# Patient Record
Sex: FEMALE | Race: WHITE | HISPANIC OR LATINO | Employment: OTHER | URBAN - METROPOLITAN AREA
[De-identification: names, ages, dates, MRNs, and addresses within clinical notes are randomized per-mention and may not be internally consistent; named-entity substitution may affect disease eponyms.]

---

## 2017-12-13 ENCOUNTER — CONVERSION ENCOUNTER (OUTPATIENT)
Dept: MAMMOGRAPHY | Facility: CLINIC | Age: 52
End: 2017-12-13

## 2018-06-11 LAB
ABSOL LYMPHOCYTES (HISTORICAL): 3.7 K/UL (ref 0.5–4)
ALBUMIN SERPL BCP-MCNC: 4 G/DL (ref 3–5.2)
ALP SERPL-CCNC: 114 U/L (ref 43–122)
ALT SERPL W P-5'-P-CCNC: 28 U/L (ref 9–52)
ANION GAP SERPL CALCULATED.3IONS-SCNC: 11 MMOL/L (ref 5–14)
AST SERPL W P-5'-P-CCNC: 17 U/L (ref 14–36)
BASOPHILS # BLD AUTO: 0.1 K/UL (ref 0–0.1)
BASOPHILS # BLD AUTO: 1 % (ref 0–1)
BILIRUB SERPL-MCNC: 0.4 MG/DL
BILIRUBIN DIRECT (HISTORICAL): 0.2 MG/DL
BUN SERPL-MCNC: 14 MG/DL (ref 5–25)
CALCIUM SERPL-MCNC: 10 MG/DL (ref 8.4–10.2)
CHLORIDE SERPL-SCNC: 97 MEQ/L (ref 97–108)
CHOLEST SERPL-MCNC: 163 MG/DL
CHOLEST/HDLC SERPL: 3.3 {RATIO}
CO2 SERPL-SCNC: 30 MMOL/L (ref 22–30)
CREATINE, SERUM (HISTORICAL): 0.51 MG/DL (ref 0.6–1.2)
DEPRECATED RDW RBC AUTO: 14.1 %
EGFR (HISTORICAL): >60 ML/MIN/1.73 M2
EOSINOPHIL # BLD AUTO: 0.3 K/UL (ref 0–0.4)
EOSINOPHIL NFR BLD AUTO: 2 % (ref 0–6)
GLUCOSE FASTING (HISTORICAL): 217 MG/DL (ref 70–99)
HCT VFR BLD AUTO: 43.7 % (ref 36–46)
HDLC SERPL-MCNC: 49 MG/DL
HGB BLD-MCNC: 14.3 G/DL (ref 12–16)
LDL/HDL RATIO (HISTORICAL): 1.5
LDLC SERPL CALC-MCNC: 73 MG/DL
LYMPHOCYTES NFR BLD AUTO: 34 % (ref 25–45)
MCH RBC QN AUTO: 26.9 PG (ref 26–34)
MCHC RBC AUTO-ENTMCNC: 32.7 % (ref 31–36)
MCV RBC AUTO: 82 FL (ref 80–100)
MONOCYTES # BLD AUTO: 0.5 K/UL (ref 0.2–0.9)
MONOCYTES NFR BLD AUTO: 4 % (ref 1–10)
NEUTROPHILS ABS COUNT (HISTORICAL): 6.5 K/UL (ref 1.8–7.8)
NEUTS SEG NFR BLD AUTO: 59 % (ref 45–65)
PLATELET # BLD AUTO: 251 K/MCL (ref 150–450)
POTASSIUM SERPL-SCNC: 4.6 MEQ/L (ref 3.6–5)
RBC # BLD AUTO: 5.32 M/MCL (ref 4–5.2)
SODIUM SERPL-SCNC: 139 MEQ/L (ref 137–147)
TOTAL PROTEIN (HISTORICAL): 7.4 G/DL (ref 5.9–8.4)
TRIGL SERPL-MCNC: 203 MG/DL
VLDLC SERPL CALC-MCNC: 41 MG/DL (ref 0–40)
WBC # BLD AUTO: 11.1 K/MCL (ref 4.5–11)

## 2018-06-22 ENCOUNTER — TRANSCRIBE ORDERS (OUTPATIENT)
Dept: ADMINISTRATIVE | Facility: HOSPITAL | Age: 53
End: 2018-06-22

## 2018-06-22 DIAGNOSIS — R51.9 FACIAL PAIN: ICD-10-CM

## 2018-06-22 DIAGNOSIS — R10.30 INGUINAL PAIN, UNSPECIFIED LATERALITY: Primary | ICD-10-CM

## 2018-06-26 ENCOUNTER — HOSPITAL ENCOUNTER (OUTPATIENT)
Dept: ULTRASOUND IMAGING | Facility: HOSPITAL | Age: 53
Discharge: HOME | End: 2018-06-26
Payer: MEDICARE

## 2018-06-26 ENCOUNTER — HOSPITAL ENCOUNTER (OUTPATIENT)
Dept: CT IMAGING | Facility: HOSPITAL | Age: 53
Discharge: HOME | End: 2018-06-26
Payer: MEDICARE

## 2018-06-26 DIAGNOSIS — R10.9 ABDOMINAL PAIN: ICD-10-CM

## 2018-06-26 DIAGNOSIS — R51.9 FACIAL PAIN: ICD-10-CM

## 2018-06-26 DIAGNOSIS — R10.30 INGUINAL PAIN, UNSPECIFIED LATERALITY: ICD-10-CM

## 2018-06-26 PROCEDURE — 70450 CT HEAD/BRAIN W/O DYE: CPT

## 2018-06-26 PROCEDURE — 76700 US EXAM ABDOM COMPLETE: CPT

## 2018-06-26 PROCEDURE — 76856 US EXAM PELVIC COMPLETE: CPT

## 2018-06-26 PROCEDURE — 76830 TRANSVAGINAL US NON-OB: CPT

## 2018-07-11 DIAGNOSIS — F32.A DEPRESSION, UNSPECIFIED DEPRESSION TYPE: Primary | ICD-10-CM

## 2018-07-11 DIAGNOSIS — J45.909 UNCOMPLICATED ASTHMA, UNSPECIFIED ASTHMA SEVERITY, UNSPECIFIED WHETHER PERSISTENT: ICD-10-CM

## 2018-07-11 RX ORDER — CALCIUM CARBONATE/VITAMIN D3 600 MG-20
TABLET,CHEWABLE ORAL
Qty: 60 TABLET | Refills: 5 | Status: SHIPPED | OUTPATIENT
Start: 2018-07-11 | End: 2018-09-07 | Stop reason: SDUPTHER

## 2018-07-11 RX ORDER — ESCITALOPRAM OXALATE 10 MG/1
TABLET ORAL
Qty: 30 TABLET | Refills: 3 | Status: SHIPPED | OUTPATIENT
Start: 2018-07-11 | End: 2018-09-07 | Stop reason: SDUPTHER

## 2018-07-24 ENCOUNTER — TRANSCRIBE ORDERS (OUTPATIENT)
Dept: ADMINISTRATIVE | Facility: HOSPITAL | Age: 53
End: 2018-07-24

## 2018-07-24 ENCOUNTER — LAB (OUTPATIENT)
Dept: LAB | Facility: HOSPITAL | Age: 53
End: 2018-07-24
Payer: MEDICARE

## 2018-07-24 DIAGNOSIS — M54.2 CERVICALGIA: ICD-10-CM

## 2018-07-24 DIAGNOSIS — E11.9 TYPE 2 DIABETES MELLITUS WITHOUT COMPLICATION, UNSPECIFIED WHETHER LONG TERM INSULIN USE (HCC): ICD-10-CM

## 2018-07-24 DIAGNOSIS — R11.0 NAUSEA: ICD-10-CM

## 2018-07-24 DIAGNOSIS — D51.9 ANEMIA DUE TO VITAMIN B12 DEFICIENCY, UNSPECIFIED B12 DEFICIENCY TYPE: ICD-10-CM

## 2018-07-24 DIAGNOSIS — E11.9 TYPE 2 DIABETES MELLITUS WITHOUT COMPLICATION, UNSPECIFIED WHETHER LONG TERM INSULIN USE (HCC): Primary | ICD-10-CM

## 2018-07-24 LAB
AMYLASE SERPL-CCNC: 56 IU/L (ref 30–110)
ANION GAP SERPL CALCULATED.3IONS-SCNC: 10 MMOL/L (ref 5–14)
BASOPHILS # BLD AUTO: 0.1 THOUSANDS/ΜL (ref 0–0.1)
BASOPHILS NFR BLD AUTO: 1 % (ref 0–1)
BUN SERPL-MCNC: 15 MG/DL (ref 5–25)
CALCIUM SERPL-MCNC: 9.8 MG/DL (ref 8.4–10.2)
CHLORIDE SERPL-SCNC: 100 MMOL/L (ref 97–108)
CO2 SERPL-SCNC: 31 MMOL/L (ref 22–30)
CREAT SERPL-MCNC: 0.52 MG/DL (ref 0.6–1.2)
EOSINOPHIL # BLD AUTO: 0.3 THOUSAND/ΜL (ref 0–0.4)
EOSINOPHIL NFR BLD AUTO: 3 % (ref 0–6)
ERYTHROCYTE [DISTWIDTH] IN BLOOD BY AUTOMATED COUNT: 14.2 %
ERYTHROCYTE [SEDIMENTATION RATE] IN BLOOD: 7 MM/HOUR (ref 1–20)
EST. AVERAGE GLUCOSE BLD GHB EST-MCNC: 240 MG/DL
FERRITIN SERPL-MCNC: 52 NG/ML (ref 8–388)
FOLATE SERPL-MCNC: 8 NG/ML (ref 3.1–17.5)
GFR SERPL CREATININE-BSD FRML MDRD: 109 ML/MIN/1.73SQ M
GLUCOSE P FAST SERPL-MCNC: 313 MG/DL (ref 70–99)
HBA1C MFR BLD: 10 % (ref 4.2–6.3)
HCT VFR BLD AUTO: 41.9 % (ref 36–46)
HGB BLD-MCNC: 13.8 G/DL (ref 12–16)
LIPASE SERPL-CCNC: 103 U/L (ref 23–300)
LYMPHOCYTES # BLD AUTO: 4 THOUSANDS/ΜL (ref 0.5–4)
LYMPHOCYTES NFR BLD AUTO: 41 % (ref 20–50)
MCH RBC QN AUTO: 27.4 PG (ref 26–34)
MCHC RBC AUTO-ENTMCNC: 32.9 G/DL (ref 31–36)
MCV RBC AUTO: 83 FL (ref 80–100)
MONOCYTES # BLD AUTO: 0.4 THOUSAND/ΜL (ref 0.2–0.9)
MONOCYTES NFR BLD AUTO: 4 % (ref 1–10)
NEUTROPHILS # BLD AUTO: 5 THOUSANDS/ΜL (ref 1.8–7.8)
NEUTS SEG NFR BLD AUTO: 51 % (ref 45–65)
PLATELET # BLD AUTO: 260 THOUSANDS/UL (ref 150–450)
PMV BLD AUTO: 9 FL (ref 8.9–12.7)
POTASSIUM SERPL-SCNC: 4.8 MMOL/L (ref 3.6–5)
RBC # BLD AUTO: 5.04 MILLION/UL (ref 4–5.2)
SODIUM SERPL-SCNC: 141 MMOL/L (ref 137–147)
WBC # BLD AUTO: 9.7 THOUSAND/UL (ref 4.5–11)

## 2018-07-24 PROCEDURE — 82150 ASSAY OF AMYLASE: CPT

## 2018-07-24 PROCEDURE — 80048 BASIC METABOLIC PNL TOTAL CA: CPT

## 2018-07-24 PROCEDURE — 83036 HEMOGLOBIN GLYCOSYLATED A1C: CPT | Performed by: INTERNAL MEDICINE

## 2018-07-24 PROCEDURE — 82746 ASSAY OF FOLIC ACID SERUM: CPT

## 2018-07-24 PROCEDURE — 83690 ASSAY OF LIPASE: CPT

## 2018-07-24 PROCEDURE — 85652 RBC SED RATE AUTOMATED: CPT

## 2018-07-24 PROCEDURE — 85025 COMPLETE CBC W/AUTO DIFF WBC: CPT

## 2018-07-24 PROCEDURE — 36415 COLL VENOUS BLD VENIPUNCTURE: CPT | Performed by: INTERNAL MEDICINE

## 2018-07-24 PROCEDURE — 82728 ASSAY OF FERRITIN: CPT

## 2018-07-31 ENCOUNTER — OFFICE VISIT (OUTPATIENT)
Dept: FAMILY MEDICINE CLINIC | Facility: CLINIC | Age: 53
End: 2018-07-31
Payer: MEDICARE

## 2018-07-31 VITALS
DIASTOLIC BLOOD PRESSURE: 72 MMHG | HEIGHT: 60 IN | WEIGHT: 160 LBS | SYSTOLIC BLOOD PRESSURE: 128 MMHG | HEART RATE: 72 BPM | TEMPERATURE: 97.5 F | BODY MASS INDEX: 31.41 KG/M2 | RESPIRATION RATE: 14 BRPM | OXYGEN SATURATION: 99 %

## 2018-07-31 DIAGNOSIS — E11.9 DIABETES MELLITUS WITHOUT COMPLICATION (HCC): Primary | ICD-10-CM

## 2018-07-31 DIAGNOSIS — E78.49 OTHER HYPERLIPIDEMIA: ICD-10-CM

## 2018-07-31 DIAGNOSIS — Z12.39 SCREENING BREAST EXAMINATION: ICD-10-CM

## 2018-07-31 DIAGNOSIS — E83.42 HYPOMAGNESEMIA: ICD-10-CM

## 2018-07-31 DIAGNOSIS — K80.20 GALL BLADDER STONES: ICD-10-CM

## 2018-07-31 PROCEDURE — 99214 OFFICE O/P EST MOD 30 MIN: CPT | Performed by: INTERNAL MEDICINE

## 2018-07-31 RX ORDER — MONTELUKAST SODIUM 10 MG/1
TABLET ORAL EVERY 24 HOURS
COMMUNITY
Start: 2018-06-13 | End: 2018-10-11 | Stop reason: SDUPTHER

## 2018-07-31 RX ORDER — MELOXICAM 7.5 MG/1
TABLET ORAL EVERY 12 HOURS
COMMUNITY
Start: 2018-06-20 | End: 2018-09-19 | Stop reason: SURG

## 2018-07-31 RX ORDER — ATORVASTATIN CALCIUM 10 MG/1
TABLET, FILM COATED ORAL
COMMUNITY
Start: 2018-06-13 | End: 2018-10-11 | Stop reason: SDUPTHER

## 2018-07-31 RX ORDER — INSULIN GLARGINE 100 [IU]/ML
INJECTION, SOLUTION SUBCUTANEOUS
Refills: 3 | COMMUNITY
Start: 2018-07-11 | End: 2018-07-31 | Stop reason: SDUPTHER

## 2018-07-31 RX ORDER — INSULIN GLARGINE 100 [IU]/ML
75 INJECTION, SOLUTION SUBCUTANEOUS DAILY
Qty: 5 PEN | Refills: 5 | Status: SHIPPED | OUTPATIENT
Start: 2018-07-31 | End: 2018-09-19 | Stop reason: SURG

## 2018-07-31 RX ORDER — METOPROLOL SUCCINATE 25 MG/1
TABLET, EXTENDED RELEASE ORAL
COMMUNITY
Start: 2018-06-13 | End: 2018-10-11 | Stop reason: SDUPTHER

## 2018-07-31 RX ORDER — PANTOPRAZOLE SODIUM 40 MG/1
TABLET, DELAYED RELEASE ORAL EVERY 12 HOURS
COMMUNITY
Start: 2018-06-13 | End: 2018-10-11 | Stop reason: SDUPTHER

## 2018-07-31 RX ORDER — ASPIRIN 81 MG/1
81 TABLET ORAL DAILY
Refills: 3 | COMMUNITY
Start: 2018-07-11 | End: 2018-11-30 | Stop reason: SDUPTHER

## 2018-07-31 RX ORDER — LANCETS 33 GAUGE
EACH MISCELLANEOUS
Refills: 5 | COMMUNITY
Start: 2018-06-20 | End: 2019-02-15 | Stop reason: SDUPTHER

## 2018-07-31 NOTE — PROGRESS NOTES
Assessment/Plan:         Diagnoses and all orders for this visit:    Diabetes mellitus without complication [facility]): not well controlled  Increase lantus to 75 Units Daily  Life Style Mod  RTC in 3mos w Bloodw ork  -     LANTUS SOLOSTAR 100 units/mL injection pen; Inject 75 Units under the skin daily  -     Ambulatory referral to Ophthalmology; Future  -     Basic metabolic panel; Future  -     CBC; Future  -     Hemoglobin A1C; Future  -     Lipid panel; Future  -     Hepatic function panel; Future  -     Urinalysis with reflex to microscopic; Future  -     Amylase; Future  -     Magnesium; Future    Gall bladder stones: Hidascan to evaluate the ejection fraction  Low fat Diet  -     NM hepatobiliary w rx; Future  -     Amylase; Future  -     Lipase; Future    Hypomagnesemia: Blood work  Consider Liquid form of magnesium  -     Basic metabolic panel; Future    Other hyperlipidemia  -     Lipid panel; Future    Screening breast examination  -     Mammo diagnostic bilateral w cad; Future    Other orders  -     indacaterol-glycopyrrolate (UTIBRON NEOHALER) 27 5-15 6 MCG inhaler; Every 12 hours  -     metoprolol succinate (TOPROL XL) 25 mg 24 hr tablet; take 1 Tablet by Oral route once daily in the evening  -     montelukast (SINGULAIR) 10 mg tablet; every 24 hours  -     pantoprazole (PROTONIX) 40 mg tablet; Every 12 hours  -     meloxicam (MOBIC) 7 5 mg tablet; Every 12 hours  -     metFORMIN (GLUCOPHAGE) 1000 MG tablet; Every 12 hours  -     glucose blood test strip; insert 1 by Injection route once to test sugar  -     aspirin (ECOTRIN LOW STRENGTH) 81 mg EC tablet;  Take 81 mg by mouth daily  -     atorvastatin (LIPITOR) 10 mg tablet; TAKE ONE TABLET BY MOUTH DAILY  -     Discontinue: LANTUS SOLOSTAR 100 units/mL injection pen; INJECT 50 UNITS TWICE A DAY SUBCUTANEOUS INJECTION EVERY DAY  -     COMFORT EZ PEN NEEDLES 32G X 4 MM MISC; INJECT SUB-Q INSULIN TWICE A DAY AS NEEDED [id number]        Subjective:      Patient ID: Mayi Salgado is a 46 y o  female  Nice 46 Y O lady with multiple Medical problems, is here today for Regular check Up, and Rt Upper Q  Tenderness    Recent blood work and Med list reviewed w Pt in Detail    She Could Not swallow the magnesium tabs           The following portions of the patient's history were reviewed and updated as appropriate: allergies, current medications, past family history, past medical history, past social history, past surgical history and problem list     Review of Systems   Constitutional: Negative for chills, fatigue and fever  HENT: Negative for congestion, facial swelling, sore throat, trouble swallowing and voice change  Eyes: Negative for pain, discharge and visual disturbance  Respiratory: Negative for cough, shortness of breath and wheezing  Cardiovascular: Negative for chest pain, palpitations and leg swelling  Gastrointestinal: Positive for abdominal pain  Negative for blood in stool, constipation, diarrhea and nausea  Endocrine: Negative for polydipsia, polyphagia and polyuria  Genitourinary: Negative for difficulty urinating, hematuria and urgency  Musculoskeletal: Negative for arthralgias and myalgias  Skin: Negative for rash  Neurological: Positive for dizziness  Negative for tremors, weakness and headaches  Hematological: Negative for adenopathy  Does not bruise/bleed easily  Psychiatric/Behavioral: Negative for dysphoric mood, sleep disturbance and suicidal ideas  Objective:      /72 (BP Location: Left arm, Patient Position: Sitting, Cuff Size: Standard)   Pulse 72   Temp 97 5 °F (36 4 °C) (Oral)   Resp 14   Ht 5' (1 524 m)   Wt 72 6 kg (160 lb)   SpO2 99%   BMI 31 25 kg/m²          Physical Exam   Constitutional: She is oriented to person, place, and time  She appears well-nourished  No distress  HENT:   Head: Normocephalic  Mouth/Throat: Oropharynx is clear and moist  No oropharyngeal exudate     Eyes: Conjunctivae are normal  Pupils are equal, round, and reactive to light  No scleral icterus  Neck: Neck supple  No thyromegaly present  Cardiovascular: Normal rate, regular rhythm and normal heart sounds  No murmur heard  Pulmonary/Chest: Effort normal and breath sounds normal  No respiratory distress  She has no wheezes  She has no rales  Abdominal: Soft  Bowel sounds are normal  There is tenderness  There is no rebound and no guarding  Epigastric and R U Q Tenderness   Mild      Musculoskeletal: She exhibits no edema or tenderness  Lymphadenopathy:     She has no cervical adenopathy  Neurological: She is alert and oriented to person, place, and time  No cranial nerve deficit  Coordination normal    Skin: No rash noted  No erythema  Psychiatric: She has a normal mood and affect

## 2018-09-07 DIAGNOSIS — F32.A DEPRESSION, UNSPECIFIED DEPRESSION TYPE: Primary | ICD-10-CM

## 2018-09-10 DIAGNOSIS — E78.49 OTHER HYPERLIPIDEMIA: Primary | ICD-10-CM

## 2018-09-10 RX ORDER — MONTELUKAST SODIUM 10 MG/1
TABLET ORAL
Qty: 30 TABLET | Refills: 5 | OUTPATIENT
Start: 2018-09-10

## 2018-09-10 RX ORDER — CALCIUM CARBONATE/VITAMIN D3 600 MG-20
TABLET,CHEWABLE ORAL
Qty: 60 TABLET | Refills: 5 | Status: SHIPPED | OUTPATIENT
Start: 2018-09-10 | End: 2019-02-12 | Stop reason: SDUPTHER

## 2018-09-10 RX ORDER — OXYBUTYNIN CHLORIDE 15 MG/1
TABLET, EXTENDED RELEASE ORAL
Qty: 30 TABLET | Refills: 1 | Status: SHIPPED | OUTPATIENT
Start: 2018-09-10 | End: 2018-11-19 | Stop reason: SDUPTHER

## 2018-09-10 RX ORDER — ATORVASTATIN CALCIUM 10 MG/1
TABLET, FILM COATED ORAL
Qty: 30 TABLET | Refills: 5 | OUTPATIENT
Start: 2018-09-10

## 2018-09-10 RX ORDER — ESCITALOPRAM OXALATE 10 MG/1
TABLET ORAL
Qty: 30 TABLET | Refills: 1 | Status: SHIPPED | OUTPATIENT
Start: 2018-09-10 | End: 2018-11-19 | Stop reason: SDUPTHER

## 2018-09-10 RX ORDER — METOPROLOL SUCCINATE 25 MG/1
TABLET, EXTENDED RELEASE ORAL
Qty: 30 TABLET | Refills: 5 | OUTPATIENT
Start: 2018-09-10

## 2018-09-10 RX ORDER — ASPIRIN 81 MG/1
TABLET ORAL
Qty: 30 TABLET | Refills: 5 | OUTPATIENT
Start: 2018-09-10

## 2018-09-13 ENCOUNTER — TRANSCRIBE ORDERS (OUTPATIENT)
Dept: ADMINISTRATIVE | Facility: HOSPITAL | Age: 53
End: 2018-09-13

## 2018-09-13 ENCOUNTER — APPOINTMENT (OUTPATIENT)
Dept: LAB | Facility: HOSPITAL | Age: 53
End: 2018-09-13
Payer: MEDICARE

## 2018-09-13 DIAGNOSIS — K80.20 GALL BLADDER STONES: ICD-10-CM

## 2018-09-13 DIAGNOSIS — E11.9 DIABETES MELLITUS WITHOUT COMPLICATION (HCC): ICD-10-CM

## 2018-09-13 LAB
AMYLASE SERPL-CCNC: 63 IU/L (ref 30–110)
ERYTHROCYTE [DISTWIDTH] IN BLOOD BY AUTOMATED COUNT: 14.1 %
EST. AVERAGE GLUCOSE BLD GHB EST-MCNC: 252 MG/DL
HBA1C MFR BLD: 10.4 % (ref 4.2–6.3)
HCT VFR BLD AUTO: 41.3 % (ref 36–46)
HGB BLD-MCNC: 13.7 G/DL (ref 12–16)
LIPASE SERPL-CCNC: 118 U/L (ref 23–300)
MAGNESIUM SERPL-MCNC: 1.8 MG/DL (ref 1.6–2.3)
MCH RBC QN AUTO: 27.5 PG (ref 26–34)
MCHC RBC AUTO-ENTMCNC: 33.1 G/DL (ref 31–36)
MCV RBC AUTO: 83 FL (ref 80–100)
PLATELET # BLD AUTO: 264 THOUSANDS/UL (ref 150–450)
PMV BLD AUTO: 9 FL (ref 8.9–12.7)
RBC # BLD AUTO: 4.97 MILLION/UL (ref 4–5.2)
WBC # BLD AUTO: 9.8 THOUSAND/UL (ref 4.5–11)

## 2018-09-13 PROCEDURE — 82150 ASSAY OF AMYLASE: CPT

## 2018-09-13 PROCEDURE — 85027 COMPLETE CBC AUTOMATED: CPT

## 2018-09-13 PROCEDURE — 83036 HEMOGLOBIN GLYCOSYLATED A1C: CPT

## 2018-09-13 PROCEDURE — 36415 COLL VENOUS BLD VENIPUNCTURE: CPT

## 2018-09-13 PROCEDURE — 83690 ASSAY OF LIPASE: CPT

## 2018-09-13 PROCEDURE — 83735 ASSAY OF MAGNESIUM: CPT

## 2018-09-19 ENCOUNTER — OFFICE VISIT (OUTPATIENT)
Dept: FAMILY MEDICINE CLINIC | Facility: CLINIC | Age: 53
End: 2018-09-19
Payer: MEDICARE

## 2018-09-19 VITALS
BODY MASS INDEX: 33.22 KG/M2 | DIASTOLIC BLOOD PRESSURE: 74 MMHG | RESPIRATION RATE: 14 BRPM | HEART RATE: 74 BPM | SYSTOLIC BLOOD PRESSURE: 126 MMHG | OXYGEN SATURATION: 97 % | TEMPERATURE: 98.3 F | WEIGHT: 169.2 LBS | HEIGHT: 60 IN

## 2018-09-19 DIAGNOSIS — R22.1 LOCALIZED SWELLING, MASS AND LUMP, NECK: Primary | ICD-10-CM

## 2018-09-19 DIAGNOSIS — R07.9 CHEST PAIN, UNSPECIFIED TYPE: ICD-10-CM

## 2018-09-19 DIAGNOSIS — E11.9 DIABETES MELLITUS WITHOUT COMPLICATION (HCC): ICD-10-CM

## 2018-09-19 DIAGNOSIS — M54.6 CHRONIC RIGHT-SIDED THORACIC BACK PAIN: ICD-10-CM

## 2018-09-19 DIAGNOSIS — F17.201 TOBACCO ABUSE, IN REMISSION: ICD-10-CM

## 2018-09-19 DIAGNOSIS — Z23 NEED FOR VACCINATION: ICD-10-CM

## 2018-09-19 DIAGNOSIS — G89.29 CHRONIC RIGHT-SIDED THORACIC BACK PAIN: ICD-10-CM

## 2018-09-19 PROCEDURE — 90471 IMMUNIZATION ADMIN: CPT

## 2018-09-19 PROCEDURE — 90682 RIV4 VACC RECOMBINANT DNA IM: CPT

## 2018-09-19 PROCEDURE — 99214 OFFICE O/P EST MOD 30 MIN: CPT | Performed by: INTERNAL MEDICINE

## 2018-09-19 PROCEDURE — 96372 THER/PROPH/DIAG INJ SC/IM: CPT | Performed by: INTERNAL MEDICINE

## 2018-09-19 RX ORDER — LANOLIN ALCOHOL/MO/W.PET/CERES
CREAM (GRAM) TOPICAL
Refills: 5 | COMMUNITY
Start: 2018-09-10 | End: 2018-10-11 | Stop reason: SDUPTHER

## 2018-09-19 NOTE — PROGRESS NOTES
Assessment/Plan:         Diagnoses and all orders for this visit:    Localized swelling, mass and lump, neck: long H/O Smoking  She quit 9 years ago  Should do :  -     US head neck soft tissue; Future    Diabetes mellitus without complication [facility]): Not well Controlled  Increase lantus to 90 U BID  Continue Metformin 1000 mg BID  RTC in 3mos w Blood work  -     influenza vaccine, 1517-2016, quadrivalent, recombinant, PF, 0 5 mL, for patients 18 yr+ (FLUBLOK)  -     insulin glargine (LANTUS SOLOSTAR) 100 units/mL injection pen; Inject 90 Units under the skin every 12 (twelve) hours  -     Ambulatory referral to Ophthalmology; Future  -     Basic metabolic panel; Future  -     CBC and differential; Future  -     TSH, 3rd generation; Future  -     Hemoglobin A1C; Future  -     Urinalysis with reflex to microscopic; Future  -     Lipid panel; Future  -     Hepatic function panel; Future    Chronic right-sided thoracic back pain: moist Heat  Tylenol PRN  X rays    Tobacco abuse, in remission: quit few Years ago  Low Dose Ct Lungs    Chest pain, unspecified type  -     XR ribs right w pa chest min 3 views; Future    Need for vaccination   Done    Other orders  -     magnesium Oxide (MAG-OX) 400 mg TABS; TAKE 1 TABLET BY ORAL ROUTE ONCE        Subjective:      Patient ID: Roslyn Carmen is a 46 y o  female  46 Y O lady with H/O DM,Obesity,HTN,  Is here for Regular Check up  Recent blood work showed elevated HgA1c still    Pt is NOT Compliant with Life Style Mod and Txs    Med list reviewed w pt in Detail    She complains of tenderness Rt chest wall and left wrist  The following portions of the patient's history were reviewed and updated as appropriate: allergies, current medications, past family history, past medical history, past social history, past surgical history and problem list     Review of Systems   Constitutional: Positive for fatigue  Negative for chills and fever     HENT: Negative for congestion, facial swelling, sore throat, trouble swallowing and voice change  Eyes: Negative for pain, discharge and visual disturbance  Respiratory: Negative for cough, shortness of breath and wheezing  Cardiovascular: Positive for chest pain  Negative for palpitations and leg swelling  Gastrointestinal: Negative for abdominal pain, blood in stool, constipation, diarrhea and nausea  Endocrine: Negative for polydipsia, polyphagia and polyuria  Genitourinary: Negative for difficulty urinating, hematuria and urgency  Musculoskeletal: Positive for back pain  Negative for arthralgias and myalgias  Skin: Negative for rash  Neurological: Negative for dizziness, tremors, weakness and headaches  Hematological: Negative for adenopathy  Does not bruise/bleed easily  Psychiatric/Behavioral: Negative for dysphoric mood, sleep disturbance and suicidal ideas  Objective:      /74 (BP Location: Left arm, Patient Position: Sitting, Cuff Size: Standard)   Pulse 74   Temp 98 3 °F (36 8 °C) (Oral)   Resp 14   Ht 5' (1 524 m)   Wt 76 7 kg (169 lb 3 2 oz)   SpO2 97%   BMI 33 04 kg/m²          Physical Exam   Constitutional: She is oriented to person, place, and time  She appears well-nourished  No distress  HENT:   Head: Normocephalic  Mouth/Throat: Oropharynx is clear and moist  No oropharyngeal exudate  Eyes: Conjunctivae are normal  Pupils are equal, round, and reactive to light  No scleral icterus  Neck: Neck supple  No thyromegaly present  Cardiovascular: Normal rate, regular rhythm and normal heart sounds  No murmur heard  Pulmonary/Chest: Effort normal and breath sounds normal  No respiratory distress  She has no wheezes  She has no rales  Abdominal: Soft  Bowel sounds are normal  She exhibits no distension  There is no tenderness  There is no rebound and no guarding  Obese   Musculoskeletal: She exhibits tenderness  She exhibits no edema     Rt Side Chest wall and Mid back tenderness? ? Lymphadenopathy:     She has no cervical adenopathy  Neurological: She is alert and oriented to person, place, and time  No cranial nerve deficit  Coordination normal    Skin: No rash noted  No erythema  No pallor  Psychiatric: She has a normal mood and affect

## 2018-10-01 DIAGNOSIS — E11.9 DIABETES MELLITUS WITHOUT COMPLICATION (HCC): ICD-10-CM

## 2018-10-01 DIAGNOSIS — E78.49 OTHER HYPERLIPIDEMIA: Primary | ICD-10-CM

## 2018-10-01 DIAGNOSIS — F32.A DEPRESSION, UNSPECIFIED DEPRESSION TYPE: ICD-10-CM

## 2018-10-01 DIAGNOSIS — I10 ESSENTIAL HYPERTENSION: ICD-10-CM

## 2018-10-02 RX ORDER — MONTELUKAST SODIUM 10 MG/1
TABLET ORAL
Qty: 30 TABLET | Refills: 5 | OUTPATIENT
Start: 2018-10-02

## 2018-10-02 RX ORDER — ESCITALOPRAM OXALATE 10 MG/1
TABLET ORAL
Qty: 30 TABLET | Refills: 3 | OUTPATIENT
Start: 2018-10-02

## 2018-10-02 RX ORDER — OXYBUTYNIN CHLORIDE 15 MG/1
TABLET, EXTENDED RELEASE ORAL
Qty: 30 TABLET | Refills: 5 | OUTPATIENT
Start: 2018-10-02

## 2018-10-02 RX ORDER — ASPIRIN 81 MG/1
TABLET ORAL
Qty: 30 TABLET | Refills: 5 | OUTPATIENT
Start: 2018-10-02

## 2018-10-02 RX ORDER — ATORVASTATIN CALCIUM 10 MG/1
TABLET, FILM COATED ORAL
Qty: 30 TABLET | Refills: 5 | OUTPATIENT
Start: 2018-10-02

## 2018-10-02 RX ORDER — METOPROLOL SUCCINATE 25 MG/1
TABLET, EXTENDED RELEASE ORAL
Qty: 30 TABLET | Refills: 5 | OUTPATIENT
Start: 2018-10-02

## 2018-10-09 DIAGNOSIS — E78.49 OTHER HYPERLIPIDEMIA: ICD-10-CM

## 2018-10-09 DIAGNOSIS — J30.1 ALLERGIC RHINITIS DUE TO POLLEN, UNSPECIFIED SEASONALITY: ICD-10-CM

## 2018-10-09 DIAGNOSIS — E11.9 DIABETES MELLITUS WITHOUT COMPLICATION (HCC): ICD-10-CM

## 2018-10-09 DIAGNOSIS — I10 ESSENTIAL HYPERTENSION: Primary | ICD-10-CM

## 2018-10-10 RX ORDER — METOPROLOL SUCCINATE 25 MG/1
TABLET, EXTENDED RELEASE ORAL
Qty: 30 TABLET | Refills: 5 | Status: CANCELLED | OUTPATIENT
Start: 2018-10-10

## 2018-10-10 RX ORDER — ATORVASTATIN CALCIUM 10 MG/1
TABLET, FILM COATED ORAL
Qty: 30 TABLET | Refills: 5 | Status: CANCELLED | OUTPATIENT
Start: 2018-10-10

## 2018-10-10 RX ORDER — MONTELUKAST SODIUM 10 MG/1
TABLET ORAL
Qty: 30 TABLET | Refills: 5 | Status: CANCELLED | OUTPATIENT
Start: 2018-10-10

## 2018-10-11 DIAGNOSIS — J45.909 UNCOMPLICATED ASTHMA, UNSPECIFIED ASTHMA SEVERITY, UNSPECIFIED WHETHER PERSISTENT: ICD-10-CM

## 2018-10-11 DIAGNOSIS — I10 ESSENTIAL HYPERTENSION: ICD-10-CM

## 2018-10-11 DIAGNOSIS — E11.9 DIABETES MELLITUS WITHOUT COMPLICATION (HCC): ICD-10-CM

## 2018-10-11 DIAGNOSIS — E78.49 OTHER HYPERLIPIDEMIA: Primary | ICD-10-CM

## 2018-10-11 RX ORDER — ASPIRIN 81 MG/1
TABLET ORAL
Qty: 30 TABLET | Refills: 5 | OUTPATIENT
Start: 2018-10-11

## 2018-10-11 RX ORDER — PANTOPRAZOLE SODIUM 40 MG/1
40 TABLET, DELAYED RELEASE ORAL DAILY
Qty: 90 TABLET | Refills: 3 | Status: SHIPPED | OUTPATIENT
Start: 2018-10-11 | End: 2019-06-24 | Stop reason: SDUPTHER

## 2018-10-11 RX ORDER — LANOLIN ALCOHOL/MO/W.PET/CERES
400 CREAM (GRAM) TOPICAL 2 TIMES DAILY
Qty: 180 TABLET | Refills: 3 | Status: SHIPPED | OUTPATIENT
Start: 2018-10-11 | End: 2019-04-15 | Stop reason: SDUPTHER

## 2018-10-11 RX ORDER — METOPROLOL SUCCINATE 25 MG/1
25 TABLET, EXTENDED RELEASE ORAL DAILY
Qty: 90 TABLET | Refills: 3 | Status: SHIPPED | OUTPATIENT
Start: 2018-10-11 | End: 2019-06-24 | Stop reason: SDUPTHER

## 2018-10-11 RX ORDER — ALBUTEROL SULFATE 90 UG/1
2 AEROSOL, METERED RESPIRATORY (INHALATION) EVERY 6 HOURS PRN
Qty: 1 INHALER | Refills: 5 | Status: SHIPPED | OUTPATIENT
Start: 2018-10-11 | End: 2018-11-09 | Stop reason: ALTCHOICE

## 2018-10-11 RX ORDER — METFORMIN HYDROCHLORIDE EXTENDED-RELEASE TABLETS 1000 MG/1
TABLET, FILM COATED, EXTENDED RELEASE ORAL
Qty: 180 TABLET | OUTPATIENT
Start: 2018-10-11

## 2018-10-11 RX ORDER — ATORVASTATIN CALCIUM 10 MG/1
10 TABLET, FILM COATED ORAL DAILY
Qty: 90 TABLET | Refills: 3 | Status: SHIPPED | OUTPATIENT
Start: 2018-10-11 | End: 2019-06-24 | Stop reason: SDUPTHER

## 2018-10-11 RX ORDER — MONTELUKAST SODIUM 10 MG/1
10 TABLET ORAL EVERY 24 HOURS
Qty: 90 TABLET | Refills: 3 | Status: SHIPPED | OUTPATIENT
Start: 2018-10-11 | End: 2019-06-24 | Stop reason: SDUPTHER

## 2018-11-02 ENCOUNTER — ANNUAL EXAM (OUTPATIENT)
Dept: OBGYN CLINIC | Facility: CLINIC | Age: 53
End: 2018-11-02
Payer: COMMERCIAL

## 2018-11-02 VITALS
SYSTOLIC BLOOD PRESSURE: 120 MMHG | DIASTOLIC BLOOD PRESSURE: 70 MMHG | HEIGHT: 60 IN | BODY MASS INDEX: 33.18 KG/M2 | WEIGHT: 169 LBS

## 2018-11-02 DIAGNOSIS — Z01.419 ENCOUNTER FOR GYNECOLOGICAL EXAMINATION (GENERAL) (ROUTINE) WITHOUT ABNORMAL FINDINGS: Primary | ICD-10-CM

## 2018-11-02 DIAGNOSIS — Z12.4 ENCOUNTER FOR PAPANICOLAOU SMEAR FOR CERVICAL CANCER SCREENING: ICD-10-CM

## 2018-11-02 DIAGNOSIS — Z12.39 SCREENING BREAST EXAMINATION: ICD-10-CM

## 2018-11-02 PROCEDURE — G0101 CA SCREEN;PELVIC/BREAST EXAM: HCPCS | Performed by: OBSTETRICS & GYNECOLOGY

## 2018-11-02 PROCEDURE — 88175 CYTOPATH C/V AUTO FLUID REDO: CPT | Performed by: OBSTETRICS & GYNECOLOGY

## 2018-11-02 PROCEDURE — 87624 HPV HI-RISK TYP POOLED RSLT: CPT | Performed by: OBSTETRICS & GYNECOLOGY

## 2018-11-02 NOTE — PROGRESS NOTES
Assessment/Plan:           Diagnoses and all orders for this visit:    Encounter for gynecological examination (general) (routine) without abnormal findings    Encounter for Papanicolaou smear for cervical cancer screening  -     Liquid-based pap, diagnostic    Screening breast examination  -     Mammo screening bilateral w cad; Future              Subjective:      Patient ID: Murray Granda is a 46 y o  female presents for annual exam   She thinks her LMP was in 2010 and she denies PMB  Last pap was May 2016 and was negative  HPI    The following portions of the patient's history were reviewed and updated as appropriate: allergies, current medications, past family history, past medical history, past social history, past surgical history and problem list     Review of Systems      Objective:      /70   Ht 5' (1 524 m)   Wt 76 7 kg (169 lb)   BMI 33 01 kg/m²          Physical Exam   Constitutional: She is oriented to person, place, and time  She appears well-developed and well-nourished  No distress  HENT:   Head: Normocephalic  Neck: No thyromegaly present  Cardiovascular: Normal rate, regular rhythm and normal heart sounds  No murmur heard  Pulmonary/Chest: Effort normal and breath sounds normal  No respiratory distress  She has no wheezes  She has no rales  She exhibits no tenderness  Right breast exhibits no inverted nipple, no mass, no nipple discharge, no skin change and no tenderness  Left breast exhibits tenderness  Left breast exhibits no inverted nipple, no mass, no nipple discharge and no skin change  Abdominal: Soft  Bowel sounds are normal  She exhibits no distension and no mass  There is no tenderness  There is no rebound and no guarding  Genitourinary: Vagina normal and uterus normal  Rectal exam shows no external hemorrhoid  No breast swelling, tenderness, discharge or bleeding  No labial fusion  There is no rash, tenderness, lesion or injury on the right labia   There is no rash, tenderness, lesion or injury on the left labia  Uterus is not deviated, not enlarged, not fixed and not tender  Cervix exhibits no motion tenderness, no discharge and no friability  Right adnexum displays no mass, no tenderness and no fullness  Left adnexum displays no mass, no tenderness and no fullness  No erythema, tenderness or bleeding in the vagina  No foreign body in the vagina  No signs of injury around the vagina  No vaginal discharge found  Musculoskeletal: She exhibits no edema  Lymphadenopathy:        Right: No inguinal adenopathy present  Left: No inguinal adenopathy present  Neurological: She is alert and oriented to person, place, and time  Skin: Skin is warm and dry  Psychiatric: She has a normal mood and affect  Her behavior is normal  Judgment and thought content normal    Vitals reviewed

## 2018-11-06 LAB
HPV HR 12 DNA CVX QL NAA+PROBE: NEGATIVE
HPV16 DNA CVX QL NAA+PROBE: NEGATIVE
HPV18 DNA CVX QL NAA+PROBE: NEGATIVE

## 2018-11-07 LAB
LAB AP GYN PRIMARY INTERPRETATION: NORMAL
Lab: NORMAL

## 2018-11-09 DIAGNOSIS — J45.909 UNCOMPLICATED ASTHMA, UNSPECIFIED ASTHMA SEVERITY, UNSPECIFIED WHETHER PERSISTENT: Primary | ICD-10-CM

## 2018-11-09 RX ORDER — ALBUTEROL SULFATE 90 UG/1
2 AEROSOL, METERED RESPIRATORY (INHALATION) EVERY 6 HOURS PRN
Qty: 1 INHALER | Refills: 5 | Status: SHIPPED | OUTPATIENT
Start: 2018-11-09 | End: 2019-10-25 | Stop reason: SDUPTHER

## 2018-11-19 DIAGNOSIS — F32.A DEPRESSION, UNSPECIFIED DEPRESSION TYPE: ICD-10-CM

## 2018-11-19 RX ORDER — ESCITALOPRAM OXALATE 10 MG/1
TABLET ORAL
Qty: 30 TABLET | Refills: 5 | Status: SHIPPED | OUTPATIENT
Start: 2018-11-19 | End: 2019-04-08 | Stop reason: SDUPTHER

## 2018-11-19 RX ORDER — OXYBUTYNIN CHLORIDE 15 MG/1
TABLET, EXTENDED RELEASE ORAL
Qty: 30 TABLET | Refills: 5 | Status: SHIPPED | OUTPATIENT
Start: 2018-11-19 | End: 2018-11-20 | Stop reason: SDUPTHER

## 2018-11-20 DIAGNOSIS — F32.A DEPRESSION, UNSPECIFIED DEPRESSION TYPE: ICD-10-CM

## 2018-11-20 RX ORDER — OXYBUTYNIN CHLORIDE 15 MG/1
15 TABLET, EXTENDED RELEASE ORAL DAILY
Qty: 30 TABLET | Refills: 3 | Status: SHIPPED | OUTPATIENT
Start: 2018-11-20 | End: 2019-08-14

## 2018-11-30 DIAGNOSIS — I10 ESSENTIAL HYPERTENSION: Primary | ICD-10-CM

## 2018-11-30 RX ORDER — ASPIRIN 81 MG/1
TABLET ORAL
Qty: 30 TABLET | Refills: 5 | Status: SHIPPED | OUTPATIENT
Start: 2018-11-30 | End: 2018-12-19 | Stop reason: SDUPTHER

## 2018-12-14 ENCOUNTER — HOSPITAL ENCOUNTER (OUTPATIENT)
Dept: MAMMOGRAPHY | Facility: CLINIC | Age: 53
Discharge: HOME | End: 2018-12-14
Payer: COMMERCIAL

## 2018-12-14 VITALS — HEIGHT: 60 IN | BODY MASS INDEX: 33.18 KG/M2 | WEIGHT: 169 LBS

## 2018-12-14 DIAGNOSIS — Z12.39 SCREENING BREAST EXAMINATION: ICD-10-CM

## 2018-12-14 PROCEDURE — 77067 SCR MAMMO BI INCL CAD: CPT

## 2018-12-19 ENCOUNTER — OFFICE VISIT (OUTPATIENT)
Dept: FAMILY MEDICINE CLINIC | Facility: CLINIC | Age: 53
End: 2018-12-19
Payer: COMMERCIAL

## 2018-12-19 VITALS
SYSTOLIC BLOOD PRESSURE: 100 MMHG | WEIGHT: 167 LBS | RESPIRATION RATE: 14 BRPM | TEMPERATURE: 97.5 F | DIASTOLIC BLOOD PRESSURE: 60 MMHG | HEART RATE: 70 BPM | BODY MASS INDEX: 29.59 KG/M2 | HEIGHT: 63 IN | OXYGEN SATURATION: 97 %

## 2018-12-19 DIAGNOSIS — Z01.00 DIABETIC EYE EXAM (HCC): ICD-10-CM

## 2018-12-19 DIAGNOSIS — I10 ESSENTIAL HYPERTENSION: ICD-10-CM

## 2018-12-19 DIAGNOSIS — M19.90 ARTHRITIS: ICD-10-CM

## 2018-12-19 DIAGNOSIS — IMO0002 TYPE II DIABETES MELLITUS WITH MANIFESTATIONS, UNCONTROLLED: Primary | ICD-10-CM

## 2018-12-19 DIAGNOSIS — Z11.59 NEED FOR HEPATITIS C SCREENING TEST: ICD-10-CM

## 2018-12-19 DIAGNOSIS — E11.69 HYPERLIPIDEMIA ASSOCIATED WITH TYPE 2 DIABETES MELLITUS (HCC): ICD-10-CM

## 2018-12-19 DIAGNOSIS — R10.11 RIGHT UPPER QUADRANT ABDOMINAL PAIN: ICD-10-CM

## 2018-12-19 DIAGNOSIS — E11.9 ENCOUNTER FOR DIABETIC FOOT EXAM (HCC): ICD-10-CM

## 2018-12-19 DIAGNOSIS — Z23 NEED FOR VACCINATION: ICD-10-CM

## 2018-12-19 DIAGNOSIS — E66.9 OBESITY (BMI 30-39.9): ICD-10-CM

## 2018-12-19 DIAGNOSIS — E11.9 DIABETIC EYE EXAM (HCC): ICD-10-CM

## 2018-12-19 DIAGNOSIS — E78.5 HYPERLIPIDEMIA ASSOCIATED WITH TYPE 2 DIABETES MELLITUS (HCC): ICD-10-CM

## 2018-12-19 DIAGNOSIS — E66.3 OVERWEIGHT (BMI 25.0-29.9): ICD-10-CM

## 2018-12-19 PROCEDURE — 96372 THER/PROPH/DIAG INJ SC/IM: CPT | Performed by: INTERNAL MEDICINE

## 2018-12-19 PROCEDURE — 99214 OFFICE O/P EST MOD 30 MIN: CPT | Performed by: INTERNAL MEDICINE

## 2018-12-19 PROCEDURE — 3008F BODY MASS INDEX DOCD: CPT | Performed by: INTERNAL MEDICINE

## 2018-12-19 PROCEDURE — 90670 PCV13 VACCINE IM: CPT

## 2018-12-19 PROCEDURE — 3074F SYST BP LT 130 MM HG: CPT | Performed by: INTERNAL MEDICINE

## 2018-12-19 PROCEDURE — 1036F TOBACCO NON-USER: CPT | Performed by: INTERNAL MEDICINE

## 2018-12-19 PROCEDURE — G0009 ADMIN PNEUMOCOCCAL VACCINE: HCPCS

## 2018-12-19 PROCEDURE — 3078F DIAST BP <80 MM HG: CPT | Performed by: INTERNAL MEDICINE

## 2018-12-19 RX ORDER — ASPIRIN 81 MG/1
81 TABLET ORAL DAILY
Qty: 90 TABLET | Refills: 3 | Status: SHIPPED | OUTPATIENT
Start: 2018-12-19 | End: 2019-12-13 | Stop reason: SDUPTHER

## 2018-12-19 NOTE — PROGRESS NOTES
Assessment/Plan:         Diagnoses and all orders for this visit:    Type II diabetes mellitus with manifestations, uncontrolled ([place] [address] ): Life Style mOd  Continue same meds  RTC in 1-2 mos w Blood work  -     Microalbumin / creatinine urine ratio  -     PNEUMOCOCCAL CONJUGATE VACCINE 13-VALENT GREATER THAN 6 MONTHS  -     Basic metabolic panel; Future  -     CBC and differential; Future  -     Hemoglobin A1C; Future  -     Lipid panel; Future  -     Hepatic function panel; Future  -     Magnesium; Future  -     TSH, 3rd generation; Future  -     Urinalysis with reflex to microscopic  -     aspirin (ECOTRIN LOW STRENGTH) 81 mg EC tablet; Take 1 tablet (81 mg total) by mouth daily    Need for hepatitis C screening test  -     Hepatitis C antibody; Future    Diabetic eye exam ([place] [address] )  -     Visual acuity screening    Encounter for diabetic foot exam ([place] [address] )  -     Ambulatory referral to Podiatry; Future    Hyperlipidemia associated with type 2 diabetes mellitus ([place] [address] ): life style Mod  Continue same meds  RTC in 2 mos w Blood work    Obesity (BMI 30-39 9); Life style Mod  -     TSH, 3rd generation; Future    Essential hypertension  -     aspirin (ECOTRIN LOW STRENGTH) 81 mg EC tablet; Take 1 tablet (81 mg total) by mouth daily    Arthritis  -     XR hip/pelv 2-3 vws left if performed; Future  -     XR knee 3 vw left non injury; Future    Right upper quadrant abdominal pain  -     US abdomen complete; Future  Patient's shoes and socks removed  Right Foot/Ankle   Right Foot Inspection  Skin Exam: skin normal, skin intact, callus and callus no dry skin, no warmth, no erythema, no maceration, no abnormal color, no pre-ulcer and no ulcer                          Toe Exam: ROM and strength within normal limitsno swelling, no tenderness and erythema  Sensory   Vibration: intact  Proprioception: intact   Monofilament testing: intact  Vascular    The right DP pulse is 2+  The right PT pulse is 2+       Left Foot/Ankle  Left Foot Inspection  Skin Exam: skin normal, skin intact and callusno dry skin, no warmth, no erythema, no maceration, normal color, no pre-ulcer and no ulcer                         Toe Exam: ROM and strength within normal limitsno swelling and no tenderness                   Sensory   Vibration: intact  Proprioception: intact  Monofilament: intact  Vascular    The left DP pulse is 2+  The left PT pulse is 2+  Assign Risk Category:  No deformity present; No loss of protective sensation; No weak pulses       Risk: 0    Need for vaccination    Other orders  -     Calcium Carbonate-Vitamin D 600-400 MG-UNIT per tablet; TAKE 2 TABLETS DAILY WITH LUNCH        Subjective:      Patient ID: Roland Thompson is a 46 y o  female  [address] with H/O DM,Obesity,  Is here for Regular check up and On/Off Tenderness RUQ, and On/Off pain Knees Hips     No recent blood work, Med list reviewed w pt in detail        The following portions of the patient's history were reviewed and updated as appropriate: allergies, current medications, past family history, past medical history, past social history, past surgical history and problem list     Review of Systems   Constitutional: Positive for fatigue  Negative for chills and fever  HENT: Negative for congestion, facial swelling, sore throat, trouble swallowing and voice change  Eyes: Negative for pain, discharge and visual disturbance  Respiratory: Negative for cough, shortness of breath and wheezing  Cardiovascular: Negative for chest pain, palpitations and leg swelling  Gastrointestinal: Positive for abdominal pain  Negative for blood in stool, constipation, diarrhea and nausea  Endocrine: Negative for polydipsia, polyphagia and polyuria  Genitourinary: Negative for difficulty urinating, hematuria and urgency  Musculoskeletal: Positive for arthralgias  Negative for myalgias  Skin: Negative for rash     Neurological: Negative for dizziness, tremors, weakness and headaches  Hematological: Negative for adenopathy  Does not bruise/bleed easily  Psychiatric/Behavioral: Negative for dysphoric mood, sleep disturbance and suicidal ideas  Objective:      /60 (BP Location: Left arm, Patient Position: Sitting, Cuff Size: Standard)   Pulse 70   Temp 97 5 °F (36 4 °C) (Tympanic)   Resp 14   Ht 5' 3" (1 6 m)   Wt 75 8 kg (167 lb)   SpO2 97%   BMI 29 58 kg/m²          Physical Exam   Constitutional: She is oriented to person, place, and time  She appears well-nourished  No distress  HENT:   Head: Normocephalic  Mouth/Throat: Oropharynx is clear and moist  No oropharyngeal exudate  Eyes: Pupils are equal, round, and reactive to light  Conjunctivae are normal  No scleral icterus  Neck: Neck supple  No thyromegaly present  Cardiovascular: Normal rate, regular rhythm and normal heart sounds  Pulses are no weak pulses  No murmur heard  Pulses:       Dorsalis pedis pulses are 2+ on the right side, and 2+ on the left side  Posterior tibial pulses are 2+ on the right side, and 2+ on the left side  Pulmonary/Chest: Effort normal and breath sounds normal  No respiratory distress  She has no wheezes  She has no rales  Abdominal: Soft  Bowel sounds are normal  She exhibits no distension  There is tenderness  There is no rebound and no guarding  Obese   Musculoskeletal: She exhibits tenderness  She exhibits no edema  Feet:   Right Foot:   Skin Integrity: Positive for callus  Negative for ulcer, skin breakdown, erythema, warmth or dry skin  Left Foot:   Skin Integrity: Positive for callus  Negative for ulcer, skin breakdown, erythema, warmth or dry skin  Lymphadenopathy:     She has no cervical adenopathy  Neurological: She is alert and oriented to person, place, and time  No cranial nerve deficit  Coordination normal    Skin: No rash noted  No erythema  No pallor  Psychiatric: She has a normal mood and affect  BMI Counseling:  Body mass index is 29 58 kg/m²  Discussed the patient's BMI with her  The BMI is above average  BMI counseling and education was provided to the patient  Nutrition recommendations include reducing portion sizes

## 2018-12-19 NOTE — PATIENT INSTRUCTIONS
Low Fat Diet   AMBULATORY CARE:   A low-fat diet  is an eating plan that is low in total fat, unhealthy fat, and cholesterol  You may need to follow a low-fat diet if you have trouble digesting or absorbing fat  You may also need to follow this diet if you have high cholesterol  You can also lower your cholesterol by increasing the amount of fiber in your diet  Soluble fiber is a type of fiber that helps to decrease cholesterol levels  Different types of fat in food:   · Limit unhealthy fats  A diet that is high in cholesterol, saturated fat, and trans fat may cause unhealthy cholesterol levels  Unhealthy cholesterol levels increase your risk of heart disease  ¨ Cholesterol:  Limit intake of cholesterol to less than 200 mg per day  Cholesterol is found in meat, eggs, and dairy  ¨ Saturated fat:  Limit saturated fat to less than 7% of your total daily calories  Ask your dietitian how many calories you need each day  Saturated fat is found in butter, cheese, ice cream, whole milk, and palm oil  Saturated fat is also found in meat, such as beef, pork, chicken skin, and processed meats  Processed meats include sausage, hot dogs, and bologna  ¨ Trans fat:  Avoid trans fat as much as possible  Trans fat is used in fried and baked foods  Foods that say trans fat free on the label may still have up to 0 5 grams of trans fat per serving  · Include healthy fats  Replace foods that are high in saturated and trans fat with foods high in healthy fats  This may help to decrease high cholesterol levels  ¨ Monounsaturated fats: These are found in avocados, nuts, and vegetable oils, such as olive, canola, and sunflower oil  ¨ Polyunsaturated fats: These can be found in vegetable oils, such as soybean or corn oil  Omega-3 fats can help to decrease the risk of heart disease  Omega-3 fats are found in fish, such as salmon, herring, trout, and tuna   Omega-3 fats can also be found in plant foods, such as walnuts, flaxseed, soybeans, and canola oil    Foods to limit or avoid:   · Grains:      ¨ Snacks that are made with partially hydrogenated oils, such as chips, regular crackers, and butter-flavored popcorn    ¨ High-fat baked goods, such as biscuits, croissants, doughnuts, pies, cookies, and pastries    · Dairy:      ¨ Whole milk, 2% milk, and yogurt and ice cream made with whole milk    ¨ Half and half creamer, heavy cream, and whipping cream    ¨ Cheese, cream cheese, and sour cream    · Meats and proteins:      ¨ High-fat cuts of meat (T-bone steak, regular hamburger, and ribs)    ¨ Fried meat, poultry (turkey and chicken), and fish    ¨ Poultry (chicken and turkey) with skin    ¨ Cold cuts (salami or bologna), hot dogs, guido, and sausage    ¨ Whole eggs and egg yolks    · Vegetables and fruits with added fat:      ¨ Fried vegetables or vegetables in butter or high-fat sauces, such as cream or cheese sauces    ¨ Fried fruit or fruit served with butter or cream    · Fats:      ¨ Butter, stick margarine, and shortening    ¨ Coconut, palm oil, and palm kernel oil  Foods to include:   · Grains:      ¨ Whole-grain breads, cereals, pasta, and brown rice    ¨ Low-fat crackers and pretzels    · Vegetables and fruits:      ¨ Fresh, frozen, or canned vegetables (no salt or low-sodium)    ¨ Fresh, frozen, dried, or canned fruit (canned in light syrup or fruit juice)    ¨ Avocado    · Low-fat dairy products:      ¨ Nonfat (skim) or 1% milk    ¨ Nonfat or low-fat cheese, yogurt, and cottage cheese    · Meats and proteins:      ¨ Chicken or turkey with no skin    ¨ Baked or broiled fish    ¨ Lean beef and pork (loin, round, extra lean hamburger)    ¨ Beans and peas, unsalted nuts, soy products    ¨ Egg whites and substitutes    ¨ Seeds and nuts    · Fats:      ¨ Unsaturated oil, such as canola, olive, peanut, soybean, or sunflower oil    ¨ Soft or liquid margarine and vegetable oil spread    ¨ Low-fat salad dressing  Other ways to decrease fat:   · Read food labels before you buy foods  Choose foods that have less than 30% of calories from fat  Choose low-fat or fat-free dairy products  Remember that fat free does not mean calorie free  These foods still contain calories, and too many calories can lead to weight gain  · Trim fat from meat and avoid fried food  Trim all visible fat from meat before you cook it  Remove the skin from poultry  Do not braun meat, fish, or poultry  Bake, roast, boil, or broil these foods instead  Avoid fried foods  Eat a baked potato instead of [place] fries  Steam vegetables instead of sautéing them in butter  · Add less fat to foods  Use imitation guido bits on salads and baked potatoes instead of regular guido bits  Use fat-free or low-fat salad dressings instead of regular dressings  Use low-fat or nonfat butter-flavored topping instead of regular butter or margarine on popcorn and other foods  Ways to decrease fat in recipes:  Replace high-fat ingredients with low-fat or nonfat ones  This may cause baked goods to be drier than usual  You may need to use nonfat cooking spray on pans to prevent food from sticking  You also may need to change the amount of other ingredients, such as water, in the recipe  Try the following:  · Use low-fat or light margarine instead of regular margarine or shortening  · Use lean ground turkey breast or chicken, or lean ground beef (less than 5% fat) instead of hamburger  · Add 1 teaspoon of canola oil to 8 ounces of skim milk instead of using cream or half and half  · Use grated zucchini, carrots, or apples in breads instead of coconut  · Use blenderized, low-fat cottage cheese, plain tofu, or low-fat ricotta cheese instead of cream cheese  · Use 1 egg white and 1 teaspoon of canola oil, or use ¼ cup (2 ounces) of fat-free egg substitute instead of a whole egg       · Replace half of the oil that is called for in a recipe with applesauce when you bake  Use 3 tablespoons of cocoa powder and 1 tablespoon of canola oil instead of a square of baking chocolate  How to increase fiber:  Eat enough high-fiber foods to get 20 to 30 grams of fiber every day  Slowly increase your fiber intake to avoid stomach cramps, gas, and other problems  · Eat 3 ounces of whole-grain foods each day  An ounce is about 1 slice of bread  Eat whole-grain breads, such as whole-wheat bread  Whole wheat, whole-wheat flour, or other whole grains should be listed as the first ingredient on the food label  Replace white flour with whole-grain flour or use half of each in recipes  Whole-grain flour is heavier than white flour, so you may have to add more yeast or baking powder  · Eat a high-fiber cereal for breakfast   Oatmeal is a good source of soluble fiber  Look for cereals that have bran or fiber in the name  Choose whole-grain products, such as brown rice, barley, and whole-wheat pasta  · Eat more beans, peas, and lentils  For example, add beans to soups or salads  Eat at least 5 cups of fruits and vegetables each day  Eat fruits and vegetables with the peel because the peel is high in fiber  © 2017 [address] [employer] Information is for End User's use only and may not be sold, redistributed or otherwise used for commercial purposes  All illustrations and images included in CareNotes® are the copyrighted property of [employer]  or [employer]  The above information is an  only  It is not intended as medical advice for individual conditions or treatments  Talk to your doctor, nurse or pharmacist before following any medical regimen to see if it is safe and effective for you  Heart Healthy Diet   AMBULATORY CARE:   A heart healthy diet  is an eating plan low in total fat, unhealthy fats, and sodium (salt)  A heart healthy diet helps decrease your risk for heart disease and stroke   Limit the amount of fat you eat to 25% to 35% of your total daily calories  Limit sodium to less than 2,300 mg each day  Healthy fats:  Healthy fats can help improve cholesterol levels  The risk for heart disease is decreased when cholesterol levels are normal  Choose healthy fats, such as the following:  · Unsaturated fat  is found in foods such as soybean, canola, olive, corn, and safflower oils  It is also found in soft tub margarine that is made with liquid vegetable oil  · Omega-3 fat  is found in certain fish, such as salmon, tuna, and trout, and in walnuts and flaxseed  Unhealthy fats:  Unhealthy fats can cause unhealthy cholesterol levels in your blood and increase your risk of heart disease  Limit unhealthy fats, such as the following:  · Cholesterol  is found in animal foods, such as eggs and lobster, and in dairy products made from whole milk  Limit cholesterol to less than 300 milligrams (mg) each day  You may need to limit cholesterol to 200 mg each day if you have heart disease  · Saturated fat  is found in meats, such as guido and hamburger  It is also found in chicken or turkey skin, whole milk, and butter  Limit saturated fat to less than 7% of your total daily calories  Limit saturated fat to less than 6% if you have heart disease or are at increased risk for it  · Trans fat  is found in packaged foods, such as potato chips and cookies  It is also in hard margarine, some fried foods, and shortening  Avoid trans fats as much as possible    Heart healthy foods and drinks to include:  Ask your dietitian or healthcare provider how many servings to have from each of the following food groups:  · Grains:      ¨ Whole-wheat breads, cereals, and pastas, and brown rice    ¨ Low-fat, low-sodium crackers and chips    · Vegetables:      ¨ Broccoli, green beans, green peas, and spinach    ¨ Collards, kale, and lima beans    ¨ Carrots, sweet potatoes, tomatoes, and peppers    ¨ Canned vegetables with no salt added    · Fruits:      ¨ Bananas, peaches, pears, and pineapple    ¨ Grapes, raisins, and dates    ¨ Oranges, tangerines, grapefruit, orange juice, and grapefruit juice    ¨ Apricots, mangoes, melons, and papaya    ¨ Raspberries and strawberries    ¨ Canned fruit with no added sugar    · Low-fat dairy products:      ¨ Nonfat (skim) milk, 1% milk, and low-fat almond, cashew, or soy milks fortified with calcium    ¨ Low-fat cheese, regular or frozen yogurt, and cottage cheese    · Meats and proteins , such as lean cuts of beef and pork (loin, leg, round), skinless chicken and turkey, legumes, soy products, egg whites, and nuts  Foods and drinks to limit or avoid:  Ask your dietitian or healthcare provider about these and other foods that are high in unhealthy fat, sodium, and sugar:  · Snack or packaged foods , such as frozen dinners, cookies, macaroni and cheese, and cereals with more than 300 mg of sodium per serving    · Canned or dry mixes  for cakes, soups, sauces, or gravies    · Vegetables with added sodium , such as instant potatoes, vegetables with added sauces, or regular canned vegetables    · Other foods high in sodium , such as ketchup, barbecue sauce, salad dressing, pickles, olives, soy sauce, and miso    · High-fat dairy foods  such as whole or 2% milk, cream cheese, or sour cream, and cheeses     · High-fat protein foods  such as high-fat cuts of beef (T-bone steaks, ribs), chicken or turkey with skin, and organ meats, such as liver    · Cured or smoked meats , such as hot dogs, guido, and sausage    · Unhealthy fats and oils , such as butter, stick margarine, shortening, and cooking oils such as coconut or palm oil    · Food and drinks high in sugar , such as soft drinks (soda), sports drinks, sweetened tea, candy, cake, cookies, pies, and doughnuts  Other diet guidelines to follow:   · Eat more foods containing omega-3 fats  Eat fish high in omega-3 fats at least 2 times a week  · Limit alcohol    Too much alcohol can damage your heart and raise your blood pressure  Women should limit alcohol to 1 drink a day  Men should limit alcohol to 2 drinks a day  A drink of alcohol is 12 ounces of beer, 5 ounces of wine, or 1½ ounces of liquor  · Choose low-sodium foods  High-sodium foods can lead to high blood pressure  Add little or no salt to food you prepare  Use herbs and spices in place of salt  · Eat more fiber  to help lower cholesterol levels  Eat at least 5 servings of fruits and vegetables each day  Eat 3 ounces of whole-grain foods each day  Legumes (beans) are also a good source of fiber  Lifestyle guidelines:   · Do not smoke  Nicotine and other chemicals in cigarettes and cigars can cause lung and heart damage  Ask your healthcare provider for information if you currently smoke and need help to quit  E-cigarettes or smokeless tobacco still contain nicotine  Talk to your healthcare provider before you use these products  · Exercise regularly  to help you maintain a healthy weight and improve your blood pressure and cholesterol levels  Ask your healthcare provider about the best exercise plan for you  Do not start an exercise program without asking your healthcare provider  Follow up with your healthcare provider as directed:  Write down your questions so you remember to ask them during your visits  © 2017 [facility] Information is for End User's use only and may not be sold, redistributed or otherwise used for commercial purposes  All illustrations and images included in CareNotes® are the copyrighted property of [employer] , [employer]  or [employer]  The above information is an  only  It is not intended as medical advice for individual conditions or treatments  Talk to your doctor, nurse or pharmacist before following any medical regimen to see if it is safe and effective for you  Calorie Counting Diet   WHAT YOU NEED TO KNOW:   What is a calorie counting diet?   It is a meal plan based on counting calories each day to reach a healthy body weight  You will need to eat fewer calories if you are trying to lose weight  Weight loss may decrease your risk for certain health problems or improve your health if you have health problems  Some of these health problems include heart disease, high blood pressure, and diabetes  What foods should I avoid? Your dietitian will tell you if you need to avoid certain foods based on your body weight and health condition  You may need to avoid high-fat foods if you are at risk for or have heart disease  You may need to eat fewer foods from the breads and starches food group if you have diabetes  How many calories are in foods? The following is a list of foods and drinks with the approximate number of calories in each  Check the food label to find the exact number of calories  A dietitian can tell you how many calories you should have from each food group each day    · Carbohydrate:      ¨ ½ of a 3-inch bagel, 1 slice of bread, or ½ of a hamburger bun or hot dog bun (80)    ¨ 1 (8-inch) flour tortilla or ½ cup of cooked rice (100)    ¨ 1 (6-inch) corn tortilla (80)    ¨ 1 (6-inch) pancake or 1 cup of bran flakes cereal (110)    ¨ ½ cup of cooked cereal (80)    ¨ ½ cup of cooked pasta (85)    ¨ 1 ounce of pretzels (100)    ¨ 3 cups of air-popped popcorn without butter or oil (80)    · Dairy:      ¨ 1 cup of skim or 1% milk (90)    ¨ 1 cup of 2% milk (120)    ¨ 1 cup of whole milk (160)    ¨ 1 cup of 2% chocolate milk (220)    ¨ 1 ounce of low-fat cheese with 3 grams of fat per ounce (70)    ¨ 1 ounce of cheddar cheese (114)    ¨ ½ cup of 1% fat cottage cheese (80)    ¨ 1 cup of plain or sugar-free, fat-free yogurt (90)    · Protein foods:      ¨ 3 ounces of fish (not breaded or fried) (95)    ¨ 3 ounces of breaded, fried fish (195)    ¨ ¾ cup of tuna canned in water (105)    ¨ 3 ounces of chicken breast without skin (105)    ¨ 1 fried chicken breast with skin (350)    ¨ ¼ cup of fat free egg substitute (40)    ¨ 1 large egg (75)    ¨ 3 ounces of lean beef or pork (165)    ¨ 3 ounces of fried pork chop or ham (185)    ¨ ½ cup of cooked dried beans, such as kidney, oliveira, lentils, or navy (115)    ¨ 3 ounces of bologna or lunch meat (225)    ¨ 2 links of breakfast sausage (140)    · Vegetables:      ¨ ½ cup of sliced mushrooms (10)    ¨ 1 cup of salad greens, such as lettuce, spinach, or moira (15)    ¨ ½ cup of steamed asparagus (20)    ¨ ½ cup of cooked summer squash, zucchini squash, or green or wax beans (25)    ¨ 1 cup of broccoli or cauliflower florets, or 1 medium tomato (25)    ¨ 1 large raw carrot or ½ cup of cooked carrots (40)    ¨ ? of a medium cucumber or 1 stalk of celery (5)    ¨ 1 small baked potato (160)    ¨ 1 cup of breaded, fried vegetables (230)    · Fruit:      ¨ 1 (6-inch) banana (55)     ¨ ½ of a 4-inch grapefruit (55)    ¨ 15 grapes (60)    ¨ 1 medium orange or apple (70)    ¨ 1 large peach (65)    ¨ 1 cup of fresh pineapple chunks (75)    ¨ 1 cup of melon cubes (50)    ¨ 1¼ cups of whole strawberries (45)    ¨ ½ cup of fruit canned in juice (55)    ¨ ½ cup of fruit canned in heavy syrup (110)    ¨ ?  cup of raisins (130)    ¨ ½ cup of unsweetened fruit juice (60)    ¨ ½ cup of grape, cranberry, or prune juice (90)    · Fat:      ¨ 10 peanuts or 2 teaspoons of peanut butter (55)    ¨ 2 tablespoons of avocado or 1 tablespoon of regular salad dressing (45)    ¨ 2 slices of guido (90)    ¨ 1 teaspoon of oil, such as safflower, canola, corn, or olive oil (45)    ¨ 2 teaspoons of low-fat margarine, or 1 tablespoon of low-fat mayonnaise (50)    ¨ 1 teaspoon of regular margarine (40)    ¨ 1 tablespoon of regular mayonnaise (135)    ¨ 1 tablespoon of cream cheese or 2 tablespoons of low-fat cream cheese (45)    ¨ 2 tablespoons of vegetable shortening (215)    · Dessert and sweets:      ¨ 8 animal crackers or 5 vanilla wafers (80)    ¨ 1 frozen fruit juice bar (80)    ¨ ½ cup of ice milk or low-fat frozen yogurt (90)    ¨ ½ cup of sherbet or sorbet (125)    ¨ ½ cup of sugar-free pudding or custard (60)    ¨ ½ cup of ice cream (140)    ¨ ½ cup of pudding or custard (175)    ¨ 1 (2-inch) square chocolate brownie (185)    · Combination foods:      ¨ Bean burrito made with an 8-inch tortilla, without cheese (275)    ¨ Chicken breast sandwich with lettuce and tomato (325)    ¨ 1 cup of chicken noodle soup (60)    ¨ 1 beef taco (175)    ¨ Regular hamburger with lettuce and tomato (310)    ¨ Regular cheeseburger with lettuce and tomato (410)     ¨ ¼ of a 12-inch cheese pizza (280)    ¨ Fried fish sandwich with lettuce and tomato (425)    ¨ Hot dog and bun (275)    ¨ 1½ cups of macaroni and cheese (310)    ¨ Taco salad with a fried tortilla shell (870)    · Low-calorie foods:      ¨ 1 tablespoon of ketchup or 1 tablespoon of fat free sour cream (15)    ¨ 1 teaspoon of mustard (5)    ¨ ¼ cup of salsa (20)    ¨ 1 large dill pickle (15)    ¨ 1 tablespoon of fat free salad dressing (10)    ¨ 2 teaspoons of low-sugar, light jam or jelly, or 1 tablespoon of sugar-free syrup (15)    ¨ 1 sugar-free popsicle (15)    ¨ 1 cup of club soda, seltzer water, or diet soda (0)  CARE AGREEMENT:   You have the right to help plan your care  Discuss treatment options with your caregivers to decide what care you want to receive  You always have the right to refuse treatment  The above information is an  only  It is not intended as medical advice for individual conditions or treatments  Talk to your doctor, nurse or pharmacist before following any medical regimen to see if it is safe and effective for you  © 2017 [address] [employer] Information is for End User's use only and may not be sold, redistributed or otherwise used for commercial purposes   All illustrations and images included in CareNotes® are the copyrighted property of [employer]  or [facility] [employer]

## 2019-02-12 DIAGNOSIS — F32.A DEPRESSION, UNSPECIFIED DEPRESSION TYPE: ICD-10-CM

## 2019-02-13 RX ORDER — CALCIUM CARBONATE/VITAMIN D3 600 MG-20
TABLET,CHEWABLE ORAL
Qty: 60 TABLET | Refills: 5 | Status: SHIPPED | OUTPATIENT
Start: 2019-02-13 | End: 2019-08-05 | Stop reason: SDUPTHER

## 2019-02-15 DIAGNOSIS — E11.9 TYPE 2 DIABETES MELLITUS WITHOUT COMPLICATION, UNSPECIFIED WHETHER LONG TERM INSULIN USE (HCC): Primary | ICD-10-CM

## 2019-02-15 RX ORDER — LANCETS 33 GAUGE
EACH MISCELLANEOUS
Qty: 400 EACH | Refills: 5 | Status: SHIPPED | OUTPATIENT
Start: 2019-02-15 | End: 2020-12-28

## 2019-03-25 ENCOUNTER — HOSPITAL ENCOUNTER (OUTPATIENT)
Dept: RADIOLOGY | Facility: HOSPITAL | Age: 54
Discharge: HOME | End: 2019-03-25
Payer: COMMERCIAL

## 2019-03-25 ENCOUNTER — APPOINTMENT (OUTPATIENT)
Dept: LAB | Facility: HOSPITAL | Age: 54
End: 2019-03-25
Payer: COMMERCIAL

## 2019-03-25 DIAGNOSIS — M19.90 ARTHRITIS: ICD-10-CM

## 2019-03-25 DIAGNOSIS — Z11.59 NEED FOR HEPATITIS C SCREENING TEST: ICD-10-CM

## 2019-03-25 DIAGNOSIS — IMO0002 TYPE II DIABETES MELLITUS WITH MANIFESTATIONS, UNCONTROLLED: ICD-10-CM

## 2019-03-25 DIAGNOSIS — E11.9 DIABETES MELLITUS WITHOUT COMPLICATION (HCC): ICD-10-CM

## 2019-03-25 DIAGNOSIS — E66.9 OBESITY (BMI 30-39.9): ICD-10-CM

## 2019-03-25 LAB
ALBUMIN SERPL BCP-MCNC: 4.3 G/DL (ref 3–5.2)
ALP SERPL-CCNC: 132 U/L (ref 43–122)
ALT SERPL W P-5'-P-CCNC: 29 U/L (ref 9–52)
ANION GAP SERPL CALCULATED.3IONS-SCNC: 11 MMOL/L (ref 5–14)
AST SERPL W P-5'-P-CCNC: 18 U/L (ref 14–36)
BACTERIA UR QL AUTO: ABNORMAL /HPF
BASOPHILS # BLD AUTO: 0.1 THOUSANDS/ΜL (ref 0–0.1)
BASOPHILS NFR BLD AUTO: 1 % (ref 0–1)
BILIRUB DIRECT SERPL-MCNC: 0 MG/DL
BILIRUB SERPL-MCNC: 0.3 MG/DL
BILIRUB UR QL STRIP: NEGATIVE
BUN SERPL-MCNC: 15 MG/DL (ref 5–25)
CALCIUM SERPL-MCNC: 10.1 MG/DL (ref 8.4–10.2)
CHLORIDE SERPL-SCNC: 101 MMOL/L (ref 97–108)
CHOLEST SERPL-MCNC: 149 MG/DL
CLARITY UR: ABNORMAL
CO2 SERPL-SCNC: 29 MMOL/L (ref 22–30)
COLOR UR: YELLOW
CREAT SERPL-MCNC: 0.55 MG/DL (ref 0.6–1.2)
CREAT UR-MCNC: 104 MG/DL
EOSINOPHIL # BLD AUTO: 0.3 THOUSAND/ΜL (ref 0–0.4)
EOSINOPHIL NFR BLD AUTO: 3 % (ref 0–6)
ERYTHROCYTE [DISTWIDTH] IN BLOOD BY AUTOMATED COUNT: 14.6 %
EST. AVERAGE GLUCOSE BLD GHB EST-MCNC: 206 MG/DL
GFR SERPL CREATININE-BSD FRML MDRD: 107 ML/MIN/1.73SQ M
GLUCOSE P FAST SERPL-MCNC: 290 MG/DL (ref 70–99)
GLUCOSE UR STRIP-MCNC: ABNORMAL MG/DL
HBA1C MFR BLD: 8.8 % (ref 4.2–6.3)
HCT VFR BLD AUTO: 41.3 % (ref 36–46)
HDLC SERPL-MCNC: 41 MG/DL (ref 40–59)
HGB BLD-MCNC: 13.1 G/DL (ref 12–16)
HGB UR QL STRIP.AUTO: NEGATIVE
KETONES UR STRIP-MCNC: NEGATIVE MG/DL
LDLC SERPL CALC-MCNC: 79 MG/DL
LEUKOCYTE ESTERASE UR QL STRIP: 500
LYMPHOCYTES # BLD AUTO: 3.4 THOUSANDS/ΜL (ref 0.5–4)
LYMPHOCYTES NFR BLD AUTO: 33 % (ref 25–45)
MAGNESIUM SERPL-MCNC: 1.7 MG/DL (ref 1.6–2.3)
MCH RBC QN AUTO: 26 PG (ref 26–34)
MCHC RBC AUTO-ENTMCNC: 31.7 G/DL (ref 31–36)
MCV RBC AUTO: 82 FL (ref 80–100)
MICROALBUMIN UR-MCNC: 129 MG/L (ref 0–20)
MICROALBUMIN/CREAT 24H UR: 124 MG/G CREATININE (ref 0–30)
MONOCYTES # BLD AUTO: 0.4 THOUSAND/ΜL (ref 0.2–0.9)
MONOCYTES NFR BLD AUTO: 4 % (ref 1–10)
NEUTROPHILS # BLD AUTO: 6 THOUSANDS/ΜL (ref 1.8–7.8)
NEUTS SEG NFR BLD AUTO: 58 % (ref 45–65)
NITRITE UR QL STRIP: NEGATIVE
NON-SQ EPI CELLS URNS QL MICRO: ABNORMAL /HPF
NONHDLC SERPL-MCNC: 108 MG/DL
PH UR STRIP.AUTO: 5 [PH]
PLATELET # BLD AUTO: 272 THOUSANDS/UL (ref 150–450)
PMV BLD AUTO: 8.6 FL (ref 8.9–12.7)
POTASSIUM SERPL-SCNC: 4.3 MMOL/L (ref 3.6–5)
PROT SERPL-MCNC: 7.6 G/DL (ref 5.9–8.4)
PROT UR STRIP-MCNC: ABNORMAL MG/DL
RBC # BLD AUTO: 5.04 MILLION/UL (ref 4–5.2)
RBC #/AREA URNS AUTO: ABNORMAL /HPF
SODIUM SERPL-SCNC: 141 MMOL/L (ref 137–147)
SP GR UR STRIP.AUTO: 1.02 (ref 1–1.04)
TRIGL SERPL-MCNC: 143 MG/DL
TSH SERPL DL<=0.05 MIU/L-ACNC: 2.82 UIU/ML (ref 0.47–4.68)
UROBILINOGEN UA: NEGATIVE MG/DL
WBC # BLD AUTO: 10.2 THOUSAND/UL (ref 4.5–11)
WBC #/AREA URNS AUTO: ABNORMAL /HPF

## 2019-03-25 PROCEDURE — 36415 COLL VENOUS BLD VENIPUNCTURE: CPT

## 2019-03-25 PROCEDURE — 83036 HEMOGLOBIN GLYCOSYLATED A1C: CPT

## 2019-03-25 PROCEDURE — 73502 X-RAY EXAM HIP UNI 2-3 VIEWS: CPT

## 2019-03-25 PROCEDURE — 80061 LIPID PANEL: CPT

## 2019-03-25 PROCEDURE — 3060F POS MICROALBUMINURIA REV: CPT | Performed by: INTERNAL MEDICINE

## 2019-03-25 PROCEDURE — 81001 URINALYSIS AUTO W/SCOPE: CPT | Performed by: INTERNAL MEDICINE

## 2019-03-25 PROCEDURE — 80076 HEPATIC FUNCTION PANEL: CPT

## 2019-03-25 PROCEDURE — 80048 BASIC METABOLIC PNL TOTAL CA: CPT

## 2019-03-25 PROCEDURE — 82570 ASSAY OF URINE CREATININE: CPT | Performed by: INTERNAL MEDICINE

## 2019-03-25 PROCEDURE — 86803 HEPATITIS C AB TEST: CPT

## 2019-03-25 PROCEDURE — 85025 COMPLETE CBC W/AUTO DIFF WBC: CPT

## 2019-03-25 PROCEDURE — 82043 UR ALBUMIN QUANTITATIVE: CPT | Performed by: INTERNAL MEDICINE

## 2019-03-25 PROCEDURE — 73562 X-RAY EXAM OF KNEE 3: CPT

## 2019-03-25 PROCEDURE — 84443 ASSAY THYROID STIM HORMONE: CPT

## 2019-03-25 PROCEDURE — 83735 ASSAY OF MAGNESIUM: CPT

## 2019-03-26 LAB — HCV AB SER QL: NORMAL

## 2019-04-08 DIAGNOSIS — F32.A DEPRESSION, UNSPECIFIED DEPRESSION TYPE: ICD-10-CM

## 2019-04-08 RX ORDER — ESCITALOPRAM OXALATE 10 MG/1
TABLET ORAL
Qty: 30 TABLET | Refills: 5 | Status: SHIPPED | OUTPATIENT
Start: 2019-04-08 | End: 2019-07-09 | Stop reason: SDUPTHER

## 2019-04-09 ENCOUNTER — OFFICE VISIT (OUTPATIENT)
Dept: FAMILY MEDICINE CLINIC | Facility: CLINIC | Age: 54
End: 2019-04-09
Payer: COMMERCIAL

## 2019-04-09 VITALS
SYSTOLIC BLOOD PRESSURE: 116 MMHG | OXYGEN SATURATION: 98 % | BODY MASS INDEX: 29.77 KG/M2 | WEIGHT: 168 LBS | TEMPERATURE: 98.1 F | DIASTOLIC BLOOD PRESSURE: 66 MMHG | HEART RATE: 68 BPM | HEIGHT: 63 IN | RESPIRATION RATE: 14 BRPM

## 2019-04-09 DIAGNOSIS — R10.84 GENERALIZED ABDOMINAL PAIN: ICD-10-CM

## 2019-04-09 DIAGNOSIS — E66.9 OBESITY (BMI 30-39.9): ICD-10-CM

## 2019-04-09 DIAGNOSIS — IMO0002 TYPE II DIABETES MELLITUS WITH MANIFESTATIONS, UNCONTROLLED: ICD-10-CM

## 2019-04-09 DIAGNOSIS — Z12.11 SCREENING FOR COLON CANCER: ICD-10-CM

## 2019-04-09 DIAGNOSIS — Z00.01 ENCOUNTER FOR GENERAL ADULT MEDICAL EXAMINATION WITH ABNORMAL FINDINGS: Primary | ICD-10-CM

## 2019-04-09 DIAGNOSIS — R01.1 HEART MURMUR: ICD-10-CM

## 2019-04-09 DIAGNOSIS — J30.9 ALLERGIC RHINITIS, UNSPECIFIED SEASONALITY, UNSPECIFIED TRIGGER: ICD-10-CM

## 2019-04-09 DIAGNOSIS — M81.0 OSTEOPOROSIS, UNSPECIFIED OSTEOPOROSIS TYPE, UNSPECIFIED PATHOLOGICAL FRACTURE PRESENCE: ICD-10-CM

## 2019-04-09 DIAGNOSIS — E11.9 DIABETES MELLITUS WITHOUT COMPLICATION (HCC): Primary | ICD-10-CM

## 2019-04-09 PROCEDURE — G0439 PPPS, SUBSEQ VISIT: HCPCS | Performed by: INTERNAL MEDICINE

## 2019-04-09 PROCEDURE — 3008F BODY MASS INDEX DOCD: CPT | Performed by: INTERNAL MEDICINE

## 2019-04-09 PROCEDURE — 1036F TOBACCO NON-USER: CPT | Performed by: INTERNAL MEDICINE

## 2019-04-09 PROCEDURE — 99214 OFFICE O/P EST MOD 30 MIN: CPT | Performed by: INTERNAL MEDICINE

## 2019-04-09 RX ORDER — FEXOFENADINE HCL 180 MG/1
180 TABLET ORAL DAILY
Qty: 30 TABLET | Refills: 5 | Status: SHIPPED | OUTPATIENT
Start: 2019-04-09 | End: 2020-09-03

## 2019-04-09 RX ORDER — DIPHENHYDRAMINE HCL 25 MG
25 TABLET ORAL
Qty: 30 TABLET | Refills: 5 | Status: SHIPPED | OUTPATIENT
Start: 2019-04-09 | End: 2019-08-14

## 2019-04-09 RX ORDER — MELOXICAM 7.5 MG/1
7.5 TABLET ORAL DAILY
Qty: 30 TABLET | Refills: 1 | Status: SHIPPED | OUTPATIENT
Start: 2019-04-09 | End: 2019-05-07 | Stop reason: SDUPTHER

## 2019-04-10 RX ORDER — LANOLIN ALCOHOL/MO/W.PET/CERES
400 CREAM (GRAM) TOPICAL 2 TIMES DAILY
Qty: 180 TABLET | Refills: 3 | OUTPATIENT
Start: 2019-04-10

## 2019-04-10 RX ORDER — LEVOCETIRIZINE DIHYDROCHLORIDE 5 MG/1
TABLET, FILM COATED ORAL
Qty: 90 TABLET | Refills: 1 | OUTPATIENT
Start: 2019-04-10

## 2019-04-15 DIAGNOSIS — E11.9 DIABETES MELLITUS WITHOUT COMPLICATION (HCC): ICD-10-CM

## 2019-04-15 RX ORDER — LANOLIN ALCOHOL/MO/W.PET/CERES
400 CREAM (GRAM) TOPICAL DAILY
Qty: 90 TABLET | Refills: 0 | Status: SHIPPED | OUTPATIENT
Start: 2019-04-15 | End: 2019-08-13 | Stop reason: SDUPTHER

## 2019-04-17 ENCOUNTER — HOSPITAL ENCOUNTER (OUTPATIENT)
Dept: ULTRASOUND IMAGING | Facility: HOSPITAL | Age: 54
Discharge: HOME | End: 2019-04-17
Payer: COMMERCIAL

## 2019-04-17 ENCOUNTER — HOSPITAL ENCOUNTER (OUTPATIENT)
Dept: BONE DENSITY | Facility: CLINIC | Age: 54
Discharge: HOME | End: 2019-04-17
Payer: COMMERCIAL

## 2019-04-17 ENCOUNTER — APPOINTMENT (OUTPATIENT)
Dept: LAB | Facility: HOSPITAL | Age: 54
End: 2019-04-17
Payer: COMMERCIAL

## 2019-04-17 DIAGNOSIS — R10.84 GENERALIZED ABDOMINAL PAIN: ICD-10-CM

## 2019-04-17 DIAGNOSIS — J30.9 ALLERGIC RHINITIS, UNSPECIFIED SEASONALITY, UNSPECIFIED TRIGGER: ICD-10-CM

## 2019-04-17 DIAGNOSIS — IMO0002 TYPE II DIABETES MELLITUS WITH MANIFESTATIONS, UNCONTROLLED: ICD-10-CM

## 2019-04-17 DIAGNOSIS — M81.0 OSTEOPOROSIS, UNSPECIFIED OSTEOPOROSIS TYPE, UNSPECIFIED PATHOLOGICAL FRACTURE PRESENCE: ICD-10-CM

## 2019-04-17 DIAGNOSIS — R52 GENERALIZED PAIN: ICD-10-CM

## 2019-04-17 LAB
ANION GAP SERPL CALCULATED.3IONS-SCNC: 11 MMOL/L (ref 5–14)
BUN SERPL-MCNC: 13 MG/DL (ref 5–25)
CALCIUM SERPL-MCNC: 9.7 MG/DL (ref 8.4–10.2)
CHLORIDE SERPL-SCNC: 98 MMOL/L (ref 97–108)
CO2 SERPL-SCNC: 28 MMOL/L (ref 22–30)
CREAT SERPL-MCNC: 0.55 MG/DL (ref 0.6–1.2)
EST. AVERAGE GLUCOSE BLD GHB EST-MCNC: 229 MG/DL
GFR SERPL CREATININE-BSD FRML MDRD: 107 ML/MIN/1.73SQ M
GLUCOSE P FAST SERPL-MCNC: 187 MG/DL (ref 70–99)
HBA1C MFR BLD: 9.6 % (ref 4.2–6.3)
POTASSIUM SERPL-SCNC: 4.1 MMOL/L (ref 3.6–5)
SODIUM SERPL-SCNC: 137 MMOL/L (ref 137–147)

## 2019-04-17 PROCEDURE — 76856 US EXAM PELVIC COMPLETE: CPT

## 2019-04-17 PROCEDURE — 83036 HEMOGLOBIN GLYCOSYLATED A1C: CPT

## 2019-04-17 PROCEDURE — 77080 DXA BONE DENSITY AXIAL: CPT

## 2019-04-17 PROCEDURE — 76830 TRANSVAGINAL US NON-OB: CPT

## 2019-04-17 PROCEDURE — 36415 COLL VENOUS BLD VENIPUNCTURE: CPT

## 2019-04-17 PROCEDURE — 86003 ALLG SPEC IGE CRUDE XTRC EA: CPT

## 2019-04-17 PROCEDURE — 80048 BASIC METABOLIC PNL TOTAL CA: CPT

## 2019-04-17 PROCEDURE — 76700 US EXAM ABDOM COMPLETE: CPT

## 2019-04-17 PROCEDURE — 82785 ASSAY OF IGE: CPT

## 2019-04-19 LAB
A ALTERNATA IGE QN: <0.1 KUA/I
A FUMIGATUS IGE QN: <0.1 KUA/I
ALLERGEN COMMENT: NORMAL
ALLERGEN COMMENT: NORMAL
ALMOND IGE QN: <0.1 KUA/I
BERMUDA GRASS IGE QN: <0.1 KUA/I
BOXELDER IGE QN: <0.1 KUA/I
C HERBARUM IGE QN: <0.1 KUA/I
CASHEW NUT IGE QN: <0.1 KUA/I
CAT DANDER IGE QN: <0.1 KUA/I
CMN PIGWEED IGE QN: <0.1 KUA/I
CODFISH IGE QN: <0.1 KUA/I
COMMON RAGWEED IGE QN: <0.1 KUA/I
COTTONWOOD IGE QN: <0.1 KUA/I
D FARINAE IGE QN: <0.1 KUA/I
D PTERONYSS IGE QN: <0.1 KUA/I
DOG DANDER IGE QN: <0.1 KUA/I
EGG WHITE IGE QN: <0.1 KUA/I
GLUTEN IGE QN: <0.1 KUA/I
HAZELNUT IGE QN: <0.1 KUA/L
LONDON PLANE IGE QN: <0.1 KUA/I
MILK IGE QN: <0.1 KUA/I
MOUSE URINE PROT IGE QN: <0.1 KUA/I
MT JUNIPER IGE QN: <0.1 KUA/I
MUGWORT IGE QN: <0.1 KUA/I
P NOTATUM IGE QN: <0.1 KUA/I
PEANUT IGE QN: <0.1 KUA/I
ROACH IGE QN: <0.1 KUA/I
SALMON IGE QN: <0.1 KUA/I
SCALLOP IGE QN: <0.1 KUA/L
SESAME SEED IGE QN: <0.1 KUA/I
SHEEP SORREL IGE QN: <0.1 KUA/I
SHRIMP IGE QN: <0.1 KUA/L
SILVER BIRCH IGE QN: <0.1 KUA/I
SOYBEAN IGE QN: <0.1 KUA/I
TIMOTHY IGE QN: <0.1 KUA/I
TOTAL IGE SMQN RAST: <2 KU/L (ref 0–113)
TOTAL IGE SMQN RAST: <2 KU/L (ref 0–113)
TUNA IGE QN: <0.1 KUA/I
WALNUT IGE QN: <0.1 KUA/I
WALNUT IGE QN: <0.1 KUA/I
WHEAT IGE QN: <0.1 KUA/I
WHITE ASH IGE QN: <0.1 KUA/I
WHITE ELM IGE QN: <0.1 KUA/I
WHITE MULBERRY IGE QN: <0.1 KUA/I
WHITE OAK IGE QN: <0.1 KUA/I

## 2019-04-22 ENCOUNTER — TELEPHONE (OUTPATIENT)
Dept: FAMILY MEDICINE CLINIC | Facility: CLINIC | Age: 54
End: 2019-04-22

## 2019-05-01 ENCOUNTER — OFFICE VISIT (OUTPATIENT)
Dept: OBGYN CLINIC | Facility: CLINIC | Age: 54
End: 2019-05-01

## 2019-05-01 VITALS
HEIGHT: 63 IN | WEIGHT: 167 LBS | SYSTOLIC BLOOD PRESSURE: 110 MMHG | DIASTOLIC BLOOD PRESSURE: 60 MMHG | BODY MASS INDEX: 29.59 KG/M2

## 2019-05-01 DIAGNOSIS — R10.2 PELVIC PAIN: Primary | ICD-10-CM

## 2019-05-01 PROCEDURE — 99213 OFFICE O/P EST LOW 20 MIN: CPT | Performed by: FAMILY MEDICINE

## 2019-05-07 DIAGNOSIS — J30.9 ALLERGIC RHINITIS, UNSPECIFIED SEASONALITY, UNSPECIFIED TRIGGER: ICD-10-CM

## 2019-05-07 DIAGNOSIS — F32.A DEPRESSION, UNSPECIFIED DEPRESSION TYPE: ICD-10-CM

## 2019-05-07 RX ORDER — MELOXICAM 7.5 MG/1
7.5 TABLET ORAL DAILY
Qty: 30 TABLET | Refills: 1 | Status: SHIPPED | OUTPATIENT
Start: 2019-05-07 | End: 2019-07-08 | Stop reason: SDUPTHER

## 2019-05-07 RX ORDER — ESCITALOPRAM OXALATE 10 MG/1
TABLET ORAL
Qty: 30 TABLET | Refills: 5 | Status: SHIPPED | OUTPATIENT
Start: 2019-05-07 | End: 2020-09-03 | Stop reason: SDUPTHER

## 2019-05-07 RX ORDER — OXYBUTYNIN CHLORIDE 15 MG/1
TABLET, EXTENDED RELEASE ORAL
Qty: 30 TABLET | Refills: 5 | Status: SHIPPED | OUTPATIENT
Start: 2019-05-07 | End: 2019-11-13 | Stop reason: SDUPTHER

## 2019-05-13 ENCOUNTER — HOSPITAL ENCOUNTER (OUTPATIENT)
Dept: NON INVASIVE DIAGNOSTICS | Facility: HOSPITAL | Age: 54
Discharge: HOME | End: 2019-05-13
Payer: COMMERCIAL

## 2019-05-13 DIAGNOSIS — R01.1 HEART MURMUR: ICD-10-CM

## 2019-05-13 PROCEDURE — 93306 TTE W/DOPPLER COMPLETE: CPT

## 2019-05-13 PROCEDURE — 93306 TTE W/DOPPLER COMPLETE: CPT | Performed by: INTERNAL MEDICINE

## 2019-06-04 DIAGNOSIS — J43.9 PULMONARY EMPHYSEMA, UNSPECIFIED EMPHYSEMA TYPE (HCC): Primary | ICD-10-CM

## 2019-06-24 DIAGNOSIS — I10 ESSENTIAL HYPERTENSION: ICD-10-CM

## 2019-06-24 DIAGNOSIS — J45.909 UNCOMPLICATED ASTHMA, UNSPECIFIED ASTHMA SEVERITY, UNSPECIFIED WHETHER PERSISTENT: ICD-10-CM

## 2019-06-24 DIAGNOSIS — E78.49 OTHER HYPERLIPIDEMIA: ICD-10-CM

## 2019-06-24 RX ORDER — MONTELUKAST SODIUM 10 MG/1
10 TABLET ORAL EVERY 24 HOURS
Qty: 90 TABLET | Refills: 3 | Status: SHIPPED | OUTPATIENT
Start: 2019-06-24 | End: 2020-07-02

## 2019-06-24 RX ORDER — METOPROLOL SUCCINATE 25 MG/1
25 TABLET, EXTENDED RELEASE ORAL DAILY
Qty: 90 TABLET | Refills: 3 | Status: SHIPPED | OUTPATIENT
Start: 2019-06-24 | End: 2019-08-14

## 2019-06-24 RX ORDER — ATORVASTATIN CALCIUM 10 MG/1
10 TABLET, FILM COATED ORAL DAILY
Qty: 90 TABLET | Refills: 3 | Status: SHIPPED | OUTPATIENT
Start: 2019-06-24 | End: 2020-04-23 | Stop reason: SDUPTHER

## 2019-06-24 RX ORDER — PANTOPRAZOLE SODIUM 40 MG/1
40 TABLET, DELAYED RELEASE ORAL DAILY
Qty: 90 TABLET | Refills: 3 | Status: SHIPPED | OUTPATIENT
Start: 2019-06-24 | End: 2020-02-24 | Stop reason: SDUPTHER

## 2019-07-08 DIAGNOSIS — J30.9 ALLERGIC RHINITIS, UNSPECIFIED SEASONALITY, UNSPECIFIED TRIGGER: ICD-10-CM

## 2019-07-09 ENCOUNTER — OFFICE VISIT (OUTPATIENT)
Dept: FAMILY MEDICINE CLINIC | Facility: CLINIC | Age: 54
End: 2019-07-09
Payer: COMMERCIAL

## 2019-07-09 VITALS
HEIGHT: 63 IN | HEART RATE: 74 BPM | BODY MASS INDEX: 28.35 KG/M2 | OXYGEN SATURATION: 96 % | RESPIRATION RATE: 14 BRPM | SYSTOLIC BLOOD PRESSURE: 112 MMHG | DIASTOLIC BLOOD PRESSURE: 72 MMHG | TEMPERATURE: 97.5 F | WEIGHT: 160 LBS

## 2019-07-09 DIAGNOSIS — L03.032 CELLULITIS OF TOE OF LEFT FOOT: ICD-10-CM

## 2019-07-09 DIAGNOSIS — H61.20 WAX IN EAR: ICD-10-CM

## 2019-07-09 DIAGNOSIS — R22.1 LOCALIZED SWELLING, MASS AND LUMP, NECK: ICD-10-CM

## 2019-07-09 DIAGNOSIS — R07.9 CHEST PAIN, UNSPECIFIED TYPE: ICD-10-CM

## 2019-07-09 DIAGNOSIS — F17.210 CIGARETTE SMOKER: ICD-10-CM

## 2019-07-09 DIAGNOSIS — E11.9 TYPE 2 DIABETES MELLITUS WITHOUT COMPLICATION, WITH LONG-TERM CURRENT USE OF INSULIN (HCC): Primary | ICD-10-CM

## 2019-07-09 DIAGNOSIS — J44.1 ACUTE EXACERBATION OF CHRONIC OBSTRUCTIVE PULMONARY DISEASE (COPD) (HCC): ICD-10-CM

## 2019-07-09 DIAGNOSIS — Z79.4 TYPE 2 DIABETES MELLITUS WITHOUT COMPLICATION, WITH LONG-TERM CURRENT USE OF INSULIN (HCC): Primary | ICD-10-CM

## 2019-07-09 DIAGNOSIS — H61.22 CERUMEN DEBRIS ON TYMPANIC MEMBRANE OF LEFT EAR: ICD-10-CM

## 2019-07-09 LAB — SL AMB POCT HEMOGLOBIN AIC: 9.8 (ref ?–6.5)

## 2019-07-09 PROCEDURE — 99214 OFFICE O/P EST MOD 30 MIN: CPT | Performed by: INTERNAL MEDICINE

## 2019-07-09 PROCEDURE — 93000 ELECTROCARDIOGRAM COMPLETE: CPT | Performed by: INTERNAL MEDICINE

## 2019-07-09 PROCEDURE — 69209 REMOVE IMPACTED EAR WAX UNI: CPT | Performed by: INTERNAL MEDICINE

## 2019-07-09 PROCEDURE — 83036 HEMOGLOBIN GLYCOSYLATED A1C: CPT | Performed by: INTERNAL MEDICINE

## 2019-07-09 PROCEDURE — 3046F HEMOGLOBIN A1C LEVEL >9.0%: CPT | Performed by: INTERNAL MEDICINE

## 2019-07-09 PROCEDURE — 96372 THER/PROPH/DIAG INJ SC/IM: CPT | Performed by: INTERNAL MEDICINE

## 2019-07-09 PROCEDURE — 36415 COLL VENOUS BLD VENIPUNCTURE: CPT | Performed by: INTERNAL MEDICINE

## 2019-07-09 RX ORDER — METHYLPREDNISOLONE SODIUM SUCCINATE 125 MG/2ML
125 INJECTION, POWDER, LYOPHILIZED, FOR SOLUTION INTRAMUSCULAR; INTRAVENOUS ONCE
Status: COMPLETED | OUTPATIENT
Start: 2019-07-09 | End: 2019-07-09

## 2019-07-09 RX ORDER — AMOXICILLIN AND CLAVULANATE POTASSIUM 875; 125 MG/1; MG/1
1 TABLET, FILM COATED ORAL EVERY 12 HOURS SCHEDULED
Qty: 2014 TABLET | Refills: 0 | Status: SHIPPED | OUTPATIENT
Start: 2019-07-09 | End: 2019-07-16

## 2019-07-09 RX ORDER — GUAIFENESIN/DEXTROMETHORPHAN 100-10MG/5
5 SYRUP ORAL 3 TIMES DAILY PRN
Qty: 118 ML | Refills: 1 | Status: SHIPPED | OUTPATIENT
Start: 2019-07-09 | End: 2019-08-14 | Stop reason: ALTCHOICE

## 2019-07-09 RX ORDER — MELOXICAM 7.5 MG/1
7.5 TABLET ORAL DAILY
Qty: 30 TABLET | Refills: 1 | Status: SHIPPED | OUTPATIENT
Start: 2019-07-09 | End: 2019-08-05 | Stop reason: SDUPTHER

## 2019-07-09 RX ADMIN — METHYLPREDNISOLONE SODIUM SUCCINATE 125 MG: 125 INJECTION, POWDER, LYOPHILIZED, FOR SOLUTION INTRAMUSCULAR; INTRAVENOUS at 09:45

## 2019-07-09 NOTE — PROGRESS NOTES
Assessment/Plan:         Diagnoses and all orders for this visit:    Type 2 diabetes mellitus without complication, with long-term current use of insulin ([facility] ); Life Style mod  Continue same  RTc in 2-3 mos w :  -     POCT hemoglobin A1c  -     Ambulatory referral to Podiatry; Future  -     Basic metabolic panel; Future  -     CBC and differential; Future  -     Hemoglobin A1C; Future  -     Magnesium; Future  -     Lipid panel; Future  -     Hepatic function panel; Future  -     TSH, 3rd generation; Future  -     T4, free; Future  -     Urinalysis with reflex to microscopic      Acute exacerbation of chronic obstructive pulmonary disease (COPD) ([facility] ) : Bed Rest, Try ;  -     Peak flow  -     methylPREDNISolone sodium succinate (Solu-MEDROL) injection 125 mg  -     amoxicillin-clavulanate (AUGMENTIN) 875-125 mg per tablet; Take 1 tablet by mouth every 12 (twelve) hours for 7 days With food  -     dextromethorphan-guaiFENesin (ROBITUSSIN DM)  mg/5 mL syrup; Take 5 mL by mouth 3 (three) times a day as needed for congestion  -     Peak flow; Future    Cerumen debris on tympanic membrane of left ear; Ear Irrigation done in office WITHOUt using any Instrumenets    Cellulitis of toe of left foot  -     Ambulatory referral to Podiatry; Future    Chest pain, unspecified type; chest wall ; Costochondritis :  -     POCT ECG  -     XR chest pa & lateral; Future    Cigarette smoker; H/O quit only few years ago ;  -     CT lung screening program; Future  -     Occult Blood, Fecal Immunochemical; Future  -     CT soft tissue neck w wo contrast; Future    Localized swelling, mass and lump, neck; left side of neck :  -     CT soft tissue neck w wo contrast; Future      BMI Counseling: Body mass index is 28 34 kg/m²  Discussed the patient's BMI with her  The BMI is above average  BMI counseling and education was provided to the patient   Nutrition recommendations include reducing portion sizes and decreasing overall calorie intake  Patient's shoes and socks removed  Right Foot/Ankle   Right Foot Inspection  Skin Exam: skin normal and skin intact no dry skin, no warmth, no callus, no erythema, no maceration, no abnormal color, no pre-ulcer, no ulcer and no callus                          Toe Exam: no swelling and no tenderness  Sensory   Vibration: intact  Proprioception: intact   Monofilament testing: intact  Vascular    The right DP pulse is 2+  The right PT pulse is 2+  Left Foot/Ankle  Left Foot Inspection  Skin Exam: skin normal and skin intactno dry skin, no warmth, no erythema, no maceration, normal color, no pre-ulcer, no ulcer and no callus                         Toe Exam: no swelling and no tenderness                   Sensory   Vibration: intact  Proprioception: intact  Monofilament: intact  Vascular    The left DP pulse is 2+  The left PT pulse is 2+  Assign Risk Category:  No deformity present; No loss of protective sensation; No weak pulses       Risk: 0      Subjective:      Patient ID: Denisha Calle is a 53 y o  female  53 Y O lady is here for Regular check up and Few Issues as per R O S , Recent blood work and med list reviewed  wpt in detail    The following portions of the patient's history were reviewed and updated as appropriate: allergies, current medications, past family history, past medical history, past social history, past surgical history and problem list     Review of Systems   Constitutional: Negative for chills, fatigue and fever  HENT: Positive for ear pain, hearing loss, postnasal drip and sore throat  Negative for congestion, facial swelling, trouble swallowing and voice change  Eyes: Negative for pain, discharge and visual disturbance  Respiratory: Positive for cough and wheezing  Negative for shortness of breath  Cardiovascular: Negative for chest pain, palpitations and leg swelling     Gastrointestinal: Negative for abdominal pain, blood in stool, constipation, diarrhea and nausea  Endocrine: Negative for polydipsia, polyphagia and polyuria  Genitourinary: Negative for difficulty urinating, hematuria and urgency  Musculoskeletal: Negative for arthralgias and myalgias  Skin: Negative for rash  Neurological: Negative for dizziness, tremors, weakness and headaches  Hematological: Negative for adenopathy  Does not bruise/bleed easily  Psychiatric/Behavioral: Negative for dysphoric mood, sleep disturbance and suicidal ideas  Objective:      /72 (BP Location: Left arm, Patient Position: Sitting, Cuff Size: Standard)   Pulse 74   Temp 97 5 °F (36 4 °C) (Tympanic)   Resp 14   Ht 5' 3" (1 6 m)   Wt 72 6 kg (160 lb)   SpO2 96%   BMI 28 34 kg/m²          Physical Exam   Constitutional: She is oriented to person, place, and time  She appears well-nourished  No distress  HENT:   Head: Normocephalic  Mouth/Throat: Oropharynx is clear and moist  No oropharyngeal exudate  Left Ear Cerumen   Eyes: Pupils are equal, round, and reactive to light  Conjunctivae are normal  No scleral icterus  Neck: Neck supple  Thyromegaly present  Cardiovascular: Normal rate, regular rhythm and normal heart sounds  Pulses are no weak pulses  No murmur heard  Pulses:       Dorsalis pedis pulses are 2+ on the right side, and 2+ on the left side  Posterior tibial pulses are 2+ on the right side, and 2+ on the left side  Pulmonary/Chest: Effort normal  No respiratory distress  She has wheezes  She has no rales  Abdominal: Soft  Bowel sounds are normal  She exhibits no distension  There is no tenderness  There is no rebound and no guarding  obese   Musculoskeletal: She exhibits no edema or tenderness  Feet:   Right Foot:   Skin Integrity: Negative for ulcer, skin breakdown, erythema, warmth, callus or dry skin  Left Foot:   Skin Integrity: Negative for ulcer, skin breakdown, erythema, warmth, callus or dry skin     Lymphadenopathy:     She has no cervical adenopathy  Neurological: She is alert and oriented to person, place, and time  No cranial nerve deficit  Coordination normal    Skin: No erythema  Psychiatric: She has a normal mood and affect

## 2019-07-12 ENCOUNTER — HOSPITAL ENCOUNTER (OUTPATIENT)
Dept: CT IMAGING | Facility: HOSPITAL | Age: 54
Discharge: HOME | End: 2019-07-12
Payer: COMMERCIAL

## 2019-07-12 ENCOUNTER — HOSPITAL ENCOUNTER (OUTPATIENT)
Dept: RADIOLOGY | Facility: HOSPITAL | Age: 54
Discharge: HOME | End: 2019-07-12

## 2019-07-12 ENCOUNTER — APPOINTMENT (OUTPATIENT)
Dept: LAB | Facility: HOSPITAL | Age: 54
End: 2019-07-12
Payer: COMMERCIAL

## 2019-07-12 DIAGNOSIS — R22.1 LOCALIZED SWELLING, MASS AND LUMP, NECK: Primary | ICD-10-CM

## 2019-07-12 DIAGNOSIS — F17.210 CIGARETTE SMOKER: ICD-10-CM

## 2019-07-12 DIAGNOSIS — Z79.4 TYPE 2 DIABETES MELLITUS WITHOUT COMPLICATION, WITH LONG-TERM CURRENT USE OF INSULIN (HCC): ICD-10-CM

## 2019-07-12 DIAGNOSIS — R07.9 CHEST PAIN, UNSPECIFIED TYPE: ICD-10-CM

## 2019-07-12 DIAGNOSIS — E11.9 TYPE 2 DIABETES MELLITUS WITHOUT COMPLICATION, WITH LONG-TERM CURRENT USE OF INSULIN (HCC): ICD-10-CM

## 2019-07-12 DIAGNOSIS — R22.1 LOCALIZED SWELLING, MASS AND LUMP, NECK: ICD-10-CM

## 2019-07-12 LAB
ALBUMIN SERPL BCP-MCNC: 4.3 G/DL (ref 3–5.2)
ALP SERPL-CCNC: 114 U/L (ref 43–122)
ALT SERPL W P-5'-P-CCNC: 27 U/L (ref 9–52)
ANION GAP SERPL CALCULATED.3IONS-SCNC: 11 MMOL/L (ref 5–14)
AST SERPL W P-5'-P-CCNC: 25 U/L (ref 14–36)
BACTERIA UR QL AUTO: ABNORMAL /HPF
BASOPHILS # BLD AUTO: 0.21 THOUSAND/UL (ref 0–0.1)
BASOPHILS NFR MAR MANUAL: 2 % (ref 0–1)
BILIRUB DIRECT SERPL-MCNC: 0.1 MG/DL
BILIRUB SERPL-MCNC: 0.4 MG/DL
BILIRUB UR QL STRIP: NEGATIVE
BUN SERPL-MCNC: 18 MG/DL (ref 5–25)
CALCIUM SERPL-MCNC: 10 MG/DL (ref 8.4–10.2)
CHLORIDE SERPL-SCNC: 101 MMOL/L (ref 97–108)
CHOLEST SERPL-MCNC: 164 MG/DL
CLARITY UR: CLEAR
CO2 SERPL-SCNC: 32 MMOL/L (ref 22–30)
COLOR UR: ABNORMAL
CREAT SERPL-MCNC: 0.63 MG/DL (ref 0.6–1.2)
EOSINOPHIL # BLD AUTO: 0.32 THOUSAND/UL (ref 0–0.4)
EOSINOPHIL NFR BLD MANUAL: 3 % (ref 0–6)
ERYTHROCYTE [DISTWIDTH] IN BLOOD BY AUTOMATED COUNT: 14.8 %
GFR SERPL CREATININE-BSD FRML MDRD: 101 ML/MIN/1.73SQ M
GLUCOSE P FAST SERPL-MCNC: 202 MG/DL (ref 70–99)
GLUCOSE UR STRIP-MCNC: ABNORMAL MG/DL
HCT VFR BLD AUTO: 43.9 % (ref 36–46)
HDLC SERPL-MCNC: 40 MG/DL (ref 40–59)
HGB BLD-MCNC: 14.4 G/DL (ref 12–16)
HGB UR QL STRIP.AUTO: NEGATIVE
KETONES UR STRIP-MCNC: NEGATIVE MG/DL
LDLC SERPL CALC-MCNC: 65 MG/DL
LEUKOCYTE ESTERASE UR QL STRIP: 100
LYMPHOCYTES # BLD AUTO: 4.41 THOUSAND/UL (ref 0.5–4)
LYMPHOCYTES # BLD AUTO: 42 % (ref 25–45)
MAGNESIUM SERPL-MCNC: 2 MG/DL (ref 1.6–2.3)
MCH RBC QN AUTO: 26.2 PG (ref 26–34)
MCHC RBC AUTO-ENTMCNC: 32.8 G/DL (ref 31–36)
MCV RBC AUTO: 80 FL (ref 80–100)
MONOCYTES # BLD AUTO: 0.95 THOUSAND/UL (ref 0.2–0.9)
MONOCYTES NFR BLD AUTO: 9 % (ref 1–10)
NEUTS SEG # BLD: 4.31 THOUSAND/UL (ref 1.8–7.8)
NEUTS SEG NFR BLD AUTO: 41 %
NITRITE UR QL STRIP: NEGATIVE
NON-SQ EPI CELLS URNS QL MICRO: ABNORMAL /HPF
NONHDLC SERPL-MCNC: 124 MG/DL
PH UR STRIP.AUTO: 5 [PH]
PLATELET # BLD AUTO: 276 THOUSANDS/UL (ref 150–450)
PLATELET BLD QL SMEAR: ADEQUATE
PMV BLD AUTO: 9.1 FL (ref 8.9–12.7)
POTASSIUM SERPL-SCNC: 4.5 MMOL/L (ref 3.6–5)
PROT SERPL-MCNC: 7.8 G/DL (ref 5.9–8.4)
PROT UR STRIP-MCNC: NEGATIVE MG/DL
RBC # BLD AUTO: 5.49 MILLION/UL (ref 4–5.2)
RBC #/AREA URNS AUTO: ABNORMAL /HPF
RBC MORPH BLD: NORMAL
SODIUM SERPL-SCNC: 144 MMOL/L (ref 137–147)
SP GR UR STRIP.AUTO: 1.01 (ref 1–1.04)
T4 FREE SERPL-MCNC: 1.18 NG/DL (ref 0.76–1.46)
TOTAL CELLS COUNTED SPEC: 100
TRIGL SERPL-MCNC: 293 MG/DL
TSH SERPL DL<=0.05 MIU/L-ACNC: 3.5 UIU/ML (ref 0.47–4.68)
UROBILINOGEN UA: NEGATIVE MG/DL
VARIANT LYMPHS # BLD AUTO: 3 % (ref 0–0)
WBC # BLD AUTO: 10.5 THOUSAND/UL (ref 4.5–11)
WBC #/AREA URNS AUTO: ABNORMAL /HPF

## 2019-07-12 PROCEDURE — 70491 CT SOFT TISSUE NECK W/DYE: CPT

## 2019-07-12 PROCEDURE — 85027 COMPLETE CBC AUTOMATED: CPT

## 2019-07-12 PROCEDURE — 36415 COLL VENOUS BLD VENIPUNCTURE: CPT

## 2019-07-12 PROCEDURE — 84443 ASSAY THYROID STIM HORMONE: CPT

## 2019-07-12 PROCEDURE — 80061 LIPID PANEL: CPT

## 2019-07-12 PROCEDURE — 84439 ASSAY OF FREE THYROXINE: CPT

## 2019-07-12 PROCEDURE — 83735 ASSAY OF MAGNESIUM: CPT

## 2019-07-12 PROCEDURE — 80048 BASIC METABOLIC PNL TOTAL CA: CPT

## 2019-07-12 PROCEDURE — 85007 BL SMEAR W/DIFF WBC COUNT: CPT

## 2019-07-12 PROCEDURE — 81001 URINALYSIS AUTO W/SCOPE: CPT | Performed by: INTERNAL MEDICINE

## 2019-07-12 PROCEDURE — 80076 HEPATIC FUNCTION PANEL: CPT

## 2019-07-12 RX ADMIN — IOHEXOL 85 ML: 350 INJECTION, SOLUTION INTRAVENOUS at 09:42

## 2019-08-05 DIAGNOSIS — F32.A DEPRESSION, UNSPECIFIED DEPRESSION TYPE: ICD-10-CM

## 2019-08-05 DIAGNOSIS — J30.9 ALLERGIC RHINITIS, UNSPECIFIED SEASONALITY, UNSPECIFIED TRIGGER: ICD-10-CM

## 2019-08-05 RX ORDER — MELOXICAM 7.5 MG/1
7.5 TABLET ORAL DAILY
Qty: 30 TABLET | Refills: 1 | Status: SHIPPED | OUTPATIENT
Start: 2019-08-05 | End: 2019-08-14

## 2019-08-07 DIAGNOSIS — E11.9 DIABETES MELLITUS WITHOUT COMPLICATION (HCC): ICD-10-CM

## 2019-08-07 RX ORDER — INSULIN GLARGINE 100 [IU]/ML
INJECTION, SOLUTION SUBCUTANEOUS
Qty: 60 ML | Refills: 5 | Status: SHIPPED | OUTPATIENT
Start: 2019-08-07 | End: 2020-07-02

## 2019-08-13 DIAGNOSIS — E11.9 DIABETES MELLITUS WITHOUT COMPLICATION (HCC): ICD-10-CM

## 2019-08-14 ENCOUNTER — OFFICE VISIT (OUTPATIENT)
Dept: FAMILY MEDICINE CLINIC | Facility: CLINIC | Age: 54
End: 2019-08-14
Payer: COMMERCIAL

## 2019-08-14 VITALS
DIASTOLIC BLOOD PRESSURE: 60 MMHG | HEIGHT: 63 IN | HEART RATE: 74 BPM | SYSTOLIC BLOOD PRESSURE: 110 MMHG | TEMPERATURE: 97.8 F | BODY MASS INDEX: 28.35 KG/M2 | RESPIRATION RATE: 14 BRPM | WEIGHT: 160 LBS | OXYGEN SATURATION: 96 %

## 2019-08-14 DIAGNOSIS — I10 ESSENTIAL HYPERTENSION: ICD-10-CM

## 2019-08-14 DIAGNOSIS — Z12.11 SCREENING FOR COLON CANCER: ICD-10-CM

## 2019-08-14 DIAGNOSIS — K52.9 ACUTE GASTROENTERITIS: Primary | ICD-10-CM

## 2019-08-14 DIAGNOSIS — E78.5 HYPERLIPIDEMIA ASSOCIATED WITH TYPE 2 DIABETES MELLITUS (HCC): ICD-10-CM

## 2019-08-14 DIAGNOSIS — IMO0002 TYPE II DIABETES MELLITUS WITH MANIFESTATIONS, UNCONTROLLED: ICD-10-CM

## 2019-08-14 DIAGNOSIS — E11.69 HYPERLIPIDEMIA ASSOCIATED WITH TYPE 2 DIABETES MELLITUS (HCC): ICD-10-CM

## 2019-08-14 DIAGNOSIS — R22.1 NECK MASS: ICD-10-CM

## 2019-08-14 PROCEDURE — 1036F TOBACCO NON-USER: CPT | Performed by: INTERNAL MEDICINE

## 2019-08-14 PROCEDURE — 3008F BODY MASS INDEX DOCD: CPT | Performed by: INTERNAL MEDICINE

## 2019-08-14 PROCEDURE — 3074F SYST BP LT 130 MM HG: CPT | Performed by: INTERNAL MEDICINE

## 2019-08-14 PROCEDURE — 99214 OFFICE O/P EST MOD 30 MIN: CPT | Performed by: INTERNAL MEDICINE

## 2019-08-14 RX ORDER — FENOFIBRATE 145 MG/1
145 TABLET, COATED ORAL DAILY
Qty: 90 TABLET | Refills: 3 | Status: SHIPPED | OUTPATIENT
Start: 2019-08-14 | End: 2020-04-23 | Stop reason: SDUPTHER

## 2019-08-14 RX ORDER — GLIPIZIDE 5 MG/1
5 TABLET ORAL 2 TIMES DAILY WITH MEALS
Qty: 180 TABLET | Refills: 3 | Status: SHIPPED | OUTPATIENT
Start: 2019-08-14 | End: 2020-04-23 | Stop reason: SDUPTHER

## 2019-08-14 RX ORDER — CIPROFLOXACIN 500 MG/1
500 TABLET, FILM COATED ORAL EVERY 12 HOURS SCHEDULED
Qty: 10 TABLET | Refills: 0 | Status: SHIPPED | OUTPATIENT
Start: 2019-08-14 | End: 2019-08-19

## 2019-08-14 NOTE — PROGRESS NOTES
Assessment/Plan:         Diagnoses and all orders for this visit:    Acute gastroenteritis; Bed Rest, Increase Po fluids, TRY ;  -     ciprofloxacin (CIPRO) 500 mg tablet; Take 1 tablet (500 mg total) by mouth every 12 (twelve) hours for 5 days With food    Type II diabetes mellitus with manifestations, uncontrolled ([facility] 75 ); Not well Controlled  Continue metformin  Add :  -     glipiZIDE (GLUCOTROL) 5 mg tablet; Take 1 tablet (5 mg total) by mouth 2 (two) times a day with meals  RTC in 3mos w :  -     Comprehensive metabolic panel; Future  -     CBC and differential; Future  -     Hemoglobin A1C; Future  -     Magnesium; Future  -     Urinalysis with reflex to microscopic    Hyperlipidemia associated with type 2 diabetes mellitus ([facility] 75 ); Not well controlled  Add :  -     fenofibrate (TRICOR) 145 mg tablet; Take 1 tablet (145 mg total) by mouth daily  RTC in 3mos w :  -     Lipid panel; Future    Essential hypertension; life style mod  Change To :  -     metoprolol tartrate (LOPRESSOR) 25 mg tablet; Take 1 tablet (25 mg total) by mouth every 12 (twelve) hours    Neck mass; left side of neck : ENT Consult ASAP    Screening for colon cancer  -     Occult Blood, Fecal Immunochemical; Future        Subjective:      Patient ID: Roslyn Carmen is a 53 y o  female  [address] is here for Regular check up and increasing Diarrhea and abd Pain for few days, Recent blood work and med List reviewed  w pt in Detail    The following portions of the patient's history were reviewed and updated as appropriate: allergies, current medications, past family history, past medical history, past social history, past surgical history and problem list     Review of Systems   Constitutional: Negative for chills, fatigue and fever  HENT: Negative for congestion, facial swelling, sore throat, trouble swallowing and voice change  Eyes: Negative for pain, discharge and visual disturbance     Respiratory: Negative for cough, shortness of breath and wheezing  Cardiovascular: Negative for chest pain, palpitations and leg swelling  Gastrointestinal: Positive for abdominal pain and diarrhea  Negative for blood in stool, constipation and nausea  Endocrine: Negative for polydipsia, polyphagia and polyuria  Genitourinary: Negative for difficulty urinating, hematuria and urgency  Musculoskeletal: Negative for arthralgias and myalgias  Skin: Negative for rash  Neurological: Negative for dizziness, tremors, weakness and headaches  Hematological: Negative for adenopathy  Does not bruise/bleed easily  Psychiatric/Behavioral: Negative for dysphoric mood, sleep disturbance and suicidal ideas  Objective:      /60 (BP Location: Right arm, Patient Position: Standing, Cuff Size: Standard)   Pulse 74   Temp 97 8 °F (36 6 °C) (Tympanic)   Resp 14   Ht 5' 3" (1 6 m)   Wt 72 6 kg (160 lb)   SpO2 96%   BMI 28 34 kg/m²          Physical Exam   Constitutional: She is oriented to person, place, and time  She appears well-nourished  No distress  HENT:   Head: Normocephalic  Mouth/Throat: Oropharynx is clear and moist  No oropharyngeal exudate  Eyes: Pupils are equal, round, and reactive to light  Conjunctivae are normal  No scleral icterus  Neck: Neck supple  Thyromegaly present  Cardiovascular: Normal rate and regular rhythm  Murmur heard  Pulmonary/Chest: Effort normal and breath sounds normal  No respiratory distress  She has no wheezes  She has no rales  Abdominal: Soft  Bowel sounds are normal  She exhibits no distension  There is tenderness  There is no rebound and no guarding  Musculoskeletal: She exhibits no edema or tenderness  Lymphadenopathy:     She has no cervical adenopathy  Neurological: She is alert and oriented to person, place, and time  No cranial nerve deficit  Skin: No erythema  Psychiatric: She has a normal mood and affect

## 2019-09-04 DIAGNOSIS — J30.9 ALLERGIC RHINITIS, UNSPECIFIED SEASONALITY, UNSPECIFIED TRIGGER: ICD-10-CM

## 2019-09-04 DIAGNOSIS — F32.A DEPRESSION, UNSPECIFIED DEPRESSION TYPE: ICD-10-CM

## 2019-09-04 DIAGNOSIS — IMO0002 TYPE II DIABETES MELLITUS WITH MANIFESTATIONS, UNCONTROLLED: ICD-10-CM

## 2019-09-04 RX ORDER — EMPAGLIFLOZIN 25 MG/1
TABLET, FILM COATED ORAL
Qty: 30 TABLET | Refills: 5 | Status: SHIPPED | OUTPATIENT
Start: 2019-09-04 | End: 2020-03-23

## 2019-09-04 RX ORDER — DIPHENHYDRAMINE HYDROCHLORIDE 25 MG/1
CAPSULE ORAL
Qty: 30 CAPSULE | Refills: 3 | Status: SHIPPED | OUTPATIENT
Start: 2019-09-04 | End: 2020-01-15

## 2019-10-03 DIAGNOSIS — F32.A DEPRESSION, UNSPECIFIED DEPRESSION TYPE: ICD-10-CM

## 2019-10-03 DIAGNOSIS — J30.9 ALLERGIC RHINITIS, UNSPECIFIED SEASONALITY, UNSPECIFIED TRIGGER: ICD-10-CM

## 2019-10-03 RX ORDER — MELOXICAM 7.5 MG/1
7.5 TABLET ORAL DAILY
Qty: 30 TABLET | Refills: 1 | OUTPATIENT
Start: 2019-10-03

## 2019-10-03 RX ORDER — OXYBUTYNIN CHLORIDE 15 MG/1
TABLET, EXTENDED RELEASE ORAL
Qty: 30 TABLET | Refills: 5 | OUTPATIENT
Start: 2019-10-03

## 2019-10-25 DIAGNOSIS — J45.909 UNCOMPLICATED ASTHMA, UNSPECIFIED ASTHMA SEVERITY, UNSPECIFIED WHETHER PERSISTENT: ICD-10-CM

## 2019-10-27 RX ORDER — ALBUTEROL SULFATE 90 UG/1
2 AEROSOL, METERED RESPIRATORY (INHALATION) EVERY 6 HOURS PRN
Qty: 18 G | Refills: 3 | Status: SHIPPED | OUTPATIENT
Start: 2019-10-27 | End: 2020-07-02

## 2019-11-01 ENCOUNTER — TRANSCRIBE ORDERS (OUTPATIENT)
Dept: ADMINISTRATIVE | Facility: HOSPITAL | Age: 54
End: 2019-11-01

## 2019-11-01 ENCOUNTER — APPOINTMENT (OUTPATIENT)
Dept: LAB | Facility: HOSPITAL | Age: 54
End: 2019-11-01
Payer: COMMERCIAL

## 2019-11-01 DIAGNOSIS — E11.69 HYPERLIPIDEMIA ASSOCIATED WITH TYPE 2 DIABETES MELLITUS (HCC): ICD-10-CM

## 2019-11-01 DIAGNOSIS — IMO0002 TYPE II DIABETES MELLITUS WITH MANIFESTATIONS, UNCONTROLLED: ICD-10-CM

## 2019-11-01 DIAGNOSIS — E78.5 HYPERLIPIDEMIA ASSOCIATED WITH TYPE 2 DIABETES MELLITUS (HCC): ICD-10-CM

## 2019-11-01 LAB
ALBUMIN SERPL BCP-MCNC: 4 G/DL (ref 3–5.2)
ALP SERPL-CCNC: 98 U/L (ref 43–122)
ALT SERPL W P-5'-P-CCNC: 22 U/L (ref 9–52)
ANION GAP SERPL CALCULATED.3IONS-SCNC: 7 MMOL/L (ref 5–14)
AST SERPL W P-5'-P-CCNC: 20 U/L (ref 14–36)
BACTERIA UR QL AUTO: ABNORMAL /HPF
BASOPHILS # BLD AUTO: 0.1 THOUSANDS/ΜL (ref 0–0.1)
BASOPHILS NFR BLD AUTO: 1 % (ref 0–1)
BILIRUB SERPL-MCNC: 0.3 MG/DL
BILIRUB UR QL STRIP: NEGATIVE
BUN SERPL-MCNC: 12 MG/DL (ref 5–25)
CALCIUM SERPL-MCNC: 9.8 MG/DL (ref 8.4–10.2)
CHLORIDE SERPL-SCNC: 103 MMOL/L (ref 97–108)
CHOLEST SERPL-MCNC: 150 MG/DL
CLARITY UR: ABNORMAL
CO2 SERPL-SCNC: 30 MMOL/L (ref 22–30)
COLOR UR: ABNORMAL
CREAT SERPL-MCNC: 0.63 MG/DL (ref 0.6–1.2)
EOSINOPHIL # BLD AUTO: 0.3 THOUSAND/ΜL (ref 0–0.4)
EOSINOPHIL NFR BLD AUTO: 3 % (ref 0–6)
ERYTHROCYTE [DISTWIDTH] IN BLOOD BY AUTOMATED COUNT: 14.8 %
EST. AVERAGE GLUCOSE BLD GHB EST-MCNC: 186 MG/DL
GFR SERPL CREATININE-BSD FRML MDRD: 101 ML/MIN/1.73SQ M
GLUCOSE P FAST SERPL-MCNC: 157 MG/DL (ref 70–99)
GLUCOSE UR STRIP-MCNC: ABNORMAL MG/DL
HBA1C MFR BLD: 8.1 % (ref 4.2–6.3)
HCT VFR BLD AUTO: 43.6 % (ref 36–46)
HDLC SERPL-MCNC: 37 MG/DL
HGB BLD-MCNC: 14.2 G/DL (ref 12–16)
HGB UR QL STRIP.AUTO: 10
KETONES UR STRIP-MCNC: NEGATIVE MG/DL
LDLC SERPL CALC-MCNC: 73 MG/DL
LEUKOCYTE ESTERASE UR QL STRIP: 500
LYMPHOCYTES # BLD AUTO: 2.9 THOUSANDS/ΜL (ref 0.5–4)
LYMPHOCYTES NFR BLD AUTO: 30 % (ref 25–45)
MAGNESIUM SERPL-MCNC: 2 MG/DL (ref 1.6–2.3)
MCH RBC QN AUTO: 26.7 PG (ref 26–34)
MCHC RBC AUTO-ENTMCNC: 32.5 G/DL (ref 31–36)
MCV RBC AUTO: 82 FL (ref 80–100)
MONOCYTES # BLD AUTO: 0.5 THOUSAND/ΜL (ref 0.2–0.9)
MONOCYTES NFR BLD AUTO: 5 % (ref 1–10)
NEUTROPHILS # BLD AUTO: 6.2 THOUSANDS/ΜL (ref 1.8–7.8)
NEUTS SEG NFR BLD AUTO: 62 % (ref 45–65)
NITRITE UR QL STRIP: NEGATIVE
NON-SQ EPI CELLS URNS QL MICRO: ABNORMAL /HPF
NONHDLC SERPL-MCNC: 113 MG/DL
OTHER STN SPEC: ABNORMAL
PH UR STRIP.AUTO: 5 [PH]
PLATELET # BLD AUTO: 270 THOUSANDS/UL (ref 150–450)
PMV BLD AUTO: 8.3 FL (ref 8.9–12.7)
POTASSIUM SERPL-SCNC: 4.3 MMOL/L (ref 3.6–5)
PROT SERPL-MCNC: 7.4 G/DL (ref 5.9–8.4)
PROT UR STRIP-MCNC: NEGATIVE MG/DL
RBC # BLD AUTO: 5.31 MILLION/UL (ref 4–5.2)
RBC #/AREA URNS AUTO: ABNORMAL /HPF
SODIUM SERPL-SCNC: 140 MMOL/L (ref 137–147)
SP GR UR STRIP.AUTO: 1.01 (ref 1–1.04)
TRIGL SERPL-MCNC: 198 MG/DL
UROBILINOGEN UA: NEGATIVE MG/DL
WBC # BLD AUTO: 9.9 THOUSAND/UL (ref 4.5–11)
WBC #/AREA URNS AUTO: ABNORMAL /HPF

## 2019-11-01 PROCEDURE — 36415 COLL VENOUS BLD VENIPUNCTURE: CPT

## 2019-11-01 PROCEDURE — 83036 HEMOGLOBIN GLYCOSYLATED A1C: CPT

## 2019-11-01 PROCEDURE — 80053 COMPREHEN METABOLIC PANEL: CPT

## 2019-11-01 PROCEDURE — 83735 ASSAY OF MAGNESIUM: CPT

## 2019-11-01 PROCEDURE — 80061 LIPID PANEL: CPT

## 2019-11-01 PROCEDURE — 85025 COMPLETE CBC W/AUTO DIFF WBC: CPT

## 2019-11-01 PROCEDURE — 81001 URINALYSIS AUTO W/SCOPE: CPT | Performed by: INTERNAL MEDICINE

## 2019-11-05 ENCOUNTER — TELEPHONE (OUTPATIENT)
Dept: FAMILY MEDICINE CLINIC | Facility: CLINIC | Age: 54
End: 2019-11-05

## 2019-11-05 DIAGNOSIS — R31.1 BENIGN ESSENTIAL MICROSCOPIC HEMATURIA: Primary | ICD-10-CM

## 2019-11-13 ENCOUNTER — OFFICE VISIT (OUTPATIENT)
Dept: FAMILY MEDICINE CLINIC | Facility: CLINIC | Age: 54
End: 2019-11-13
Payer: COMMERCIAL

## 2019-11-13 VITALS
TEMPERATURE: 97.7 F | HEART RATE: 92 BPM | RESPIRATION RATE: 14 BRPM | DIASTOLIC BLOOD PRESSURE: 78 MMHG | OXYGEN SATURATION: 96 % | SYSTOLIC BLOOD PRESSURE: 130 MMHG | WEIGHT: 162 LBS | HEIGHT: 63 IN | BODY MASS INDEX: 28.7 KG/M2

## 2019-11-13 DIAGNOSIS — Z11.4 SCREENING FOR HUMAN IMMUNODEFICIENCY VIRUS: ICD-10-CM

## 2019-11-13 DIAGNOSIS — Z01.00 DIABETIC EYE EXAM (HCC): ICD-10-CM

## 2019-11-13 DIAGNOSIS — R80.9 PROTEINURIA, UNSPECIFIED TYPE: ICD-10-CM

## 2019-11-13 DIAGNOSIS — E03.9 HYPOTHYROIDISM, UNSPECIFIED TYPE: ICD-10-CM

## 2019-11-13 DIAGNOSIS — E78.5 HYPERLIPIDEMIA ASSOCIATED WITH TYPE 2 DIABETES MELLITUS (HCC): ICD-10-CM

## 2019-11-13 DIAGNOSIS — K58.0 IRRITABLE BOWEL SYNDROME WITH DIARRHEA: ICD-10-CM

## 2019-11-13 DIAGNOSIS — F32.A DEPRESSION, UNSPECIFIED DEPRESSION TYPE: ICD-10-CM

## 2019-11-13 DIAGNOSIS — Z12.11 SPECIAL SCREENING FOR MALIGNANT NEOPLASMS, COLON: ICD-10-CM

## 2019-11-13 DIAGNOSIS — IMO0002 TYPE II DIABETES MELLITUS WITH MANIFESTATIONS, UNCONTROLLED: Primary | ICD-10-CM

## 2019-11-13 DIAGNOSIS — J30.9 ALLERGIC RHINITIS, UNSPECIFIED SEASONALITY, UNSPECIFIED TRIGGER: ICD-10-CM

## 2019-11-13 DIAGNOSIS — E11.69 HYPERLIPIDEMIA ASSOCIATED WITH TYPE 2 DIABETES MELLITUS (HCC): ICD-10-CM

## 2019-11-13 DIAGNOSIS — E11.9 DIABETIC EYE EXAM (HCC): ICD-10-CM

## 2019-11-13 DIAGNOSIS — R10.84 GENERALIZED ABDOMINAL PAIN: ICD-10-CM

## 2019-11-13 DIAGNOSIS — Z23 NEED FOR INFLUENZA VACCINATION: ICD-10-CM

## 2019-11-13 LAB
SL AMB  POCT GLUCOSE, UA: ABNORMAL
SL AMB LEUKOCYTE ESTERASE,UA: ABNORMAL
SL AMB POCT BILIRUBIN,UA: ABNORMAL
SL AMB POCT BLOOD,UA: ABNORMAL
SL AMB POCT CLARITY,UA: CLEAR
SL AMB POCT COLOR,UA: YELLOW
SL AMB POCT KETONES,UA: ABNORMAL
SL AMB POCT NITRITE,UA: ABNORMAL
SL AMB POCT PH,UA: 5
SL AMB POCT SPECIFIC GRAVITY,UA: 1.01
SL AMB POCT URINE PROTEIN: ABNORMAL
SL AMB POCT UROBILINOGEN: ABNORMAL

## 2019-11-13 PROCEDURE — 3066F NEPHROPATHY DOC TX: CPT | Performed by: INTERNAL MEDICINE

## 2019-11-13 PROCEDURE — 1036F TOBACCO NON-USER: CPT | Performed by: INTERNAL MEDICINE

## 2019-11-13 PROCEDURE — 81002 URINALYSIS NONAUTO W/O SCOPE: CPT | Performed by: INTERNAL MEDICINE

## 2019-11-13 PROCEDURE — 99214 OFFICE O/P EST MOD 30 MIN: CPT | Performed by: INTERNAL MEDICINE

## 2019-11-13 PROCEDURE — 90686 IIV4 VACC NO PRSV 0.5 ML IM: CPT

## 2019-11-13 PROCEDURE — G0008 ADMIN INFLUENZA VIRUS VAC: HCPCS

## 2019-11-13 RX ORDER — OXYBUTYNIN CHLORIDE 15 MG/1
15 TABLET, EXTENDED RELEASE ORAL DAILY
Qty: 30 TABLET | Refills: 5 | Status: SHIPPED | OUTPATIENT
Start: 2019-11-13 | End: 2020-02-24 | Stop reason: SDUPTHER

## 2019-11-13 RX ORDER — BENZONATATE 100 MG/1
100 CAPSULE ORAL 3 TIMES DAILY PRN
Qty: 30 CAPSULE | Refills: 0 | Status: SHIPPED | OUTPATIENT
Start: 2019-11-13 | End: 2020-03-10

## 2019-11-13 RX ORDER — LEVOCETIRIZINE DIHYDROCHLORIDE 5 MG/1
5 TABLET, FILM COATED ORAL EVERY EVENING
Qty: 30 TABLET | Refills: 3 | Status: SHIPPED | OUTPATIENT
Start: 2019-11-13 | End: 2020-02-25

## 2019-11-13 NOTE — PROGRESS NOTES
Assessment/Plan:         Diagnoses and all orders for this visit:    Type II diabetes mellitus with manifestations, uncontrolled ([place] [address] ); Improving slowly, continue same  RTc in 3mos w :  -     Ambulatory referral to Ophthalmology; Future  -     Comprehensive metabolic panel; Future  -     CBC and differential; Future  -     Hemoglobin A1C; Future  -     Magnesium; Future  -     Vitamin D 25 hydroxy; Future  -     Vitamin B12; Future    Special screening for malignant neoplasms, colon  -     Cologuard; Future    Screening for human immunodeficiency virus  -     Human Immunodeficiency Virus 1/2 Antigen / Antibody ( Fourth Generation) with Reflex Testing; Future    Need for influenza vaccination  -     influenza vaccine, 6155-2176, quadrivalent, 0 5 mL, preservative-free, for adult and pediatric patients 6 mos+ (AFLURIA, FLUARIX, FLULAVAL, FLUZONE)    Diabetic eye exam ([place] [address] );  -     Ambulatory referral to Ophthalmology; Future    Irritable bowel syndrome with diarrhea; TRY :  -     rifaximin (XIFAXAN) 550 mg tablet; Take 1 tablet (550 mg total) by mouth every 8 (eight) hours for 14 days  RTC in [place] w :  -     Vitamin D 25 hydroxy; Future  -     Vitamin B12; Future  -     IgE; Future  -     Food Allergy Profile; Future  -     IgA; Future  -     Celiac Disease Panel; Future  -     Gluten IgE; Future  -     [place] Allergy Panel, Adult; Future  -     Ambulatory referral to Gastroenterology; Future    Hypothyroidism, unspecified type; RTC in 3mos w :  -     Thyroid Antibodies Panel; Future  -     T4, free; Future  -     TSH, 3rd generation; Future    Hyperlipidemia associated with type 2 diabetes mellitus ([place] [address] )  -     Lipid panel; Future    Proteinuria, unspecified type  -     UA (URINE) with reflex to Scope  -     Protein, Total W/Creat, 24 Hour Urine  -     POCT urine dip    Allergic rhinitis, unspecified seasonality, unspecified trigger  -     benzonatate (TESSALON PERLES) 100 mg capsule;  Take 1 capsule (100 mg total) by mouth 3 (three) times a day as needed for cough With food/Meals  -     levocetirizine (XYZAL) 5 MG tablet; Take 1 tablet (5 mg total) by mouth every evening    Renew :  -     oxybutynin (DITROPAN XL) 15 MG 24 hr tablet; Take 1 tablet (15 mg total) by mouth daily    Generalized abdominal pain; with increase Gas : cause ? ? :  -     Ambulatory referral to Gastroenterology; Future        Subjective:      Patient ID: Analia Santamaria is a 53 y o  female  [address] is here for Regular check up, she has for last few months increasing gas,abd pain and Diarrhea, Recent blood work and med List reviewed w pt in detail    The following portions of the patient's history were reviewed and updated as appropriate: allergies, current medications, past family history, past medical history, past social history, past surgical history and problem list     Review of Systems   Constitutional: Negative for chills, fatigue and fever  HENT: Positive for postnasal drip and sore throat  Negative for congestion, facial swelling, trouble swallowing and voice change  Eyes: Negative for pain, discharge and visual disturbance  Respiratory: Positive for cough  Negative for shortness of breath and wheezing  Cardiovascular: Negative for chest pain, palpitations and leg swelling  Gastrointestinal: Positive for abdominal pain and diarrhea  Negative for blood in stool, constipation and nausea  Endocrine: Negative for polydipsia, polyphagia and polyuria  Genitourinary: Negative for difficulty urinating, hematuria and urgency  Musculoskeletal: Negative for arthralgias and myalgias  Skin: Negative for rash  Neurological: Negative for dizziness, tremors, weakness and headaches  Hematological: Negative for adenopathy  Does not bruise/bleed easily  Psychiatric/Behavioral: Negative for dysphoric mood, sleep disturbance and suicidal ideas           Objective:      /78 (BP Location: Left arm, Patient Position: Sitting, Cuff Size: Standard)   Pulse 92   Temp 97 7 °F (36 5 °C) (Tympanic)   Resp 14   Ht 5' 3" (1 6 m)   Wt 73 5 kg (162 lb)   SpO2 96%   BMI 28 70 kg/m²          Physical Exam   Constitutional: She is oriented to person, place, and time  She appears well-nourished  No distress  HENT:   Head: Normocephalic  Mouth/Throat: No oropharyngeal exudate  Viral URI ? ? Eyes: Pupils are equal, round, and reactive to light  Conjunctivae are normal  No scleral icterus  Neck: Neck supple  Thyromegaly present  Cardiovascular: Normal rate and regular rhythm  Murmur heard  Pulmonary/Chest: Effort normal and breath sounds normal  No respiratory distress  She has no wheezes  She has no rales  Abdominal: Soft  Bowel sounds are normal  She exhibits no distension  There is tenderness  There is no rebound and no guarding  Musculoskeletal: She exhibits no edema or tenderness  Lymphadenopathy:     She has no cervical adenopathy  Neurological: She is alert and oriented to person, place, and time  No cranial nerve deficit  Coordination normal    Skin: No rash noted  No erythema  Psychiatric: She has a normal mood and affect

## 2019-12-13 DIAGNOSIS — I10 ESSENTIAL HYPERTENSION: ICD-10-CM

## 2019-12-13 DIAGNOSIS — IMO0002 TYPE II DIABETES MELLITUS WITH MANIFESTATIONS, UNCONTROLLED: ICD-10-CM

## 2019-12-13 RX ORDER — ASPIRIN 81 MG/1
81 TABLET ORAL DAILY
Qty: 90 TABLET | Refills: 0 | Status: SHIPPED | OUTPATIENT
Start: 2019-12-13 | End: 2020-02-24 | Stop reason: SDUPTHER

## 2020-01-15 DIAGNOSIS — J30.9 ALLERGIC RHINITIS, UNSPECIFIED SEASONALITY, UNSPECIFIED TRIGGER: ICD-10-CM

## 2020-01-15 RX ORDER — DIPHENHYDRAMINE HYDROCHLORIDE 25 MG/1
CAPSULE ORAL
Qty: 30 CAPSULE | Refills: 0 | Status: SHIPPED | OUTPATIENT
Start: 2020-01-15 | End: 2020-02-24 | Stop reason: SDUPTHER

## 2020-02-24 DIAGNOSIS — I10 ESSENTIAL HYPERTENSION: ICD-10-CM

## 2020-02-24 DIAGNOSIS — F32.A DEPRESSION, UNSPECIFIED DEPRESSION TYPE: ICD-10-CM

## 2020-02-24 DIAGNOSIS — J30.9 ALLERGIC RHINITIS, UNSPECIFIED SEASONALITY, UNSPECIFIED TRIGGER: ICD-10-CM

## 2020-02-24 DIAGNOSIS — J45.909 UNCOMPLICATED ASTHMA, UNSPECIFIED ASTHMA SEVERITY, UNSPECIFIED WHETHER PERSISTENT: ICD-10-CM

## 2020-02-24 DIAGNOSIS — IMO0002 TYPE II DIABETES MELLITUS WITH MANIFESTATIONS, UNCONTROLLED: ICD-10-CM

## 2020-02-24 RX ORDER — DIPHENHYDRAMINE HCL 25 MG
25 CAPSULE ORAL
Qty: 90 CAPSULE | Refills: 3 | Status: SHIPPED | OUTPATIENT
Start: 2020-02-24 | End: 2020-04-23 | Stop reason: SDUPTHER

## 2020-02-24 RX ORDER — PANTOPRAZOLE SODIUM 40 MG/1
40 TABLET, DELAYED RELEASE ORAL DAILY
Qty: 90 TABLET | Refills: 3 | Status: SHIPPED | OUTPATIENT
Start: 2020-02-24 | End: 2020-09-03 | Stop reason: SDUPTHER

## 2020-02-24 RX ORDER — OXYBUTYNIN CHLORIDE 15 MG/1
15 TABLET, EXTENDED RELEASE ORAL DAILY
Qty: 90 TABLET | Refills: 3 | Status: SHIPPED | OUTPATIENT
Start: 2020-02-24 | End: 2020-09-03 | Stop reason: SDUPTHER

## 2020-02-24 RX ORDER — ASPIRIN 81 MG/1
81 TABLET ORAL DAILY
Qty: 90 TABLET | Refills: 3 | Status: SHIPPED | OUTPATIENT
Start: 2020-02-24 | End: 2021-03-26

## 2020-02-25 DIAGNOSIS — J30.9 ALLERGIC RHINITIS, UNSPECIFIED SEASONALITY, UNSPECIFIED TRIGGER: ICD-10-CM

## 2020-02-25 RX ORDER — LEVOCETIRIZINE DIHYDROCHLORIDE 5 MG/1
5 TABLET, FILM COATED ORAL EVERY EVENING
Qty: 30 TABLET | Refills: 3 | Status: SHIPPED | OUTPATIENT
Start: 2020-02-25 | End: 2020-07-24

## 2020-02-27 ENCOUNTER — OFFICE VISIT (OUTPATIENT)
Dept: FAMILY MEDICINE CLINIC | Facility: CLINIC | Age: 55
End: 2020-02-27
Payer: COMMERCIAL

## 2020-02-27 ENCOUNTER — HOSPITAL ENCOUNTER (OUTPATIENT)
Dept: CT IMAGING | Facility: HOSPITAL | Age: 55
Discharge: HOME | End: 2020-02-27
Payer: COMMERCIAL

## 2020-02-27 ENCOUNTER — APPOINTMENT (OUTPATIENT)
Dept: LAB | Facility: HOSPITAL | Age: 55
End: 2020-02-27
Payer: COMMERCIAL

## 2020-02-27 ENCOUNTER — TELEPHONE (OUTPATIENT)
Dept: FAMILY MEDICINE CLINIC | Facility: CLINIC | Age: 55
End: 2020-02-27

## 2020-02-27 VITALS
TEMPERATURE: 98.4 F | DIASTOLIC BLOOD PRESSURE: 62 MMHG | HEART RATE: 64 BPM | RESPIRATION RATE: 14 BRPM | BODY MASS INDEX: 28.35 KG/M2 | OXYGEN SATURATION: 98 % | WEIGHT: 160 LBS | SYSTOLIC BLOOD PRESSURE: 118 MMHG | HEIGHT: 63 IN

## 2020-02-27 DIAGNOSIS — J44.1 ACUTE EXACERBATION OF CHRONIC OBSTRUCTIVE PULMONARY DISEASE (COPD) (HCC): ICD-10-CM

## 2020-02-27 DIAGNOSIS — R11.0 NAUSEA: ICD-10-CM

## 2020-02-27 DIAGNOSIS — E11.69 HYPERLIPIDEMIA ASSOCIATED WITH TYPE 2 DIABETES MELLITUS (HCC): ICD-10-CM

## 2020-02-27 DIAGNOSIS — E03.9 HYPOTHYROIDISM, UNSPECIFIED TYPE: ICD-10-CM

## 2020-02-27 DIAGNOSIS — Z11.4 SCREENING FOR HUMAN IMMUNODEFICIENCY VIRUS: ICD-10-CM

## 2020-02-27 DIAGNOSIS — R10.31 RIGHT LOWER QUADRANT ABDOMINAL PAIN: ICD-10-CM

## 2020-02-27 DIAGNOSIS — K58.0 IRRITABLE BOWEL SYNDROME WITH DIARRHEA: ICD-10-CM

## 2020-02-27 DIAGNOSIS — Z12.31 SCREENING MAMMOGRAM FOR HIGH-RISK PATIENT: ICD-10-CM

## 2020-02-27 DIAGNOSIS — IMO0002 TYPE II DIABETES MELLITUS WITH MANIFESTATIONS, UNCONTROLLED: ICD-10-CM

## 2020-02-27 DIAGNOSIS — E78.5 HYPERLIPIDEMIA ASSOCIATED WITH TYPE 2 DIABETES MELLITUS (HCC): ICD-10-CM

## 2020-02-27 DIAGNOSIS — R10.31 RIGHT LOWER QUADRANT ABDOMINAL PAIN: Primary | ICD-10-CM

## 2020-02-27 LAB
25(OH)D3 SERPL-MCNC: 39.2 NG/ML (ref 30–100)
ALBUMIN SERPL BCP-MCNC: 4.4 G/DL (ref 3–5.2)
ALP SERPL-CCNC: 105 U/L (ref 43–122)
ALT SERPL W P-5'-P-CCNC: 35 U/L (ref 9–52)
ANION GAP SERPL CALCULATED.3IONS-SCNC: 9 MMOL/L (ref 5–14)
AST SERPL W P-5'-P-CCNC: 22 U/L (ref 14–36)
BACTERIA UR QL AUTO: ABNORMAL /HPF
BILIRUB SERPL-MCNC: 0.3 MG/DL
BILIRUB UR QL STRIP: NEGATIVE
BUN SERPL-MCNC: 13 MG/DL (ref 5–25)
CALCIUM ALBUM COR SERPL-MCNC: 10.3 MG/DL (ref 8.3–10.1)
CALCIUM SERPL-MCNC: 10.6 MG/DL (ref 8.4–10.2)
CHLORIDE SERPL-SCNC: 101 MMOL/L (ref 97–108)
CHOLEST SERPL-MCNC: 174 MG/DL
CLARITY UR: ABNORMAL
CO2 SERPL-SCNC: 33 MMOL/L (ref 22–30)
COLOR UR: ABNORMAL
CREAT SERPL-MCNC: 0.54 MG/DL (ref 0.6–1.2)
EOSINOPHIL # BLD AUTO: 0.48 THOUSAND/UL (ref 0–0.4)
EOSINOPHIL NFR BLD MANUAL: 4 % (ref 0–6)
ERYTHROCYTE [DISTWIDTH] IN BLOOD BY AUTOMATED COUNT: 15.8 %
EST. AVERAGE GLUCOSE BLD GHB EST-MCNC: 169 MG/DL
GFR SERPL CREATININE-BSD FRML MDRD: 107 ML/MIN/1.73SQ M
GLUCOSE P FAST SERPL-MCNC: 87 MG/DL (ref 70–99)
GLUCOSE UR STRIP-MCNC: ABNORMAL MG/DL
HBA1C MFR BLD: 7.5 %
HCT VFR BLD AUTO: 45.4 % (ref 36–46)
HDLC SERPL-MCNC: 48 MG/DL
HGB BLD-MCNC: 14.9 G/DL (ref 12–16)
HGB UR QL STRIP.AUTO: 10
IGA SERPL-MCNC: 368 MG/DL (ref 70–400)
KETONES UR STRIP-MCNC: NEGATIVE MG/DL
LDLC SERPL CALC-MCNC: 74 MG/DL
LEUKOCYTE ESTERASE UR QL STRIP: 500
LIPASE SERPL-CCNC: 106 U/L (ref 23–300)
LYMPHOCYTES # BLD AUTO: 44 % (ref 25–45)
LYMPHOCYTES # BLD AUTO: 5.28 THOUSAND/UL (ref 0.5–4)
MAGNESIUM SERPL-MCNC: 2.2 MG/DL (ref 1.6–2.3)
MCH RBC QN AUTO: 27 PG (ref 26–34)
MCHC RBC AUTO-ENTMCNC: 32.8 G/DL (ref 31–36)
MCV RBC AUTO: 83 FL (ref 80–100)
MONOCYTES # BLD AUTO: 0.6 THOUSAND/UL (ref 0.2–0.9)
MONOCYTES NFR BLD AUTO: 5 % (ref 1–10)
MUCOUS THREADS UR QL AUTO: ABNORMAL
NEUTS BAND NFR BLD MANUAL: 1 % (ref 0–8)
NEUTS SEG # BLD: 5.4 THOUSAND/UL (ref 1.8–7.8)
NEUTS SEG NFR BLD AUTO: 44 %
NITRITE UR QL STRIP: NEGATIVE
NON-SQ EPI CELLS URNS QL MICRO: ABNORMAL /HPF
NONHDLC SERPL-MCNC: 126 MG/DL
PH UR STRIP.AUTO: 5 [PH]
PLATELET # BLD AUTO: 280 THOUSANDS/UL (ref 150–450)
PLATELET BLD QL SMEAR: ADEQUATE
PMV BLD AUTO: 8.6 FL (ref 8.9–12.7)
POTASSIUM SERPL-SCNC: 4.2 MMOL/L (ref 3.6–5)
PROT SERPL-MCNC: 8.4 G/DL (ref 5.9–8.4)
PROT UR STRIP-MCNC: ABNORMAL MG/DL
RBC # BLD AUTO: 5.51 MILLION/UL (ref 4–5.2)
RBC #/AREA URNS AUTO: ABNORMAL /HPF
RBC MORPH BLD: NORMAL
SODIUM SERPL-SCNC: 143 MMOL/L (ref 137–147)
SP GR UR STRIP.AUTO: 1.01 (ref 1–1.04)
T4 FREE SERPL-MCNC: 1.09 NG/DL (ref 0.76–1.46)
TOTAL CELLS COUNTED SPEC: 100
TRIGL SERPL-MCNC: 259 MG/DL
TSH SERPL DL<=0.05 MIU/L-ACNC: 2.32 UIU/ML (ref 0.47–4.68)
UROBILINOGEN UA: NEGATIVE MG/DL
VARIANT LYMPHS # BLD AUTO: 2 % (ref 0–0)
VIT B12 SERPL-MCNC: 472 PG/ML (ref 100–900)
WBC # BLD AUTO: 12 THOUSAND/UL (ref 4.5–11)
WBC #/AREA URNS AUTO: ABNORMAL /HPF

## 2020-02-27 PROCEDURE — 80061 LIPID PANEL: CPT

## 2020-02-27 PROCEDURE — 36415 COLL VENOUS BLD VENIPUNCTURE: CPT

## 2020-02-27 PROCEDURE — 82784 ASSAY IGA/IGD/IGG/IGM EACH: CPT

## 2020-02-27 PROCEDURE — 86255 FLUORESCENT ANTIBODY SCREEN: CPT

## 2020-02-27 PROCEDURE — 83036 HEMOGLOBIN GLYCOSYLATED A1C: CPT

## 2020-02-27 PROCEDURE — 81001 URINALYSIS AUTO W/SCOPE: CPT

## 2020-02-27 PROCEDURE — 74177 CT ABD & PELVIS W/CONTRAST: CPT

## 2020-02-27 PROCEDURE — 80053 COMPREHEN METABOLIC PANEL: CPT

## 2020-02-27 PROCEDURE — 86800 THYROGLOBULIN ANTIBODY: CPT

## 2020-02-27 PROCEDURE — 83690 ASSAY OF LIPASE: CPT

## 2020-02-27 PROCEDURE — 86003 ALLG SPEC IGE CRUDE XTRC EA: CPT

## 2020-02-27 PROCEDURE — 87389 HIV-1 AG W/HIV-1&-2 AB AG IA: CPT

## 2020-02-27 PROCEDURE — 3078F DIAST BP <80 MM HG: CPT | Performed by: INTERNAL MEDICINE

## 2020-02-27 PROCEDURE — 83735 ASSAY OF MAGNESIUM: CPT

## 2020-02-27 PROCEDURE — 3051F HG A1C>EQUAL 7.0%<8.0%: CPT | Performed by: INTERNAL MEDICINE

## 2020-02-27 PROCEDURE — 86376 MICROSOMAL ANTIBODY EACH: CPT

## 2020-02-27 PROCEDURE — 3008F BODY MASS INDEX DOCD: CPT | Performed by: INTERNAL MEDICINE

## 2020-02-27 PROCEDURE — 82785 ASSAY OF IGE: CPT

## 2020-02-27 PROCEDURE — 1036F TOBACCO NON-USER: CPT | Performed by: INTERNAL MEDICINE

## 2020-02-27 PROCEDURE — 85007 BL SMEAR W/DIFF WBC COUNT: CPT

## 2020-02-27 PROCEDURE — 82306 VITAMIN D 25 HYDROXY: CPT

## 2020-02-27 PROCEDURE — 3074F SYST BP LT 130 MM HG: CPT | Performed by: INTERNAL MEDICINE

## 2020-02-27 PROCEDURE — 84443 ASSAY THYROID STIM HORMONE: CPT

## 2020-02-27 PROCEDURE — 99214 OFFICE O/P EST MOD 30 MIN: CPT | Performed by: INTERNAL MEDICINE

## 2020-02-27 PROCEDURE — 84439 ASSAY OF FREE THYROXINE: CPT

## 2020-02-27 PROCEDURE — 82607 VITAMIN B-12: CPT

## 2020-02-27 PROCEDURE — 83516 IMMUNOASSAY NONANTIBODY: CPT

## 2020-02-27 PROCEDURE — 85027 COMPLETE CBC AUTOMATED: CPT

## 2020-02-27 RX ORDER — CIPROFLOXACIN 500 MG/1
500 TABLET, FILM COATED ORAL EVERY 12 HOURS SCHEDULED
Qty: 14 TABLET | Refills: 0 | Status: SHIPPED | OUTPATIENT
Start: 2020-02-27 | End: 2020-03-10

## 2020-02-27 RX ORDER — METRONIDAZOLE 250 MG/1
250 TABLET ORAL EVERY 8 HOURS SCHEDULED
Qty: 21 TABLET | Refills: 0 | Status: SHIPPED | OUTPATIENT
Start: 2020-02-27 | End: 2020-03-10

## 2020-02-27 RX ADMIN — IOHEXOL 100 ML: 350 INJECTION, SOLUTION INTRAVENOUS at 12:57

## 2020-02-27 NOTE — TELEPHONE ENCOUNTER
Ellen from [facility] radiology calling in regards to patients CT, states results are ready in epic

## 2020-02-27 NOTE — PROGRESS NOTES
Assessment/Plan:         Diagnoses and all orders for this visit:    Right lower quadrant abdominal pain; Cause ? R/O Acute Diverticulitis  Off work  Bed rest  Increase Po Fluids  Try ;  -     CT abdomen pelvis w contrast; STAT     -     Comprehensive metabolic panel; STAT  -     Lipase; Future  -     CBC and differential; Future  -     Cancel: UA (URINE) with reflex to Scope  -     ciprofloxacin (CIPRO) 500 mg tablet; Take 1 tablet (500 mg total) by mouth every 12 (twelve) hours for 7 days  -     metroNIDAZOLE (FLAGYL) 250 mg tablet; Take 1 tablet (250 mg total) by mouth every 8 (eight) hours for 7 days  RTC in 10 days    Screening for human immunodeficiency virus  -     HIV 1/2 AG-AB combo; Future      Nausea; as above    -     CT abdomen pelvis w contrast; Future  -     Comprehensive metabolic panel; Future  -     Lipase; Future  -     CBC and differential; Future  -     Cancel: UA (URINE) with reflex to Scope  -     ciprofloxacin (CIPRO) 500 mg tablet; Take 1 tablet (500 mg total) by mouth every 12 (twelve) hours for 7 days  -     metroNIDAZOLE (FLAGYL) 250 mg tablet; Take 1 tablet (250 mg total) by mouth every 8 (eight) hours for 7 days  Patient's shoes and socks removed  Right Foot/Ankle   Right Foot Inspection  Skin Exam: skin normal and skin intact no dry skin, no warmth, no callus, no erythema, no maceration, no abnormal color, no pre-ulcer, no ulcer and no callus                          Toe Exam: no swelling and no tenderness  Sensory   Vibration: diminished  Proprioception: diminished   Monofilament testing: diminished  Vascular    The right DP pulse is 1+  The right PT pulse is 1+       Left Foot/Ankle  Left Foot Inspection  Skin Exam: skin normal and skin intactno dry skin, no warmth, no erythema, no maceration, normal color, no pre-ulcer, no ulcer and no callus                         Toe Exam: no swelling and no tenderness                   Sensory   Vibration: diminished  Proprioception: diminished  Monofilament: diminished  Vascular    The left DP pulse is 1+  The left PT pulse is 1+  Assign Risk Category:  No deformity present; No loss of protective sensation; Weak pulses       Risk: 1      Other orders  -     Cancel: Mammo screening bilateral w cad; Future        Subjective:      Patient ID: Cleone Leyden is a 53 y o  female  53 Y O lady is here for Regular check up, and for last 2-3 weeks she ahs been suffering of Nausea,abd pain,Diarrhea, low Grade fever, Recent blood work and med list reviewed w pt in Detail    The following portions of the patient's history were reviewed and updated as appropriate: allergies, current medications, past family history, past medical history, past social history, past surgical history and problem list     Review of Systems   Constitutional: Negative for chills, fatigue and fever  HENT: Negative for congestion, facial swelling, sore throat, trouble swallowing and voice change  Eyes: Negative for pain, discharge and visual disturbance  Respiratory: Negative for cough, shortness of breath and wheezing  Cardiovascular: Negative for chest pain, palpitations and leg swelling  Gastrointestinal: Positive for abdominal pain, diarrhea and nausea  Negative for blood in stool and constipation  Endocrine: Negative for polydipsia, polyphagia and polyuria  Genitourinary: Negative for difficulty urinating, hematuria and urgency  Musculoskeletal: Negative for arthralgias and myalgias  Skin: Negative for rash  Neurological: Negative for dizziness, tremors, weakness and headaches  Hematological: Negative for adenopathy  Does not bruise/bleed easily  Psychiatric/Behavioral: Negative for dysphoric mood, sleep disturbance and suicidal ideas           Objective:      /62 (BP Location: Right arm, Patient Position: Standing, Cuff Size: Standard)   Pulse 64   Temp 98 4 °F (36 9 °C) (Tympanic)   Resp 14   Ht 5' 3" (1 6 m)   Wt 72 6 kg (160 lb) SpO2 98%   BMI 28 34 kg/m²          Physical Exam   Constitutional: She is oriented to person, place, and time  She appears well-nourished  No distress  HENT:   Head: Normocephalic  Mouth/Throat: Oropharynx is clear and moist  No oropharyngeal exudate  Eyes: Pupils are equal, round, and reactive to light  Conjunctivae are normal  No scleral icterus  Neck: Neck supple  Thyromegaly present  Cardiovascular: Normal rate and regular rhythm  Pulses are weak pulses  Murmur heard  Pulses:       Dorsalis pedis pulses are 1+ on the right side, and 1+ on the left side  Posterior tibial pulses are 1+ on the right side, and 1+ on the left side  Pulmonary/Chest: Effort normal and breath sounds normal  No respiratory distress  She has no wheezes  She has no rales  Abdominal: Soft  Bowel sounds are normal  She exhibits no distension  There is tenderness  There is no rebound and no guarding  Musculoskeletal: She exhibits no edema or tenderness  Feet:   Right Foot:   Skin Integrity: Negative for ulcer, skin breakdown, erythema, warmth, callus or dry skin  Left Foot:   Skin Integrity: Negative for ulcer, skin breakdown, erythema, warmth, callus or dry skin  Lymphadenopathy:     She has no cervical adenopathy  Neurological: She is alert and oriented to person, place, and time  No cranial nerve deficit  Coordination normal    Skin: No rash noted  No erythema  Psychiatric: She has a normal mood and affect

## 2020-02-28 LAB
A ALTERNATA IGE QN: <0.1 KUA/I
A FUMIGATUS IGE QN: <0.1 KUA/I
ALLERGEN COMMENT: NORMAL
ALLERGEN COMMENT: NORMAL
ALMOND IGE QN: <0.1 KUA/I
BERMUDA GRASS IGE QN: <0.1 KUA/I
BOXELDER IGE QN: <0.1 KUA/I
C HERBARUM IGE QN: <0.1 KUA/I
CASHEW NUT IGE QN: <0.1 KUA/I
CAT DANDER IGE QN: <0.1 KUA/I
CMN PIGWEED IGE QN: <0.1 KUA/I
CODFISH IGE QN: <0.1 KUA/I
COMMON RAGWEED IGE QN: <0.1 KUA/I
COTTONWOOD IGE QN: <0.1 KUA/I
D FARINAE IGE QN: <0.1 KUA/I
D PTERONYSS IGE QN: <0.1 KUA/I
DOG DANDER IGE QN: <0.1 KUA/I
EGG WHITE IGE QN: <0.1 KUA/I
GLUTEN IGE QN: <0.1 KUA/I
HAZELNUT IGE QN: <0.1 KUA/L
HIV 1+2 AB+HIV1 P24 AG SERPL QL IA: NORMAL
LONDON PLANE IGE QN: <0.1 KUA/I
MILK IGE QN: <0.1 KUA/I
MOUSE URINE PROT IGE QN: <0.1 KUA/I
MT JUNIPER IGE QN: <0.1 KUA/I
MUGWORT IGE QN: <0.1 KUA/I
P NOTATUM IGE QN: <0.1 KUA/I
PEANUT IGE QN: <0.1 KUA/I
ROACH IGE QN: <0.1 KUA/I
SALMON IGE QN: <0.1 KUA/I
SCALLOP IGE QN: <0.1 KUA/L
SESAME SEED IGE QN: <0.1 KUA/I
SHEEP SORREL IGE QN: <0.1 KUA/I
SHRIMP IGE QN: <0.1 KUA/L
SILVER BIRCH IGE QN: <0.1 KUA/I
SOYBEAN IGE QN: <0.1 KUA/I
THYROGLOB AB SERPL-ACNC: <1 IU/ML (ref 0–0.9)
THYROPEROXIDASE AB SERPL-ACNC: 8 IU/ML (ref 0–34)
TIMOTHY IGE QN: <0.1 KUA/I
TOTAL IGE SMQN RAST: <2 KU/L (ref 0–113)
TOTAL IGE SMQN RAST: <2 KU/L (ref 0–113)
TUNA IGE QN: <0.1 KUA/I
WALNUT IGE QN: <0.1 KUA/I
WALNUT IGE QN: <0.1 KUA/I
WHEAT IGE QN: <0.1 KUA/I
WHITE ASH IGE QN: <0.1 KUA/I
WHITE ELM IGE QN: <0.1 KUA/I
WHITE MULBERRY IGE QN: <0.1 KUA/I
WHITE OAK IGE QN: <0.1 KUA/I

## 2020-02-29 LAB
ENDOMYSIUM IGA SER QL: NEGATIVE
GLIADIN PEPTIDE IGA SER-ACNC: 4 UNITS (ref 0–19)
GLIADIN PEPTIDE IGG SER-ACNC: 10 UNITS (ref 0–19)
IGA SERPL-MCNC: 396 MG/DL (ref 87–352)
TTG IGA SER-ACNC: <2 U/ML (ref 0–3)
TTG IGG SER-ACNC: 8 U/ML (ref 0–5)

## 2020-03-10 ENCOUNTER — OFFICE VISIT (OUTPATIENT)
Dept: FAMILY MEDICINE CLINIC | Facility: CLINIC | Age: 55
End: 2020-03-10
Payer: COMMERCIAL

## 2020-03-10 VITALS
DIASTOLIC BLOOD PRESSURE: 62 MMHG | OXYGEN SATURATION: 98 % | TEMPERATURE: 98 F | WEIGHT: 161.8 LBS | RESPIRATION RATE: 14 BRPM | HEART RATE: 72 BPM | SYSTOLIC BLOOD PRESSURE: 118 MMHG | BODY MASS INDEX: 28.67 KG/M2 | HEIGHT: 63 IN

## 2020-03-10 DIAGNOSIS — K90.0 CELIAC DISEASE: ICD-10-CM

## 2020-03-10 DIAGNOSIS — IMO0002 TYPE II DIABETES MELLITUS WITH MANIFESTATIONS, UNCONTROLLED: ICD-10-CM

## 2020-03-10 DIAGNOSIS — Z12.31 SCREENING MAMMOGRAM FOR HIGH-RISK PATIENT: ICD-10-CM

## 2020-03-10 DIAGNOSIS — K52.9 COLITIS: Primary | ICD-10-CM

## 2020-03-10 DIAGNOSIS — Z12.11 COLON CANCER SCREENING: ICD-10-CM

## 2020-03-10 DIAGNOSIS — R35.0 URINARY FREQUENCY: ICD-10-CM

## 2020-03-10 DIAGNOSIS — R06.02 SOB (SHORTNESS OF BREATH): ICD-10-CM

## 2020-03-10 LAB — NON VENT ROOM AIR: 400 %

## 2020-03-10 PROCEDURE — 3074F SYST BP LT 130 MM HG: CPT | Performed by: INTERNAL MEDICINE

## 2020-03-10 PROCEDURE — 1036F TOBACCO NON-USER: CPT | Performed by: INTERNAL MEDICINE

## 2020-03-10 PROCEDURE — 99214 OFFICE O/P EST MOD 30 MIN: CPT | Performed by: INTERNAL MEDICINE

## 2020-03-10 PROCEDURE — 3051F HG A1C>EQUAL 7.0%<8.0%: CPT | Performed by: INTERNAL MEDICINE

## 2020-03-10 PROCEDURE — 3008F BODY MASS INDEX DOCD: CPT | Performed by: INTERNAL MEDICINE

## 2020-03-10 PROCEDURE — 3078F DIAST BP <80 MM HG: CPT | Performed by: INTERNAL MEDICINE

## 2020-03-10 RX ORDER — METRONIDAZOLE 500 MG/1
TABLET ORAL
COMMUNITY
Start: 2020-02-27 | End: 2020-03-10

## 2020-03-10 RX ORDER — CALCIUM CARBONATE/VITAMIN D3 600 MG-10
2 TABLET ORAL DAILY
COMMUNITY
Start: 2020-02-24 | End: 2020-04-23 | Stop reason: SDUPTHER

## 2020-03-10 NOTE — PROGRESS NOTES
Assessment/Plan:         Diagnoses and all orders for this visit:    Colitis; Improving slowly : TRY ;  -     rifaximin (XIFAXAN) 200 mg tablet; Take 1 tablet (200 mg total) by mouth 3 (three) times a day for 3 days  RTc in 1mo w :  -     CBC and differential; Future    Screening mammogram for high-risk patient  -     Mammo screening bilateral w 3d & cad; Future    Colon cancer screening  -     Cologuard; Future    Celiac disease; ??   -     Ambulatory referral to Gastroenterology; Future    Urinary frequency; cause ? ?   -     US kidney and bladder with pvr; Future  -     Protein, Total W/Creat, 24 Hour Urine    Type II diabetes mellitus with manifestations, uncontrolled ([facility] [address] ); Life style Mod  Continue same Meds  RTc in 1-2 mos w :  -     Basic metabolic panel; Future    SOB (shortness of breath)  -     POCT peak flow  -     CBC and differential; Future    Other orders  -     Cancel: Mammo screening bilateral w cad; Future  -     Discontinue: metroNIDAZOLE (FLAGYL) 500 mg tablet; TAKE HALF TABLET (250 MG TOTAL) BY MOUTH EVERY 8 (EIGHT) HOURS FOR 7 DAYS  -     CALCIUM 600-D 600-400 MG-UNIT TABS; Take 2 tablets by mouth daily  -     Cancel: rifaximin (XIFAXAN) 550 mg tablet; Take 1 tablet (550 mg total) by mouth every 8 (eight) hours for 14 days        Subjective:      Patient ID: Margie Banegas is a 53 y o  female  [address] is here for Regular check up, and Re check from last visit, she feels better, No New issues, recent Blood work and med List reviewed w pt in detail    The following portions of the patient's history were reviewed and updated as appropriate: allergies, current medications, past family history, past medical history, past social history, past surgical history and problem list     Review of Systems   Constitutional: Negative for chills, fatigue and fever  HENT: Negative for congestion, facial swelling, sore throat, trouble swallowing and voice change      Eyes: Negative for pain, discharge and visual disturbance  Respiratory: Positive for shortness of breath  Negative for cough and wheezing  Cardiovascular: Negative for chest pain, palpitations and leg swelling  Gastrointestinal: Positive for abdominal pain and diarrhea  Negative for blood in stool, constipation and nausea  Endocrine: Negative for polydipsia, polyphagia and polyuria  Genitourinary: Positive for frequency  Negative for difficulty urinating, hematuria and urgency  Musculoskeletal: Negative for arthralgias and myalgias  Skin: Negative for rash  Neurological: Negative for dizziness, tremors, weakness and headaches  Hematological: Negative for adenopathy  Does not bruise/bleed easily  Psychiatric/Behavioral: Negative for dysphoric mood, sleep disturbance and suicidal ideas  Objective:      /62 (BP Location: Left arm, Patient Position: Sitting, Cuff Size: Standard)   Pulse 72   Temp 98 °F (36 7 °C) (Tympanic)   Resp 14   Ht 5' 3" (1 6 m)   Wt 73 4 kg (161 lb 12 8 oz)   SpO2 98%   BMI 28 66 kg/m²          Physical Exam   Constitutional: She is oriented to person, place, and time  She appears well-nourished  No distress  HENT:   Head: Normocephalic  Mouth/Throat: Oropharynx is clear and moist  No oropharyngeal exudate  Eyes: Pupils are equal, round, and reactive to light  Conjunctivae are normal  No scleral icterus  Neck: Neck supple  Thyromegaly present  Cardiovascular: Normal rate and regular rhythm  Murmur heard  Pulmonary/Chest: Effort normal and breath sounds normal  No respiratory distress  She has no wheezes  She has no rales  Abdominal: Soft  Bowel sounds are normal  She exhibits no distension  There is tenderness  There is no rebound and no guarding  Increase Gas   Musculoskeletal: She exhibits no edema or tenderness  Lymphadenopathy:     She has no cervical adenopathy  Neurological: She is alert and oriented to person, place, and time  No cranial nerve deficit  Coordination normal    Skin: No rash noted  No erythema  Psychiatric: She has a normal mood and affect

## 2020-03-20 ENCOUNTER — APPOINTMENT (OUTPATIENT)
Dept: LAB | Facility: HOSPITAL | Age: 55
End: 2020-03-20
Payer: COMMERCIAL

## 2020-03-20 ENCOUNTER — HOSPITAL ENCOUNTER (OUTPATIENT)
Dept: ULTRASOUND IMAGING | Facility: HOSPITAL | Age: 55
Discharge: HOME | End: 2020-03-20
Payer: COMMERCIAL

## 2020-03-20 DIAGNOSIS — R35.0 URINARY FREQUENCY: ICD-10-CM

## 2020-03-20 DIAGNOSIS — IMO0002 TYPE II DIABETES MELLITUS WITH MANIFESTATIONS, UNCONTROLLED: ICD-10-CM

## 2020-03-20 DIAGNOSIS — K52.9 COLITIS: ICD-10-CM

## 2020-03-20 DIAGNOSIS — R06.02 SOB (SHORTNESS OF BREATH): ICD-10-CM

## 2020-03-20 LAB
ANION GAP SERPL CALCULATED.3IONS-SCNC: 9 MMOL/L (ref 5–14)
BASOPHILS # BLD AUTO: 0.1 THOUSANDS/ΜL (ref 0–0.1)
BASOPHILS NFR BLD AUTO: 1 % (ref 0–1)
BUN SERPL-MCNC: 14 MG/DL (ref 5–25)
CALCIUM SERPL-MCNC: 9.5 MG/DL (ref 8.4–10.2)
CHLORIDE SERPL-SCNC: 99 MMOL/L (ref 97–108)
CO2 SERPL-SCNC: 30 MMOL/L (ref 22–30)
CREAT SERPL-MCNC: 0.5 MG/DL (ref 0.6–1.2)
EOSINOPHIL # BLD AUTO: 0.3 THOUSAND/ΜL (ref 0–0.4)
EOSINOPHIL NFR BLD AUTO: 3 % (ref 0–6)
ERYTHROCYTE [DISTWIDTH] IN BLOOD BY AUTOMATED COUNT: 15 %
GFR SERPL CREATININE-BSD FRML MDRD: 109 ML/MIN/1.73SQ M
GLUCOSE P FAST SERPL-MCNC: 146 MG/DL (ref 70–99)
HCT VFR BLD AUTO: 42.2 % (ref 36–46)
HGB BLD-MCNC: 13.8 G/DL (ref 12–16)
LYMPHOCYTES # BLD AUTO: 2.9 THOUSANDS/ΜL (ref 0.5–4)
LYMPHOCYTES NFR BLD AUTO: 32 % (ref 25–45)
MCH RBC QN AUTO: 26.9 PG (ref 26–34)
MCHC RBC AUTO-ENTMCNC: 32.6 G/DL (ref 31–36)
MCV RBC AUTO: 82 FL (ref 80–100)
MONOCYTES # BLD AUTO: 0.4 THOUSAND/ΜL (ref 0.2–0.9)
MONOCYTES NFR BLD AUTO: 4 % (ref 1–10)
NEUTROPHILS # BLD AUTO: 5.6 THOUSANDS/ΜL (ref 1.8–7.8)
NEUTS SEG NFR BLD AUTO: 60 % (ref 45–65)
PLATELET # BLD AUTO: 263 THOUSANDS/UL (ref 150–450)
PMV BLD AUTO: 8.7 FL (ref 8.9–12.7)
POTASSIUM SERPL-SCNC: 4 MMOL/L (ref 3.6–5)
RBC # BLD AUTO: 5.12 MILLION/UL (ref 4–5.2)
SODIUM SERPL-SCNC: 138 MMOL/L (ref 137–147)
WBC # BLD AUTO: 9.3 THOUSAND/UL (ref 4.5–11)

## 2020-03-20 PROCEDURE — 80048 BASIC METABOLIC PNL TOTAL CA: CPT

## 2020-03-20 PROCEDURE — 51798 US URINE CAPACITY MEASURE: CPT

## 2020-03-20 PROCEDURE — 85025 COMPLETE CBC W/AUTO DIFF WBC: CPT

## 2020-03-20 PROCEDURE — 36415 COLL VENOUS BLD VENIPUNCTURE: CPT

## 2020-03-23 DIAGNOSIS — IMO0002 TYPE II DIABETES MELLITUS WITH MANIFESTATIONS, UNCONTROLLED: ICD-10-CM

## 2020-03-23 RX ORDER — EMPAGLIFLOZIN 25 MG/1
TABLET, FILM COATED ORAL
Qty: 30 TABLET | Refills: 5 | Status: SHIPPED | OUTPATIENT
Start: 2020-03-23 | End: 2020-04-22

## 2020-03-25 ENCOUNTER — APPOINTMENT (OUTPATIENT)
Dept: LAB | Facility: HOSPITAL | Age: 55
End: 2020-03-25
Payer: COMMERCIAL

## 2020-03-25 DIAGNOSIS — R80.9 PROTEINURIA, UNSPECIFIED TYPE: Primary | ICD-10-CM

## 2020-03-25 LAB
CREAT 24H UR-MRATE: 0.7 G/24HR (ref 0.6–1.8)
PROT 24H UR-MCNC: <120 MG/24 HRS (ref 40–150)
SPECIMEN VOL UR: 2000 ML
SPECIMEN VOL UR: 2000 ML

## 2020-03-25 PROCEDURE — 84156 ASSAY OF PROTEIN URINE: CPT

## 2020-03-25 PROCEDURE — 3066F NEPHROPATHY DOC TX: CPT | Performed by: INTERNAL MEDICINE

## 2020-03-25 PROCEDURE — 82570 ASSAY OF URINE CREATININE: CPT

## 2020-04-07 ENCOUNTER — TELEMEDICINE (OUTPATIENT)
Dept: GASTROENTEROLOGY | Facility: MEDICAL CENTER | Age: 55
End: 2020-04-07

## 2020-04-07 DIAGNOSIS — R19.4 CHANGE IN BOWEL HABITS: Primary | ICD-10-CM

## 2020-04-07 DIAGNOSIS — R21 RASH: Primary | ICD-10-CM

## 2020-04-07 DIAGNOSIS — K62.5 RECTAL BLEEDING: ICD-10-CM

## 2020-04-07 DIAGNOSIS — R10.30 LOWER ABDOMINAL PAIN: ICD-10-CM

## 2020-04-07 DIAGNOSIS — R15.1 FECAL SMEARING: ICD-10-CM

## 2020-04-07 DIAGNOSIS — E11.9 TYPE 2 DIABETES MELLITUS WITHOUT COMPLICATION, WITH LONG-TERM CURRENT USE OF INSULIN (HCC): ICD-10-CM

## 2020-04-07 DIAGNOSIS — K57.30 DIVERTICULOSIS OF COLON: ICD-10-CM

## 2020-04-07 DIAGNOSIS — Z79.4 TYPE 2 DIABETES MELLITUS WITHOUT COMPLICATION, WITH LONG-TERM CURRENT USE OF INSULIN (HCC): ICD-10-CM

## 2020-04-07 PROBLEM — D72.829 LEUKOCYTOSIS: Status: ACTIVE | Noted: 2020-04-07

## 2020-04-07 PROBLEM — E66.01 MORBID OBESITY (HCC): Status: ACTIVE | Noted: 2020-04-07

## 2020-04-07 PROBLEM — E24.9 CUSHING'S SYNDROME (HCC): Status: ACTIVE | Noted: 2020-04-07

## 2020-04-07 PROBLEM — M19.90 OSTEOARTHRITIS: Status: ACTIVE | Noted: 2020-04-07

## 2020-04-07 PROCEDURE — 99214 OFFICE O/P EST MOD 30 MIN: CPT | Performed by: INTERNAL MEDICINE

## 2020-04-07 RX ORDER — POLYETHYLENE GLYCOL 3350 17 G/17G
17 POWDER, FOR SOLUTION ORAL DAILY
Qty: 510 G | Refills: 3 | Status: SHIPPED | OUTPATIENT
Start: 2020-04-07 | End: 2020-04-23

## 2020-04-07 RX ORDER — CALCIPOTRIENE 50 UG/G
CREAM TOPICAL
Qty: 120 G | Refills: 0 | Status: SHIPPED | OUTPATIENT
Start: 2020-04-07 | End: 2020-07-02

## 2020-04-11 PROBLEM — E66.01 MORBID OBESITY (HCC): Status: RESOLVED | Noted: 2020-04-07 | Resolved: 2020-04-11

## 2020-04-14 ENCOUNTER — TELEPHONE (OUTPATIENT)
Dept: GASTROENTEROLOGY | Facility: MEDICAL CENTER | Age: 55
End: 2020-04-14

## 2020-04-16 ENCOUNTER — TELEPHONE (OUTPATIENT)
Dept: GASTROENTEROLOGY | Facility: MEDICAL CENTER | Age: 55
End: 2020-04-16

## 2020-04-22 DIAGNOSIS — IMO0002 TYPE II DIABETES MELLITUS WITH MANIFESTATIONS, UNCONTROLLED: ICD-10-CM

## 2020-04-22 RX ORDER — EMPAGLIFLOZIN 25 MG/1
TABLET, FILM COATED ORAL
Qty: 30 TABLET | Refills: 5 | Status: SHIPPED | OUTPATIENT
Start: 2020-04-22 | End: 2020-04-23 | Stop reason: SDUPTHER

## 2020-04-23 ENCOUNTER — OFFICE VISIT (OUTPATIENT)
Dept: FAMILY MEDICINE CLINIC | Facility: CLINIC | Age: 55
End: 2020-04-23
Payer: COMMERCIAL

## 2020-04-23 VITALS
TEMPERATURE: 98.1 F | BODY MASS INDEX: 28.76 KG/M2 | OXYGEN SATURATION: 98 % | RESPIRATION RATE: 14 BRPM | SYSTOLIC BLOOD PRESSURE: 118 MMHG | HEIGHT: 63 IN | DIASTOLIC BLOOD PRESSURE: 74 MMHG | WEIGHT: 162.3 LBS | HEART RATE: 64 BPM

## 2020-04-23 DIAGNOSIS — IMO0002 TYPE II DIABETES MELLITUS WITH MANIFESTATIONS, UNCONTROLLED: ICD-10-CM

## 2020-04-23 DIAGNOSIS — E78.49 OTHER HYPERLIPIDEMIA: ICD-10-CM

## 2020-04-23 DIAGNOSIS — J30.9 ALLERGIC RHINITIS, UNSPECIFIED SEASONALITY, UNSPECIFIED TRIGGER: ICD-10-CM

## 2020-04-23 DIAGNOSIS — E11.9 DIABETES MELLITUS WITHOUT COMPLICATION (HCC): ICD-10-CM

## 2020-04-23 DIAGNOSIS — E11.69 HYPERLIPIDEMIA ASSOCIATED WITH TYPE 2 DIABETES MELLITUS (HCC): ICD-10-CM

## 2020-04-23 DIAGNOSIS — E78.5 HYPERLIPIDEMIA ASSOCIATED WITH TYPE 2 DIABETES MELLITUS (HCC): ICD-10-CM

## 2020-04-23 DIAGNOSIS — I10 ESSENTIAL HYPERTENSION: ICD-10-CM

## 2020-04-23 DIAGNOSIS — Z00.01 ENCOUNTER FOR GENERAL ADULT MEDICAL EXAMINATION WITH ABNORMAL FINDINGS: Primary | ICD-10-CM

## 2020-04-23 DIAGNOSIS — Z12.11 SCREEN FOR COLON CANCER: ICD-10-CM

## 2020-04-23 DIAGNOSIS — Z79.4 TYPE 2 DIABETES MELLITUS WITHOUT COMPLICATION, WITH LONG-TERM CURRENT USE OF INSULIN (HCC): ICD-10-CM

## 2020-04-23 DIAGNOSIS — E11.9 TYPE 2 DIABETES MELLITUS WITHOUT COMPLICATION, WITH LONG-TERM CURRENT USE OF INSULIN (HCC): ICD-10-CM

## 2020-04-23 DIAGNOSIS — E24.9 CUSHING'S SYNDROME (HCC): ICD-10-CM

## 2020-04-23 PROCEDURE — 3074F SYST BP LT 130 MM HG: CPT | Performed by: INTERNAL MEDICINE

## 2020-04-23 PROCEDURE — 3066F NEPHROPATHY DOC TX: CPT | Performed by: INTERNAL MEDICINE

## 2020-04-23 PROCEDURE — 3008F BODY MASS INDEX DOCD: CPT | Performed by: INTERNAL MEDICINE

## 2020-04-23 PROCEDURE — 3051F HG A1C>EQUAL 7.0%<8.0%: CPT | Performed by: INTERNAL MEDICINE

## 2020-04-23 PROCEDURE — 99214 OFFICE O/P EST MOD 30 MIN: CPT | Performed by: INTERNAL MEDICINE

## 2020-04-23 PROCEDURE — 3078F DIAST BP <80 MM HG: CPT | Performed by: INTERNAL MEDICINE

## 2020-04-23 PROCEDURE — G0439 PPPS, SUBSEQ VISIT: HCPCS | Performed by: INTERNAL MEDICINE

## 2020-04-23 PROCEDURE — 1036F TOBACCO NON-USER: CPT | Performed by: INTERNAL MEDICINE

## 2020-04-23 RX ORDER — GLIPIZIDE 5 MG/1
5 TABLET ORAL 2 TIMES DAILY WITH MEALS
Qty: 180 TABLET | Refills: 3 | Status: SHIPPED | OUTPATIENT
Start: 2020-04-23 | End: 2020-09-03 | Stop reason: SDUPTHER

## 2020-04-23 RX ORDER — ATORVASTATIN CALCIUM 10 MG/1
10 TABLET, FILM COATED ORAL DAILY
Qty: 90 TABLET | Refills: 3 | Status: SHIPPED | OUTPATIENT
Start: 2020-04-23 | End: 2020-09-03 | Stop reason: SDUPTHER

## 2020-04-23 RX ORDER — DIPHENHYDRAMINE HCL 25 MG
25 CAPSULE ORAL
Qty: 90 CAPSULE | Refills: 3 | Status: SHIPPED | OUTPATIENT
Start: 2020-04-23 | End: 2020-09-03

## 2020-04-23 RX ORDER — CALCIUM CARBONATE/VITAMIN D3 600 MG-10
2 TABLET ORAL DAILY
Qty: 60 TABLET | Refills: 6 | Status: SHIPPED | OUTPATIENT
Start: 2020-04-23 | End: 2020-12-01

## 2020-04-23 RX ORDER — FENOFIBRATE 145 MG/1
145 TABLET, COATED ORAL DAILY
Qty: 90 TABLET | Refills: 3 | Status: SHIPPED | OUTPATIENT
Start: 2020-04-23 | End: 2020-09-03 | Stop reason: SDUPTHER

## 2020-07-02 DIAGNOSIS — R21 RASH: ICD-10-CM

## 2020-07-02 DIAGNOSIS — E11.9 DIABETES MELLITUS WITHOUT COMPLICATION (HCC): ICD-10-CM

## 2020-07-02 DIAGNOSIS — J45.909 UNCOMPLICATED ASTHMA, UNSPECIFIED ASTHMA SEVERITY, UNSPECIFIED WHETHER PERSISTENT: ICD-10-CM

## 2020-07-02 RX ORDER — ALBUTEROL SULFATE 90 UG/1
2 AEROSOL, METERED RESPIRATORY (INHALATION) EVERY 6 HOURS PRN
Qty: 8.5 G | Refills: 3 | Status: SHIPPED | OUTPATIENT
Start: 2020-07-02 | End: 2020-08-25

## 2020-07-02 RX ORDER — MONTELUKAST SODIUM 10 MG/1
10 TABLET ORAL EVERY 24 HOURS
Qty: 90 TABLET | Refills: 3 | Status: SHIPPED | OUTPATIENT
Start: 2020-07-02 | End: 2020-09-03 | Stop reason: SDUPTHER

## 2020-07-02 RX ORDER — INSULIN GLARGINE 100 [IU]/ML
INJECTION, SOLUTION SUBCUTANEOUS
Qty: 60 ML | Refills: 5 | Status: SHIPPED | OUTPATIENT
Start: 2020-07-02 | End: 2020-09-03 | Stop reason: SDUPTHER

## 2020-07-02 RX ORDER — CALCIPOTRIENE 50 UG/G
CREAM TOPICAL
Qty: 120 G | Refills: 0 | Status: SHIPPED | OUTPATIENT
Start: 2020-07-02 | End: 2020-08-26

## 2020-07-24 DIAGNOSIS — J30.9 ALLERGIC RHINITIS, UNSPECIFIED SEASONALITY, UNSPECIFIED TRIGGER: ICD-10-CM

## 2020-07-24 DIAGNOSIS — E11.9 DIABETES MELLITUS WITHOUT COMPLICATION (HCC): ICD-10-CM

## 2020-07-24 DIAGNOSIS — R19.4 CHANGE IN BOWEL HABITS: ICD-10-CM

## 2020-07-24 DIAGNOSIS — R10.30 LOWER ABDOMINAL PAIN: ICD-10-CM

## 2020-07-24 RX ORDER — LEVOCETIRIZINE DIHYDROCHLORIDE 5 MG/1
5 TABLET, FILM COATED ORAL EVERY EVENING
Qty: 30 TABLET | Refills: 3 | Status: SHIPPED | OUTPATIENT
Start: 2020-07-24 | End: 2020-08-25 | Stop reason: SDUPTHER

## 2020-07-24 RX ORDER — MAGNESIUM OXIDE 400 MG/1
TABLET ORAL
Qty: 90 TABLET | Refills: 1 | Status: SHIPPED | OUTPATIENT
Start: 2020-07-24 | End: 2020-09-03

## 2020-07-24 RX ORDER — POLYETHYLENE GLYCOL 3350 17 G/17G
POWDER, FOR SOLUTION ORAL
Qty: 510 G | Refills: 3 | Status: SHIPPED | OUTPATIENT
Start: 2020-07-24 | End: 2020-08-25 | Stop reason: SDUPTHER

## 2020-08-25 DIAGNOSIS — R10.30 LOWER ABDOMINAL PAIN: ICD-10-CM

## 2020-08-25 DIAGNOSIS — R19.4 CHANGE IN BOWEL HABITS: ICD-10-CM

## 2020-08-25 DIAGNOSIS — J30.9 ALLERGIC RHINITIS, UNSPECIFIED SEASONALITY, UNSPECIFIED TRIGGER: ICD-10-CM

## 2020-08-25 DIAGNOSIS — E11.9 DIABETES MELLITUS WITHOUT COMPLICATION (HCC): ICD-10-CM

## 2020-08-25 DIAGNOSIS — J45.909 UNCOMPLICATED ASTHMA, UNSPECIFIED ASTHMA SEVERITY, UNSPECIFIED WHETHER PERSISTENT: ICD-10-CM

## 2020-08-25 RX ORDER — LEVOCETIRIZINE DIHYDROCHLORIDE 5 MG/1
5 TABLET, FILM COATED ORAL EVERY EVENING
Qty: 30 TABLET | Refills: 3 | OUTPATIENT
Start: 2020-08-25

## 2020-08-25 RX ORDER — MAGNESIUM OXIDE 400 MG/1
TABLET ORAL
Qty: 90 TABLET | Refills: 1 | OUTPATIENT
Start: 2020-08-25

## 2020-08-25 RX ORDER — POLYETHYLENE GLYCOL 3350 17 G/17G
POWDER, FOR SOLUTION ORAL
Qty: 510 G | Refills: 3 | Status: SHIPPED | OUTPATIENT
Start: 2020-08-25 | End: 2022-04-05

## 2020-08-25 RX ORDER — ALBUTEROL SULFATE 90 UG/1
2 AEROSOL, METERED RESPIRATORY (INHALATION) EVERY 6 HOURS PRN
Qty: 17 G | OUTPATIENT
Start: 2020-08-25

## 2020-08-26 DIAGNOSIS — R21 RASH: ICD-10-CM

## 2020-08-26 RX ORDER — CALCIPOTRIENE 50 UG/G
CREAM TOPICAL
Qty: 120 G | Refills: 0 | Status: SHIPPED | OUTPATIENT
Start: 2020-08-26 | End: 2020-09-29

## 2020-08-26 RX ORDER — ALBUTEROL SULFATE 90 UG/1
2 AEROSOL, METERED RESPIRATORY (INHALATION) EVERY 6 HOURS PRN
Qty: 8.5 G | Refills: 3 | Status: SHIPPED | OUTPATIENT
Start: 2020-08-26 | End: 2020-12-23

## 2020-08-26 RX ORDER — LEVOCETIRIZINE DIHYDROCHLORIDE 5 MG/1
5 TABLET, FILM COATED ORAL EVERY EVENING
Qty: 30 TABLET | Refills: 3 | Status: SHIPPED | OUTPATIENT
Start: 2020-08-26 | End: 2021-03-26

## 2020-09-03 ENCOUNTER — OFFICE VISIT (OUTPATIENT)
Dept: FAMILY MEDICINE CLINIC | Facility: CLINIC | Age: 55
End: 2020-09-03
Payer: COMMERCIAL

## 2020-09-03 VITALS
BODY MASS INDEX: 29.82 KG/M2 | OXYGEN SATURATION: 98 % | DIASTOLIC BLOOD PRESSURE: 76 MMHG | TEMPERATURE: 97.9 F | HEART RATE: 72 BPM | RESPIRATION RATE: 14 BRPM | SYSTOLIC BLOOD PRESSURE: 120 MMHG | WEIGHT: 168.3 LBS | HEIGHT: 63 IN

## 2020-09-03 DIAGNOSIS — Z79.4 TYPE 2 DIABETES MELLITUS WITHOUT COMPLICATION, WITH LONG-TERM CURRENT USE OF INSULIN (HCC): ICD-10-CM

## 2020-09-03 DIAGNOSIS — IMO0002 TYPE II DIABETES MELLITUS WITH MANIFESTATIONS, UNCONTROLLED: Primary | ICD-10-CM

## 2020-09-03 DIAGNOSIS — R32 URINARY INCONTINENCE, UNSPECIFIED TYPE: ICD-10-CM

## 2020-09-03 DIAGNOSIS — F17.210 CIGARETTE SMOKER: ICD-10-CM

## 2020-09-03 DIAGNOSIS — E78.5 HYPERLIPIDEMIA ASSOCIATED WITH TYPE 2 DIABETES MELLITUS (HCC): ICD-10-CM

## 2020-09-03 DIAGNOSIS — E11.9 DIABETES MELLITUS WITHOUT COMPLICATION (HCC): ICD-10-CM

## 2020-09-03 DIAGNOSIS — E78.49 OTHER HYPERLIPIDEMIA: ICD-10-CM

## 2020-09-03 DIAGNOSIS — I10 ESSENTIAL HYPERTENSION: ICD-10-CM

## 2020-09-03 DIAGNOSIS — E11.9 TYPE 2 DIABETES MELLITUS WITHOUT COMPLICATION, WITH LONG-TERM CURRENT USE OF INSULIN (HCC): ICD-10-CM

## 2020-09-03 DIAGNOSIS — J45.909 UNCOMPLICATED ASTHMA, UNSPECIFIED ASTHMA SEVERITY, UNSPECIFIED WHETHER PERSISTENT: ICD-10-CM

## 2020-09-03 DIAGNOSIS — F32.A DEPRESSION, UNSPECIFIED DEPRESSION TYPE: ICD-10-CM

## 2020-09-03 DIAGNOSIS — Z12.39 BREAST CANCER SCREENING: ICD-10-CM

## 2020-09-03 DIAGNOSIS — E11.69 HYPERLIPIDEMIA ASSOCIATED WITH TYPE 2 DIABETES MELLITUS (HCC): ICD-10-CM

## 2020-09-03 LAB
SL AMB  POCT GLUCOSE, UA: ABNORMAL
SL AMB LEUKOCYTE ESTERASE,UA: ABNORMAL
SL AMB POCT BILIRUBIN,UA: ABNORMAL
SL AMB POCT BLOOD,UA: ABNORMAL
SL AMB POCT CLARITY,UA: CLEAR
SL AMB POCT COLOR,UA: ABNORMAL
SL AMB POCT GLUCOSE BLD: 286
SL AMB POCT HEMOGLOBIN AIC: 9.6 (ref ?–6.5)
SL AMB POCT KETONES,UA: ABNORMAL
SL AMB POCT NITRITE,UA: ABNORMAL
SL AMB POCT PH,UA: 5
SL AMB POCT SPECIFIC GRAVITY,UA: 1.01
SL AMB POCT URINE PROTEIN: ABNORMAL
SL AMB POCT UROBILINOGEN: ABNORMAL

## 2020-09-03 PROCEDURE — 36415 COLL VENOUS BLD VENIPUNCTURE: CPT | Performed by: INTERNAL MEDICINE

## 2020-09-03 PROCEDURE — 82948 REAGENT STRIP/BLOOD GLUCOSE: CPT | Performed by: INTERNAL MEDICINE

## 2020-09-03 PROCEDURE — 81002 URINALYSIS NONAUTO W/O SCOPE: CPT | Performed by: INTERNAL MEDICINE

## 2020-09-03 PROCEDURE — 3061F NEG MICROALBUMINURIA REV: CPT | Performed by: INTERNAL MEDICINE

## 2020-09-03 PROCEDURE — 83036 HEMOGLOBIN GLYCOSYLATED A1C: CPT | Performed by: INTERNAL MEDICINE

## 2020-09-03 PROCEDURE — 3078F DIAST BP <80 MM HG: CPT | Performed by: INTERNAL MEDICINE

## 2020-09-03 PROCEDURE — 99214 OFFICE O/P EST MOD 30 MIN: CPT | Performed by: INTERNAL MEDICINE

## 2020-09-03 PROCEDURE — 1036F TOBACCO NON-USER: CPT | Performed by: INTERNAL MEDICINE

## 2020-09-03 PROCEDURE — 3046F HEMOGLOBIN A1C LEVEL >9.0%: CPT | Performed by: INTERNAL MEDICINE

## 2020-09-03 RX ORDER — ATORVASTATIN CALCIUM 10 MG/1
10 TABLET, FILM COATED ORAL DAILY
Qty: 90 TABLET | Refills: 3 | Status: SHIPPED | OUTPATIENT
Start: 2020-09-03 | End: 2021-04-15 | Stop reason: SDUPTHER

## 2020-09-03 RX ORDER — MONTELUKAST SODIUM 10 MG/1
10 TABLET ORAL EVERY 24 HOURS
Qty: 90 TABLET | Refills: 3 | Status: SHIPPED | OUTPATIENT
Start: 2020-09-03 | End: 2021-04-15 | Stop reason: SDUPTHER

## 2020-09-03 RX ORDER — GLIPIZIDE 5 MG/1
5 TABLET ORAL 2 TIMES DAILY WITH MEALS
Qty: 180 TABLET | Refills: 3 | Status: SHIPPED | OUTPATIENT
Start: 2020-09-03 | End: 2021-04-15 | Stop reason: SDUPTHER

## 2020-09-03 RX ORDER — ESCITALOPRAM OXALATE 10 MG/1
10 TABLET ORAL
Qty: 30 TABLET | Refills: 5 | Status: SHIPPED | OUTPATIENT
Start: 2020-09-03 | End: 2021-04-15 | Stop reason: SDUPTHER

## 2020-09-03 RX ORDER — FENOFIBRATE 145 MG/1
145 TABLET, COATED ORAL DAILY
Qty: 90 TABLET | Refills: 3 | Status: SHIPPED | OUTPATIENT
Start: 2020-09-03 | End: 2021-04-15 | Stop reason: SDUPTHER

## 2020-09-03 RX ORDER — EMPAGLIFLOZIN 25 MG/1
25 TABLET, FILM COATED ORAL DAILY
Qty: 90 TABLET | Refills: 3 | Status: SHIPPED | OUTPATIENT
Start: 2020-09-03 | End: 2021-08-13

## 2020-09-03 RX ORDER — OXYBUTYNIN CHLORIDE 15 MG/1
15 TABLET, EXTENDED RELEASE ORAL DAILY
Qty: 90 TABLET | Refills: 3 | Status: SHIPPED | OUTPATIENT
Start: 2020-09-03 | End: 2021-07-08

## 2020-09-03 RX ORDER — INSULIN GLARGINE 100 [IU]/ML
INJECTION, SOLUTION SUBCUTANEOUS
Qty: 45 ML | Refills: 5 | Status: SHIPPED | OUTPATIENT
Start: 2020-09-03 | End: 2021-04-15 | Stop reason: SDUPTHER

## 2020-09-03 RX ORDER — INSULIN GLARGINE 100 [IU]/ML
70 INJECTION, SOLUTION SUBCUTANEOUS DAILY
Qty: 5 PEN | Refills: 5 | Status: SHIPPED | OUTPATIENT
Start: 2020-09-03 | End: 2020-09-03

## 2020-09-03 RX ORDER — PANTOPRAZOLE SODIUM 40 MG/1
40 TABLET, DELAYED RELEASE ORAL DAILY
Qty: 90 TABLET | Refills: 3 | Status: SHIPPED | OUTPATIENT
Start: 2020-09-03 | End: 2021-04-15 | Stop reason: SDUPTHER

## 2020-09-04 NOTE — PATIENT INSTRUCTIONS
Low Fat Diet   AMBULATORY CARE:   A low-fat diet  is an eating plan that is low in total fat, unhealthy fat, and cholesterol  You may need to follow a low-fat diet if you have trouble digesting or absorbing fat  You may also need to follow this diet if you have high cholesterol  You can also lower your cholesterol by increasing the amount of fiber in your diet  Soluble fiber is a type of fiber that helps to decrease cholesterol levels  Different types of fat in food:   · Limit unhealthy fats  A diet that is high in cholesterol, saturated fat, and trans fat may cause unhealthy cholesterol levels  Unhealthy cholesterol levels increase your risk of heart disease  ¨ Cholesterol:  Limit intake of cholesterol to less than 200 mg per day  Cholesterol is found in meat, eggs, and dairy  ¨ Saturated fat:  Limit saturated fat to less than 7% of your total daily calories  Ask your dietitian how many calories you need each day  Saturated fat is found in butter, cheese, ice cream, whole milk, and palm oil  Saturated fat is also found in meat, such as beef, pork, chicken skin, and processed meats  Processed meats include sausage, hot dogs, and bologna  ¨ Trans fat:  Avoid trans fat as much as possible  Trans fat is used in fried and baked foods  Foods that say trans fat free on the label may still have up to 0 5 grams of trans fat per serving  · Include healthy fats  Replace foods that are high in saturated and trans fat with foods high in healthy fats  This may help to decrease high cholesterol levels  ¨ Monounsaturated fats: These are found in avocados, nuts, and vegetable oils, such as olive, canola, and sunflower oil  ¨ Polyunsaturated fats: These can be found in vegetable oils, such as soybean or corn oil  Omega-3 fats can help to decrease the risk of heart disease  Omega-3 fats are found in fish, such as salmon, herring, trout, and tuna   Omega-3 fats can also be found in plant foods, such as walnuts, flaxseed, soybeans, and canola oil    Foods to limit or avoid:   · Grains:      ¨ Snacks that are made with partially hydrogenated oils, such as chips, regular crackers, and butter-flavored popcorn    ¨ High-fat baked goods, such as biscuits, croissants, doughnuts, pies, cookies, and pastries    · Dairy:      ¨ Whole milk, 2% milk, and yogurt and ice cream made with whole milk    ¨ Half and half creamer, heavy cream, and whipping cream    ¨ Cheese, cream cheese, and sour cream    · Meats and proteins:      ¨ High-fat cuts of meat (T-bone steak, regular hamburger, and ribs)    ¨ Fried meat, poultry (turkey and chicken), and fish    ¨ Poultry (chicken and turkey) with skin    ¨ Cold cuts (salami or bologna), hot dogs, guido, and sausage    ¨ Whole eggs and egg yolks    · Vegetables and fruits with added fat:      ¨ Fried vegetables or vegetables in butter or high-fat sauces, such as cream or cheese sauces    ¨ Fried fruit or fruit served with butter or cream    · Fats:      ¨ Butter, stick margarine, and shortening    ¨ Coconut, palm oil, and palm kernel oil  Foods to include:   · Grains:      ¨ Whole-grain breads, cereals, pasta, and brown rice    ¨ Low-fat crackers and pretzels    · Vegetables and fruits:      ¨ Fresh, frozen, or canned vegetables (no salt or low-sodium)    ¨ Fresh, frozen, dried, or canned fruit (canned in light syrup or fruit juice)    ¨ Avocado    · Low-fat dairy products:      ¨ Nonfat (skim) or 1% milk    ¨ Nonfat or low-fat cheese, yogurt, and cottage cheese    · Meats and proteins:      ¨ Chicken or turkey with no skin    ¨ Baked or broiled fish    ¨ Lean beef and pork (loin, round, extra lean hamburger)    ¨ Beans and peas, unsalted nuts, soy products    ¨ Egg whites and substitutes    ¨ Seeds and nuts    · Fats:      ¨ Unsaturated oil, such as canola, olive, peanut, soybean, or sunflower oil    ¨ Soft or liquid margarine and vegetable oil spread    ¨ Low-fat salad dressing  Other ways to decrease fat:   · Read food labels before you buy foods  Choose foods that have less than 30% of calories from fat  Choose low-fat or fat-free dairy products  Remember that fat free does not mean calorie free  These foods still contain calories, and too many calories can lead to weight gain  · Trim fat from meat and avoid fried food  Trim all visible fat from meat before you cook it  Remove the skin from poultry  Do not braun meat, fish, or poultry  Bake, roast, boil, or broil these foods instead  Avoid fried foods  Eat a baked potato instead of [place] fries  Steam vegetables instead of sautéing them in butter  · Add less fat to foods  Use imitation guido bits on salads and baked potatoes instead of regular guido bits  Use fat-free or low-fat salad dressings instead of regular dressings  Use low-fat or nonfat butter-flavored topping instead of regular butter or margarine on popcorn and other foods  Ways to decrease fat in recipes:  Replace high-fat ingredients with low-fat or nonfat ones  This may cause baked goods to be drier than usual  You may need to use nonfat cooking spray on pans to prevent food from sticking  You also may need to change the amount of other ingredients, such as water, in the recipe  Try the following:  · Use low-fat or light margarine instead of regular margarine or shortening  · Use lean ground turkey breast or chicken, or lean ground beef (less than 5% fat) instead of hamburger  · Add 1 teaspoon of canola oil to 8 ounces of skim milk instead of using cream or half and half  · Use grated zucchini, carrots, or apples in breads instead of coconut  · Use blenderized, low-fat cottage cheese, plain tofu, or low-fat ricotta cheese instead of cream cheese  · Use 1 egg white and 1 teaspoon of canola oil, or use ¼ cup (2 ounces) of fat-free egg substitute instead of a whole egg       · Replace half of the oil that is called for in a recipe with applesauce when you bake  Use 3 tablespoons of cocoa powder and 1 tablespoon of canola oil instead of a square of baking chocolate  How to increase fiber:  Eat enough high-fiber foods to get 20 to 30 grams of fiber every day  Slowly increase your fiber intake to avoid stomach cramps, gas, and other problems  · Eat 3 ounces of whole-grain foods each day  An ounce is about 1 slice of bread  Eat whole-grain breads, such as whole-wheat bread  Whole wheat, whole-wheat flour, or other whole grains should be listed as the first ingredient on the food label  Replace white flour with whole-grain flour or use half of each in recipes  Whole-grain flour is heavier than white flour, so you may have to add more yeast or baking powder  · Eat a high-fiber cereal for breakfast   Oatmeal is a good source of soluble fiber  Look for cereals that have bran or fiber in the name  Choose whole-grain products, such as brown rice, barley, and whole-wheat pasta  · Eat more beans, peas, and lentils  For example, add beans to soups or salads  Eat at least 5 cups of fruits and vegetables each day  Eat fruits and vegetables with the peel because the peel is high in fiber  © 2017 [address] Information is for End User's use only and may not be sold, redistributed or otherwise used for commercial purposes  All illustrations and images included in CareNotes® are the copyrighted property of [employer]  or [employer]  The above information is an  only  It is not intended as medical advice for individual conditions or treatments  Talk to your doctor, nurse or pharmacist before following any medical regimen to see if it is safe and effective for you  Heart Healthy Diet   AMBULATORY CARE:   A heart healthy diet  is an eating plan low in total fat, unhealthy fats, and sodium (salt)  A heart healthy diet helps decrease your risk for heart disease and stroke   Limit the amount of fat you eat to 25% to 35% of your total daily calories  Limit sodium to less than 2,300 mg each day  Healthy fats:  Healthy fats can help improve cholesterol levels  The risk for heart disease is decreased when cholesterol levels are normal  Choose healthy fats, such as the following:  · Unsaturated fat  is found in foods such as soybean, canola, olive, corn, and safflower oils  It is also found in soft tub margarine that is made with liquid vegetable oil  · Omega-3 fat  is found in certain fish, such as salmon, tuna, and trout, and in walnuts and flaxseed  Unhealthy fats:  Unhealthy fats can cause unhealthy cholesterol levels in your blood and increase your risk of heart disease  Limit unhealthy fats, such as the following:  · Cholesterol  is found in animal foods, such as eggs and lobster, and in dairy products made from whole milk  Limit cholesterol to less than 300 milligrams (mg) each day  You may need to limit cholesterol to 200 mg each day if you have heart disease  · Saturated fat  is found in meats, such as guido and hamburger  It is also found in chicken or turkey skin, whole milk, and butter  Limit saturated fat to less than 7% of your total daily calories  Limit saturated fat to less than 6% if you have heart disease or are at increased risk for it  · Trans fat  is found in packaged foods, such as potato chips and cookies  It is also in hard margarine, some fried foods, and shortening  Avoid trans fats as much as possible    Heart healthy foods and drinks to include:  Ask your dietitian or healthcare provider how many servings to have from each of the following food groups:  · Grains:      ¨ Whole-wheat breads, cereals, and pastas, and brown rice    ¨ Low-fat, low-sodium crackers and chips    · Vegetables:      ¨ Broccoli, green beans, green peas, and spinach    ¨ Collards, kale, and lima beans    ¨ Carrots, sweet potatoes, tomatoes, and peppers    ¨ Canned vegetables with no salt added    · Fruits:      ¨ Bananas, peaches, pears, and pineapple    ¨ Grapes, raisins, and dates    ¨ Oranges, tangerines, grapefruit, orange juice, and grapefruit juice    ¨ Apricots, mangoes, melons, and papaya    ¨ Raspberries and strawberries    ¨ Canned fruit with no added sugar    · Low-fat dairy products:      ¨ Nonfat (skim) milk, 1% milk, and low-fat almond, cashew, or soy milks fortified with calcium    ¨ Low-fat cheese, regular or frozen yogurt, and cottage cheese    · Meats and proteins , such as lean cuts of beef and pork (loin, leg, round), skinless chicken and turkey, legumes, soy products, egg whites, and nuts  Foods and drinks to limit or avoid:  Ask your dietitian or healthcare provider about these and other foods that are high in unhealthy fat, sodium, and sugar:  · Snack or packaged foods , such as frozen dinners, cookies, macaroni and cheese, and cereals with more than 300 mg of sodium per serving    · Canned or dry mixes  for cakes, soups, sauces, or gravies    · Vegetables with added sodium , such as instant potatoes, vegetables with added sauces, or regular canned vegetables    · Other foods high in sodium , such as ketchup, barbecue sauce, salad dressing, pickles, olives, soy sauce, and miso    · High-fat dairy foods  such as whole or 2% milk, cream cheese, or sour cream, and cheeses     · High-fat protein foods  such as high-fat cuts of beef (T-bone steaks, ribs), chicken or turkey with skin, and organ meats, such as liver    · Cured or smoked meats , such as hot dogs, guido, and sausage    · Unhealthy fats and oils , such as butter, stick margarine, shortening, and cooking oils such as coconut or palm oil    · Food and drinks high in sugar , such as soft drinks (soda), sports drinks, sweetened tea, candy, cake, cookies, pies, and doughnuts  Other diet guidelines to follow:   · Eat more foods containing omega-3 fats  Eat fish high in omega-3 fats at least 2 times a week  · Limit alcohol    Too much alcohol can damage your heart and raise your blood pressure  Women should limit alcohol to 1 drink a day  Men should limit alcohol to 2 drinks a day  A drink of alcohol is 12 ounces of beer, 5 ounces of wine, or 1½ ounces of liquor  · Choose low-sodium foods  High-sodium foods can lead to high blood pressure  Add little or no salt to food you prepare  Use herbs and spices in place of salt  · Eat more fiber  to help lower cholesterol levels  Eat at least 5 servings of fruits and vegetables each day  Eat 3 ounces of whole-grain foods each day  Legumes (beans) are also a good source of fiber  Lifestyle guidelines:   · Do not smoke  Nicotine and other chemicals in cigarettes and cigars can cause lung and heart damage  Ask your healthcare provider for information if you currently smoke and need help to quit  E-cigarettes or smokeless tobacco still contain nicotine  Talk to your healthcare provider before you use these products  · Exercise regularly  to help you maintain a healthy weight and improve your blood pressure and cholesterol levels  Ask your healthcare provider about the best exercise plan for you  Do not start an exercise program without asking your healthcare provider  Follow up with your healthcare provider as directed:  Write down your questions so you remember to ask them during your visits  © 2017 [facility] Information is for End User's use only and may not be sold, redistributed or otherwise used for commercial purposes  All illustrations and images included in CareNotes® are the copyrighted property of [employer]  or [employer]  The above information is an  only  It is not intended as medical advice for individual conditions or treatments  Talk to your doctor, nurse or pharmacist before following any medical regimen to see if it is safe and effective for you  Calorie Counting Diet   WHAT YOU NEED TO KNOW:   What is a calorie counting diet?   It is a meal plan based on counting calories each day to reach a healthy body weight  You will need to eat fewer calories if you are trying to lose weight  Weight loss may decrease your risk for certain health problems or improve your health if you have health problems  Some of these health problems include heart disease, high blood pressure, and diabetes  What foods should I avoid? Your dietitian will tell you if you need to avoid certain foods based on your body weight and health condition  You may need to avoid high-fat foods if you are at risk for or have heart disease  You may need to eat fewer foods from the breads and starches food group if you have diabetes  How many calories are in foods? The following is a list of foods and drinks with the approximate number of calories in each  Check the food label to find the exact number of calories  A dietitian can tell you how many calories you should have from each food group each day    · Carbohydrate:      ¨ ½ of a 3-inch bagel, 1 slice of bread, or ½ of a hamburger bun or hot dog bun (80)    ¨ 1 (8-inch) flour tortilla or ½ cup of cooked rice (100)    ¨ 1 (6-inch) corn tortilla (80)    ¨ 1 (6-inch) pancake or 1 cup of bran flakes cereal (110)    ¨ ½ cup of cooked cereal (80)    ¨ ½ cup of cooked pasta (85)    ¨ 1 ounce of pretzels (100)    ¨ 3 cups of air-popped popcorn without butter or oil (80)    · Dairy:      ¨ 1 cup of skim or 1% milk (90)    ¨ 1 cup of 2% milk (120)    ¨ 1 cup of whole milk (160)    ¨ 1 cup of 2% chocolate milk (220)    ¨ 1 ounce of low-fat cheese with 3 grams of fat per ounce (70)    ¨ 1 ounce of cheddar cheese (114)    ¨ ½ cup of 1% fat cottage cheese (80)    ¨ 1 cup of plain or sugar-free, fat-free yogurt (90)    · Protein foods:      ¨ 3 ounces of fish (not breaded or fried) (95)    ¨ 3 ounces of breaded, fried fish (195)    ¨ ¾ cup of tuna canned in water (105)    ¨ 3 ounces of chicken breast without skin (105)    ¨ 1 fried chicken breast with skin (350)    ¨ ¼ cup of fat free egg substitute (40)    ¨ 1 large egg (75)    ¨ 3 ounces of lean beef or pork (165)    ¨ 3 ounces of fried pork chop or ham (185)    ¨ ½ cup of cooked dried beans, such as kidney, oliveira, lentils, or navy (115)    ¨ 3 ounces of bologna or lunch meat (225)    ¨ 2 links of breakfast sausage (140)    · Vegetables:      ¨ ½ cup of sliced mushrooms (10)    ¨ 1 cup of salad greens, such as lettuce, spinach, or moira (15)    ¨ ½ cup of steamed asparagus (20)    ¨ ½ cup of cooked summer squash, zucchini squash, or green or wax beans (25)    ¨ 1 cup of broccoli or cauliflower florets, or 1 medium tomato (25)    ¨ 1 large raw carrot or ½ cup of cooked carrots (40)    ¨ ? of a medium cucumber or 1 stalk of celery (5)    ¨ 1 small baked potato (160)    ¨ 1 cup of breaded, fried vegetables (230)    · Fruit:      ¨ 1 (6-inch) banana (55)     ¨ ½ of a 4-inch grapefruit (55)    ¨ 15 grapes (60)    ¨ 1 medium orange or apple (70)    ¨ 1 large peach (65)    ¨ 1 cup of fresh pineapple chunks (75)    ¨ 1 cup of melon cubes (50)    ¨ 1¼ cups of whole strawberries (45)    ¨ ½ cup of fruit canned in juice (55)    ¨ ½ cup of fruit canned in heavy syrup (110)    ¨ ?  cup of raisins (130)    ¨ ½ cup of unsweetened fruit juice (60)    ¨ ½ cup of grape, cranberry, or prune juice (90)    · Fat:      ¨ 10 peanuts or 2 teaspoons of peanut butter (55)    ¨ 2 tablespoons of avocado or 1 tablespoon of regular salad dressing (45)    ¨ 2 slices of guido (90)    ¨ 1 teaspoon of oil, such as safflower, canola, corn, or olive oil (45)    ¨ 2 teaspoons of low-fat margarine, or 1 tablespoon of low-fat mayonnaise (50)    ¨ 1 teaspoon of regular margarine (40)    ¨ 1 tablespoon of regular mayonnaise (135)    ¨ 1 tablespoon of cream cheese or 2 tablespoons of low-fat cream cheese (45)    ¨ 2 tablespoons of vegetable shortening (215)    · Dessert and sweets:      ¨ 8 animal crackers or 5 vanilla wafers (80)    ¨ 1 frozen fruit juice bar (80)    ¨ ½ cup of ice milk or low-fat frozen yogurt (90)    ¨ ½ cup of sherbet or sorbet (125)    ¨ ½ cup of sugar-free pudding or custard (60)    ¨ ½ cup of ice cream (140)    ¨ ½ cup of pudding or custard (175)    ¨ 1 (2-inch) square chocolate brownie (185)    · Combination foods:      ¨ Bean burrito made with an 8-inch tortilla, without cheese (275)    ¨ Chicken breast sandwich with lettuce and tomato (325)    ¨ 1 cup of chicken noodle soup (60)    ¨ 1 beef taco (175)    ¨ Regular hamburger with lettuce and tomato (310)    ¨ Regular cheeseburger with lettuce and tomato (410)     ¨ ¼ of a 12-inch cheese pizza (280)    ¨ Fried fish sandwich with lettuce and tomato (425)    ¨ Hot dog and bun (275)    ¨ 1½ cups of macaroni and cheese (310)    ¨ Taco salad with a fried tortilla shell (870)    · Low-calorie foods:      ¨ 1 tablespoon of ketchup or 1 tablespoon of fat free sour cream (15)    ¨ 1 teaspoon of mustard (5)    ¨ ¼ cup of salsa (20)    ¨ 1 large dill pickle (15)    ¨ 1 tablespoon of fat free salad dressing (10)    ¨ 2 teaspoons of low-sugar, light jam or jelly, or 1 tablespoon of sugar-free syrup (15)    ¨ 1 sugar-free popsicle (15)    ¨ 1 cup of club soda, seltzer water, or diet soda (0)  CARE AGREEMENT:   You have the right to help plan your care  Discuss treatment options with your caregivers to decide what care you want to receive  You always have the right to refuse treatment  The above information is an  only  It is not intended as medical advice for individual conditions or treatments  Talk to your doctor, nurse or pharmacist before following any medical regimen to see if it is safe and effective for you  © 2017 [address] Information is for End User's use only and may not be sold, redistributed or otherwise used for commercial purposes   All illustrations and images included in CareNotes® are the copyrighted property of [employer]  or [employer]

## 2020-09-04 NOTE — PROGRESS NOTES
Assessment/Plan:         Diagnoses and all orders for this visit:    Type II diabetes mellitus with manifestations, uncontrolled ([facility] [address] ); life style mod  Continue Lantus on daily basis and :  -     glipiZIDE (GLUCOTROL) 5 mg tablet; Take 1 tablet (5 mg total) by mouth 2 (two) times a day with meals  -     Empagliflozin (Jardiance) 25 MG TABS; Take 1 tablet (25 mg total) by mouth daily  -     POCT urine dip  RTC in 2-3 mos w :  -     Comprehensive metabolic panel; Future  -     CBC and differential; Future  -     Magnesium; Future  -     TSH, 3rd generation; Future  -     T4, free; Future  -     Thyroid Antibodies Panel; Future  -     Vitamin B12; Future  -     Vitamin D 25 hydroxy; Future  -     UA (URINE) with reflex to Scope; Future  -     Ambulatory Referral to Ophthalmology; Future    Breast cancer screening  - rtc in 2-3 mos w:  -     Mammo screening bilateral w 3d & cad; Future  -     Ambulatory referral to Ophthalmology; Future  -     POCT hemoglobin A1c  -     POCT blood glucose    Uncomplicated asthma, unspecified asthma severity, unspecified whether persistent  -     pantoprazole (PROTONIX) 40 mg tablet; Take 1 tablet (40 mg total) by mouth daily  -     montelukast (SINGULAIR) 10 mg tablet; Take 1 tablet (10 mg total) by mouth every 24 hours    Depression, unspecified depression type  -     Renew :  -     escitalopram (LEXAPRO) 10 mg tablet; Take 1 tablet (10 mg total) by mouth daily with dinner    Essential hypertension  -     metoprolol tartrate (LOPRESSOR) 25 mg tablet; Take 1 tablet (25 mg total) by mouth every 12 (twelve) hours  RTC in 2-3 mos  w:  -     Comprehensive metabolic panel; Future  -     CBC and differential; Future  -     Magnesium; Future  -     Lipid panel; Future  -     TSH, 3rd generation; Future  -     T4, free; Future  -     Thyroid Antibodies Panel; Future  -     Vitamin B12; Future  -     Vitamin D 25 hydroxy; Future    Renew :  -     metFORMIN (GLUCOPHAGE) 1000 MG tablet;  Take 1 tablet (1,000 mg total) by mouth 2 (two) times a day with meals  -     magnesium oxide (MAG-OX) 400 mg; Take 1 tablet (400 mg total) by mouth daily  -     Discontinue: insulin glargine (Lantus SoloStar) 100 units/mL injection pen; Inject 70 Units under the skin daily    Hyperlipidemia associated with type 2 diabetes mellitus ([address] ); Renew :  -     fenofibrate (TRICOR) 145 mg tablet; Take 1 tablet (145 mg total) by mouth daily  -     Lipid panel; Future  -     TSH, 3rd generation; Future  -     T4, free; Future  -     Thyroid Antibodies Panel; Future  -     Vitamin B12; Future  -     Vitamin D 25 hydroxy; Future  -     UA (URINE) with reflex to Scope; Future    -     atorvastatin (LIPITOR) 10 mg tablet; Take 1 tablet (10 mg total) by mouth daily    Urinary incontinence, unspecified type  -     Ambulatory referral to Urogynecology; Future    Cigarette smoker  -     CT lung screening program; Future      Patient's shoes and socks removed  Right Foot/Ankle   Right Foot Inspection  Skin Exam: skin normal and skin intact no dry skin, no warmth, no callus, no erythema, no maceration, no abnormal color, no pre-ulcer, no ulcer and no callus                          Toe Exam: swellingno tenderness  Sensory     Proprioception: diminished   Monofilament testing: diminished  Vascular    The right DP pulse is 1+  The right PT pulse is 1+  Left Foot/Ankle  Left Foot Inspection  Skin Exam: skin normal and skin intactno dry skin, no warmth, no erythema, no maceration, normal color, no pre-ulcer, no ulcer and no callus                         Toe Exam: swellingno tenderness                   Sensory   Vibration: diminished  Proprioception: diminished  Monofilament: diminished  Vascular    The left DP pulse is 1+  The left PT pulse is 1+  Assign Risk Category:  No deformity present; No loss of protective sensation; Weak pulses       Risk: 1          Subjective:      Patient ID: Mayda Owens is a 63 y o  female      63 y o lady is here for Regular check up, she feels fatigue, recent Blood work and med List reviewed  w pt in Detail,  The following portions of the patient's history were reviewed and updated as appropriate: allergies, current medications, past family history, past social history, past surgical history and problem list     Review of Systems   Constitutional: Positive for fatigue  Negative for chills and fever  HENT: Negative for congestion, facial swelling, sore throat, trouble swallowing and voice change  Eyes: Negative for pain, discharge and visual disturbance  Respiratory: Negative for cough, shortness of breath and wheezing  Cardiovascular: Negative for chest pain, palpitations and leg swelling  Gastrointestinal: Negative for abdominal pain, blood in stool, constipation, diarrhea and nausea  Endocrine: Negative for polydipsia, polyphagia and polyuria  Genitourinary: Positive for frequency and urgency  Negative for difficulty urinating and hematuria  Musculoskeletal: Negative for arthralgias and myalgias  Skin: Negative for rash  Neurological: Negative for dizziness, tremors, weakness and headaches  Hematological: Negative for adenopathy  Does not bruise/bleed easily  Psychiatric/Behavioral: Negative for dysphoric mood, sleep disturbance and suicidal ideas  Objective:      /76 (BP Location: Left arm, Patient Position: Sitting, Cuff Size: Standard)   Pulse 72   Temp 97 9 °F (36 6 °C) (Probe)   Resp 14   Ht 5' 3" (1 6 m)   Wt 76 3 kg (168 lb 4 8 oz)   SpO2 98%   BMI 29 81 kg/m²          Physical Exam  Constitutional:       General: She is not in acute distress  HENT:      Head: Normocephalic  Mouth/Throat:      Pharynx: No oropharyngeal exudate  Eyes:      General: No scleral icterus  Conjunctiva/sclera: Conjunctivae normal       Pupils: Pupils are equal, round, and reactive to light  Neck:      Musculoskeletal: Neck supple  Thyroid: No thyromegaly  Cardiovascular:      Rate and Rhythm: Normal rate and regular rhythm  Pulses: Pulses are weak  Dorsalis pedis pulses are 1+ on the right side and 1+ on the left side  Posterior tibial pulses are 1+ on the right side and 1+ on the left side  Heart sounds: Normal heart sounds  No murmur  Pulmonary:      Effort: Pulmonary effort is normal  No respiratory distress  Breath sounds: Normal breath sounds  No wheezing or rales  Abdominal:      General: Bowel sounds are normal  There is no distension  Palpations: Abdomen is soft  Tenderness: There is no abdominal tenderness  There is no guarding or rebound  Musculoskeletal:         General: No tenderness  Feet:      Right foot:      Skin integrity: No ulcer, skin breakdown, erythema, warmth, callus or dry skin  Left foot:      Skin integrity: No ulcer, skin breakdown, erythema, warmth, callus or dry skin  Lymphadenopathy:      Cervical: No cervical adenopathy  Skin:     Coloration: Skin is not pale  Findings: No rash  Neurological:      Mental Status: She is alert and oriented to person, place, and time  Sensory: No sensory deficit  Motor: No weakness  BMI Counseling: Body mass index is 29 81 kg/m²  The BMI is above normal  Nutrition recommendations include reducing portion sizes and decreasing overall calorie intake

## 2020-09-29 DIAGNOSIS — R21 RASH: ICD-10-CM

## 2020-09-29 RX ORDER — CALCIPOTRIENE 50 UG/G
CREAM TOPICAL
Qty: 120 G | Refills: 0 | Status: SHIPPED | OUTPATIENT
Start: 2020-09-29 | End: 2020-10-20

## 2020-09-30 ENCOUNTER — TELEPHONE (OUTPATIENT)
Dept: GASTROENTEROLOGY | Facility: MEDICAL CENTER | Age: 55
End: 2020-09-30

## 2020-09-30 NOTE — TELEPHONE ENCOUNTER
CALLED PT TO CONFIRM AND SHE STATES SHE HAS NO  DURING THE WEEK   SHE WILL CALL BACK WHEN SHE CAN RESCHEDULE

## 2020-10-20 DIAGNOSIS — R21 RASH: ICD-10-CM

## 2020-10-20 RX ORDER — CALCIPOTRIENE 50 UG/G
CREAM TOPICAL
Qty: 120 G | Refills: 0 | Status: SHIPPED | OUTPATIENT
Start: 2020-10-20 | End: 2021-03-26

## 2020-11-14 ENCOUNTER — HOSPITAL ENCOUNTER (OUTPATIENT)
Dept: MAMMOGRAPHY | Facility: CLINIC | Age: 55
Discharge: HOME | End: 2020-11-14
Payer: COMMERCIAL

## 2020-11-14 ENCOUNTER — HOSPITAL ENCOUNTER (OUTPATIENT)
Dept: CT IMAGING | Facility: HOSPITAL | Age: 55
Discharge: HOME | End: 2020-11-14
Payer: COMMERCIAL

## 2020-11-14 VITALS — HEIGHT: 63 IN | BODY MASS INDEX: 29.77 KG/M2 | WEIGHT: 168 LBS

## 2020-11-14 DIAGNOSIS — Z12.31 ENCOUNTER FOR SCREENING MAMMOGRAM FOR MALIGNANT NEOPLASM OF BREAST: ICD-10-CM

## 2020-11-14 DIAGNOSIS — F17.210 CIGARETTE SMOKER: ICD-10-CM

## 2020-11-14 DIAGNOSIS — Z12.39 BREAST CANCER SCREENING: ICD-10-CM

## 2020-11-14 PROCEDURE — G1004 CDSM NDSC: HCPCS

## 2020-11-14 PROCEDURE — 77067 SCR MAMMO BI INCL CAD: CPT

## 2020-11-14 PROCEDURE — G0297 LDCT FOR LUNG CA SCREEN: HCPCS

## 2020-11-14 PROCEDURE — 77063 BREAST TOMOSYNTHESIS BI: CPT

## 2020-11-20 ENCOUNTER — LAB (OUTPATIENT)
Dept: LAB | Facility: HOSPITAL | Age: 55
End: 2020-11-20
Payer: COMMERCIAL

## 2020-11-20 DIAGNOSIS — Z00.01 ENCOUNTER FOR GENERAL ADULT MEDICAL EXAMINATION WITH ABNORMAL FINDINGS: ICD-10-CM

## 2020-11-20 DIAGNOSIS — E11.69 HYPERLIPIDEMIA ASSOCIATED WITH TYPE 2 DIABETES MELLITUS (HCC): ICD-10-CM

## 2020-11-20 DIAGNOSIS — E78.5 HYPERLIPIDEMIA ASSOCIATED WITH TYPE 2 DIABETES MELLITUS (HCC): ICD-10-CM

## 2020-11-20 DIAGNOSIS — IMO0002 TYPE II DIABETES MELLITUS WITH MANIFESTATIONS, UNCONTROLLED: ICD-10-CM

## 2020-11-20 DIAGNOSIS — I10 ESSENTIAL HYPERTENSION: ICD-10-CM

## 2020-11-20 LAB
25(OH)D3 SERPL-MCNC: 33.4 NG/ML (ref 30–100)
ALBUMIN SERPL BCP-MCNC: 4.1 G/DL (ref 3–5.2)
ALP SERPL-CCNC: 101 U/L (ref 43–122)
ALT SERPL W P-5'-P-CCNC: 47 U/L (ref 9–52)
ANION GAP SERPL CALCULATED.3IONS-SCNC: 7 MMOL/L (ref 5–14)
AST SERPL W P-5'-P-CCNC: 42 U/L (ref 14–36)
BACTERIA UR QL AUTO: ABNORMAL /HPF
BASOPHILS # BLD AUTO: 0.1 THOUSANDS/ΜL (ref 0–0.1)
BASOPHILS NFR BLD AUTO: 1 % (ref 0–1)
BILIRUB SERPL-MCNC: 0.6 MG/DL
BILIRUB UR QL STRIP: NEGATIVE
BUN SERPL-MCNC: 13 MG/DL (ref 5–25)
CALCIUM SERPL-MCNC: 9.8 MG/DL (ref 8.4–10.2)
CHLORIDE SERPL-SCNC: 100 MMOL/L (ref 97–108)
CHOLEST SERPL-MCNC: 153 MG/DL
CLARITY UR: CLEAR
CO2 SERPL-SCNC: 31 MMOL/L (ref 22–30)
COLOR UR: YELLOW
CREAT SERPL-MCNC: 0.61 MG/DL (ref 0.6–1.2)
CREAT UR-MCNC: 90.3 MG/DL
EOSINOPHIL # BLD AUTO: 0.2 THOUSAND/ΜL (ref 0–0.4)
EOSINOPHIL NFR BLD AUTO: 2 % (ref 0–6)
ERYTHROCYTE [DISTWIDTH] IN BLOOD BY AUTOMATED COUNT: 15.3 %
EST. AVERAGE GLUCOSE BLD GHB EST-MCNC: 163 MG/DL
GFR SERPL CREATININE-BSD FRML MDRD: 102 ML/MIN/1.73SQ M
GLUCOSE P FAST SERPL-MCNC: 83 MG/DL (ref 70–99)
GLUCOSE UR STRIP-MCNC: ABNORMAL MG/DL
HBA1C MFR BLD: 7.3 %
HCT VFR BLD AUTO: 41.5 % (ref 36–46)
HDLC SERPL-MCNC: 39 MG/DL
HGB BLD-MCNC: 13.6 G/DL (ref 12–16)
HGB UR QL STRIP.AUTO: NEGATIVE
KETONES UR STRIP-MCNC: NEGATIVE MG/DL
LDLC SERPL CALC-MCNC: 86 MG/DL
LEUKOCYTE ESTERASE UR QL STRIP: 100
LYMPHOCYTES # BLD AUTO: 3.6 THOUSANDS/ΜL (ref 0.5–4)
LYMPHOCYTES NFR BLD AUTO: 31 % (ref 25–45)
MAGNESIUM SERPL-MCNC: 1.8 MG/DL (ref 1.6–2.3)
MCH RBC QN AUTO: 26.1 PG (ref 26–34)
MCHC RBC AUTO-ENTMCNC: 32.8 G/DL (ref 31–36)
MCV RBC AUTO: 80 FL (ref 80–100)
MICROALBUMIN UR-MCNC: 34 MG/L (ref 0–20)
MICROALBUMIN/CREAT 24H UR: 38 MG/G CREATININE (ref 0–30)
MONOCYTES # BLD AUTO: 0.5 THOUSAND/ΜL (ref 0.2–0.9)
MONOCYTES NFR BLD AUTO: 4 % (ref 1–10)
NEUTROPHILS # BLD AUTO: 7.3 THOUSANDS/ΜL (ref 1.8–7.8)
NEUTS SEG NFR BLD AUTO: 62 % (ref 45–65)
NITRITE UR QL STRIP: NEGATIVE
NON-SQ EPI CELLS URNS QL MICRO: ABNORMAL /HPF
NONHDLC SERPL-MCNC: 114 MG/DL
PH UR STRIP.AUTO: 5 [PH]
PLATELET # BLD AUTO: 306 THOUSANDS/UL (ref 150–450)
PLATELET BLD QL SMEAR: ADEQUATE
PMV BLD AUTO: 8.7 FL (ref 8.9–12.7)
POTASSIUM SERPL-SCNC: 4.2 MMOL/L (ref 3.6–5)
PROT SERPL-MCNC: 7.7 G/DL (ref 5.9–8.4)
PROT UR STRIP-MCNC: NEGATIVE MG/DL
RBC # BLD AUTO: 5.22 MILLION/UL (ref 4–5.2)
RBC #/AREA URNS AUTO: ABNORMAL /HPF
RBC MORPH BLD: NORMAL
SODIUM SERPL-SCNC: 138 MMOL/L (ref 137–147)
SP GR UR STRIP.AUTO: 1.01 (ref 1–1.04)
T4 FREE SERPL-MCNC: 1.19 NG/DL (ref 0.76–1.46)
TRIGL SERPL-MCNC: 140 MG/DL
TSH SERPL DL<=0.05 MIU/L-ACNC: 3.16 UIU/ML (ref 0.47–4.68)
UROBILINOGEN UA: NEGATIVE MG/DL
VIT B12 SERPL-MCNC: 339 PG/ML (ref 100–900)
WBC # BLD AUTO: 11.7 THOUSAND/UL (ref 4.5–11)
WBC #/AREA URNS AUTO: ABNORMAL /HPF

## 2020-11-20 PROCEDURE — 86376 MICROSOMAL ANTIBODY EACH: CPT

## 2020-11-20 PROCEDURE — 86800 THYROGLOBULIN ANTIBODY: CPT

## 2020-11-20 PROCEDURE — 82043 UR ALBUMIN QUANTITATIVE: CPT | Performed by: INTERNAL MEDICINE

## 2020-11-20 PROCEDURE — 85025 COMPLETE CBC W/AUTO DIFF WBC: CPT

## 2020-11-20 PROCEDURE — 80053 COMPREHEN METABOLIC PANEL: CPT

## 2020-11-20 PROCEDURE — 84443 ASSAY THYROID STIM HORMONE: CPT

## 2020-11-20 PROCEDURE — 83036 HEMOGLOBIN GLYCOSYLATED A1C: CPT

## 2020-11-20 PROCEDURE — 82570 ASSAY OF URINE CREATININE: CPT | Performed by: INTERNAL MEDICINE

## 2020-11-20 PROCEDURE — 80061 LIPID PANEL: CPT

## 2020-11-20 PROCEDURE — 36415 COLL VENOUS BLD VENIPUNCTURE: CPT

## 2020-11-20 PROCEDURE — 82607 VITAMIN B-12: CPT

## 2020-11-20 PROCEDURE — 84439 ASSAY OF FREE THYROXINE: CPT

## 2020-11-20 PROCEDURE — 83735 ASSAY OF MAGNESIUM: CPT

## 2020-11-20 PROCEDURE — 82306 VITAMIN D 25 HYDROXY: CPT

## 2020-11-20 PROCEDURE — 81001 URINALYSIS AUTO W/SCOPE: CPT | Performed by: INTERNAL MEDICINE

## 2020-11-21 LAB
THYROGLOB AB SERPL-ACNC: <1 IU/ML (ref 0–0.9)
THYROPEROXIDASE AB SERPL-ACNC: <9 IU/ML (ref 0–34)

## 2020-12-01 DIAGNOSIS — IMO0002 TYPE II DIABETES MELLITUS WITH MANIFESTATIONS, UNCONTROLLED: ICD-10-CM

## 2020-12-03 ENCOUNTER — OFFICE VISIT (OUTPATIENT)
Dept: FAMILY MEDICINE CLINIC | Facility: CLINIC | Age: 55
End: 2020-12-03
Payer: COMMERCIAL

## 2020-12-03 VITALS
RESPIRATION RATE: 14 BRPM | OXYGEN SATURATION: 97 % | WEIGHT: 168.8 LBS | TEMPERATURE: 97.7 F | HEIGHT: 63 IN | BODY MASS INDEX: 29.91 KG/M2 | HEART RATE: 97 BPM | SYSTOLIC BLOOD PRESSURE: 132 MMHG | DIASTOLIC BLOOD PRESSURE: 78 MMHG

## 2020-12-03 DIAGNOSIS — M19.90 ARTHRITIS: ICD-10-CM

## 2020-12-03 DIAGNOSIS — E11.69 HYPERLIPIDEMIA ASSOCIATED WITH TYPE 2 DIABETES MELLITUS (HCC): ICD-10-CM

## 2020-12-03 DIAGNOSIS — R19.7 DIARRHEA, UNSPECIFIED TYPE: ICD-10-CM

## 2020-12-03 DIAGNOSIS — K52.9 ACUTE COLITIS: Primary | ICD-10-CM

## 2020-12-03 DIAGNOSIS — Z23 NEED FOR INFLUENZA VACCINATION: ICD-10-CM

## 2020-12-03 DIAGNOSIS — E78.5 HYPERLIPIDEMIA ASSOCIATED WITH TYPE 2 DIABETES MELLITUS (HCC): ICD-10-CM

## 2020-12-03 DIAGNOSIS — IMO0002 TYPE II DIABETES MELLITUS WITH MANIFESTATIONS, UNCONTROLLED: ICD-10-CM

## 2020-12-03 PROCEDURE — G0008 ADMIN INFLUENZA VIRUS VAC: HCPCS

## 2020-12-03 PROCEDURE — 3008F BODY MASS INDEX DOCD: CPT | Performed by: INTERNAL MEDICINE

## 2020-12-03 PROCEDURE — 99214 OFFICE O/P EST MOD 30 MIN: CPT | Performed by: INTERNAL MEDICINE

## 2020-12-03 PROCEDURE — 3078F DIAST BP <80 MM HG: CPT | Performed by: INTERNAL MEDICINE

## 2020-12-03 PROCEDURE — 1036F TOBACCO NON-USER: CPT | Performed by: INTERNAL MEDICINE

## 2020-12-03 PROCEDURE — 90682 RIV4 VACC RECOMBINANT DNA IM: CPT

## 2020-12-03 PROCEDURE — 3075F SYST BP GE 130 - 139MM HG: CPT | Performed by: INTERNAL MEDICINE

## 2020-12-03 RX ORDER — METRONIDAZOLE 250 MG/1
250 TABLET ORAL EVERY 8 HOURS SCHEDULED
Qty: 21 TABLET | Refills: 0 | Status: SHIPPED | OUTPATIENT
Start: 2020-12-03 | End: 2020-12-10

## 2020-12-03 RX ORDER — CELECOXIB 200 MG/1
200 CAPSULE ORAL DAILY
Qty: 30 CAPSULE | Refills: 1 | Status: SHIPPED | OUTPATIENT
Start: 2020-12-03 | End: 2021-03-26

## 2020-12-23 DIAGNOSIS — J45.909 UNCOMPLICATED ASTHMA, UNSPECIFIED ASTHMA SEVERITY, UNSPECIFIED WHETHER PERSISTENT: ICD-10-CM

## 2020-12-23 RX ORDER — ALBUTEROL SULFATE 90 UG/1
2 AEROSOL, METERED RESPIRATORY (INHALATION) EVERY 6 HOURS PRN
Qty: 8.5 G | Refills: 3 | Status: SHIPPED | OUTPATIENT
Start: 2020-12-23 | End: 2021-05-19

## 2020-12-28 DIAGNOSIS — E11.9 TYPE 2 DIABETES MELLITUS WITHOUT COMPLICATION, UNSPECIFIED WHETHER LONG TERM INSULIN USE (HCC): ICD-10-CM

## 2020-12-28 RX ORDER — BLOOD-GLUCOSE METER
EACH MISCELLANEOUS
Qty: 100 EACH | Refills: 3 | Status: SHIPPED | OUTPATIENT
Start: 2020-12-28

## 2021-03-26 DIAGNOSIS — I10 ESSENTIAL HYPERTENSION: ICD-10-CM

## 2021-03-26 DIAGNOSIS — IMO0002 TYPE II DIABETES MELLITUS WITH MANIFESTATIONS, UNCONTROLLED: ICD-10-CM

## 2021-03-26 DIAGNOSIS — R21 RASH: ICD-10-CM

## 2021-03-26 DIAGNOSIS — M19.90 ARTHRITIS: ICD-10-CM

## 2021-03-26 DIAGNOSIS — J30.9 ALLERGIC RHINITIS, UNSPECIFIED SEASONALITY, UNSPECIFIED TRIGGER: ICD-10-CM

## 2021-03-26 RX ORDER — LEVOCETIRIZINE DIHYDROCHLORIDE 5 MG/1
5 TABLET, FILM COATED ORAL EVERY EVENING
Qty: 30 TABLET | Refills: 3 | Status: SHIPPED | OUTPATIENT
Start: 2021-03-26 | End: 2021-07-14

## 2021-03-26 RX ORDER — CELECOXIB 200 MG/1
200 CAPSULE ORAL DAILY
Qty: 30 CAPSULE | Refills: 1 | Status: SHIPPED | OUTPATIENT
Start: 2021-03-26 | End: 2021-04-15

## 2021-03-26 RX ORDER — CALCIPOTRIENE 50 UG/G
CREAM TOPICAL
Qty: 120 G | Refills: 0 | Status: SHIPPED | OUTPATIENT
Start: 2021-03-26 | End: 2021-07-24

## 2021-03-26 RX ORDER — ASPIRIN 81 MG/1
81 TABLET ORAL DAILY
Qty: 90 TABLET | Refills: 3 | Status: SHIPPED | OUTPATIENT
Start: 2021-03-26 | End: 2021-04-15 | Stop reason: SDUPTHER

## 2021-04-13 ENCOUNTER — APPOINTMENT (OUTPATIENT)
Dept: LAB | Facility: HOSPITAL | Age: 56
End: 2021-04-13
Payer: COMMERCIAL

## 2021-04-13 DIAGNOSIS — E78.5 HYPERLIPIDEMIA ASSOCIATED WITH TYPE 2 DIABETES MELLITUS (HCC): ICD-10-CM

## 2021-04-13 DIAGNOSIS — E11.69 HYPERLIPIDEMIA ASSOCIATED WITH TYPE 2 DIABETES MELLITUS (HCC): ICD-10-CM

## 2021-04-13 DIAGNOSIS — IMO0002 TYPE II DIABETES MELLITUS WITH MANIFESTATIONS, UNCONTROLLED: ICD-10-CM

## 2021-04-13 LAB
ALBUMIN SERPL BCP-MCNC: 3.8 G/DL (ref 3–5.2)
ALP SERPL-CCNC: 108 U/L (ref 43–122)
ALT SERPL W P-5'-P-CCNC: 30 U/L
ANION GAP SERPL CALCULATED.3IONS-SCNC: 8 MMOL/L (ref 5–14)
AST SERPL W P-5'-P-CCNC: 30 U/L (ref 14–36)
BILIRUB SERPL-MCNC: 0.24 MG/DL
BUN SERPL-MCNC: 16 MG/DL (ref 5–25)
CALCIUM SERPL-MCNC: 10 MG/DL (ref 8.4–10.2)
CHLORIDE SERPL-SCNC: 102 MMOL/L (ref 97–108)
CHOLEST SERPL-MCNC: 145 MG/DL
CO2 SERPL-SCNC: 31 MMOL/L (ref 22–30)
CREAT SERPL-MCNC: 0.72 MG/DL (ref 0.6–1.2)
GFR SERPL CREATININE-BSD FRML MDRD: 94 ML/MIN/1.73SQ M
GLUCOSE P FAST SERPL-MCNC: 83 MG/DL (ref 70–99)
HDLC SERPL-MCNC: 41 MG/DL
LDLC SERPL CALC-MCNC: 63 MG/DL
NONHDLC SERPL-MCNC: 104 MG/DL
POTASSIUM SERPL-SCNC: 4 MMOL/L (ref 3.6–5)
PROT SERPL-MCNC: 7.1 G/DL (ref 5.9–8.4)
SODIUM SERPL-SCNC: 141 MMOL/L (ref 137–147)
TRIGL SERPL-MCNC: 207 MG/DL

## 2021-04-13 PROCEDURE — 80061 LIPID PANEL: CPT

## 2021-04-13 PROCEDURE — 36415 COLL VENOUS BLD VENIPUNCTURE: CPT

## 2021-04-13 PROCEDURE — 83036 HEMOGLOBIN GLYCOSYLATED A1C: CPT

## 2021-04-13 PROCEDURE — 80053 COMPREHEN METABOLIC PANEL: CPT

## 2021-04-14 LAB
EST. AVERAGE GLUCOSE BLD GHB EST-MCNC: 163 MG/DL
HBA1C MFR BLD: 7.3 %

## 2021-04-14 PROCEDURE — 3051F HG A1C>EQUAL 7.0%<8.0%: CPT | Performed by: INTERNAL MEDICINE

## 2021-04-15 ENCOUNTER — OFFICE VISIT (OUTPATIENT)
Dept: FAMILY MEDICINE CLINIC | Facility: CLINIC | Age: 56
End: 2021-04-15
Payer: COMMERCIAL

## 2021-04-15 VITALS
TEMPERATURE: 97.7 F | HEIGHT: 63 IN | OXYGEN SATURATION: 98 % | RESPIRATION RATE: 16 BRPM | HEART RATE: 71 BPM | BODY MASS INDEX: 30.3 KG/M2 | WEIGHT: 171 LBS | SYSTOLIC BLOOD PRESSURE: 112 MMHG | DIASTOLIC BLOOD PRESSURE: 72 MMHG

## 2021-04-15 DIAGNOSIS — I10 ESSENTIAL HYPERTENSION: ICD-10-CM

## 2021-04-15 DIAGNOSIS — M25.462 PAIN AND SWELLING OF LEFT KNEE: ICD-10-CM

## 2021-04-15 DIAGNOSIS — L03.119 CELLULITIS OF FOOT: Primary | ICD-10-CM

## 2021-04-15 DIAGNOSIS — E78.5 HYPERLIPIDEMIA ASSOCIATED WITH TYPE 2 DIABETES MELLITUS (HCC): ICD-10-CM

## 2021-04-15 DIAGNOSIS — IMO0002 TYPE II DIABETES MELLITUS WITH MANIFESTATIONS, UNCONTROLLED: ICD-10-CM

## 2021-04-15 DIAGNOSIS — E11.9 DIABETES MELLITUS WITHOUT COMPLICATION (HCC): ICD-10-CM

## 2021-04-15 DIAGNOSIS — J45.909 UNCOMPLICATED ASTHMA, UNSPECIFIED ASTHMA SEVERITY, UNSPECIFIED WHETHER PERSISTENT: ICD-10-CM

## 2021-04-15 DIAGNOSIS — M25.562 PAIN AND SWELLING OF LEFT KNEE: ICD-10-CM

## 2021-04-15 DIAGNOSIS — J44.1 ACUTE EXACERBATION OF CHRONIC OBSTRUCTIVE PULMONARY DISEASE (COPD) (HCC): ICD-10-CM

## 2021-04-15 DIAGNOSIS — F32.A DEPRESSION, UNSPECIFIED DEPRESSION TYPE: ICD-10-CM

## 2021-04-15 DIAGNOSIS — E78.49 OTHER HYPERLIPIDEMIA: ICD-10-CM

## 2021-04-15 DIAGNOSIS — E24.9 CUSHING'S SYNDROME (HCC): ICD-10-CM

## 2021-04-15 DIAGNOSIS — E11.69 HYPERLIPIDEMIA ASSOCIATED WITH TYPE 2 DIABETES MELLITUS (HCC): ICD-10-CM

## 2021-04-15 PROCEDURE — 99214 OFFICE O/P EST MOD 30 MIN: CPT | Performed by: INTERNAL MEDICINE

## 2021-04-15 PROCEDURE — 3008F BODY MASS INDEX DOCD: CPT | Performed by: INTERNAL MEDICINE

## 2021-04-15 PROCEDURE — 1036F TOBACCO NON-USER: CPT | Performed by: INTERNAL MEDICINE

## 2021-04-15 PROCEDURE — 3074F SYST BP LT 130 MM HG: CPT | Performed by: INTERNAL MEDICINE

## 2021-04-15 PROCEDURE — 3078F DIAST BP <80 MM HG: CPT | Performed by: INTERNAL MEDICINE

## 2021-04-15 RX ORDER — ATORVASTATIN CALCIUM 10 MG/1
10 TABLET, FILM COATED ORAL DAILY
Qty: 90 TABLET | Refills: 3 | Status: SHIPPED | OUTPATIENT
Start: 2021-04-15 | End: 2021-08-13

## 2021-04-15 RX ORDER — PANTOPRAZOLE SODIUM 40 MG/1
40 TABLET, DELAYED RELEASE ORAL DAILY
Qty: 90 TABLET | Refills: 3 | Status: SHIPPED | OUTPATIENT
Start: 2021-04-15 | End: 2022-01-07

## 2021-04-15 RX ORDER — GLIPIZIDE 5 MG/1
5 TABLET ORAL 2 TIMES DAILY WITH MEALS
Qty: 180 TABLET | Refills: 3 | Status: SHIPPED | OUTPATIENT
Start: 2021-04-15 | End: 2021-08-13 | Stop reason: SDUPTHER

## 2021-04-15 RX ORDER — MONTELUKAST SODIUM 10 MG/1
10 TABLET ORAL EVERY 24 HOURS
Qty: 90 TABLET | Refills: 3 | Status: SHIPPED | OUTPATIENT
Start: 2021-04-15 | End: 2022-01-07

## 2021-04-15 RX ORDER — FENOFIBRATE 145 MG/1
145 TABLET, COATED ORAL DAILY
Qty: 90 TABLET | Refills: 3 | Status: SHIPPED | OUTPATIENT
Start: 2021-04-15 | End: 2021-08-13 | Stop reason: SDUPTHER

## 2021-04-15 RX ORDER — ASPIRIN 81 MG/1
81 TABLET ORAL DAILY
Qty: 90 TABLET | Refills: 3 | Status: SHIPPED | OUTPATIENT
Start: 2021-04-15 | End: 2021-08-13 | Stop reason: SDUPTHER

## 2021-04-15 RX ORDER — ESCITALOPRAM OXALATE 10 MG/1
10 TABLET ORAL
Qty: 30 TABLET | Refills: 5 | Status: SHIPPED | OUTPATIENT
Start: 2021-04-15 | End: 2021-10-28

## 2021-04-15 RX ORDER — AMOXICILLIN AND CLAVULANATE POTASSIUM 875; 125 MG/1; MG/1
1 TABLET, FILM COATED ORAL EVERY 12 HOURS SCHEDULED
Qty: 14 TABLET | Refills: 0 | Status: SHIPPED | OUTPATIENT
Start: 2021-04-15 | End: 2021-04-22

## 2021-04-15 RX ORDER — INSULIN GLARGINE 100 [IU]/ML
70 INJECTION, SOLUTION SUBCUTANEOUS DAILY
Qty: 25 ML | Refills: 5 | Status: SHIPPED | OUTPATIENT
Start: 2021-04-15 | End: 2021-08-13 | Stop reason: SDUPTHER

## 2021-04-15 NOTE — PROGRESS NOTES
Assessment/Plan:         Diagnoses and all orders for this visit:    Cellulitis of foot; right large toe :  -     amoxicillin-clavulanate (AUGMENTIN) 875-125 mg per tablet; Take 1 tablet by mouth every 12 (twelve) hours for 7 days With food/Meals  -     Ambulatory referral to Podiatry; Future  -     mupirocin (BACTROBAN) 2 % ointment; Apply topically 3 (three) times a day    Uncomplicated asthma, unspecified asthma severity, unspecified whether persistent  -     pantoprazole (PROTONIX) 40 mg tablet; Take 1 tablet (40 mg total) by mouth daily  -     montelukast (SINGULAIR) 10 mg tablet; Take 1 tablet (10 mg total) by mouth every 24 hours    Essential hypertension  -     metoprolol tartrate (LOPRESSOR) 25 mg tablet; Take 1 tablet (25 mg total) by mouth every 12 (twelve) hours  -     aspirin (ECOTRIN LOW STRENGTH) 81 mg EC tablet; Take 1 tablet (81 mg total) by mouth daily    Renew :  -     metFORMIN (GLUCOPHAGE) 1000 MG tablet; Take 1 tablet (1,000 mg total) by mouth 2 (two) times a day with meals  -     magnesium oxide (MAG-OX) 400 mg; Take 1 tablet (400 mg total) by mouth daily  -     insulin glargine (Lantus SoloStar) 100 units/mL injection pen; Inject 70 Units under the skin daily    Type II diabetes mellitus with manifestations, uncontrolled ([facility] [address] ); Improving  continue same  -     Ambulatory referral to Podiatry; Future  -     glipiZIDE (GLUCOTROL) 5 mg tablet; Take 1 tablet (5 mg total) by mouth 2 (two) times a day with meals  -     Calcium Carbonate-Vitamin D3 600-400 MG-UNIT TABS; Take 2 tablets by mouth daily  -     aspirin (ECOTRIN LOW STRENGTH) 81 mg EC tablet; Take 1 tablet (81 mg total) by mouth daily  -     Semaglutide, 1 MG/DOSE, 2 MG/1 5ML SOPN; Inject 1 mg under the skin once a week  RTC in 3 mos w :  -     Comprehensive metabolic panel; Future  -     CBC and differential; Future  -     Magnesium; Future  -     Hemoglobin A1C; Future  -     UA (URINE) with reflex to Scope;  Future    Hyperlipidemia associated with type 2 diabetes mellitus (HCC)  -     fenofibrate (TRICOR) 145 mg tablet; Take 1 tablet (145 mg total) by mouth daily  -     Lipid panel; Future    Depression, unspecified depression type  -     escitalopram (LEXAPRO) 10 mg tablet; Take 1 tablet (10 mg total) by mouth daily with dinner    Renew :  -     atorvastatin (LIPITOR) 10 mg tablet; Take 1 tablet (10 mg total) by mouth daily    Acute exacerbation of chronic obstructive pulmonary disease (COPD) ([facility] [address] ); stable    Cushing's syndrome ([facility] [address] ); in remission    Pain and swelling of left knee  -     Ambulatory referral to Orthopedic Surgery; Future        Subjective:      Patient ID: Mayda Owens is a 63 y o  female  R Yes[address] is here for Regular check up, she has few symptoms, recent blood work and med list reviewed,    The following portions of the patient's history were reviewed and updated as appropriate: allergies, current medications, past medical history, past social history, past surgical history and problem list     Review of Systems   Constitutional: Negative for chills, fatigue and fever  HENT: Negative for congestion, facial swelling, sore throat, trouble swallowing and voice change  Eyes: Negative for pain, discharge and visual disturbance  Respiratory: Negative for cough, shortness of breath and wheezing  Cardiovascular: Negative for chest pain, palpitations and leg swelling  Gastrointestinal: Negative for abdominal pain, blood in stool, constipation, diarrhea and nausea  Endocrine: Negative for polydipsia, polyphagia and polyuria  Genitourinary: Negative for difficulty urinating, hematuria and urgency  Musculoskeletal: Negative for arthralgias and myalgias  Skin: Negative for rash  Neurological: Negative for dizziness, tremors, weakness and headaches  Hematological: Negative for adenopathy  Does not bruise/bleed easily     Psychiatric/Behavioral: Negative for dysphoric mood, sleep disturbance and suicidal ideas          Objective:      /72 (BP Location: Left arm, Patient Position: Sitting, Cuff Size: Standard)   Pulse 71   Temp 97 7 °F (36 5 °C) (Tympanic)   Resp 16   Ht 5' 3" (1 6 m)   Wt 77 6 kg (171 lb)   LMP  (LMP Unknown)   SpO2 98%   BMI 30 29 kg/m²          Physical Exam  Constitutional:       General: She is not in acute distress  HENT:      Head: Normocephalic  Mouth/Throat:      Pharynx: No oropharyngeal exudate  Eyes:      General: No scleral icterus  Conjunctiva/sclera: Conjunctivae normal       Pupils: Pupils are equal, round, and reactive to light  Neck:      Musculoskeletal: Neck supple  Thyroid: No thyromegaly  Cardiovascular:      Rate and Rhythm: Normal rate and regular rhythm  Heart sounds: Normal heart sounds  No murmur  Pulmonary:      Effort: Pulmonary effort is normal  No respiratory distress  Breath sounds: Normal breath sounds  No wheezing or rales  Abdominal:      General: Bowel sounds are normal  There is no distension  Palpations: Abdomen is soft  Tenderness: There is no abdominal tenderness  There is no guarding or rebound  Musculoskeletal:         General: Tenderness present  Lymphadenopathy:      Cervical: No cervical adenopathy  Skin:     Coloration: Skin is not pale  Findings: Erythema and lesion present  Neurological:      Mental Status: She is alert and oriented to person, place, and time  Sensory: Sensory deficit present  Motor: No weakness  BMI Counseling: Body mass index is 30 29 kg/m²  The BMI is above normal  Nutrition recommendations include reducing portion sizes and decreasing overall calorie intake

## 2021-04-15 NOTE — PATIENT INSTRUCTIONS
Low Fat Diet   AMBULATORY CARE:   A low-fat diet  is an eating plan that is low in total fat, unhealthy fat, and cholesterol  You may need to follow a low-fat diet if you have trouble digesting or absorbing fat  You may also need to follow this diet if you have high cholesterol  You can also lower your cholesterol by increasing the amount of fiber in your diet  Soluble fiber is a type of fiber that helps to decrease cholesterol levels  Different types of fat in food:   · Limit unhealthy fats  A diet that is high in cholesterol, saturated fat, and trans fat may cause unhealthy cholesterol levels  Unhealthy cholesterol levels increase your risk of heart disease  ? Cholesterol:  Limit intake of cholesterol to less than 200 mg per day  Cholesterol is found in meat, eggs, and dairy  ? Saturated fat:  Limit saturated fat to less than 7% of your total daily calories  Ask your dietitian how many calories you need each day  Saturated fat is found in butter, cheese, ice cream, whole milk, and palm oil  Saturated fat is also found in meat, such as beef, pork, chicken skin, and processed meats  Processed meats include sausage, hot dogs, and bologna  ? Trans fat:  Avoid trans fat as much as possible  Trans fat is used in fried and baked foods  Foods that say trans fat free on the label may still have up to 0 5 grams of trans fat per serving  · Include healthy fats  Replace foods that are high in saturated and trans fat with foods high in healthy fats  This may help to decrease high cholesterol levels  ? Monounsaturated fats: These are found in avocados, nuts, and vegetable oils, such as olive, canola, and sunflower oil  ? Polyunsaturated fats: These can be found in vegetable oils, such as soybean or corn oil  Omega-3 fats can help to decrease the risk of heart disease  Omega-3 fats are found in fish, such as salmon, herring, trout, and tuna   Omega-3 fats can also be found in plant foods, such as walnuts, flaxseed, soybeans, and canola oil  Foods to limit or avoid:   · Grains:      ? Snacks that are made with partially hydrogenated oils, such as chips, regular crackers, and butter-flavored popcorn    ? High-fat baked goods, such as biscuits, croissants, doughnuts, pies, cookies, and pastries    · Dairy:      ? Whole milk, 2% milk, and yogurt and ice cream made with whole milk    ? Half and half creamer, heavy cream, and whipping cream    ? Cheese, cream cheese, and sour cream    · Meats and proteins:      ? High-fat cuts of meat (T-bone steak, regular hamburger, and ribs)    ? Fried meat, poultry (turkey and chicken), and fish    ? Poultry (chicken and turkey) with skin    ? Cold cuts (salami or bologna), hot dogs, guido, and sausage    ? Whole eggs and egg yolks    · Vegetables and fruits with added fat:      ? Fried vegetables or vegetables in butter or high-fat sauces, such as cream or cheese sauces    ? Fried fruit or fruit served with butter or cream    · Fats:      ? Butter, stick margarine, and shortening    ? Coconut, palm oil, and palm kernel oil    Foods to include:   · Grains:      ? Whole-grain breads, cereals, pasta, and brown rice    ? Low-fat crackers and pretzels    · Vegetables and fruits:      ? Fresh, frozen, or canned vegetables (no salt or low-sodium)    ? Fresh, frozen, dried, or canned fruit (canned in light syrup or fruit juice)    ? Avocado    · Low-fat dairy products:      ? Nonfat (skim) or 1% milk    ? Nonfat or low-fat cheese, yogurt, and cottage cheese    · Meats and proteins:      ? Chicken or turkey with no skin    ? Baked or broiled fish    ? Lean beef and pork (loin, round, extra lean hamburger)    ? Beans and peas, unsalted nuts, soy products    ? Egg whites and substitutes    ? Seeds and nuts    · Fats:      ? Unsaturated oil, such as canola, olive, peanut, soybean, or sunflower oil    ? Soft or liquid margarine and vegetable oil spread    ?  Low-fat salad dressing    Other ways to decrease fat:   · Read food labels before you buy foods  Choose foods that have less than 30% of calories from fat  Choose low-fat or fat-free dairy products  Remember that fat free does not mean calorie free  These foods still contain calories, and too many calories can lead to weight gain  · Trim fat from meat and avoid fried food  Trim all visible fat from meat before you cook it  Remove the skin from poultry  Do not braun meat, fish, or poultry  Bake, roast, boil, or broil these foods instead  Avoid fried foods  Eat a baked potato instead of [place] fries  Steam vegetables instead of sautéing them in butter  · Add less fat to foods  Use imitation guido bits on salads and baked potatoes instead of regular guido bits  Use fat-free or low-fat salad dressings instead of regular dressings  Use low-fat or nonfat butter-flavored topping instead of regular butter or margarine on popcorn and other foods  Ways to decrease fat in recipes:  Replace high-fat ingredients with low-fat or nonfat ones  This may cause baked goods to be drier than usual  You may need to use nonfat cooking spray on pans to prevent food from sticking  You also may need to change the amount of other ingredients, such as water, in the recipe  Try the following:  · Use low-fat or light margarine instead of regular margarine or shortening  · Use lean ground turkey breast or chicken, or lean ground beef (less than 5% fat) instead of hamburger  · Add 1 teaspoon of canola oil to 8 ounces of skim milk instead of using cream or half and half  · Use grated zucchini, carrots, or apples in breads instead of coconut  · Use blenderized, low-fat cottage cheese, plain tofu, or low-fat ricotta cheese instead of cream cheese  · Use 1 egg white and 1 teaspoon of canola oil, or use ¼ cup (2 ounces) of fat-free egg substitute instead of a whole egg       · Replace half of the oil that is called for in a recipe with applesauce when you bake  Use 3 tablespoons of cocoa powder and 1 tablespoon of canola oil instead of a square of baking chocolate  How to increase fiber:  Eat enough high-fiber foods to get 20 to 30 grams of fiber every day  Slowly increase your fiber intake to avoid stomach cramps, gas, and other problems  · Eat 3 ounces of whole-grain foods each day  An ounce is about 1 slice of bread  Eat whole-grain breads, such as whole-wheat bread  Whole wheat, whole-wheat flour, or other whole grains should be listed as the first ingredient on the food label  Replace white flour with whole-grain flour or use half of each in recipes  Whole-grain flour is heavier than white flour, so you may have to add more yeast or baking powder  · Eat a high-fiber cereal for breakfast   Oatmeal is a good source of soluble fiber  Look for cereals that have bran or fiber in the name  Choose whole-grain products, such as brown rice, barley, and whole-wheat pasta  · Eat more beans, peas, and lentils  For example, add beans to soups or salads  Eat at least 5 cups of fruits and vegetables each day  Eat fruits and vegetables with the peel because the peel is high in fiber  © Copyright [address] Information is for End User's use only and may not be sold, redistributed or otherwise used for commercial purposes  All illustrations and images included in CareNotes® are the copyrighted property of A D A M , Inc  or [address] [place] [address]  The above information is an  only  It is not intended as medical advice for individual conditions or treatments  Talk to your doctor, nurse or pharmacist before following any medical regimen to see if it is safe and effective for you  Heart Healthy Diet   AMBULATORY CARE:   A heart healthy diet  is an eating plan low in unhealthy fats and sodium (salt)  The plan is high in healthy fats and fiber   A heart healthy diet helps improve your cholesterol levels and lowers your risk for heart disease and stroke  A dietitian will teach you how to read and understand food labels  Heart healthy diet guidelines to follow:   · Choose foods that contain healthy fats  ? Unsaturated fats  include monounsaturated and polyunsaturated fats  Unsaturated fat is found in foods such as soybean, canola, olive, corn, and safflower oils  It is also found in soft tub margarine that is made with liquid vegetable oil  ? Omega-3 fat  is found in certain fish, such as salmon, tuna, and trout, and in walnuts and flaxseed  Eat fish high in omega-3 fats at least 2 times a week  · Get 20 to 30 grams of fiber each day  Fruits, vegetables, whole-grain foods, and legumes (cooked beans) are good sources of fiber  · Limit or do not have unhealthy fats  ? Cholesterol  is found in animal foods, such as eggs and lobster, and in dairy products made from whole milk  Limit cholesterol to less than 200 mg each day  ? Saturated fat  is found in meats, such as guido and hamburger  It is also found in chicken or turkey skin, whole milk, and butter  Limit saturated fat to less than 7% of your total daily calories  ? Trans fat  is found in packaged foods, such as potato chips and cookies  It is also in hard margarine, some fried foods, and shortening  Do not eat foods that contain trans fats  · Limit sodium as directed  You may be told to limit sodium to 2,000 to 2,300 mg each day  Choose low-sodium or no-salt-added foods  Add little or no salt to food you prepare  Use herbs and spices in place of salt  Include the following in your heart healthy plan:  Ask your dietitian or healthcare provider how many servings to have from each of the following food groups:  · Grains:      ? Whole-wheat breads, cereals, and pastas, and brown rice    ? Low-fat, low-sodium crackers and chips    · Vegetables:      ? Broccoli, green beans, green peas, and spinach    ? Collards, kale, and lima beans    ?  Carrots, sweet potatoes, tomatoes, and peppers    ? Canned vegetables with no salt added    · Fruits:      ? Bananas, peaches, pears, and pineapple    ? Grapes, raisins, and dates    ? Oranges, tangerines, grapefruit, orange juice, and grapefruit juice    ? Apricots, mangoes, melons, and papaya    ? Raspberries and strawberries    ? Canned fruit with no added sugar    · Low-fat dairy:      ? Nonfat (skim) milk, 1% milk, and low-fat almond, cashew, or soy milks fortified with calcium    ? Low-fat cheese, regular or frozen yogurt, and cottage cheese    · Meats and proteins:      ? Lean cuts of beef and pork (loin, leg, round), skinless chicken and turkey    ? Legumes, soy products, egg whites, or nuts    Limit or do not include the following in your heart healthy plan:   · Unhealthy fats and oils:      ? Whole or 2% milk, cream cheese, sour cream, or cheese    ? High-fat cuts of beef (T-bone steaks, ribs), chicken or turkey with skin, and organ meats such as liver    ? Butter, stick margarine, shortening, and cooking oils such as coconut or palm oil    · Foods and liquids high in sodium:      ? Packaged foods, such as frozen dinners, cookies, macaroni and cheese, and cereals with more than 300 mg of sodium per serving    ? Vegetables with added sodium, such as instant potatoes, vegetables with added sauces, or regular canned vegetables    ? Cured or smoked meats, such as hot dogs, guido, and sausage    ? High-sodium ketchup, barbecue sauce, salad dressing, pickles, olives, soy sauce, or miso    · Foods and liquids high in sugar:      ? Candy, cake, cookies, pies, or doughnuts    ? Soft drinks (soda), sports drinks, or sweetened tea    ? Canned or dry mixes for cakes, soups, sauces, or gravies    Other healthy heart guidelines:   · Do not smoke  Nicotine and other chemicals in cigarettes and cigars can cause lung and heart damage  Ask your healthcare provider for information if you currently smoke and need help to quit  E-cigarettes or smokeless tobacco still contain nicotine  Talk to your healthcare provider before you use these products  · Limit or do not drink alcohol as directed  Alcohol can damage your heart and raise your blood pressure  Your healthcare provider may give you specific daily and weekly limits  The general recommended limit is 1 drink a day for women 21 or older and for men 72 or older  Do not have more than 3 drinks in a day or 7 in a week  The recommended limit is 2 drinks a day for men 24to 59years of age  Do not have more than 4 drinks in a day or 14 in a week  A drink of alcohol is 12 ounces of beer, 5 ounces of wine, or 1½ ounces of liquor  · Exercise regularly  Exercise can help you maintain a healthy weight and improve your blood pressure and cholesterol levels  Regular exercise can also decrease your risk for heart problems  Ask your healthcare provider about the best exercise plan for you  Do not start an exercise program without asking your healthcare provider  Follow up with your doctor or cardiologist as directed:  Write down your questions so you remember to ask them during your visits  © Copyright [address] [place] [address] Drive Information is for End User's use only and may not be sold, redistributed or otherwise used for commercial purposes  All illustrations and images included in CareNotes® are the copyrighted property of A D A M , Inc  or [address] [place] [id number]  The above information is an  only  It is not intended as medical advice for individual conditions or treatments  Talk to your doctor, nurse or pharmacist before following any medical regimen to see if it is safe and effective for you  Calorie Counting Diet   WHAT YOU NEED TO KNOW:   What is a calorie counting diet? It is a meal plan based on counting calories each day to reach a healthy body weight  You will need to eat fewer calories if you are trying to lose weight   Weight loss may decrease your risk for certain health problems or improve your health if you have health problems  Some of these health problems include heart disease, high blood pressure, and diabetes  What foods should I avoid? Your dietitian will tell you if you need to avoid certain foods based on your body weight and health condition  You may need to avoid high-fat foods if you are at risk for or have heart disease  You may need to eat fewer foods from the breads and starches food group if you have diabetes  How many calories are in foods? The following is a list of foods and drinks with the approximate number of calories in each  Check the food label to find the exact number of calories  A dietitian can tell you how many calories you should have from each food group each day  · Carbohydrate:      ? ½ of a 3-inch bagel, 1 slice of bread, or ½ of a hamburger bun or hot dog bun (80)    ? 1 (8-inch) flour tortilla or ½ cup of cooked rice (100)    ? 1 (6-inch) corn tortilla (80)    ? 1 (6-inch) pancake or 1 cup of bran flakes cereal (110)    ? ½ cup of cooked cereal (80)    ? ½ cup of cooked pasta (85)    ? 1 ounce of pretzels (100)    ? 3 cups of air-popped popcorn without butter or oil (80)    · Dairy:      ? 1 cup of skim or 1% milk (90)    ? 1 cup of 2% milk (120)    ? 1 cup of whole milk (160)    ? 1 cup of 2% chocolate milk (220)    ? 1 ounce of low-fat cheese with 3 grams of fat per ounce (70)    ? 1 ounce of cheddar cheese (114)    ? ½ cup of 1% fat cottage cheese (80)    ? 1 cup of plain or sugar-free, fat-free yogurt (90)    · Protein foods:      ? 3 ounces of fish (not breaded or fried) (95)    ? 3 ounces of breaded, fried fish (195)    ? ¾ cup of tuna canned in water (105)    ? 3 ounces of chicken breast without skin (105)    ? 1 fried chicken breast with skin (350)    ? ¼ cup of fat free egg substitute (40)    ? 1 large egg (75)    ? 3 ounces of lean beef or pork (165)    ? 3 ounces of fried pork chop or ham (185)    ?  ½ cup of cooked dried beans, such as kidney, oliveira, lentils, or navy (115)    ? 3 ounces of bologna or lunch meat (225)    ? 2 links of breakfast sausage (140)    · Vegetables:      ? ½ cup of sliced mushrooms (10)    ? 1 cup of salad greens, such as lettuce, spinach, or moira (15)    ? ½ cup of steamed asparagus (20)    ? ½ cup of cooked summer squash, zucchini squash, or green or wax beans (25)    ? 1 cup of broccoli or cauliflower florets, or 1 medium tomato (25)    ? 1 large raw carrot or ½ cup of cooked carrots (40)    ? ? of a medium cucumber or 1 stalk of celery (5)    ? 1 small baked potato (160)    ? 1 cup of breaded, fried vegetables (230)    · Fruit:      ? 1 (6-inch) banana (55)     ? ½ of a 4-inch grapefruit (55)    ? 15 grapes (60)    ? 1 medium orange or apple (70)    ? 1 large peach (65)    ? 1 cup of fresh pineapple chunks (75)    ? 1 cup of melon cubes (50)    ? 1¼ cups of whole strawberries (45)    ? ½ cup of fruit canned in juice (55)    ? ½ cup of fruit canned in heavy syrup (110)    ? ? cup of raisins (130)    ? ½ cup of unsweetened fruit juice (60)    ? ½ cup of grape, cranberry, or prune juice (90)    · Fat:      ? 10 peanuts or 2 teaspoons of peanut butter (55)    ? 2 tablespoons of avocado or 1 tablespoon of regular salad dressing (45)    ? 2 slices of guido (90)    ? 1 teaspoon of oil, such as safflower, canola, corn, or olive oil (45)    ? 2 teaspoons of low-fat margarine, or 1 tablespoon of low-fat mayonnaise (50)    ? 1 teaspoon of regular margarine (40)    ? 1 tablespoon of regular mayonnaise (135)    ? 1 tablespoon of cream cheese or 2 tablespoons of low-fat cream cheese (45)    ? 2 tablespoons of vegetable shortening (215)    · Dessert and sweets:      ? 8 animal crackers or 5 vanilla wafers (80)    ? 1 frozen fruit juice bar (80)    ? ½ cup of ice milk or low-fat frozen yogurt (90)    ? ½ cup of sherbet or sorbet (125)    ? ½ cup of sugar-free pudding or custard (60)    ?  ½ cup of ice cream (140)    ? ½ cup of pudding or custard (175)    ? 1 (2-inch) square chocolate brownie (185)    · Combination foods:      ? Bean burrito made with an 8-inch tortilla, without cheese (275)    ? Chicken breast sandwich with lettuce and tomato (325)    ? 1 cup of chicken noodle soup (60)    ? 1 beef taco (175)    ? Regular hamburger with lettuce and tomato (310)    ? Regular cheeseburger with lettuce and tomato (410)     ? ¼ of a 12-inch cheese pizza (280)    ? Fried fish sandwich with lettuce and tomato (425)    ? Hot dog and bun (275)    ? 1½ cups of macaroni and cheese (310)    ? Taco salad with a fried tortilla shell (870)    · Low-calorie foods:      ? 1 tablespoon of ketchup or 1 tablespoon of fat free sour cream (15)    ? 1 teaspoon of mustard (5)    ? ¼ cup of salsa (20)    ? 1 large dill pickle (15)    ? 1 tablespoon of fat free salad dressing (10)    ? 2 teaspoons of low-sugar, light jam or jelly, or 1 tablespoon of sugar-free syrup (15)    ? 1 sugar-free popsicle (15)    ? 1 cup of club soda, seltzer water, or diet soda (0)    CARE AGREEMENT:   You have the right to help plan your care  Discuss treatment options with your healthcare provider to decide what care you want to receive  You always have the right to refuse treatment  The above information is an  only  It is not intended as medical advice for individual conditions or treatments  Talk to your doctor, nurse or pharmacist before following any medical regimen to see if it is safe and effective for you  © Copyright [address] Information is for End User's use only and may not be sold, redistributed or otherwise used for commercial purposes   All illustrations and images included in CareNotes® are the copyrighted property of [employer] M , Inc  or [facility] [facility] [place] [address]

## 2021-04-22 ENCOUNTER — VBI (OUTPATIENT)
Dept: ADMINISTRATIVE | Facility: OTHER | Age: 56
End: 2021-04-22

## 2021-04-22 DIAGNOSIS — L03.119 CELLULITIS OF FOOT: ICD-10-CM

## 2021-04-22 DIAGNOSIS — J30.9 ALLERGIC RHINITIS, UNSPECIFIED SEASONALITY, UNSPECIFIED TRIGGER: ICD-10-CM

## 2021-04-22 RX ORDER — DIPHENHYDRAMINE HYDROCHLORIDE 25 MG/1
CAPSULE ORAL
Qty: 90 CAPSULE | Refills: 3 | Status: SHIPPED | OUTPATIENT
Start: 2021-04-22

## 2021-05-19 DIAGNOSIS — J45.909 UNCOMPLICATED ASTHMA, UNSPECIFIED ASTHMA SEVERITY, UNSPECIFIED WHETHER PERSISTENT: ICD-10-CM

## 2021-05-19 RX ORDER — ALBUTEROL SULFATE 90 UG/1
2 AEROSOL, METERED RESPIRATORY (INHALATION) EVERY 6 HOURS PRN
Qty: 8.5 G | Refills: 3 | Status: SHIPPED | OUTPATIENT
Start: 2021-05-19 | End: 2021-08-24

## 2021-06-02 ENCOUNTER — OFFICE VISIT (OUTPATIENT)
Dept: URGENT CARE | Age: 56
End: 2021-06-02
Payer: COMMERCIAL

## 2021-06-02 ENCOUNTER — APPOINTMENT (OUTPATIENT)
Dept: RADIOLOGY | Age: 56
End: 2021-06-02
Payer: COMMERCIAL

## 2021-06-02 VITALS
HEART RATE: 79 BPM | OXYGEN SATURATION: 97 % | BODY MASS INDEX: 30.07 KG/M2 | TEMPERATURE: 98 F | SYSTOLIC BLOOD PRESSURE: 124 MMHG | RESPIRATION RATE: 18 BRPM | HEIGHT: 62 IN | DIASTOLIC BLOOD PRESSURE: 67 MMHG | WEIGHT: 163.4 LBS

## 2021-06-02 DIAGNOSIS — M25.531 WRIST PAIN, ACUTE, RIGHT: Primary | ICD-10-CM

## 2021-06-02 DIAGNOSIS — M25.531 WRIST PAIN, ACUTE, RIGHT: ICD-10-CM

## 2021-06-02 PROCEDURE — 73110 X-RAY EXAM OF WRIST: CPT

## 2021-06-02 PROCEDURE — 99213 OFFICE O/P EST LOW 20 MIN: CPT | Performed by: PHYSICIAN ASSISTANT

## 2021-06-02 RX ORDER — COLCHICINE 0.6 MG/1
TABLET ORAL
Qty: 3 TABLET | Refills: 0 | Status: SHIPPED | OUTPATIENT
Start: 2021-06-02

## 2021-06-02 RX ORDER — INDOMETHACIN 50 MG/1
50 CAPSULE ORAL
Qty: 15 CAPSULE | Refills: 0 | Status: SHIPPED | OUTPATIENT
Start: 2021-06-02 | End: 2022-04-05

## 2021-06-02 RX ORDER — CEPHALEXIN 500 MG/1
500 CAPSULE ORAL EVERY 6 HOURS SCHEDULED
Qty: 28 CAPSULE | Refills: 0 | Status: SHIPPED | OUTPATIENT
Start: 2021-06-02 | End: 2021-06-09

## 2021-06-02 RX ORDER — ATORVASTATIN CALCIUM 40 MG/1
TABLET, FILM COATED ORAL
COMMUNITY
Start: 2021-04-15 | End: 2021-08-13 | Stop reason: SDUPTHER

## 2021-06-02 NOTE — PATIENT INSTRUCTIONS
use medications as directed  Follow up with PCP in 3-5 days  Proceed to  ER if symptoms worsen  Gout   AMBULATORY CARE:   Gout  is a form of arthritis that causes severe joint pain and stiffness  Acute gout pain starts suddenly, gets worse quickly, and stops on its own  Acute gout can become chronic and cause permanent damage to your joints  Seek care immediately if:   · You have severe pain in one or more of your joints that you cannot tolerate  · You have a fever or redness that spreads beyond the joint area  Call your doctor if:   · You have new symptoms, such as a rash, after you start gout treatment  · Your joint pain and swelling do not go away, even after treatment  · You are not urinating as much or as often as you usually do  · You have trouble taking your gout medicines  · You have questions or concerns about your condition or care  Stages of gout:   · Hyperuricemia  starts with high levels of uric acid  Hyperuricemia is not gout, but it increases your risk for gout  You may have no symptoms at this stage, and it usually does not need treatment  · Acute gouty arthritis  starts with a sudden attack of pain and swelling, usually in 1 joint  The attack may last from a few days to 2 weeks  · Intercritical gout  is the time between attacks  You may go months or years without another attack  You will not have joint pain or stiffness, but this does not mean your gout is cured  You will still need treatment to prevent chronic gout  · Chronic tophaceous gout  develops if gout is not treated  Large amounts of uric acid crystals, called tophi, collect around your joints  The crystals can destroy or deform the joints  Gout attacks occur more often, and last hours to weeks  More than 1 joint may be painful and swollen  At this stage, gout symptoms do not go away on their own  Medicines: You may need any of the following:  · Prescription pain medicine  may be given   Ask your healthcare provider how to take this medicine safely  Some prescription pain medicines contain acetaminophen  Do not take other medicines that contain acetaminophen without talking to your healthcare provider  Too much acetaminophen may cause liver damage  Prescription pain medicine may cause constipation  Ask your healthcare provider how to prevent or treat constipation  · NSAIDs , such as ibuprofen, help decrease swelling, pain, and fever  This medicine is available with or without a doctor's order  NSAIDs can cause stomach bleeding or kidney problems in certain people  If you take blood thinner medicine, always ask your healthcare provider if NSAIDs are safe for you  Always read the medicine label and follow directions  · Gout medicine  decreases joint pain and swelling  It may also be given to prevent new gout attacks  · Steroids  reduce inflammation and can help your joint stiffness and pain during gout attacks  · Uric acid medicine  may be given to reduce the amount of uric acid your body makes  Some medicines may help you pass more uric acid when you urinate  · Take your medicine as directed  Contact your healthcare provider if you think your medicine is not helping or if you have side effects  Tell him or her if you are allergic to any medicine  Keep a list of the medicines, vitamins, and herbs you take  Include the amounts, and when and why you take them  Bring the list or the pill bottles to follow-up visits  Carry your medicine list with you in case of an emergency  Manage your symptoms:   · Rest your painful joint so it can heal   Your healthcare provider may recommend crutches or a walker if the affected joint is in a leg  · Apply ice to your joint  Ice decreases pain and swelling  Use an ice pack, or put crushed ice in a plastic bag  Cover the ice pack or bag with a towel before you apply it to your painful joint   Apply ice for 15 to 20 minutes every hour, or as directed  · Elevate your joint  Elevation helps reduce swelling and pain  Raise your joint above the level of your heart as often as you can  Prop your painful joint on pillows to keep it above your heart comfortably  · Go to physical therapy if directed  A physical therapist can teach you exercises to improve flexibility and range of motion  Help prevent gout attacks:   · Do not eat high-purine foods  These foods include meats, seafood, asparagus, spinach, cauliflower, and some types of beans  Healthcare providers may tell you to eat more low-fat milk products, such as yogurt  Milk products may decrease your risk for gout attacks  Vitamin C and coffee may also help  Your healthcare provider or dietitian can help you create a meal plan  · Drink liquids as directed  Liquids such as water help remove uric acid from your body  Ask how much liquid to drink each day and which liquids are best for you  · Maintain a healthy weight  Weight loss may decrease the amount of uric acid in your body  Ask your healthcare provider how much you should weigh  Ask him to help you create a weight loss plan if you are overweight  · Control your blood sugar level if you have diabetes  Keep your blood sugar level in a normal range  This can help prevent gout attacks  · Limit or do not drink alcohol as directed  Alcohol can trigger a gout attack  Alcohol also increases your risk for dehydration  Ask your healthcare provider if alcohol is safe for you  Follow up with your doctor as directed: You may be referred to a rheumatologist or podiatrist  Write down your questions so you remember to ask them during your visits  © Copyright [address] Information is for End User's use only and may not be sold, redistributed or otherwise used for commercial purposes   All illustrations and images included in CareNotes® are the copyrighted property of [employer]  or [employer] [place]  The above information is an  only  It is not intended as medical advice for individual conditions or treatments  Talk to your doctor, nurse or pharmacist before following any medical regimen to see if it is safe and effective for you  Cellulitis   AMBULATORY CARE:   Cellulitis  is a skin infection caused by bacteria  Cellulitis usually appears on the legs and feet, arms and hands, or face  Common signs and symptoms include the following:   · A red, warm, swollen area on your skin    · Pain when the area is touched    · Bumps or blisters (abscess) that may drain pus    · Bumpy, raised skin that feels like an orange peel    Call 911 if:   · You have sudden trouble breathing or chest pain  Seek care immediately if:   · Your wound gets larger and more painful  · You feel a crackling under your skin when you touch it  · You have purple dots or bumps on your skin, or you see bleeding under your skin  · You have new swelling and pain in your legs  · The red, warm, swollen area gets larger  · You see red streaks coming from the infected area  Contact your healthcare provider if:   · You have a fever  · Your fever or pain does not go away or gets worse  · The area does not get smaller after 2 days of antibiotics  · Your skin is flaking or peeling off  · You have questions or concerns about your condition or care  Treatment for cellulitis  may decrease symptoms, stop the infection from spreading, and cure the infection  Treatment depends on how severe your cellulitis is  Cellulitis may go away on its own  You may  instead need antibiotics to help treat the bacterial infection and medicines for pain  Your healthcare provider may draw a [place] around the edges of your cellulitis  If your cellulitis spreads, your healthcare provider will see it outside of the [place]  Manage your symptoms:   · Elevate the area above the level of your heart  as often as you can   This will help decrease swelling and pain  Prop the area on pillows or blankets to keep it elevated comfortably  · Clean the area daily until the wound scabs over  Gently wash the area with soap and water  Pat dry  Use dressings as directed  · Place cool or warm, wet cloths on the area as directed  Use clean cloths and clean water  Leave it on the area until the cloth is room temperature  Pat the area dry with a clean, dry cloth  The cloths may help decrease pain  Prevent cellulitis:   · Do not scratch bug bites or areas of injury  You increase your risk for cellulitis by scratching these areas  · Do not share personal items, such as towels, clothing, and razors  · Clean exercise equipment  with germ-killing  before and after you use it  · Wash your hands often  Use soap and water  Wash your hands after you use the bathroom, change a child's diapers, or sneeze  Wash your hands before you prepare or eat food  Use lotion to prevent dry, cracked skin  · Wear pressure stockings as directed  You may be told to wear the stockings if you have peripheral edema  The stockings improve blood flow and decrease swelling  · Treat athlete's foot  This can help prevent the spread of a bacterial skin infection  Follow up with your healthcare provider within 3 days, or as directed: Your healthcare provider will check if your cellulitis is getting better  You may need different medicine  Write down your questions so you remember to ask them during your visits  © Copyright [address] Information is for End User's use only and may not be sold, redistributed or otherwise used for commercial purposes  All illustrations and images included in CareNotes® are the copyrighted property of A [employer]  or [facility]   The above information is an  only  It is not intended as medical advice for individual conditions or treatments   Talk to your doctor, nurse or pharmacist before following any medical regimen to see if it is safe and effective for you

## 2021-06-02 NOTE — PROGRESS NOTES
[address]        NAME: Eric Chun is a 61 y o  female  : 1965    MRN: [id number]  DATE: 2021  TIME: 7:15 PM    Assessment and Plan   Wrist pain, acute, right [M25 531]  1  Wrist pain, acute, right  XR wrist 3+ vw right    cephalexin (KEFLEX) 500 mg capsule    colchicine (COLCRYS) 0 6 mg tablet    indomethacin (INDOCIN) 50 mg capsule    Sling         Patient Instructions      use medications as directed  Follow up with PCP in 3-5 days  Proceed to  ER if symptoms worsen  Chief Complaint     Chief Complaint   Patient presents with    Wrist Pain     Pt reports 3 day hx of right wrist pain radiating into arm  Denies fall/trauma  Applying Biofreeze  No OTC meds taken  History of Present Illness        43-year-old female presents with right wrist pain  Patient reports started 3 days ago  No history of any traumas or injuries  Has been applying Biofreeze to the area with no relief  Patient reports that swollen and red  Denies any numbness or tingling in the hand  Wrist Pain   The pain is present in the left wrist  This is a new problem  The current episode started in the past 7 days  There has been no history of extremity trauma  The problem occurs constantly  The problem has been waxing and waning  The quality of the pain is described as aching and pounding  The pain is severe  Associated symptoms include joint swelling, a limited range of motion and stiffness  Pertinent negatives include no inability to bear weight, numbness or tingling  The symptoms are aggravated by activity  Treatments tried: As Biofreeze  The treatment provided no relief  Family history does not include gout or rheumatoid arthritis  Her past medical history is significant for diabetes  Review of Systems   Review of Systems   Constitutional: Negative  HENT: Negative  Eyes: Negative  Respiratory: Negative  Cardiovascular: Negative  Gastrointestinal: Negative      Musculoskeletal: Positive for arthralgias, joint swelling and stiffness  Skin: Negative  Neurological: Negative  Negative for tingling and numbness  Current Medications       Current Outpatient Medications:     albuterol (PROVENTIL HFA,VENTOLIN HFA) 90 mcg/act inhaler, INHALE 2 PUFFS EVERY 6 (SIX) HOURS AS NEEDED FOR WHEEZING, Disp: 8 5 g, Rfl: 3    aspirin (ECOTRIN LOW STRENGTH) 81 mg EC tablet, Take 1 tablet (81 mg total) by mouth daily, Disp: 90 tablet, Rfl: 3    atorvastatin (LIPITOR) 10 mg tablet, Take 1 tablet (10 mg total) by mouth daily, Disp: 90 tablet, Rfl: 3    Banophen 25 MG capsule, TAKE 1 CAPSULE (25 MG TOTAL) BY MOUTH DAILY AT BEDTIME AS NEEDED FOR ITCHING, Disp: 90 capsule, Rfl: 3    calcipotriene (DOVONEX) 0 005 % cream, APPLY TO AFFECTED AREA TWICE A DAY 30 DAYS, Disp: 120 g, Rfl: 0    Calcium Carbonate-Vitamin D3 600-400 MG-UNIT TABS, Take 2 tablets by mouth daily, Disp: 60 tablet, Rfl: 6    Easy Comfort Pen Needles 32G X 4 MM MISC, USE FOR INSULIN INJECTIONS ONCE DAILY AS DIRECTED, Disp: 100 each, Rfl: 3    Empagliflozin (Jardiance) 25 MG TABS, Take 1 tablet (25 mg total) by mouth daily, Disp: 90 tablet, Rfl: 3    escitalopram (LEXAPRO) 10 mg tablet, Take 1 tablet (10 mg total) by mouth daily with dinner, Disp: 30 tablet, Rfl: 5    fenofibrate (TRICOR) 145 mg tablet, Take 1 tablet (145 mg total) by mouth daily, Disp: 90 tablet, Rfl: 3    GaviLAX 17 GM/SCOOP powder, TAKE 17 G BY MOUTH DAILY MIX WITH WATER EVERY DAY, Disp: 510 g, Rfl: 3    glipiZIDE (GLUCOTROL) 5 mg tablet, Take 1 tablet (5 mg total) by mouth 2 (two) times a day with meals, Disp: 180 tablet, Rfl: 3    glucose blood test strip, Use as instructed to test three times daily  , Disp: 100 each, Rfl: 5    insulin glargine (Lantus SoloStar) 100 units/mL injection pen, Inject 70 Units under the skin daily, Disp: 25 mL, Rfl: 5    levocetirizine (XYZAL) 5 MG tablet, TAKE 1 TABLET (5 MG TOTAL) BY MOUTH EVERY EVENING, Disp: 30 tablet, Rfl: 3    magnesium oxide (MAG-OX) 400 mg, Take 1 tablet (400 mg total) by mouth daily, Disp: 90 tablet, Rfl: 3    metFORMIN (GLUCOPHAGE) 1000 MG tablet, Take 1 tablet (1,000 mg total) by mouth 2 (two) times a day with meals, Disp: 180 tablet, Rfl: 3    metoprolol tartrate (LOPRESSOR) 25 mg tablet, Take 1 tablet (25 mg total) by mouth every 12 (twelve) hours, Disp: 180 tablet, Rfl: 3    montelukast (SINGULAIR) 10 mg tablet, Take 1 tablet (10 mg total) by mouth every 24 hours, Disp: 90 tablet, Rfl: 3    mupirocin (BACTROBAN) 2 % ointment, APPLY TOPICALLY 3 (THREE) TIMES A DAY, Disp: 22 g, Rfl: 1    oxybutynin (DITROPAN XL) 15 MG 24 hr tablet, Take 1 tablet (15 mg total) by mouth daily, Disp: 90 tablet, Rfl: 3    pantoprazole (PROTONIX) 40 mg tablet, Take 1 tablet (40 mg total) by mouth daily, Disp: 90 tablet, Rfl: 3    Semaglutide, 1 MG/DOSE, 2 MG/1 5ML SOPN, Inject 1 mg under the skin once a week, Disp: 4 5 mL, Rfl: 5    atorvastatin (LIPITOR) 40 mg tablet, TAKE 1 TABLET (10 MG TOTAL) BY MOUTH DAILY CHOLESTEROL PILL, Disp: , Rfl:     cephalexin (KEFLEX) 500 mg capsule, Take 1 capsule (500 mg total) by mouth every 6 (six) hours for 7 days, Disp: 28 capsule, Rfl: 0    colchicine (COLCRYS) 0 6 mg tablet, Take 2 pills immediately and take 3rd pill an hour later, Disp: 3 tablet, Rfl: 0    indomethacin (INDOCIN) 50 mg capsule, Take 1 capsule (50 mg total) by mouth 3 (three) times a day with meals Reduced doses at as symptoms improve, Disp: 15 capsule, Rfl: 0    Current Allergies     Allergies as of 06/02/2021 - Reviewed 06/02/2021   Allergen Reaction Noted    No active allergies  06/13/2018            The following portions of the patient's history were reviewed and updated as appropriate: allergies, current medications, past family history, past medical history, past social history, past surgical history and problem list      Past Medical History:   Diagnosis Date    Acute exacerbation of chronic obstructive pulmonary disease (COPD) ([facility] ) 2/27/2020    Asthma     exercise induced    Diabetes ([facility] )     type 2    Hypertension     Morbid obesity ([facility] [address] ) 4/7/2020    Osteoarthritis 4/7/2020       Past Surgical History:   Procedure Laterality Date    BREAST BIOPSY Left 2016    benign    HERNIA REPAIR      MAMMO STEREOTACTIC BREAST BIOPSY LEFT (ALL INC) Left     REPLACEMENT TOTAL KNEE Left        Family History   Problem Relation Age of Onset    Breast cancer Mother         cause of death    Heart attack Father         MI cause of death    No Known Problems Sister     No Known Problems Daughter     No Known Problems Maternal Grandmother     No Known Problems Maternal Grandfather     No Known Problems Paternal Grandmother     No Known Problems Paternal Grandfather     No Known Problems Sister     No Known Problems Sister          Medications have been verified  Objective   /67   Pulse 79   Temp 98 °F (36 7 °C)   Resp 18   Ht 5' 2" (1 575 m)   Wt 74 1 kg (163 lb 6 4 oz)   LMP  (LMP Unknown)   SpO2 97%   BMI 29 89 kg/m²   No LMP recorded (lmp unknown)  Patient is postmenopausal        Physical Exam     Physical Exam  Vitals signs and nursing note reviewed  Constitutional:       General: She is not in acute distress  Appearance: She is well-developed  HENT:      Head: Normocephalic and atraumatic  Right Ear: External ear normal       Left Ear: External ear normal       Nose: Nose normal       Mouth/Throat:      Pharynx: No oropharyngeal exudate  Eyes:      General:         Right eye: No discharge  Left eye: No discharge  Conjunctiva/sclera: Conjunctivae normal    Neck:      Musculoskeletal: Normal range of motion and neck supple  Pulmonary:      Effort: Pulmonary effort is normal  No respiratory distress  Musculoskeletal:      Right wrist: She exhibits decreased range of motion, tenderness, bony tenderness and swelling   She exhibits no effusion, no crepitus, no deformity and no laceration  Arms:    Skin:     General: Skin is warm and dry  Neurological:      Mental Status: She is alert and oriented to person, place, and time  X-ray E of right wrist were reviewed:  No fractures noted

## 2021-07-08 DIAGNOSIS — F32.A DEPRESSION, UNSPECIFIED DEPRESSION TYPE: ICD-10-CM

## 2021-07-08 RX ORDER — OXYBUTYNIN CHLORIDE 15 MG/1
15 TABLET, EXTENDED RELEASE ORAL DAILY
Qty: 90 TABLET | Refills: 3 | Status: SHIPPED | OUTPATIENT
Start: 2021-07-08 | End: 2022-07-28

## 2021-07-14 DIAGNOSIS — J30.9 ALLERGIC RHINITIS, UNSPECIFIED SEASONALITY, UNSPECIFIED TRIGGER: ICD-10-CM

## 2021-07-14 DIAGNOSIS — IMO0002 TYPE II DIABETES MELLITUS WITH MANIFESTATIONS, UNCONTROLLED: ICD-10-CM

## 2021-07-14 RX ORDER — LEVOCETIRIZINE DIHYDROCHLORIDE 5 MG/1
5 TABLET, FILM COATED ORAL EVERY EVENING
Qty: 30 TABLET | Refills: 3 | Status: SHIPPED | OUTPATIENT
Start: 2021-07-14 | End: 2021-10-28

## 2021-07-15 ENCOUNTER — RA CDI HCC (OUTPATIENT)
Dept: OTHER | Facility: HOSPITAL | Age: 56
End: 2021-07-15

## 2021-07-15 NOTE — PROGRESS NOTES
[place] [id number]  coding opportunities             Chart reviewed, (number of) suggestions sent to provider: 1            Number of suggestions actually used: 0         Patients insurance company: [employer]     Visit status: Provider canceled the appointment        [place] [id number]  coding opportunities     7/22        Chart reviewed, (number of) suggestions sent to provider: 1    E11 65  Type 2 diabetes mellitus with hyperglycemia - last A1c in 4/21 was 7 3                  Patients insurance company: [employer]

## 2021-07-24 ENCOUNTER — APPOINTMENT (OUTPATIENT)
Dept: LAB | Facility: HOSPITAL | Age: 56
End: 2021-07-24
Payer: COMMERCIAL

## 2021-07-24 DIAGNOSIS — IMO0002 TYPE II DIABETES MELLITUS WITH MANIFESTATIONS, UNCONTROLLED: Primary | ICD-10-CM

## 2021-07-24 DIAGNOSIS — E11.69 HYPERLIPIDEMIA ASSOCIATED WITH TYPE 2 DIABETES MELLITUS (HCC): ICD-10-CM

## 2021-07-24 DIAGNOSIS — E78.5 HYPERLIPIDEMIA ASSOCIATED WITH TYPE 2 DIABETES MELLITUS (HCC): ICD-10-CM

## 2021-07-24 DIAGNOSIS — IMO0002 TYPE II DIABETES MELLITUS WITH MANIFESTATIONS, UNCONTROLLED: ICD-10-CM

## 2021-07-24 DIAGNOSIS — R21 RASH: ICD-10-CM

## 2021-07-24 LAB
ALBUMIN SERPL BCP-MCNC: 4.1 G/DL (ref 3–5.2)
ALP SERPL-CCNC: 89 U/L (ref 43–122)
ALT SERPL W P-5'-P-CCNC: 21 U/L
ANION GAP SERPL CALCULATED.3IONS-SCNC: 10 MMOL/L (ref 5–14)
AST SERPL W P-5'-P-CCNC: 23 U/L (ref 14–36)
BASOPHILS # BLD AUTO: 0.1 THOUSANDS/ΜL (ref 0–0.1)
BASOPHILS NFR BLD AUTO: 1 % (ref 0–1)
BILIRUB SERPL-MCNC: 0.48 MG/DL
BUN SERPL-MCNC: 10 MG/DL (ref 5–25)
CALCIUM SERPL-MCNC: 9.6 MG/DL (ref 8.4–10.2)
CHLORIDE SERPL-SCNC: 103 MMOL/L (ref 97–108)
CHOLEST SERPL-MCNC: 155 MG/DL
CO2 SERPL-SCNC: 27 MMOL/L (ref 22–30)
CREAT SERPL-MCNC: 0.56 MG/DL (ref 0.6–1.2)
EOSINOPHIL # BLD AUTO: 0.2 THOUSAND/ΜL (ref 0–0.4)
EOSINOPHIL NFR BLD AUTO: 2 % (ref 0–6)
ERYTHROCYTE [DISTWIDTH] IN BLOOD BY AUTOMATED COUNT: 14.7 %
EST. AVERAGE GLUCOSE BLD GHB EST-MCNC: 143 MG/DL
GFR SERPL CREATININE-BSD FRML MDRD: 104 ML/MIN/1.73SQ M
GLUCOSE P FAST SERPL-MCNC: 172 MG/DL (ref 70–99)
HBA1C MFR BLD: 6.6 %
HCT VFR BLD AUTO: 41.4 % (ref 36–46)
HDLC SERPL-MCNC: 41 MG/DL
HGB BLD-MCNC: 13.9 G/DL (ref 12–16)
LDLC SERPL CALC-MCNC: 89 MG/DL
LYMPHOCYTES # BLD AUTO: 3.3 THOUSANDS/ΜL (ref 0.5–4)
LYMPHOCYTES NFR BLD AUTO: 31 % (ref 25–45)
MAGNESIUM SERPL-MCNC: 1.8 MG/DL (ref 1.6–2.3)
MCH RBC QN AUTO: 27.3 PG (ref 26–34)
MCHC RBC AUTO-ENTMCNC: 33.5 G/DL (ref 31–36)
MCV RBC AUTO: 81 FL (ref 80–100)
MONOCYTES # BLD AUTO: 0.5 THOUSAND/ΜL (ref 0.2–0.9)
MONOCYTES NFR BLD AUTO: 5 % (ref 1–10)
NEUTROPHILS # BLD AUTO: 6.4 THOUSANDS/ΜL (ref 1.8–7.8)
NEUTS SEG NFR BLD AUTO: 61 % (ref 45–65)
NONHDLC SERPL-MCNC: 114 MG/DL
PLATELET # BLD AUTO: 294 THOUSANDS/UL (ref 150–450)
PMV BLD AUTO: 8.6 FL (ref 8.9–12.7)
POTASSIUM SERPL-SCNC: 4 MMOL/L (ref 3.6–5)
PROT SERPL-MCNC: 7.5 G/DL (ref 5.9–8.4)
RBC # BLD AUTO: 5.09 MILLION/UL (ref 4–5.2)
SODIUM SERPL-SCNC: 140 MMOL/L (ref 137–147)
TRIGL SERPL-MCNC: 126 MG/DL
WBC # BLD AUTO: 10.4 THOUSAND/UL (ref 4.5–11)

## 2021-07-24 PROCEDURE — 80053 COMPREHEN METABOLIC PANEL: CPT

## 2021-07-24 PROCEDURE — 83036 HEMOGLOBIN GLYCOSYLATED A1C: CPT

## 2021-07-24 PROCEDURE — 85025 COMPLETE CBC W/AUTO DIFF WBC: CPT

## 2021-07-24 PROCEDURE — 80061 LIPID PANEL: CPT

## 2021-07-24 PROCEDURE — 83735 ASSAY OF MAGNESIUM: CPT

## 2021-07-24 PROCEDURE — 36415 COLL VENOUS BLD VENIPUNCTURE: CPT

## 2021-07-24 RX ORDER — PEN NEEDLE, DIABETIC 32GX 5/32"
NEEDLE, DISPOSABLE MISCELLANEOUS
Qty: 100 EACH | Refills: 6 | Status: SHIPPED | OUTPATIENT
Start: 2021-07-24 | End: 2022-06-13

## 2021-07-24 RX ORDER — CALCIPOTRIENE 50 UG/G
CREAM TOPICAL
Qty: 120 G | Refills: 0 | Status: SHIPPED | OUTPATIENT
Start: 2021-07-24 | End: 2021-10-19

## 2021-08-13 ENCOUNTER — OFFICE VISIT (OUTPATIENT)
Dept: FAMILY MEDICINE CLINIC | Facility: CLINIC | Age: 56
End: 2021-08-13
Payer: COMMERCIAL

## 2021-08-13 VITALS
HEIGHT: 63 IN | SYSTOLIC BLOOD PRESSURE: 122 MMHG | BODY MASS INDEX: 27.46 KG/M2 | DIASTOLIC BLOOD PRESSURE: 68 MMHG | RESPIRATION RATE: 14 BRPM | HEART RATE: 86 BPM | WEIGHT: 155 LBS | OXYGEN SATURATION: 96 % | TEMPERATURE: 97.9 F

## 2021-08-13 DIAGNOSIS — R63.0 LOSS OF APPETITE: ICD-10-CM

## 2021-08-13 DIAGNOSIS — E11.69 HYPERLIPIDEMIA ASSOCIATED WITH TYPE 2 DIABETES MELLITUS (HCC): ICD-10-CM

## 2021-08-13 DIAGNOSIS — I10 ESSENTIAL HYPERTENSION: ICD-10-CM

## 2021-08-13 DIAGNOSIS — IMO0002 TYPE II DIABETES MELLITUS WITH MANIFESTATIONS, UNCONTROLLED: ICD-10-CM

## 2021-08-13 DIAGNOSIS — Z00.01 ENCOUNTER FOR GENERAL ADULT MEDICAL EXAMINATION WITH ABNORMAL FINDINGS: Primary | ICD-10-CM

## 2021-08-13 DIAGNOSIS — E78.5 HYPERLIPIDEMIA ASSOCIATED WITH TYPE 2 DIABETES MELLITUS (HCC): ICD-10-CM

## 2021-08-13 DIAGNOSIS — E11.9 DIABETES MELLITUS WITHOUT COMPLICATION (HCC): ICD-10-CM

## 2021-08-13 DIAGNOSIS — Z12.11 SCREEN FOR COLON CANCER: ICD-10-CM

## 2021-08-13 PROCEDURE — G0439 PPPS, SUBSEQ VISIT: HCPCS | Performed by: INTERNAL MEDICINE

## 2021-08-13 PROCEDURE — 3074F SYST BP LT 130 MM HG: CPT | Performed by: INTERNAL MEDICINE

## 2021-08-13 PROCEDURE — 3008F BODY MASS INDEX DOCD: CPT | Performed by: INTERNAL MEDICINE

## 2021-08-13 PROCEDURE — 99214 OFFICE O/P EST MOD 30 MIN: CPT | Performed by: INTERNAL MEDICINE

## 2021-08-13 PROCEDURE — 3078F DIAST BP <80 MM HG: CPT | Performed by: INTERNAL MEDICINE

## 2021-08-13 PROCEDURE — 1036F TOBACCO NON-USER: CPT | Performed by: INTERNAL MEDICINE

## 2021-08-13 RX ORDER — INSULIN GLARGINE 100 [IU]/ML
50 INJECTION, SOLUTION SUBCUTANEOUS DAILY
Qty: 25 ML | Refills: 5 | Status: SHIPPED | OUTPATIENT
Start: 2021-08-13 | End: 2022-02-24

## 2021-08-13 RX ORDER — GLIPIZIDE 5 MG/1
5 TABLET ORAL 2 TIMES DAILY WITH MEALS
Qty: 180 TABLET | Refills: 3 | Status: SHIPPED | OUTPATIENT
Start: 2021-08-13 | End: 2021-12-15 | Stop reason: SDUPTHER

## 2021-08-13 RX ORDER — ATORVASTATIN CALCIUM 40 MG/1
40 TABLET, FILM COATED ORAL DAILY
Qty: 90 TABLET | Refills: 3 | Status: SHIPPED | OUTPATIENT
Start: 2021-08-13 | End: 2021-12-15 | Stop reason: SDUPTHER

## 2021-08-13 RX ORDER — FENOFIBRATE 145 MG/1
145 TABLET, COATED ORAL DAILY
Qty: 90 TABLET | Refills: 3 | Status: SHIPPED | OUTPATIENT
Start: 2021-08-13 | End: 2021-12-15 | Stop reason: SDUPTHER

## 2021-08-13 RX ORDER — ASPIRIN 81 MG/1
81 TABLET ORAL DAILY
Qty: 90 TABLET | Refills: 3 | Status: SHIPPED | OUTPATIENT
Start: 2021-08-13 | End: 2022-07-19

## 2021-08-13 NOTE — PROGRESS NOTES
Assessment/Plan:         Diagnoses and all orders for this visit:    Encounter for general adult medical examination with abnormal findings; Done in Detail    Type II diabetes mellitus with manifestations, uncontrolled ([address] ); Improved Nicely  Continue and renew :  -     Semaglutide, 1 MG/DOSE, 2 MG/1 5ML SOPN; Inject 1 mg under the skin once a week  -     glipiZIDE (GLUCOTROL) 5 mg tablet; Take 1 tablet (5 mg total) by mouth 2 (two) times a day with meals  -     aspirin (ECOTRIN LOW STRENGTH) 81 mg EC tablet; Take 1 tablet (81 mg total) by mouth daily  RTC in 3mos  w:  -     UA (URINE) with reflex to Scope; Future  -     Comprehensive metabolic panel; Future  -     CBC and differential; Future  -     Magnesium; Future  -     Hemoglobin A1C; Future    Diabetes mellitus without complication (HCC)  -     metFORMIN (GLUCOPHAGE) 1000 MG tablet; Take 1 tablet (1,000 mg total) by mouth 2 (two) times a day with meals  -     magnesium oxide (MAG-OX) 400 mg; Take 1 tablet (400 mg total) by mouth daily  -     insulin glargine (Lantus SoloStar) 100 units/mL injection pen; Inject 50 Units under the skin daily    Essential hypertension  -     metoprolol tartrate (LOPRESSOR) 25 mg tablet; Take 1 tablet (25 mg total) by mouth every 12 (twelve) hours  -     aspirin (ECOTRIN LOW STRENGTH) 81 mg EC tablet; Take 1 tablet (81 mg total) by mouth daily    Hyperlipidemia associated with type 2 diabetes mellitus (HCC)  -     fenofibrate (TRICOR) 145 mg tablet; Take 1 tablet (145 mg total) by mouth daily  -     atorvastatin (LIPITOR) 40 mg tablet; Take 1 tablet (40 mg total) by mouth daily  -     Lipid panel; Future    Loss of appetite  -     Ambulatory referral to Gastroenterology; Future    Screen for colon cancer  -     Ambulatory referral to Gastroenterology; Future        Subjective:      Patient ID: Aman Adamson is a 61 y o  female      Lisa Candy is here for AWV and Regular Check up, she feels Ramiro Sang, recent Blood work and med list reviewed w Pt  In detail    The following portions of the patient's history were reviewed and updated as appropriate: allergies, past family history, past medical history, past social history, past surgical history and problem list     Review of Systems   Constitutional: Positive for appetite change  Negative for chills, fatigue and fever  HENT: Negative for congestion, facial swelling, sore throat, trouble swallowing and voice change  Eyes: Negative for pain, discharge and visual disturbance  Respiratory: Negative for cough, shortness of breath and wheezing  Cardiovascular: Negative for chest pain, palpitations and leg swelling  Gastrointestinal: Negative for abdominal pain, blood in stool, constipation, diarrhea and nausea  Endocrine: Negative for polydipsia, polyphagia and polyuria  Genitourinary: Negative for difficulty urinating, hematuria and urgency  Musculoskeletal: Negative for arthralgias and myalgias  Skin: Negative for rash  Neurological: Negative for dizziness, tremors, weakness and headaches  Hematological: Negative for adenopathy  Does not bruise/bleed easily  Psychiatric/Behavioral: Negative for dysphoric mood, sleep disturbance and suicidal ideas  Objective:      /68 (BP Location: Left arm, Patient Position: Sitting, Cuff Size: Standard)   Pulse 86   Temp 97 9 °F (36 6 °C) (Tympanic)   Resp 14   Ht 5' 3" (1 6 m)   Wt 70 3 kg (155 lb)   LMP  (LMP Unknown)   SpO2 96%   BMI 27 46 kg/m²          Physical Exam  Constitutional:       General: She is not in acute distress  HENT:      Head: Normocephalic  Mouth/Throat:      Pharynx: No oropharyngeal exudate  Eyes:      General: No scleral icterus  Conjunctiva/sclera: Conjunctivae normal       Pupils: Pupils are equal, round, and reactive to light  Neck:      Thyroid: No thyromegaly  Cardiovascular:      Rate and Rhythm: Normal rate and regular rhythm        Heart sounds: Normal heart sounds  No murmur heard  Pulmonary:      Effort: Pulmonary effort is normal  No respiratory distress  Breath sounds: Normal breath sounds  No wheezing or rales  Abdominal:      General: Bowel sounds are normal  There is no distension  Palpations: Abdomen is soft  Tenderness: There is no abdominal tenderness  There is no guarding or rebound  Musculoskeletal:         General: No tenderness  Cervical back: Neck supple  Lymphadenopathy:      Cervical: No cervical adenopathy  Skin:     Coloration: Skin is not pale  Findings: No rash  Neurological:      Mental Status: She is alert and oriented to person, place, and time  Sensory: No sensory deficit  Motor: No weakness

## 2021-08-13 NOTE — PROGRESS NOTES
Assessment and Plan:     Problem List Items Addressed This Visit     None           Preventive health issues were discussed with patient, and age appropriate screening tests were ordered as noted in patient's After Visit Summary  Personalized health advice and appropriate referrals for health education or preventive services given if needed, as noted in patient's After Visit Summary       History of Present Illness:     Patient presents for Medicare Annual Wellness visit    Patient Care Team:  Malina Carney MD as PCP - General (Internal Medicine)  Malina Carney MD as PCP - [address] [place] [address] (RTE)     Problem List:     Patient Active Problem List   Diagnosis    Diabetes mellitus ([address] )    Essential hypertension    Hyperlipidemia    Pelvic pain    Acute exacerbation of chronic obstructive pulmonary disease (COPD) ([address] )    Cushing's syndrome ([address] )    Leukocytosis    Osteoarthritis    Type 2 diabetes mellitus without complication ([address] )      Past Medical and Surgical History:     Past Medical History:   Diagnosis Date    Acute exacerbation of chronic obstructive pulmonary disease (COPD) ([address] ) 2/27/2020    Asthma     exercise induced    Diabetes ([address] )     type 2    Hypertension     Morbid obesity ([address] ) 4/7/2020    Osteoarthritis 4/7/2020     Past Surgical History:   Procedure Laterality Date    BREAST BIOPSY Left 2016    benign    HERNIA REPAIR      MAMMO STEREOTACTIC BREAST BIOPSY LEFT (ALL INC) Left     REPLACEMENT TOTAL KNEE Left       Family History:     Family History   Problem Relation Age of Onset    Breast cancer Mother         cause of death    Heart attack Father         MI cause of death    No Known Problems Sister     No Known Problems Daughter     No Known Problems Maternal Grandmother     No Known Problems Maternal Grandfather     No Known Problems Paternal Grandmother     No Known Problems Paternal Grandfather     No Known Problems Sister     No Known Problems Sister Social History:     Social History     Socioeconomic History    Marital status: Single     Spouse name: None    Number of children: None    Years of education: None    Highest education level: None   Occupational History    None   Tobacco Use    Smoking status: Former Smoker    Smokeless tobacco: Never Used   Vaping Use    Vaping Use: Never used   Substance and Sexual Activity    Alcohol use: No    Drug use: No    Sexual activity: Not Currently     Birth control/protection: Surgical   Other Topics Concern    None   Social History Narrative    None     Social Determinants of Health     Financial Resource Strain: Low Risk     Difficulty of Paying Living Expenses: Not hard at all   Food Insecurity: No Food Insecurity    Worried About Running Out of Food in the Last Year: Never true    Robert of Food in the Last Year: Never true   Transportation Needs: No Transportation Needs    Lack of Transportation (Medical): No    Lack of Transportation (Non-Medical):  No   Physical Activity: Unknown    Days of Exercise per Week: Patient refused    Minutes of Exercise per Session: Patient refused   Stress: No Stress Concern Present    Feeling of Stress : Not at all   Social Connections: Unknown    Frequency of Communication with Friends and Family: Patient refused    Frequency of Social Gatherings with Friends and Family: Patient refused    Attends Zoroastrianism Services: Patient refused    Active Member of Clubs or Organizations: Patient refused    Attends Club or Organization Meetings: Patient refused    Marital Status: Patient refused   Intimate Partner Violence: Not At Risk    Fear of Current or Ex-Partner: No    Emotionally Abused: No    Physically Abused: No    Sexually Abused: No      Medications and Allergies:     Current Outpatient Medications   Medication Sig Dispense Refill    albuterol (PROVENTIL HFA,VENTOLIN HFA) 90 mcg/act inhaler INHALE 2 PUFFS EVERY 6 (SIX) HOURS AS NEEDED FOR WHEEZING 8 5 g 3    aspirin (ECOTRIN LOW STRENGTH) 81 mg EC tablet Take 1 tablet (81 mg total) by mouth daily 90 tablet 3    atorvastatin (LIPITOR) 10 mg tablet Take 1 tablet (10 mg total) by mouth daily 90 tablet 3    atorvastatin (LIPITOR) 40 mg tablet TAKE 1 TABLET (10 MG TOTAL) BY MOUTH DAILY CHOLESTEROL PILL      Banophen 25 MG capsule TAKE 1 CAPSULE (25 MG TOTAL) BY MOUTH DAILY AT BEDTIME AS NEEDED FOR ITCHING 90 capsule 3    calcipotriene (DOVONEX) 0 005 % cream APPLY TO AFFECTED AREA TWICE A DAY 30 DAYS LARRGER AREA HAND LEGS 120 g 0    Calcium Carbonate-Vitamin D3 600-400 MG-UNIT TABS TAKE 2 TABLETS BY MOUTH DAILY 60 tablet 6    colchicine (COLCRYS) 0 6 mg tablet Take 2 pills immediately and take 3rd pill an hour later 3 tablet 0    Easy Comfort Pen Needles 32G X 4 MM MISC USE FOR INSULIN INJECTIONS ONCE DAILY AS DIRECTED 100 each 3    Easy Comfort Pen Needles 33G X 4 MM MISC WITH INSULINE PEN AS NEEDED UP TO 5 TIMES X90 DAYS 100 each 6    Empagliflozin (Jardiance) 25 MG TABS Take 1 tablet (25 mg total) by mouth daily 90 tablet 3    escitalopram (LEXAPRO) 10 mg tablet Take 1 tablet (10 mg total) by mouth daily with dinner 30 tablet 5    fenofibrate (TRICOR) 145 mg tablet Take 1 tablet (145 mg total) by mouth daily 90 tablet 3    GaviLAX 17 GM/SCOOP powder TAKE 17 G BY MOUTH DAILY MIX WITH WATER EVERY  g 3    glipiZIDE (GLUCOTROL) 5 mg tablet Take 1 tablet (5 mg total) by mouth 2 (two) times a day with meals 180 tablet 3    glucose blood test strip Use as instructed to test three times daily   100 each 5    indomethacin (INDOCIN) 50 mg capsule Take 1 capsule (50 mg total) by mouth 3 (three) times a day with meals Reduced doses at as symptoms improve 15 capsule 0    insulin glargine (Lantus SoloStar) 100 units/mL injection pen Inject 70 Units under the skin daily 25 mL 5    levocetirizine (XYZAL) 5 MG tablet TAKE 1 TABLET (5 MG TOTAL) BY MOUTH EVERY EVENING 30 tablet 3    magnesium oxide (MAG-OX) 400 mg Take 1 tablet (400 mg total) by mouth daily 90 tablet 3    metFORMIN (GLUCOPHAGE) 1000 MG tablet Take 1 tablet (1,000 mg total) by mouth 2 (two) times a day with meals 180 tablet 3    metoprolol tartrate (LOPRESSOR) 25 mg tablet Take 1 tablet (25 mg total) by mouth every 12 (twelve) hours 180 tablet 3    montelukast (SINGULAIR) 10 mg tablet Take 1 tablet (10 mg total) by mouth every 24 hours 90 tablet 3    mupirocin (BACTROBAN) 2 % ointment APPLY TOPICALLY 3 (THREE) TIMES A DAY 22 g 1    oxybutynin (DITROPAN XL) 15 MG 24 hr tablet TAKE 1 TABLET (15 MG TOTAL) BY MOUTH DAILY 90 tablet 3    pantoprazole (PROTONIX) 40 mg tablet Take 1 tablet (40 mg total) by mouth daily 90 tablet 3    Semaglutide, 1 MG/DOSE, 2 MG/1 5ML SOPN Inject 1 mg under the skin once a week 4 5 mL 5     No current facility-administered medications for this visit  Allergies   Allergen Reactions    No Active Allergies       Immunizations:     Immunization History   Administered Date(s) Administered    Fluzone Split Quad 0 25 mL 09/28/2016    Fluzone Split Quad 0 5 mL 10/13/2014, 11/15/2017    INFLUENZA 01/08/2016, 09/28/2016, 11/15/2017    Influenza, injectable, quadrivalent, preservative free 0 5 mL 11/13/2019    Influenza, recombinant, quadrivalent,injectable, preservative free 09/19/2018, 12/03/2020    Pneumococcal Conjugate 13-Valent 12/19/2018    Pneumococcal Polysaccharide PPV23 01/08/2016    Tdap 01/05/2015      Health Maintenance:         Topic Date Due    Cervical Cancer Screening  11/02/2021    Breast Cancer Screening: Mammogram  11/14/2021    Colorectal Cancer Screening  04/05/2023    HIV Screening  Completed    Hepatitis C Screening  Completed         Topic Date Due    COVID-19 Vaccine (1) Never done    Influenza Vaccine (1) 09/01/2021      Medicare Health Risk Assessment:     Ht 5' 3" (1 6 m)   LMP  (LMP Unknown)   BMI 28 95 kg/m²      Siena Bowers is here for her Subsequent Wellness visit       Health Risk Assessment:   Patient rates overall health as good  Patient feels that their physical health rating is same  Patient is satisfied with their life  Eyesight was rated as same  Hearing was rated as same  Patient feels that their emotional and mental health rating is same  Patients states they are never, rarely angry  Patient states they are never, rarely unusually tired/fatigued  Pain experienced in the last 7 days has been none  Patient states that she has experienced no weight loss or gain in last 6 months  Fall Risk Screening: In the past year, patient has experienced: no history of falling in past year      Urinary Incontinence Screening:   Patient has not leaked urine accidently in the last six months  Home Safety:  Patient does not have trouble with stairs inside or outside of their home  Patient has working smoke alarms and has working carbon monoxide detector  Home safety hazards include: none  Nutrition:   Current diet is Regular  Medications:   Patient is not currently taking any over-the-counter supplements  Patient is able to manage medications  Activities of Daily Living (ADLs)/Instrumental Activities of Daily Living (IADLs):   Walk and transfer into and out of bed and chair?: Yes  Dress and groom yourself?: Yes    Bathe or shower yourself?: Yes    Feed yourself?  Yes  Do your laundry/housekeeping?: Yes  Manage your money, pay your bills and track your expenses?: Yes  Make your own meals?: Yes    Do your own shopping?: Yes    Previous Hospitalizations:   Any hospitalizations or ED visits within the last 12 months?: No      Advance Care Planning:   Living will: No    Durable POA for healthcare: No    Advanced directive counseling given: No    Five wishes given: No      PREVENTIVE SCREENINGS      Cardiovascular Screening:    General: Screening Not Indicated, History Lipid Disorder and Risks and Benefits Discussed      Diabetes Screening:     General: Screening Not Indicated, History Diabetes and Risks and Benefits Discussed      Colorectal Cancer Screening:     General: Screening Current      Breast Cancer Screening:     General: Screening Current and Risks and Benefits Discussed      Cervical Cancer Screening:    General: Screening Current and Risks and Benefits Discussed      Osteoporosis Screening:    General: Risks and Benefits Discussed      Abdominal Aortic Aneurysm (AAA) Screening:        General: Risks and Benefits Discussed      Lung Cancer Screening:     General: Screening Not Indicated and Risks and Benefits Discussed      Hepatitis C Screening:    General: Screening Current and Risks and Benefits Discussed    Screening, Brief Intervention, and Referral to Treatment (SBIRT)    Screening      Single Item Drug Screening:  How often have you used an illegal drug (including marijuana) or a prescription medication for non-medical reasons in the past year? never    Single Item Drug Screen Score: 0  Interpretation: Negative screen for possible drug use disorder    Other Counseling Topics:   Car/seat belt/driving safety, skin self-exam, sunscreen and calcium and vitamin D intake and regular weightbearing exercise         Charlotte Wu MD

## 2021-08-13 NOTE — PATIENT INSTRUCTIONS
Medicare Preventive Visit Patient Instructions  Thank you for completing your Welcome to Medicare Visit or Medicare Annual Wellness Visit today  Your next wellness visit will be due in one year (8/14/2022)  The screening/preventive services that you may require over the next 5-10 years are detailed below  Some tests may not apply to you based off risk factors and/or age  Screening tests ordered at today's visit but not completed yet may show as past due  Also, please note that scanned in results may not display below  Preventive Screenings:  Service Recommendations Previous Testing/Comments   Colorectal Cancer Screening  * Colonoscopy    * Fecal Occult Blood Test (FOBT)/Fecal Immunochemical Test (FIT)  * Fecal DNA/Cologuard Test  * Flexible Sigmoidoscopy Age: 54-65 years old   Colonoscopy: every 10 years (may be performed more frequently if at higher risk)  OR  FOBT/FIT: every 1 year  OR  Cologuard: every 3 years  OR  Sigmoidoscopy: every 5 years  Screening may be recommended earlier than age 48 if at higher risk for colorectal cancer  Also, an individualized decision between you and your healthcare provider will decide whether screening between the ages of 74-80 would be appropriate  Colonoscopy: Not on file  FOBT/FIT: Not on file  Cologuard: 04/05/2020  Sigmoidoscopy: Not on file    Screening Current     Breast Cancer Screening Age: 36 years old  Frequency: every 1-2 years  Not required if history of left and right mastectomy Mammogram: 11/14/2020    Screening Current   Cervical Cancer Screening Between the ages of 21-29, pap smear recommended once every 3 years  Between the ages of 33-67, can perform pap smear with HPV co-testing every 5 years     Recommendations may differ for women with a history of total hysterectomy, cervical cancer, or abnormal pap smears in past  Pap Smear: 11/02/2018    Screening Current   Hepatitis C Screening Once for adults born between 1945 and 1965  More frequently in patients at high risk for Hepatitis C Hep C Antibody: 03/25/2019    Screening Current   Diabetes Screening 1-2 times per year if you're at risk for diabetes or have pre-diabetes Fasting glucose: 172 mg/dL   A1C: 6 6 %    Screening Not Indicated  History Diabetes   Cholesterol Screening Once every 5 years if you don't have a lipid disorder  May order more often based on risk factors  Lipid panel: 07/24/2021    Screening Not Indicated  History Lipid Disorder     Other Preventive Screenings Covered by Medicare:  1  Abdominal Aortic Aneurysm (AAA) Screening: covered once if your at risk  You're considered to be at risk if you have a family history of AAA  2  Lung Cancer Screening: covers low dose CT scan once per year if you meet all of the following conditions: (1) Age 50-69; (2) No signs or symptoms of lung cancer; (3) Current smoker or have quit smoking within the last 15 years; (4) You have a tobacco smoking history of at least 30 pack years (packs per day multiplied by number of years you smoked); (5) You get a written order from a healthcare provider  3  Glaucoma Screening: covered annually if you're considered high risk: (1) You have diabetes OR (2) Family history of glaucoma OR (3)  aged 48 and older OR (3)  American aged 72 and older  3  Osteoporosis Screening: covered every 2 years if you meet one of the following conditions: (1) You're estrogen deficient and at risk for osteoporosis based off medical history and other findings; (2) Have a vertebral abnormality; (3) On glucocorticoid therapy for more than 3 months; (4) Have primary hyperparathyroidism; (5) On osteoporosis medications and need to assess response to drug therapy  · Last bone density test (DXA Scan): 04/17/2019   5  HIV Screening: covered annually if you're between the age of 15-65  Also covered annually if you are younger than 13 and older than 72 with risk factors for HIV infection   For pregnant patients, it is covered up to 3 times per pregnancy  Immunizations:  Immunization Recommendations   Influenza Vaccine Annual influenza vaccination during flu season is recommended for all persons aged >= 6 months who do not have contraindications   Pneumococcal Vaccine (Prevnar and Pneumovax)  * Prevnar = PCV13  * Pneumovax = PPSV23   Adults 25-60 years old: 1-3 doses may be recommended based on certain risk factors  Adults 72 years old: Prevnar (PCV13) vaccine recommended followed by Pneumovax (PPSV23) vaccine  If already received PPSV23 since turning 65, then PCV13 recommended at least one year after PPSV23 dose  Hepatitis B Vaccine 3 dose series if at intermediate or high risk (ex: diabetes, end stage renal disease, liver disease)   Tetanus (Td) Vaccine - COST NOT COVERED BY MEDICARE PART B Following completion of primary series, a booster dose should be given every 10 years to maintain immunity against tetanus  Td may also be given as tetanus wound prophylaxis  Tdap Vaccine - COST NOT COVERED BY MEDICARE PART B Recommended at least once for all adults  For pregnant patients, recommended with each pregnancy  Shingles Vaccine (Shingrix) - COST NOT COVERED BY MEDICARE PART B  2 shot series recommended in those aged 48 and above     Health Maintenance Due:      Topic Date Due    Cervical Cancer Screening  11/02/2021    Breast Cancer Screening: Mammogram  11/14/2021    Colorectal Cancer Screening  04/05/2023    HIV Screening  Completed    Hepatitis C Screening  Completed     Immunizations Due:      Topic Date Due    COVID-19 Vaccine (1) Never done    Influenza Vaccine (1) 09/01/2021     Advance Directives   What are advance directives? Advance directives are legal documents that state your wishes and plans for medical care  These plans are made ahead of time in case you lose your ability to make decisions for yourself   Advance directives can apply to any medical decision, such as the treatments you want, and if you want to donate organs  What are the types of advance directives? There are many types of advance directives, and each state has rules about how to use them  You may choose a combination of any of the following:  · Living will: This is a written record of the treatment you want  You can also choose which treatments you do not want, which to limit, and which to stop at a certain time  This includes surgery, medicine, IV fluid, and tube feedings  · Durable power of  for healthcare [facility]): This is a written record that states who you want to make healthcare choices for you when you are unable to make them for yourself  This person, called a proxy, is usually a family member or a friend  You may choose more than 1 proxy  · Do not resuscitate (DNR) order:  A DNR order is used in case your heart stops beating or you stop breathing  It is a request not to have certain forms of treatment, such as CPR  A DNR order may be included in other types of advance directives  · Medical directive: This covers the care that you want if you are in a coma, near death, or unable to make decisions for yourself  You can list the treatments you want for each condition  Treatment may include pain medicine, surgery, blood transfusions, dialysis, IV or tube feedings, and a ventilator (breathing machine)  · Values history: This document has questions about your views, beliefs, and how you feel and think about life  This information can help others choose the care that you would choose  Why are advance directives important? An advance directive helps you control your care  Although spoken wishes may be used, it is better to have your wishes written down  Spoken wishes can be misunderstood, or not followed  Treatments may be given even if you do not want them  An advance directive may make it easier for your family to make difficult choices about your care     Weight Management   Why it is important to manage your weight:  Being overweight increases your risk of health conditions such as heart disease, high blood pressure, type 2 diabetes, and certain types of cancer  It can also increase your risk for osteoarthritis, sleep apnea, and other respiratory problems  Aim for a slow, steady weight loss  Even a small amount of weight loss can lower your risk of health problems  How to lose weight safely:  A safe and healthy way to lose weight is to eat fewer calories and get regular exercise  You can lose up about 1 pound a week by decreasing the number of calories you eat by 500 calories each day  Healthy meal plan for weight management:  A healthy meal plan includes a variety of foods, contains fewer calories, and helps you stay healthy  A healthy meal plan includes the following:  · Eat whole-grain foods more often  A healthy meal plan should contain fiber  Fiber is the part of grains, fruits, and vegetables that is not broken down by your body  Whole-grain foods are healthy and provide extra fiber in your diet  Some examples of whole-grain foods are whole-wheat breads and pastas, oatmeal, brown rice, and bulgur  · Eat a variety of vegetables every day  Include dark, leafy greens such as spinach, kale, arsh greens, and mustard greens  Eat yellow and orange vegetables such as carrots, sweet potatoes, and winter squash  · Eat a variety of fruits every day  Choose fresh or canned fruit (canned in its own juice or light syrup) instead of juice  Fruit juice has very little or no fiber  · Eat low-fat dairy foods  Drink fat-free (skim) milk or 1% milk  Eat fat-free yogurt and low-fat cottage cheese  Try low-fat cheeses such as mozzarella and other reduced-fat cheeses  · Choose meat and other protein foods that are low in fat  Choose beans or other legumes such as split peas or lentils  Choose fish, skinless poultry (chicken or turkey), or lean cuts of red meat (beef or pork)  Before you cook meat or poultry, cut off any visible fat  · Use less fat and oil  Try baking foods instead of frying them  Add less fat, such as margarine, sour cream, regular salad dressing and mayonnaise to foods  Eat fewer high-fat foods  Some examples of high-fat foods include french fries, doughnuts, ice cream, and cakes  · Eat fewer sweets  Limit foods and drinks that are high in sugar  This includes candy, cookies, regular soda, and sweetened drinks  Exercise:  Exercise at least 30 minutes per day on most days of the week  Some examples of exercise include walking, biking, dancing, and swimming  You can also fit in more physical activity by taking the stairs instead of the elevator or parking farther away from stores  Ask your healthcare provider about the best exercise plan for you  © Copyright [employer] 2018 Information is for End User's use only and may not be sold, redistributed or otherwise used for commercial purposes   All illustrations and images included in CareNotes® are the copyrighted property of [employer]  or [address] [place] [address]

## 2021-08-24 DIAGNOSIS — J45.909 UNCOMPLICATED ASTHMA, UNSPECIFIED ASTHMA SEVERITY, UNSPECIFIED WHETHER PERSISTENT: ICD-10-CM

## 2021-08-24 RX ORDER — ALBUTEROL SULFATE 90 UG/1
2 AEROSOL, METERED RESPIRATORY (INHALATION) EVERY 6 HOURS PRN
Qty: 8.5 G | Refills: 3 | Status: SHIPPED | OUTPATIENT
Start: 2021-08-24 | End: 2022-03-24

## 2021-09-17 DIAGNOSIS — E11.9 DIABETES MELLITUS WITHOUT COMPLICATION (HCC): ICD-10-CM

## 2021-10-19 DIAGNOSIS — R21 RASH: ICD-10-CM

## 2021-10-19 RX ORDER — CALCIPOTRIENE 50 UG/G
CREAM TOPICAL
Qty: 120 G | Refills: 0 | Status: SHIPPED | OUTPATIENT
Start: 2021-10-19 | End: 2022-01-07

## 2021-10-27 DIAGNOSIS — J30.9 ALLERGIC RHINITIS, UNSPECIFIED SEASONALITY, UNSPECIFIED TRIGGER: ICD-10-CM

## 2021-10-27 DIAGNOSIS — F32.A DEPRESSION, UNSPECIFIED DEPRESSION TYPE: ICD-10-CM

## 2021-10-28 DIAGNOSIS — J30.9 ALLERGIC RHINITIS, UNSPECIFIED SEASONALITY, UNSPECIFIED TRIGGER: ICD-10-CM

## 2021-10-28 DIAGNOSIS — F32.A DEPRESSION, UNSPECIFIED DEPRESSION TYPE: ICD-10-CM

## 2021-10-28 RX ORDER — LEVOCETIRIZINE DIHYDROCHLORIDE 5 MG/1
5 TABLET, FILM COATED ORAL EVERY EVENING
Qty: 30 TABLET | Refills: 3 | Status: SHIPPED | OUTPATIENT
Start: 2021-10-28 | End: 2022-02-10

## 2021-10-28 RX ORDER — ESCITALOPRAM OXALATE 10 MG/1
10 TABLET ORAL
Qty: 30 TABLET | Refills: 5 | Status: SHIPPED | OUTPATIENT
Start: 2021-10-28 | End: 2021-10-28 | Stop reason: SDUPTHER

## 2021-10-28 RX ORDER — ESCITALOPRAM OXALATE 10 MG/1
10 TABLET ORAL
Qty: 30 TABLET | Refills: 5 | Status: SHIPPED | OUTPATIENT
Start: 2021-10-28 | End: 2022-02-10 | Stop reason: SDUPTHER

## 2021-10-28 RX ORDER — LEVOCETIRIZINE DIHYDROCHLORIDE 5 MG/1
5 TABLET, FILM COATED ORAL EVERY EVENING
Qty: 30 TABLET | Refills: 3 | Status: SHIPPED | OUTPATIENT
Start: 2021-10-28 | End: 2021-10-28 | Stop reason: SDUPTHER

## 2021-11-10 ENCOUNTER — RA CDI HCC (OUTPATIENT)
Dept: OTHER | Facility: HOSPITAL | Age: 56
End: 2021-11-10

## 2021-12-06 ENCOUNTER — APPOINTMENT (OUTPATIENT)
Dept: LAB | Facility: HOSPITAL | Age: 56
End: 2021-12-06
Payer: COMMERCIAL

## 2021-12-06 DIAGNOSIS — E11.69 HYPERLIPIDEMIA ASSOCIATED WITH TYPE 2 DIABETES MELLITUS (HCC): ICD-10-CM

## 2021-12-06 DIAGNOSIS — E78.5 HYPERLIPIDEMIA ASSOCIATED WITH TYPE 2 DIABETES MELLITUS (HCC): ICD-10-CM

## 2021-12-06 DIAGNOSIS — IMO0002 TYPE II DIABETES MELLITUS WITH MANIFESTATIONS, UNCONTROLLED: ICD-10-CM

## 2021-12-06 LAB
ALBUMIN SERPL BCP-MCNC: 4.2 G/DL (ref 3–5.2)
ALP SERPL-CCNC: 143 U/L (ref 43–122)
ALT SERPL W P-5'-P-CCNC: 22 U/L
ANION GAP SERPL CALCULATED.3IONS-SCNC: 6 MMOL/L (ref 5–14)
AST SERPL W P-5'-P-CCNC: 25 U/L (ref 14–36)
BASOPHILS # BLD AUTO: 0.1 THOUSANDS/ΜL (ref 0–0.1)
BASOPHILS NFR BLD AUTO: 1 % (ref 0–1)
BILIRUB SERPL-MCNC: 0.35 MG/DL
BUN SERPL-MCNC: 11 MG/DL (ref 5–25)
CALCIUM SERPL-MCNC: 9.4 MG/DL (ref 8.4–10.2)
CHLORIDE SERPL-SCNC: 102 MMOL/L (ref 97–108)
CHOLEST SERPL-MCNC: 204 MG/DL
CO2 SERPL-SCNC: 30 MMOL/L (ref 22–30)
CREAT SERPL-MCNC: 0.59 MG/DL (ref 0.6–1.2)
EOSINOPHIL # BLD AUTO: 0.2 THOUSAND/ΜL (ref 0–0.4)
EOSINOPHIL NFR BLD AUTO: 2 % (ref 0–6)
ERYTHROCYTE [DISTWIDTH] IN BLOOD BY AUTOMATED COUNT: 13.7 %
GFR SERPL CREATININE-BSD FRML MDRD: 102 ML/MIN/1.73SQ M
GLUCOSE P FAST SERPL-MCNC: 241 MG/DL (ref 70–99)
HCT VFR BLD AUTO: 42.8 % (ref 36–46)
HDLC SERPL-MCNC: 46 MG/DL
HGB BLD-MCNC: 14 G/DL (ref 12–16)
LDLC SERPL CALC-MCNC: 118 MG/DL
LYMPHOCYTES # BLD AUTO: 2.9 THOUSANDS/ΜL (ref 0.5–4)
LYMPHOCYTES NFR BLD AUTO: 34 % (ref 25–45)
MAGNESIUM SERPL-MCNC: 1.8 MG/DL (ref 1.6–2.3)
MCH RBC QN AUTO: 26.9 PG (ref 26–34)
MCHC RBC AUTO-ENTMCNC: 32.7 G/DL (ref 31–36)
MCV RBC AUTO: 82 FL (ref 80–100)
MONOCYTES # BLD AUTO: 0.3 THOUSAND/ΜL (ref 0.2–0.9)
MONOCYTES NFR BLD AUTO: 4 % (ref 1–10)
NEUTROPHILS # BLD AUTO: 5.2 THOUSANDS/ΜL (ref 1.8–7.8)
NEUTS SEG NFR BLD AUTO: 60 % (ref 45–65)
NONHDLC SERPL-MCNC: 158 MG/DL
PLATELET # BLD AUTO: 302 THOUSANDS/UL (ref 150–450)
PMV BLD AUTO: 8.5 FL (ref 8.9–12.7)
POTASSIUM SERPL-SCNC: 4.3 MMOL/L (ref 3.6–5)
PROT SERPL-MCNC: 7.8 G/DL (ref 5.9–8.4)
RBC # BLD AUTO: 5.19 MILLION/UL (ref 4–5.2)
SODIUM SERPL-SCNC: 138 MMOL/L (ref 137–147)
TRIGL SERPL-MCNC: 201 MG/DL
WBC # BLD AUTO: 8.8 THOUSAND/UL (ref 4.5–11)

## 2021-12-06 PROCEDURE — 83735 ASSAY OF MAGNESIUM: CPT

## 2021-12-06 PROCEDURE — 85025 COMPLETE CBC W/AUTO DIFF WBC: CPT

## 2021-12-06 PROCEDURE — 83036 HEMOGLOBIN GLYCOSYLATED A1C: CPT

## 2021-12-06 PROCEDURE — 36415 COLL VENOUS BLD VENIPUNCTURE: CPT

## 2021-12-06 PROCEDURE — 80061 LIPID PANEL: CPT

## 2021-12-06 PROCEDURE — 80053 COMPREHEN METABOLIC PANEL: CPT

## 2021-12-07 LAB
EST. AVERAGE GLUCOSE BLD GHB EST-MCNC: 160 MG/DL
HBA1C MFR BLD: 7.2 %

## 2021-12-07 PROCEDURE — 3051F HG A1C>EQUAL 7.0%<8.0%: CPT | Performed by: INTERNAL MEDICINE

## 2021-12-09 ENCOUNTER — RA CDI HCC (OUTPATIENT)
Dept: OTHER | Facility: HOSPITAL | Age: 56
End: 2021-12-09

## 2021-12-15 ENCOUNTER — OFFICE VISIT (OUTPATIENT)
Dept: FAMILY MEDICINE CLINIC | Facility: CLINIC | Age: 56
End: 2021-12-15
Payer: COMMERCIAL

## 2021-12-15 VITALS
WEIGHT: 157 LBS | TEMPERATURE: 98.1 F | RESPIRATION RATE: 14 BRPM | SYSTOLIC BLOOD PRESSURE: 120 MMHG | OXYGEN SATURATION: 99 % | BODY MASS INDEX: 27.82 KG/M2 | HEART RATE: 69 BPM | DIASTOLIC BLOOD PRESSURE: 62 MMHG | HEIGHT: 63 IN

## 2021-12-15 DIAGNOSIS — M17.12 OSTEOARTHRITIS OF LEFT KNEE, UNSPECIFIED OSTEOARTHRITIS TYPE: ICD-10-CM

## 2021-12-15 DIAGNOSIS — Z23 NEED FOR INFLUENZA VACCINATION: Primary | ICD-10-CM

## 2021-12-15 DIAGNOSIS — IMO0002 TYPE II DIABETES MELLITUS WITH MANIFESTATIONS, UNCONTROLLED: ICD-10-CM

## 2021-12-15 DIAGNOSIS — F17.210 CIGARETTE SMOKER: ICD-10-CM

## 2021-12-15 DIAGNOSIS — I10 ESSENTIAL HYPERTENSION: ICD-10-CM

## 2021-12-15 DIAGNOSIS — E11.69 HYPERLIPIDEMIA ASSOCIATED WITH TYPE 2 DIABETES MELLITUS (HCC): ICD-10-CM

## 2021-12-15 DIAGNOSIS — Z12.11 SCREEN FOR COLON CANCER: ICD-10-CM

## 2021-12-15 DIAGNOSIS — Z12.31 ENCOUNTER FOR SCREENING MAMMOGRAM FOR MALIGNANT NEOPLASM OF BREAST: ICD-10-CM

## 2021-12-15 DIAGNOSIS — E11.9 DIABETES MELLITUS WITHOUT COMPLICATION (HCC): ICD-10-CM

## 2021-12-15 DIAGNOSIS — K04.7 TOOTH ABSCESS: ICD-10-CM

## 2021-12-15 DIAGNOSIS — E78.5 HYPERLIPIDEMIA ASSOCIATED WITH TYPE 2 DIABETES MELLITUS (HCC): ICD-10-CM

## 2021-12-15 PROCEDURE — 90682 RIV4 VACC RECOMBINANT DNA IM: CPT

## 2021-12-15 PROCEDURE — 3074F SYST BP LT 130 MM HG: CPT | Performed by: INTERNAL MEDICINE

## 2021-12-15 PROCEDURE — 1036F TOBACCO NON-USER: CPT | Performed by: INTERNAL MEDICINE

## 2021-12-15 PROCEDURE — 3078F DIAST BP <80 MM HG: CPT | Performed by: INTERNAL MEDICINE

## 2021-12-15 PROCEDURE — 3008F BODY MASS INDEX DOCD: CPT | Performed by: INTERNAL MEDICINE

## 2021-12-15 PROCEDURE — 99214 OFFICE O/P EST MOD 30 MIN: CPT | Performed by: INTERNAL MEDICINE

## 2021-12-15 PROCEDURE — G0008 ADMIN INFLUENZA VIRUS VAC: HCPCS

## 2021-12-15 RX ORDER — CLINDAMYCIN HYDROCHLORIDE 300 MG/1
300 CAPSULE ORAL 2 TIMES DAILY WITH MEALS
Qty: 20 CAPSULE | Refills: 0 | Status: SHIPPED | OUTPATIENT
Start: 2021-12-15 | End: 2021-12-25

## 2021-12-15 RX ORDER — FENOFIBRATE 145 MG/1
145 TABLET, COATED ORAL DAILY
Qty: 90 TABLET | Refills: 3 | Status: SHIPPED | OUTPATIENT
Start: 2021-12-15 | End: 2022-04-05 | Stop reason: SDUPTHER

## 2021-12-15 RX ORDER — ATORVASTATIN CALCIUM 40 MG/1
40 TABLET, FILM COATED ORAL DAILY
Qty: 90 TABLET | Refills: 3 | Status: SHIPPED | OUTPATIENT
Start: 2021-12-15 | End: 2022-04-05 | Stop reason: SDUPTHER

## 2021-12-15 RX ORDER — GLIPIZIDE 5 MG/1
5 TABLET ORAL 2 TIMES DAILY WITH MEALS
Qty: 180 TABLET | Refills: 3 | Status: SHIPPED | OUTPATIENT
Start: 2021-12-15 | End: 2022-04-05 | Stop reason: SDUPTHER

## 2022-01-07 DIAGNOSIS — R21 RASH: ICD-10-CM

## 2022-01-07 DIAGNOSIS — J45.909 UNCOMPLICATED ASTHMA, UNSPECIFIED ASTHMA SEVERITY, UNSPECIFIED WHETHER PERSISTENT: ICD-10-CM

## 2022-01-07 RX ORDER — MONTELUKAST SODIUM 10 MG/1
10 TABLET ORAL EVERY 24 HOURS
Qty: 90 TABLET | Refills: 3 | Status: SHIPPED | OUTPATIENT
Start: 2022-01-07

## 2022-01-07 RX ORDER — PANTOPRAZOLE SODIUM 40 MG/1
40 TABLET, DELAYED RELEASE ORAL DAILY
Qty: 90 TABLET | Refills: 3 | Status: SHIPPED | OUTPATIENT
Start: 2022-01-07 | End: 2022-04-05 | Stop reason: SDUPTHER

## 2022-01-07 RX ORDER — CALCIPOTRIENE 50 UG/G
CREAM TOPICAL
Qty: 120 G | Refills: 0 | Status: SHIPPED | OUTPATIENT
Start: 2022-01-07 | End: 2022-04-23

## 2022-01-21 ENCOUNTER — APPOINTMENT (EMERGENCY)
Dept: RADIOLOGY | Facility: HOSPITAL | Age: 57
End: 2022-01-21
Payer: MEDICARE

## 2022-01-21 ENCOUNTER — HOSPITAL ENCOUNTER (EMERGENCY)
Facility: HOSPITAL | Age: 57
Discharge: HOME | End: 2022-01-21
Attending: EMERGENCY MEDICINE | Admitting: EMERGENCY MEDICINE
Payer: MEDICARE

## 2022-01-21 VITALS
HEART RATE: 108 BPM | DIASTOLIC BLOOD PRESSURE: 78 MMHG | BODY MASS INDEX: 27.18 KG/M2 | WEIGHT: 153.44 LBS | TEMPERATURE: 98.8 F | OXYGEN SATURATION: 94 % | RESPIRATION RATE: 18 BRPM | SYSTOLIC BLOOD PRESSURE: 137 MMHG

## 2022-01-21 DIAGNOSIS — E86.0 DEHYDRATION: ICD-10-CM

## 2022-01-21 DIAGNOSIS — U07.1 COVID: Primary | ICD-10-CM

## 2022-01-21 LAB
ALBUMIN SERPL BCP-MCNC: 3.8 G/DL (ref 3–5.2)
ALP SERPL-CCNC: 127 U/L (ref 43–122)
ALT SERPL W P-5'-P-CCNC: 27 U/L
ANION GAP SERPL CALCULATED.3IONS-SCNC: 7 MMOL/L (ref 5–14)
ANISOCYTOSIS BLD QL SMEAR: PRESENT
APTT PPP: 28 SECONDS (ref 23–37)
AST SERPL W P-5'-P-CCNC: 33 U/L (ref 14–36)
ATRIAL RATE: 120 BPM
BASOPHILS # BLD AUTO: 0 THOUSANDS/ΜL (ref 0–0.1)
BASOPHILS NFR BLD AUTO: 0 % (ref 0–1)
BILIRUB SERPL-MCNC: 0.61 MG/DL
BUN SERPL-MCNC: 7 MG/DL (ref 5–25)
CALCIUM SERPL-MCNC: 8.5 MG/DL (ref 8.4–10.2)
CARDIAC TROPONIN I PNL SERPL HS: 3 NG/L
CHLORIDE SERPL-SCNC: 97 MMOL/L (ref 97–108)
CO2 SERPL-SCNC: 31 MMOL/L (ref 22–30)
CREAT SERPL-MCNC: 0.55 MG/DL (ref 0.6–1.2)
EOSINOPHIL # BLD AUTO: 0 THOUSAND/ΜL (ref 0–0.4)
EOSINOPHIL NFR BLD AUTO: 0 % (ref 0–6)
ERYTHROCYTE [DISTWIDTH] IN BLOOD BY AUTOMATED COUNT: 13.6 %
FLUAV RNA RESP QL NAA+PROBE: NEGATIVE
FLUBV RNA RESP QL NAA+PROBE: NEGATIVE
GFR SERPL CREATININE-BSD FRML MDRD: 104 ML/MIN/1.73SQ M
GLUCOSE SERPL-MCNC: 275 MG/DL (ref 65–140)
GLUCOSE SERPL-MCNC: 315 MG/DL (ref 70–99)
HCT VFR BLD AUTO: 40.9 % (ref 36–46)
HGB BLD-MCNC: 13.6 G/DL (ref 12–16)
INR PPP: 1.05 (ref 0.84–1.19)
LG PLATELETS BLD QL SMEAR: PRESENT
LIPASE SERPL-CCNC: 213 U/L (ref 23–300)
LYMPHOCYTES # BLD AUTO: 1 THOUSANDS/ΜL (ref 0.5–4)
LYMPHOCYTES NFR BLD AUTO: 23 % (ref 25–45)
MAGNESIUM SERPL-MCNC: 1.7 MG/DL (ref 1.6–2.3)
MCH RBC QN AUTO: 26.1 PG (ref 26–34)
MCHC RBC AUTO-ENTMCNC: 33.2 G/DL (ref 31–36)
MCV RBC AUTO: 79 FL (ref 80–100)
MICROCYTES BLD QL AUTO: PRESENT
MONOCYTES # BLD AUTO: 0.3 THOUSAND/ΜL (ref 0.2–0.9)
MONOCYTES NFR BLD AUTO: 7 % (ref 1–10)
NEUTROPHILS # BLD AUTO: 3.2 THOUSANDS/ΜL (ref 1.8–7.8)
NEUTS SEG NFR BLD AUTO: 70 % (ref 45–65)
NT-PROBNP SERPL-MCNC: 22.7 PG/ML (ref 0–299)
P AXIS: 50 DEGREES
PLATELET # BLD AUTO: 169 THOUSANDS/UL (ref 150–450)
PLATELET BLD QL SMEAR: ADEQUATE
PMV BLD AUTO: 8.4 FL (ref 8.9–12.7)
POTASSIUM SERPL-SCNC: 3.8 MMOL/L (ref 3.6–5)
PR INTERVAL: 120 MS
PROT SERPL-MCNC: 7.6 G/DL (ref 5.9–8.4)
PROTHROMBIN TIME: 13.3 SECONDS (ref 11.6–14.5)
QRS AXIS: 11 DEGREES
QRSD INTERVAL: 60 MS
QT INTERVAL: 306 MS
QTC INTERVAL: 432 MS
RBC # BLD AUTO: 5.2 MILLION/UL (ref 4–5.2)
RBC MORPH BLD: NORMAL
RSV RNA RESP QL NAA+PROBE: NEGATIVE
SARS-COV-2 RNA RESP QL NAA+PROBE: POSITIVE
SODIUM SERPL-SCNC: 135 MMOL/L (ref 137–147)
T WAVE AXIS: 32 DEGREES
VENTRICULAR RATE: 120 BPM
WBC # BLD AUTO: 4.6 THOUSAND/UL (ref 4.5–11)

## 2022-01-21 PROCEDURE — 83690 ASSAY OF LIPASE: CPT | Performed by: EMERGENCY MEDICINE

## 2022-01-21 PROCEDURE — 85610 PROTHROMBIN TIME: CPT | Performed by: EMERGENCY MEDICINE

## 2022-01-21 PROCEDURE — 96360 HYDRATION IV INFUSION INIT: CPT

## 2022-01-21 PROCEDURE — 83735 ASSAY OF MAGNESIUM: CPT | Performed by: EMERGENCY MEDICINE

## 2022-01-21 PROCEDURE — 99284 EMERGENCY DEPT VISIT MOD MDM: CPT

## 2022-01-21 PROCEDURE — 36415 COLL VENOUS BLD VENIPUNCTURE: CPT | Performed by: EMERGENCY MEDICINE

## 2022-01-21 PROCEDURE — 99284 EMERGENCY DEPT VISIT MOD MDM: CPT | Performed by: EMERGENCY MEDICINE

## 2022-01-21 PROCEDURE — 85025 COMPLETE CBC W/AUTO DIFF WBC: CPT | Performed by: EMERGENCY MEDICINE

## 2022-01-21 PROCEDURE — 93010 ELECTROCARDIOGRAM REPORT: CPT | Performed by: INTERNAL MEDICINE

## 2022-01-21 PROCEDURE — 85730 THROMBOPLASTIN TIME PARTIAL: CPT | Performed by: EMERGENCY MEDICINE

## 2022-01-21 PROCEDURE — 82948 REAGENT STRIP/BLOOD GLUCOSE: CPT

## 2022-01-21 PROCEDURE — 84484 ASSAY OF TROPONIN QUANT: CPT | Performed by: EMERGENCY MEDICINE

## 2022-01-21 PROCEDURE — 83880 ASSAY OF NATRIURETIC PEPTIDE: CPT | Performed by: EMERGENCY MEDICINE

## 2022-01-21 PROCEDURE — 96361 HYDRATE IV INFUSION ADD-ON: CPT

## 2022-01-21 PROCEDURE — 80053 COMPREHEN METABOLIC PANEL: CPT | Performed by: EMERGENCY MEDICINE

## 2022-01-21 PROCEDURE — 93005 ELECTROCARDIOGRAM TRACING: CPT

## 2022-01-21 PROCEDURE — 71045 X-RAY EXAM CHEST 1 VIEW: CPT

## 2022-01-21 PROCEDURE — 0241U HB NFCT DS VIR RESP RNA 4 TRGT: CPT | Performed by: EMERGENCY MEDICINE

## 2022-01-21 RX ORDER — SODIUM CHLORIDE 9 MG/ML
3 INJECTION INTRAVENOUS
Status: DISCONTINUED | OUTPATIENT
Start: 2022-01-21 | End: 2022-01-21 | Stop reason: HOSPADM

## 2022-01-21 RX ADMIN — SODIUM CHLORIDE 1000 ML: 0.9 INJECTION, SOLUTION INTRAVENOUS at 12:34

## 2022-01-21 RX ADMIN — SODIUM CHLORIDE 1000 ML: 0.9 INJECTION, SOLUTION INTRAVENOUS at 13:56

## 2022-01-21 NOTE — ED PROCEDURE NOTE
PROCEDURE  ECG 12 Lead Documentation Only    Date/Time: 1/21/2022 12:37 PM  Performed by: Harris Reyes DO  Authorized by: Harris Reyes DO     ECG reviewed by me, the ED Provider: yes    Patient location:  ED  Previous ECG:     Previous ECG:  Compared to current    Similarity:  No change  Interpretation:     Interpretation: normal    Rate:     ECG rate assessment: tachycardic    Rhythm:     Rhythm: sinus rhythm and sinus tachycardia    Ectopy:     Ectopy: none    QRS:     QRS axis:  Normal    QRS intervals:  Normal  Conduction:     Conduction: normal    ST segments:     ST segments:  Non-specific  T waves:     T waves: non-specific           Harris Reyes DO  01/21/22 1237

## 2022-01-24 DIAGNOSIS — IMO0002 TYPE II DIABETES MELLITUS WITH MANIFESTATIONS, UNCONTROLLED: ICD-10-CM

## 2022-01-24 NOTE — ED PROVIDER NOTES
HPI: Patient is a 61 y o  female who presents with 5 days of fever, chills and cough which the patient describes at moderate The patient has had contact with people with similar symptoms  The patient has not taken any medication  Allergies   Allergen Reactions    No Active Allergies        Past Medical History:   Diagnosis Date    Acute exacerbation of chronic obstructive pulmonary disease (COPD) ([facility] ) 2/27/2020    Asthma     exercise induced    Diabetes ([facility] )     type 2    Hypertension     Morbid obesity ([facility] ) 4/7/2020    Osteoarthritis 4/7/2020      Past Surgical History:   Procedure Laterality Date    BREAST BIOPSY Left 2016    benign    HERNIA REPAIR      MAMMO STEREOTACTIC BREAST BIOPSY LEFT (ALL INC) Left     REPLACEMENT TOTAL KNEE Left      Social History     Tobacco Use    Smoking status: Former Smoker    Smokeless tobacco: Never Used   Vaping Use    Vaping Use: Never used   Substance Use Topics    Alcohol use: No    Drug use: No       Nursing notes reviewed  Physical Exam:  ED Triage Vitals [01/21/22 1211]   Temperature Pulse Respirations Blood Pressure SpO2   98 8 °F (37 1 °C) (!) 124 15 146/72 90 %      Temp Source Heart Rate Source Patient Position - Orthostatic VS BP Location FiO2 (%)   Oral Monitor Sitting Left arm --      Pain Score       --           ROS: Positive for fever, chills and cough ,the remainder of a 10 organ system ROS was otherwise unremarkable    General: awake, alert, no acute distress    Head: normocephalic, atraumatic    Eyes: no scleral icterus  Ears: external ears normal, hearing grossly intact  Nose: external exam grossly normal, positive nasal discharge  Neck: symmetric, No JVD noted, trachea midline  Pulmonary: no respiratory distress, no tachypnea noted  Cardiovascular: appears well perfused  Abdomen: no distention noted  Musculoskeletal: no deformities noted, tone normal  Neuro: grossly non-focal  Psych: mood and affect appropriate    The patient is stable and has a history and physical exam consistent with a viral illness  COVID19 testing has been performed  I considered the patient's other medical conditions as applicable/noted above in my medical decision making  The patient is stable upon discharge  The plan is for supportive care at home  The patient (and any family present) verbalized understanding of the discharge instructions and warnings that would necessitate return to the Emergency Department  All questions were answered prior to discharge  Pt re-examined and evaluated after testing and treatment  Spoke with the patient and feeling improved and sxs have resolved  Will discharge home with close f/u with pcp and instructed to return to the ED if sxs worsen or continue  Pt agrees with the plan for discharge and feels comfortable to go home with proper f/u  Advised to return for worsening or additional problems  Diagnostic tests were reviewed and questions answered  Diagnosis, care plan and treatment options were discussed  The patient understand instructions and will follow up as directed  Counseling: I had a detailed discussion with the patient and/or guardian regarding: the historical points, exam findings, and any diagnostic results supporting the discharge diagnosis, lab results, radiology results, discharge instructions reviewed with patient and/or family/caregiver and understanding was verbalized  Instructions given to return to the emergency department if symptoms worsen or persist, or if there are any questions or concerns that arise at home       All labs reviewed and utilized in the medical decision making process    All radiology studies independently viewed by me and interpreted by the radiologist       Medications   sodium chloride 0 9 % bolus 1,000 mL (0 mL Intravenous Stopped 1/21/22 1356)   sodium chloride 0 9 % bolus 1,000 mL (0 mL Intravenous Stopped 1/21/22 1538)     Final diagnoses:   COVID   Dehydration     Time reflects when diagnosis was documented in both MDM as applicable and the Disposition within this note     Time User Action Codes Description Comment    1/21/2022  2:49 PM Gaurav Hearing Add [U07 1] COVID     1/21/2022  2:49 PM Gaurav Hearing Add [E86 0] Dehydration       ED Disposition     ED Disposition Condition Date/Time Comment    Discharge Stable Fri Jan 21, 2022  2:49 PM Allan Coffee discharge to home/self care  Follow-up Information    None       Discharge Medication List as of 1/21/2022  2:49 PM      CONTINUE these medications which have NOT CHANGED    Details   albuterol (PROVENTIL HFA,VENTOLIN HFA) 90 mcg/act inhaler INHALE 2 PUFFS EVERY 6 (SIX) HOURS AS NEEDED FOR WHEEZING, Starting Tue 8/24/2021, Normal      aspirin (ECOTRIN LOW STRENGTH) 81 mg EC tablet Take 1 tablet (81 mg total) by mouth daily, Starting Fri 8/13/2021, Normal      atorvastatin (LIPITOR) 40 mg tablet Take 1 tablet (40 mg total) by mouth daily, Starting Wed 12/15/2021, Normal      Banophen 25 MG capsule TAKE 1 CAPSULE (25 MG TOTAL) BY MOUTH DAILY AT BEDTIME AS NEEDED FOR ITCHING, Normal      calcipotriene (DOVONEX) 0 005 % cream APPLY TO AFFECTED AREA TWICE A DAY 30 DAYS LARRGER AREA HAND LEGS, Normal      Calcium Carbonate-Vitamin D3 600-400 MG-UNIT TABS TAKE 2 TABLETS BY MOUTH DAILY, Starting Wed 7/14/2021, Normal      colchicine (COLCRYS) 0 6 mg tablet Take 2 pills immediately and take 3rd pill an hour later, Normal      !! Easy Comfort Pen Needles 32G X 4 MM MISC USE FOR INSULIN INJECTIONS ONCE DAILY AS DIRECTED, Normal      !!  Easy Comfort Pen Needles 33G X 4 MM MISC WITH INSULINE PEN AS NEEDED UP TO 5 TIMES X90 DAYS, Normal      escitalopram (LEXAPRO) 10 mg tablet Take 1 tablet (10 mg total) by mouth daily with dinner, Starting Thu 10/28/2021, Normal      fenofibrate (TRICOR) 145 mg tablet Take 1 tablet (145 mg total) by mouth daily, Starting Wed 12/15/2021, Normal      GaviLAX 17 GM/SCOOP powder TAKE 17 G BY MOUTH DAILY MIX WITH WATER EVERY DAY, Normal      glipiZIDE (GLUCOTROL) 5 mg tablet Take 1 tablet (5 mg total) by mouth 2 (two) times a day with meals, Starting Wed 12/15/2021, Normal      glucose blood test strip Use as instructed to test three times daily  , Normal      indomethacin (INDOCIN) 50 mg capsule Take 1 capsule (50 mg total) by mouth 3 (three) times a day with meals Reduced doses at as symptoms improve, Starting Wed 6/2/2021, Normal      insulin glargine (Lantus SoloStar) 100 units/mL injection pen Inject 50 Units under the skin daily, Starting Fri 8/13/2021, Normal      levocetirizine (XYZAL) 5 MG tablet Take 1 tablet (5 mg total) by mouth every evening, Starting Thu 10/28/2021, Normal      magnesium oxide (MAG-OX) 400 mg Take 1 tablet (400 mg total) by mouth daily, Starting Wed 12/15/2021, Normal      metFORMIN (GLUCOPHAGE) 1000 MG tablet Take 1 tablet (1,000 mg total) by mouth 2 (two) times a day with meals, Starting Wed 12/15/2021, Normal      metoprolol tartrate (LOPRESSOR) 25 mg tablet Take 1 tablet (25 mg total) by mouth every 12 (twelve) hours, Starting Fri 8/13/2021, Normal      montelukast (SINGULAIR) 10 mg tablet TAKE 1 TABLET (10 MG TOTAL) BY MOUTH EVERY 24 HOURS, Starting Fri 1/7/2022, Normal      mupirocin (BACTROBAN) 2 % ointment APPLY TOPICALLY 3 (THREE) TIMES A DAY, Starting Thu 4/22/2021, Normal      oxybutynin (DITROPAN XL) 15 MG 24 hr tablet TAKE 1 TABLET (15 MG TOTAL) BY MOUTH DAILY, Starting Thu 7/8/2021, Normal      pantoprazole (PROTONIX) 40 mg tablet TAKE 1 TABLET (40 MG TOTAL) BY MOUTH DAILY, Starting Fri 1/7/2022, Normal      Semaglutide, 1 MG/DOSE, 2 MG/1 5ML SOPN Inject 1 mg under the skin once a week, Starting Wed 12/15/2021, Normal       !! - Potential duplicate medications found  Please discuss with provider  No discharge procedures on file      Electronically Signed by        Chasity Ricci DO  01/24/22 5554

## 2022-01-28 ENCOUNTER — VBI (OUTPATIENT)
Dept: ADMINISTRATIVE | Facility: OTHER | Age: 57
End: 2022-01-28

## 2022-01-28 NOTE — TELEPHONE ENCOUNTER
01/28/22 11:30 AM     See documentation in the [employer] SmartForm       Already closed in [place], [facility]

## 2022-02-07 ENCOUNTER — TELEPHONE (OUTPATIENT)
Dept: FAMILY MEDICINE CLINIC | Facility: CLINIC | Age: 57
End: 2022-02-07

## 2022-02-07 NOTE — TELEPHONE ENCOUNTER
Pt is concerned with the pneumonia is there something she can or should be taking for her breathing issues right now?

## 2022-02-07 NOTE — TELEPHONE ENCOUNTER
Pt called stating that she was positive for covid on 1/20 and it turned into pneumonia and she is still struggling to breath  Pt also noted that her daughter and daughters boyfriend does smoke in the house and she needs help to get housing and see a psychologist  Psychologist referral was put into the chart for her  She does have an appointment on 3/16  Pt made it very clear that she does not want her daughter to know what is going on because they are not on speaking terms right now and would like to keep it confidential  Please advise

## 2022-02-08 NOTE — TELEPHONE ENCOUNTER
Spoke to patient    She will see when she is able to get a ride and then will call the office to schedule

## 2022-02-10 ENCOUNTER — OFFICE VISIT (OUTPATIENT)
Dept: FAMILY MEDICINE CLINIC | Facility: CLINIC | Age: 57
End: 2022-02-10
Payer: MEDICARE

## 2022-02-10 VITALS
BODY MASS INDEX: 27.04 KG/M2 | TEMPERATURE: 97.5 F | HEART RATE: 90 BPM | OXYGEN SATURATION: 94 % | DIASTOLIC BLOOD PRESSURE: 62 MMHG | WEIGHT: 152.6 LBS | HEIGHT: 63 IN | RESPIRATION RATE: 16 BRPM | SYSTOLIC BLOOD PRESSURE: 102 MMHG

## 2022-02-10 DIAGNOSIS — F17.210 CIGARETTE SMOKER: ICD-10-CM

## 2022-02-10 DIAGNOSIS — E24.9 CUSHING'S SYNDROME (HCC): ICD-10-CM

## 2022-02-10 DIAGNOSIS — J44.1 ACUTE EXACERBATION OF CHRONIC OBSTRUCTIVE PULMONARY DISEASE (COPD) (HCC): ICD-10-CM

## 2022-02-10 DIAGNOSIS — IMO0002 TYPE II DIABETES MELLITUS WITH MANIFESTATIONS, UNCONTROLLED: Primary | ICD-10-CM

## 2022-02-10 DIAGNOSIS — J43.9 PULMONARY EMPHYSEMA, UNSPECIFIED EMPHYSEMA TYPE (HCC): ICD-10-CM

## 2022-02-10 DIAGNOSIS — E11.9 TYPE 2 DIABETES MELLITUS WITHOUT COMPLICATION, WITH LONG-TERM CURRENT USE OF INSULIN (HCC): ICD-10-CM

## 2022-02-10 DIAGNOSIS — Z79.4 TYPE 2 DIABETES MELLITUS WITHOUT COMPLICATION, WITH LONG-TERM CURRENT USE OF INSULIN (HCC): ICD-10-CM

## 2022-02-10 DIAGNOSIS — F32.A DEPRESSION, UNSPECIFIED DEPRESSION TYPE: ICD-10-CM

## 2022-02-10 DIAGNOSIS — J45.901 ACUTE EXACERBATION OF COPD WITH ASTHMA (HCC): ICD-10-CM

## 2022-02-10 DIAGNOSIS — J18.9 PNEUMONIA DUE TO INFECTIOUS ORGANISM, UNSPECIFIED LATERALITY, UNSPECIFIED PART OF LUNG: ICD-10-CM

## 2022-02-10 DIAGNOSIS — J44.1 ACUTE EXACERBATION OF COPD WITH ASTHMA (HCC): ICD-10-CM

## 2022-02-10 LAB — SL AMB POCT HEMOGLOBIN AIC: 9.8 (ref ?–6.5)

## 2022-02-10 PROCEDURE — 96372 THER/PROPH/DIAG INJ SC/IM: CPT | Performed by: INTERNAL MEDICINE

## 2022-02-10 PROCEDURE — 99214 OFFICE O/P EST MOD 30 MIN: CPT | Performed by: INTERNAL MEDICINE

## 2022-02-10 PROCEDURE — 83036 HEMOGLOBIN GLYCOSYLATED A1C: CPT | Performed by: INTERNAL MEDICINE

## 2022-02-10 RX ORDER — ALBUTEROL SULFATE 2.5 MG/3ML
2.5 SOLUTION RESPIRATORY (INHALATION) ONCE
Status: COMPLETED | OUTPATIENT
Start: 2022-02-10 | End: 2022-02-10

## 2022-02-10 RX ORDER — ESCITALOPRAM OXALATE 10 MG/1
10 TABLET ORAL
Qty: 30 TABLET | Refills: 5 | Status: SHIPPED | OUTPATIENT
Start: 2022-02-10 | End: 2022-04-05 | Stop reason: SDUPTHER

## 2022-02-10 RX ORDER — PREDNISONE 10 MG/1
TABLET ORAL
Qty: 20 TABLET | Refills: 0 | Status: SHIPPED | OUTPATIENT
Start: 2022-02-10 | End: 2022-04-05

## 2022-02-10 RX ORDER — METHYLPREDNISOLONE SODIUM SUCCINATE 125 MG/2ML
125 INJECTION, POWDER, LYOPHILIZED, FOR SOLUTION INTRAMUSCULAR; INTRAVENOUS ONCE
Status: COMPLETED | OUTPATIENT
Start: 2022-02-10 | End: 2022-02-10

## 2022-02-10 RX ORDER — BENZONATATE 100 MG/1
100 CAPSULE ORAL 3 TIMES DAILY PRN
Qty: 20 CAPSULE | Refills: 0 | Status: SHIPPED | OUTPATIENT
Start: 2022-02-10 | End: 2022-04-05 | Stop reason: SDUPTHER

## 2022-02-10 RX ORDER — FLUTICASONE FUROATE, UMECLIDINIUM BROMIDE AND VILANTEROL TRIFENATATE 100; 62.5; 25 UG/1; UG/1; UG/1
1 POWDER RESPIRATORY (INHALATION) DAILY
Qty: 60 BLISTER | Refills: 3 | Status: SHIPPED | OUTPATIENT
Start: 2022-02-10 | End: 2022-04-05 | Stop reason: SDUPTHER

## 2022-02-10 RX ADMIN — ALBUTEROL SULFATE 2.5 MG: 2.5 SOLUTION RESPIRATORY (INHALATION) at 11:38

## 2022-02-10 RX ADMIN — METHYLPREDNISOLONE SODIUM SUCCINATE 125 MG: 125 INJECTION, POWDER, LYOPHILIZED, FOR SOLUTION INTRAMUSCULAR; INTRAVENOUS at 11:38

## 2022-02-10 RX ADMIN — Medication 0.5 MG: at 11:38

## 2022-02-10 NOTE — PATIENT INSTRUCTIONS
10% - bad control"> 10% - bad control,Hemoglobin A1c (HbA1c) greater than 10% indicating poor diabetic control,Haemoglobin A1c greater than 10% indicating poor diabetic control">   Diabetes Mellitus Type 2 in Adults, Ambulatory Care   GENERAL INFORMATION:   Diabetes mellitus type 2  is a disease that affects how your body uses glucose (sugar)  Insulin helps move sugar out of the blood so it can be used for energy  Normally, when the blood sugar level increases, the pancreas makes more insulin  Type 2 diabetes develops because either the body cannot make enough insulin, or it cannot use the insulin correctly  After many years, your pancreas may stop making insulin  Common symptoms include the following:   · More hunger or thirst than usual     · Frequent urination     · Weight loss without trying     · Blurred vision  Seek immediate care for the following symptoms:   · Severe abdominal pain, or pain that spreads to your back  You may also be vomiting  · Trouble staying awake or focusing    · Shaking or sweating    · Blurred or double vision    · Breath has a fruity, sweet smell    · Breathing is deep and labored, or rapid and shallow    · Heartbeat is fast and weak  Treatment for diabetes mellitus type 2  includes keeping your blood sugar at a normal level  You must eat the right foods, and exercise regularly  You may also need medicine if you cannot control your blood sugar level with nutrition and exercise  Manage diabetes mellitus type 2:   · Check your blood sugar level  You will be taught how to check a small drop of blood in a glucose monitor  Ask your healthcare provider when and how often to check during the day  Ask your healthcare provider what your blood sugar levels should be when you check them  · Keep track of carbohydrates (sugar and starchy foods)  Your blood sugar level can get too high if you eat too many carbohydrates   Your dietitian will help you plan meals and snacks that have the right amount of carbohydrates  · Eat low-fat foods  Some examples are skinless chicken and low-fat milk  · Eat less sodium (salt)  Some examples of high-sodium foods to limit are soy sauce, potato chips, and soup  Do not add salt to food you cook  Limit your use of table salt  · Eat high-fiber foods  Foods that are a good source of fiber include vegetables, whole grain bread, and beans  · Limit alcohol  Alcohol affects your blood sugar level and can make it harder to manage your diabetes  Women should limit alcohol to 1 drink a day  Men should limit alcohol to 2 drinks a day  A drink of alcohol is 12 ounces of beer, 5 ounces of wine, or 1½ ounces of liquor  · Get regular exercise  Exercise can help keep your blood sugar level steady, decrease your risk of heart disease, and help you lose weight  Exercise for at least 30 minutes, 5 days a week  Include muscle strengthening activities 2 days each week  Work with your healthcare provider to create an exercise plan  · Check your feet each day  for injuries or open sores  Ask your healthcare provider for activities you can do if you have an open sore  · Quit smoking  If you smoke, it is never too late to quit  Smoking can worsen the problems that may occur with diabetes  Ask your healthcare provider for information about how to stop smoking if you are having trouble quitting  · Ask about your weight:  Ask healthcare providers if you need to lose weight, and how much to lose  Ask them to help you with a weight loss program  Even a 10 to 15 pound weight loss can help you manage your blood sugar level  · Carry medical alert identification  Wear medical alert jewelry or carry a card that says you have diabetes  Ask your healthcare provider where to get these items  · Ask about vaccines  Diabetes puts you at risk of serious illness if you get the flu, pneumonia, or hepatitis   Ask your healthcare provider if you should get a flu, pneumonia, or hepatitis B vaccine, and when to get the vaccine  Follow up with your healthcare provider as directed:  Write down your questions so you remember to ask them during your visits  CARE AGREEMENT:   You have the right to help plan your care  Learn about your health condition and how it may be treated  Discuss treatment options with your caregivers to decide what care you want to receive  You always have the right to refuse treatment  The above information is an  only  It is not intended as medical advice for individual conditions or treatments  Talk to your doctor, nurse or pharmacist before following any medical regimen to see if it is safe and effective for you  © 2014 [address] is for End User's use only and may not be sold, redistributed or otherwise used for commercial purposes  All illustrations and images included in CareNotes® are the copyrighted property of A D A M , Inc  or [employer]

## 2022-02-10 NOTE — PROGRESS NOTES
Assessment/Plan:         Diagnoses and all orders for this visit:    Type II diabetes mellitus with manifestations, uncontrolled ([place] [address] ); life style Mod  -     POCT hemoglobin A1c  -     Magnesium; Future  -     Microalbumin / creatinine urine ratio    Cigarette smoker  -     XR chest pa & lateral; Future  -     UA (URINE) with reflex to Scope; Future  -     Magnesium; Future    Pneumonia due to infectious organism, unspecified laterality, unspecified part of lung; Improving  RTC in 2 weeks w :  -     XR chest pa & lateral; Future  -     UA (URINE) with reflex to Scope; Future    Depression, unspecified depression type  -     escitalopram (LEXAPRO) 10 mg tablet; Take 1 tablet (10 mg total) by mouth daily with dinner  -     Ambulatory Referral to Psychology; Future  -     Ambulatory Referral to Psychiatry; Future    Acute exacerbation of COPD with asthma ([place] [address] ); Try :  -     fluticasone-umeclidinium-vilanterol (Trelegy Ellipta) 100-62 5-25 MCG/INH inhaler; Inhale 1 puff daily Rinse mouth after use  -     predniSONE 10 mg tablet; Take 3 tabs daily with breakfast for 3 days then Take 2 tabs daily for 3 Days then take one tab daily for 3 days then stop     -     Echo complete w/ contrast if indicated; Future  -     methylPREDNISolone sodium succinate (Solu-MEDROL) injection 125 mg  -     albuterol inhalation solution 2 5 mg  -     ipratropium (ATROVENT) 0 02 % inhalation solution 0 5 mg  -     benzonatate (TESSALON PERLES) 100 mg capsule; Take 1 capsule (100 mg total) by mouth 3 (three) times a day as needed for cough    Pulmonary emphysema, unspecified emphysema type ([place] [address] )  -     fluticasone-umeclidinium-vilanterol (Trelegy Ellipta) 100-62 5-25 MCG/INH inhaler; Inhale 1 puff daily Rinse mouth after use  -     predniSONE 10 mg tablet; Take 3 tabs daily with breakfast for 3 days then Take 2 tabs daily for 3 Days then take one tab daily for 3 days then stop     -     Echo complete w/ contrast if indicated;  Future  - benzonatate (TESSALON PERLES) 100 mg capsule; Take 1 capsule (100 mg total) by mouth 3 (three) times a day as needed for cough        Subjective:      Patient ID: Vilma Mota is a 61 y o  female  [place] is here for regular check up, she has few symptoms, recent Blood work and med list reviewed w  Pt in detail    The following portions of the patient's history were reviewed and updated as appropriate: allergies, current medications, past family history, past medical history, past social history, past surgical history and problem list     Review of Systems   Constitutional: Negative for chills, fatigue and fever  HENT: Positive for congestion, postnasal drip, sinus pressure and sore throat  Negative for facial swelling, trouble swallowing and voice change  Eyes: Negative for pain, discharge and visual disturbance  Respiratory: Positive for shortness of breath  Negative for cough and wheezing  Cardiovascular: Negative for chest pain, palpitations and leg swelling  Gastrointestinal: Negative for abdominal pain, blood in stool, constipation, diarrhea and nausea  Endocrine: Negative for polydipsia, polyphagia and polyuria  Genitourinary: Negative for difficulty urinating, hematuria and urgency  Musculoskeletal: Negative for arthralgias and myalgias  Skin: Negative for rash  Neurological: Negative for dizziness, tremors, weakness and headaches  Hematological: Negative for adenopathy  Does not bruise/bleed easily  Psychiatric/Behavioral: Negative for dysphoric mood, sleep disturbance and suicidal ideas  Objective:      /62 (BP Location: Left arm, Patient Position: Sitting, Cuff Size: Standard)   Pulse (!) 109   Temp 97 5 °F (36 4 °C)   Resp 16   Ht 5' 3" (1 6 m)   Wt 69 2 kg (152 lb 9 6 oz)   LMP  (LMP Unknown)   SpO2 94%   BMI 27 03 kg/m²          Physical Exam  Constitutional:       General: She is not in acute distress  HENT:      Head: Normocephalic  Mouth/Throat:      Pharynx: No oropharyngeal exudate  Eyes:      General: No scleral icterus  Conjunctiva/sclera: Conjunctivae normal       Pupils: Pupils are equal, round, and reactive to light  Neck:      Thyroid: No thyromegaly  Cardiovascular:      Rate and Rhythm: Normal rate and regular rhythm  Heart sounds: Normal heart sounds  No murmur heard  Pulmonary:      Effort: Pulmonary effort is normal  No respiratory distress  Breath sounds: Normal breath sounds  No wheezing or rales  Abdominal:      General: Bowel sounds are normal  There is no distension  Palpations: Abdomen is soft  Tenderness: There is no abdominal tenderness  There is no guarding or rebound  Musculoskeletal:         General: No tenderness  Cervical back: Neck supple  Lymphadenopathy:      Cervical: No cervical adenopathy  Skin:     Coloration: Skin is not pale  Findings: No rash  Neurological:      Mental Status: She is alert and oriented to person, place, and time  Sensory: No sensory deficit  Motor: No weakness

## 2022-02-15 ENCOUNTER — TELEPHONE (OUTPATIENT)
Dept: PSYCHIATRY | Facility: CLINIC | Age: 57
End: 2022-02-15

## 2022-02-16 ENCOUNTER — TELEPHONE (OUTPATIENT)
Dept: HEMATOLOGY ONCOLOGY | Facility: CLINIC | Age: 57
End: 2022-02-16

## 2022-02-16 NOTE — TELEPHONE ENCOUNTER
LVM on Hayley's cell phone informing her of the info below and provided her with central scheduling's number  Spoke to Robert Fernandez, [place] daughter, and informed her that her mother is scheduled to have her CT Lung Screening done at [facility] but as per insurance, she needs to go to [facility] or the [address] be covered  Robert Fernandez stated that she will inform her mother

## 2022-02-17 ENCOUNTER — TELEPHONE (OUTPATIENT)
Dept: HEMATOLOGY ONCOLOGY | Facility: CLINIC | Age: 57
End: 2022-02-17

## 2022-02-17 NOTE — TELEPHONE ENCOUNTER
LVM reiterating my message left yesterday informing Liliane Lugo that her CT Lung Screening needs to be R/S'd to the [facility] in order for her insurance to cover it  I left central scheduling's number and my number if she had any further questions or concerns

## 2022-02-24 DIAGNOSIS — E11.9 DIABETES MELLITUS WITHOUT COMPLICATION (HCC): ICD-10-CM

## 2022-02-24 RX ORDER — INSULIN GLARGINE 100 [IU]/ML
50 INJECTION, SOLUTION SUBCUTANEOUS DAILY
Qty: 15 ML | Refills: 3 | Status: SHIPPED | OUTPATIENT
Start: 2022-02-24 | End: 2022-04-05 | Stop reason: SDUPTHER

## 2022-03-09 ENCOUNTER — HOSPITAL ENCOUNTER (OUTPATIENT)
Dept: RADIOLOGY | Facility: HOSPITAL | Age: 57
Discharge: HOME | End: 2022-03-09
Payer: COMMERCIAL

## 2022-03-09 ENCOUNTER — HOSPITAL ENCOUNTER (OUTPATIENT)
Dept: NON INVASIVE DIAGNOSTICS | Facility: HOSPITAL | Age: 57
Discharge: HOME | End: 2022-03-09
Payer: COMMERCIAL

## 2022-03-09 VITALS
HEIGHT: 63 IN | HEART RATE: 81 BPM | DIASTOLIC BLOOD PRESSURE: 62 MMHG | WEIGHT: 152 LBS | BODY MASS INDEX: 26.93 KG/M2 | SYSTOLIC BLOOD PRESSURE: 102 MMHG

## 2022-03-09 DIAGNOSIS — J43.9 PULMONARY EMPHYSEMA, UNSPECIFIED EMPHYSEMA TYPE (HCC): ICD-10-CM

## 2022-03-09 DIAGNOSIS — F17.210 CIGARETTE SMOKER: ICD-10-CM

## 2022-03-09 DIAGNOSIS — J18.9 PNEUMONIA DUE TO INFECTIOUS ORGANISM, UNSPECIFIED LATERALITY, UNSPECIFIED PART OF LUNG: ICD-10-CM

## 2022-03-09 DIAGNOSIS — J44.1 ACUTE EXACERBATION OF COPD WITH ASTHMA (HCC): ICD-10-CM

## 2022-03-09 DIAGNOSIS — J45.901 ACUTE EXACERBATION OF COPD WITH ASTHMA (HCC): ICD-10-CM

## 2022-03-09 LAB
AORTIC ROOT: 2.5 CM
APICAL FOUR CHAMBER EJECTION FRACTION: 63 %
E WAVE DECELERATION TIME: 197 MS
FRACTIONAL SHORTENING: 33 % (ref 28–44)
INTERVENTRICULAR SEPTUM IN DIASTOLE (PARASTERNAL SHORT AXIS VIEW): 0.8 CM
INTERVENTRICULAR SEPTUM: 0.8 CM (ref 0.51–0.95)
LAAS-AP2: 12.8 CM2
LAAS-AP4: 12.3 CM2
LEFT ATRIUM AREA SYSTOLE SINGLE PLANE A4C: 13 CM2
LEFT ATRIUM SIZE: 3.5 CM
LEFT INTERNAL DIMENSION IN SYSTOLE: 2.4 CM (ref 2.46–3.72)
LEFT VENTRICULAR INTERNAL DIMENSION IN DIASTOLE: 3.6 CM (ref 4.01–5.97)
LEFT VENTRICULAR POSTERIOR WALL IN END DIASTOLE: 0.8 CM (ref 0.5–0.94)
LEFT VENTRICULAR STROKE VOLUME: 33 ML
LVSV (TEICH): 33 ML
MV E'TISSUE VEL-SEP: 9 CM/S
MV PEAK A VEL: 0.83 M/S
MV PEAK E VEL: 93 CM/S
MV STENOSIS PRESSURE HALF TIME: 57 MS
MV VALVE AREA P 1/2 METHOD: 3.86 CM2
PA SYSTOLIC PRESSURE: 19 MMHG
RA PRESSURE ESTIMATED: 3 MMHG
RIGHT ATRIUM AREA SYSTOLE A4C: 9.9 CM2
RIGHT VENTRICLE ID DIMENSION: 2.8 CM
RV PSP: 19 MMHG
SL CV LEFT ATRIUM LENGTH A2C: 4.5 CM
SL CV LV EF: 63
SL CV PED ECHO LEFT VENTRICLE DIASTOLIC VOLUME (MOD BIPLANE) 2D: 54 ML
SL CV PED ECHO LEFT VENTRICLE SYSTOLIC VOLUME (MOD BIPLANE) 2D: 21 ML
TR MAX PG: 16 MMHG
TR PEAK VELOCITY: 2 M/S
TRICUSPID ANNULAR PLANE SYSTOLIC EXCURSION: 2.3 CM
TRICUSPID VALVE PEAK REGURGITATION VELOCITY: 2.01 M/S
Z-SCORE OF INTERVENTRICULAR SEPTUM IN END DIASTOLE: 0.63
Z-SCORE OF LEFT VENTRICULAR DIMENSION IN END DIASTOLE: -3.07
Z-SCORE OF LEFT VENTRICULAR DIMENSION IN END SYSTOLE: -1.83
Z-SCORE OF LEFT VENTRICULAR POSTERIOR WALL IN END DIASTOLE: 0.74

## 2022-03-09 PROCEDURE — 93306 TTE W/DOPPLER COMPLETE: CPT

## 2022-03-09 PROCEDURE — 93306 TTE W/DOPPLER COMPLETE: CPT | Performed by: INTERNAL MEDICINE

## 2022-03-09 PROCEDURE — 71046 X-RAY EXAM CHEST 2 VIEWS: CPT

## 2022-03-23 ENCOUNTER — APPOINTMENT (OUTPATIENT)
Dept: LAB | Facility: HOSPITAL | Age: 57
End: 2022-03-23
Payer: COMMERCIAL

## 2022-03-23 DIAGNOSIS — E11.9 DIABETES MELLITUS WITHOUT COMPLICATION (HCC): ICD-10-CM

## 2022-03-23 DIAGNOSIS — F17.210 CIGARETTE SMOKER: ICD-10-CM

## 2022-03-23 DIAGNOSIS — E78.5 HYPERLIPIDEMIA ASSOCIATED WITH TYPE 2 DIABETES MELLITUS (HCC): ICD-10-CM

## 2022-03-23 DIAGNOSIS — IMO0002 TYPE II DIABETES MELLITUS WITH MANIFESTATIONS, UNCONTROLLED: ICD-10-CM

## 2022-03-23 DIAGNOSIS — Z23 NEED FOR INFLUENZA VACCINATION: ICD-10-CM

## 2022-03-23 DIAGNOSIS — E11.69 HYPERLIPIDEMIA ASSOCIATED WITH TYPE 2 DIABETES MELLITUS (HCC): ICD-10-CM

## 2022-03-23 LAB
25(OH)D3 SERPL-MCNC: 36.3 NG/ML (ref 30–100)
ALBUMIN SERPL BCP-MCNC: 4 G/DL (ref 3–5.2)
ALP SERPL-CCNC: 104 U/L (ref 43–122)
ALT SERPL W P-5'-P-CCNC: 30 U/L
ANION GAP SERPL CALCULATED.3IONS-SCNC: 5 MMOL/L (ref 5–14)
AST SERPL W P-5'-P-CCNC: 30 U/L (ref 14–36)
BILIRUB SERPL-MCNC: 0.58 MG/DL
BUN SERPL-MCNC: 13 MG/DL (ref 5–25)
CALCIUM SERPL-MCNC: 9.5 MG/DL (ref 8.4–10.2)
CHLORIDE SERPL-SCNC: 100 MMOL/L (ref 97–108)
CO2 SERPL-SCNC: 32 MMOL/L (ref 22–30)
CREAT SERPL-MCNC: 0.61 MG/DL (ref 0.6–1.2)
CREAT UR-MCNC: 109 MG/DL
EST. AVERAGE GLUCOSE BLD GHB EST-MCNC: 237 MG/DL
GFR SERPL CREATININE-BSD FRML MDRD: 100 ML/MIN/1.73SQ M
GLUCOSE P FAST SERPL-MCNC: 191 MG/DL (ref 70–99)
HBA1C MFR BLD: 9.9 %
LIPASE SERPL-CCNC: 131 U/L (ref 23–300)
MAGNESIUM SERPL-MCNC: 1.5 MG/DL (ref 1.6–2.3)
MICROALBUMIN UR-MCNC: 82.5 MG/L (ref 0–20)
MICROALBUMIN/CREAT 24H UR: 76 MG/G CREATININE (ref 0–30)
POTASSIUM SERPL-SCNC: 4.5 MMOL/L (ref 3.6–5)
PROT SERPL-MCNC: 7.5 G/DL (ref 5.9–8.4)
SODIUM SERPL-SCNC: 137 MMOL/L (ref 137–147)
T4 FREE SERPL-MCNC: 1.03 NG/DL (ref 0.76–1.46)
TSH SERPL DL<=0.05 MIU/L-ACNC: 2.37 UIU/ML (ref 0.47–4.68)
VIT B12 SERPL-MCNC: 337 PG/ML (ref 100–900)

## 2022-03-23 PROCEDURE — 82607 VITAMIN B-12: CPT

## 2022-03-23 PROCEDURE — 82306 VITAMIN D 25 HYDROXY: CPT

## 2022-03-23 PROCEDURE — 82570 ASSAY OF URINE CREATININE: CPT | Performed by: INTERNAL MEDICINE

## 2022-03-23 PROCEDURE — 36415 COLL VENOUS BLD VENIPUNCTURE: CPT

## 2022-03-23 PROCEDURE — 83735 ASSAY OF MAGNESIUM: CPT

## 2022-03-23 PROCEDURE — 83690 ASSAY OF LIPASE: CPT

## 2022-03-23 PROCEDURE — 83036 HEMOGLOBIN GLYCOSYLATED A1C: CPT

## 2022-03-23 PROCEDURE — 84439 ASSAY OF FREE THYROXINE: CPT

## 2022-03-23 PROCEDURE — 82043 UR ALBUMIN QUANTITATIVE: CPT | Performed by: INTERNAL MEDICINE

## 2022-03-23 PROCEDURE — 80053 COMPREHEN METABOLIC PANEL: CPT

## 2022-03-23 PROCEDURE — 84443 ASSAY THYROID STIM HORMONE: CPT

## 2022-03-24 DIAGNOSIS — J45.909 UNCOMPLICATED ASTHMA, UNSPECIFIED ASTHMA SEVERITY, UNSPECIFIED WHETHER PERSISTENT: ICD-10-CM

## 2022-03-24 RX ORDER — ALBUTEROL SULFATE 90 UG/1
AEROSOL, METERED RESPIRATORY (INHALATION)
Qty: 54 G | Refills: 3 | Status: SHIPPED | OUTPATIENT
Start: 2022-03-24 | End: 2022-04-05 | Stop reason: SDUPTHER

## 2022-03-24 RX ORDER — ALCOHOL ANTISEPTIC PADS
PADS, MEDICATED (EA) TOPICAL
Qty: 400 EACH | Refills: 5 | Status: SHIPPED | OUTPATIENT
Start: 2022-03-24

## 2022-03-29 ENCOUNTER — RA CDI HCC (OUTPATIENT)
Dept: OTHER | Facility: HOSPITAL | Age: 57
End: 2022-03-29

## 2022-03-29 NOTE — PROGRESS NOTES
[facility]  coding opportunities    DX:  E11 [id number]  F33 0 Major depressive disorder, recurrent, mild OR  F33 1 Major depressive disorder, recurrent, moderate OR  F33 8 Other recurrent depressive disorders OR  F33 9 Major depressive disorder, recurrent, unspecified        Chart Reviewed number of suggestions sent to Provider: 2     Patients Insurance     Medicare Insurance: [employer]

## 2022-04-04 ENCOUNTER — HOSPITAL ENCOUNTER (OUTPATIENT)
Dept: MAMMOGRAPHY | Facility: CLINIC | Age: 57
Discharge: HOME | End: 2022-04-04
Payer: COMMERCIAL

## 2022-04-04 VITALS — BODY MASS INDEX: 26.93 KG/M2 | WEIGHT: 152 LBS | HEIGHT: 63 IN

## 2022-04-04 DIAGNOSIS — Z12.31 SCREENING MAMMOGRAM FOR HIGH-RISK PATIENT: ICD-10-CM

## 2022-04-04 PROCEDURE — 77063 BREAST TOMOSYNTHESIS BI: CPT

## 2022-04-04 PROCEDURE — 77067 SCR MAMMO BI INCL CAD: CPT

## 2022-04-05 ENCOUNTER — OFFICE VISIT (OUTPATIENT)
Dept: FAMILY MEDICINE CLINIC | Facility: CLINIC | Age: 57
End: 2022-04-05
Payer: COMMERCIAL

## 2022-04-05 VITALS
RESPIRATION RATE: 14 BRPM | SYSTOLIC BLOOD PRESSURE: 114 MMHG | WEIGHT: 154.8 LBS | DIASTOLIC BLOOD PRESSURE: 62 MMHG | HEIGHT: 63 IN | TEMPERATURE: 97.6 F | HEART RATE: 76 BPM | BODY MASS INDEX: 27.43 KG/M2 | OXYGEN SATURATION: 97 %

## 2022-04-05 DIAGNOSIS — J44.1 ACUTE EXACERBATION OF COPD WITH ASTHMA (HCC): ICD-10-CM

## 2022-04-05 DIAGNOSIS — E11.69 HYPERLIPIDEMIA ASSOCIATED WITH TYPE 2 DIABETES MELLITUS (HCC): ICD-10-CM

## 2022-04-05 DIAGNOSIS — I10 ESSENTIAL HYPERTENSION: ICD-10-CM

## 2022-04-05 DIAGNOSIS — E11.9 TYPE 2 DIABETES MELLITUS WITHOUT COMPLICATION, WITH LONG-TERM CURRENT USE OF INSULIN (HCC): ICD-10-CM

## 2022-04-05 DIAGNOSIS — J43.9 PULMONARY EMPHYSEMA, UNSPECIFIED EMPHYSEMA TYPE (HCC): ICD-10-CM

## 2022-04-05 DIAGNOSIS — J01.90 ACUTE SINUSITIS, RECURRENCE NOT SPECIFIED, UNSPECIFIED LOCATION: ICD-10-CM

## 2022-04-05 DIAGNOSIS — F32.A DEPRESSION, UNSPECIFIED DEPRESSION TYPE: ICD-10-CM

## 2022-04-05 DIAGNOSIS — Z12.4 SCREENING FOR CERVICAL CANCER: Primary | ICD-10-CM

## 2022-04-05 DIAGNOSIS — J45.901 ACUTE EXACERBATION OF COPD WITH ASTHMA (HCC): ICD-10-CM

## 2022-04-05 DIAGNOSIS — Z79.4 TYPE 2 DIABETES MELLITUS WITHOUT COMPLICATION, WITH LONG-TERM CURRENT USE OF INSULIN (HCC): ICD-10-CM

## 2022-04-05 DIAGNOSIS — E78.5 HYPERLIPIDEMIA ASSOCIATED WITH TYPE 2 DIABETES MELLITUS (HCC): ICD-10-CM

## 2022-04-05 DIAGNOSIS — E11.9 DIABETES MELLITUS WITHOUT COMPLICATION (HCC): ICD-10-CM

## 2022-04-05 DIAGNOSIS — R10.84 GENERALIZED ABDOMINAL PAIN: ICD-10-CM

## 2022-04-05 DIAGNOSIS — E11.8 TYPE II DIABETES MELLITUS WITH COMPLICATION (HCC): ICD-10-CM

## 2022-04-05 DIAGNOSIS — J45.909 UNCOMPLICATED ASTHMA, UNSPECIFIED ASTHMA SEVERITY, UNSPECIFIED WHETHER PERSISTENT: ICD-10-CM

## 2022-04-05 PROCEDURE — 99214 OFFICE O/P EST MOD 30 MIN: CPT | Performed by: INTERNAL MEDICINE

## 2022-04-05 PROCEDURE — 1036F TOBACCO NON-USER: CPT | Performed by: INTERNAL MEDICINE

## 2022-04-05 PROCEDURE — 3725F SCREEN DEPRESSION PERFORMED: CPT | Performed by: INTERNAL MEDICINE

## 2022-04-05 RX ORDER — PANTOPRAZOLE SODIUM 40 MG/1
40 TABLET, DELAYED RELEASE ORAL DAILY
Qty: 90 TABLET | Refills: 3 | Status: SHIPPED | OUTPATIENT
Start: 2022-04-05

## 2022-04-05 RX ORDER — BENZONATATE 100 MG/1
100 CAPSULE ORAL 3 TIMES DAILY PRN
Qty: 20 CAPSULE | Refills: 0 | Status: SHIPPED | OUTPATIENT
Start: 2022-04-05

## 2022-04-05 RX ORDER — FLUTICASONE FUROATE, UMECLIDINIUM BROMIDE AND VILANTEROL TRIFENATATE 100; 62.5; 25 UG/1; UG/1; UG/1
1 POWDER RESPIRATORY (INHALATION) DAILY
Qty: 60 BLISTER | Refills: 3 | Status: SHIPPED | OUTPATIENT
Start: 2022-04-05 | End: 2022-05-05

## 2022-04-05 RX ORDER — ESCITALOPRAM OXALATE 10 MG/1
10 TABLET ORAL
Qty: 30 TABLET | Refills: 5 | Status: SHIPPED | OUTPATIENT
Start: 2022-04-05

## 2022-04-05 RX ORDER — AMOXICILLIN AND CLAVULANATE POTASSIUM 875; 125 MG/1; MG/1
1 TABLET, FILM COATED ORAL EVERY 12 HOURS SCHEDULED
Qty: 14 TABLET | Refills: 0 | Status: SHIPPED | OUTPATIENT
Start: 2022-04-05 | End: 2022-04-12

## 2022-04-05 RX ORDER — SEMAGLUTIDE 1.34 MG/ML
1 INJECTION, SOLUTION SUBCUTANEOUS WEEKLY
COMMUNITY
Start: 2022-03-24 | End: 2022-04-05 | Stop reason: SDUPTHER

## 2022-04-05 RX ORDER — LEVOCETIRIZINE DIHYDROCHLORIDE 5 MG/1
TABLET, FILM COATED ORAL
COMMUNITY
Start: 2022-03-24 | End: 2022-06-13

## 2022-04-05 RX ORDER — INSULIN GLARGINE 100 [IU]/ML
50 INJECTION, SOLUTION SUBCUTANEOUS DAILY
Qty: 15 ML | Refills: 3 | Status: SHIPPED | OUTPATIENT
Start: 2022-04-05

## 2022-04-05 RX ORDER — ATORVASTATIN CALCIUM 40 MG/1
40 TABLET, FILM COATED ORAL DAILY
Qty: 90 TABLET | Refills: 3 | Status: SHIPPED | OUTPATIENT
Start: 2022-04-05

## 2022-04-05 RX ORDER — FENOFIBRATE 145 MG/1
145 TABLET, COATED ORAL DAILY
Qty: 90 TABLET | Refills: 3 | Status: SHIPPED | OUTPATIENT
Start: 2022-04-05

## 2022-04-05 RX ORDER — GLIPIZIDE 5 MG/1
5 TABLET ORAL 2 TIMES DAILY WITH MEALS
Qty: 180 TABLET | Refills: 3 | Status: SHIPPED | OUTPATIENT
Start: 2022-04-05

## 2022-04-05 RX ORDER — SEMAGLUTIDE 1.34 MG/ML
1 INJECTION, SOLUTION SUBCUTANEOUS WEEKLY
Qty: 6 ML | Refills: 3 | Status: SHIPPED | OUTPATIENT
Start: 2022-04-05

## 2022-04-05 RX ORDER — ALBUTEROL SULFATE 90 UG/1
2 AEROSOL, METERED RESPIRATORY (INHALATION) EVERY 6 HOURS PRN
Qty: 54 G | Refills: 3 | Status: SHIPPED | OUTPATIENT
Start: 2022-04-05

## 2022-04-05 NOTE — PROGRESS NOTES
Assessment/Plan:         Diagnoses and all orders for this visit:    Screening for cervical cancer  -     Ambulatory Referral to Gynecology; Future    Type 2 diabetes mellitus without complication, with long-term current use of insulin ([place] [id number] ); life style Mod  -     Ambulatory Referral to Ophthalmology; Future    Uncomplicated asthma, unspecified asthma severity, unspecified whether persistent  -     albuterol (PROVENTIL HFA,VENTOLIN HFA) 90 mcg/act inhaler; Inhale 2 puffs every 6 (six) hours as needed for wheezing  -     pantoprazole (PROTONIX) 40 mg tablet; Take 1 tablet (40 mg total) by mouth daily    Hyperlipidemia associated with type 2 diabetes mellitus ([place] [address] ); continue and Renew :  -     atorvastatin (LIPITOR) 40 mg tablet; Take 1 tablet (40 mg total) by mouth daily  -     fenofibrate (TRICOR) 145 mg tablet; Take 1 tablet (145 mg total) by mouth daily    COPD with asthma ([place] [address] ); she quit smoking few years ago  -     benzonatate (TESSALON PERLES) 100 mg capsule; Take 1 capsule (100 mg total) by mouth 3 (three) times a day as needed for cough  -     fluticasone-umeclidinium-vilanterol (Trelegy Ellipta) 100-62 5-25 MCG/INH inhaler; Inhale 1 puff daily Rinse mouth after use  Pulmonary emphysema, unspecified emphysema type (HCC)  -     benzonatate (TESSALON PERLES) 100 mg capsule; Take 1 capsule (100 mg total) by mouth 3 (three) times a day as needed for cough  -     fluticasone-umeclidinium-vilanterol (Trelegy Ellipta) 100-62 5-25 MCG/INH inhaler; Inhale 1 puff daily Rinse mouth after use  Depression, unspecified depression type; Improved on ;  -     escitalopram (LEXAPRO) 10 mg tablet; Take 1 tablet (10 mg total) by mouth daily with dinner    Renew :  -     glipiZIDE (GLUCOTROL) 5 mg tablet; Take 1 tablet (5 mg total) by mouth 2 (two) times a day with meals  -     Ozempic, 1 MG/DOSE, 4 MG/3ML SOPN injection pen;  Inject 0 75 mL (1 mg total) under the skin once a week    -     insulin glargine (Lantus SoloStar) 100 units/mL injection pen; Inject 50 Units under the skin daily  -     magnesium oxide (MAG-OX) 400 mg; Take 1 tablet (400 mg total) by mouth 2 (two) times a day  -     metFORMIN (GLUCOPHAGE) 1000 MG tablet; Take 1 tablet (1,000 mg total) by mouth 2 (two) times a day with meals    Essential hypertension  -     metoprolol tartrate (LOPRESSOR) 25 mg tablet; Take 1 tablet (25 mg total) by mouth every 12 (twelve) hours    Acute sinusitis, recurrence not specified, unspecified location; try;  -     amoxicillin-clavulanate (AUGMENTIN) 875-125 mg per tablet; Take 1 tablet by mouth every 12 (twelve) hours for 7 days With food/meals    Generalized abdominal pain  -     Ambulatory referral to Gastroenterology; Future    Other orders  -     levocetirizine (XYZAL) 5 MG tablet; TAKE 1 TABLET (5 MG TOTAL) BY MOUTH EVERY EVENING  -     mupirocin (BACTROBAN) 2 % ointment; Apply 1 application topically 3 (three) times a day  -     Discontinue: Ozempic, 1 MG/DOSE, 4 MG/3ML SOPN injection pen; Inject 1 mg under the skin once a week        Subjective:      Patient ID: Rex Carpenter is a 61 y o  female  61 Y O lady is here for Regular check Up, she has few symptoms, recent Blood work and med list reviewed w Pt in detail    The following portions of the patient's history were reviewed and updated as appropriate: allergies, current medications, past family history, past medical history, past social history, past surgical history and problem list     Review of Systems   Constitutional: Negative for chills, fatigue and fever  HENT: Positive for congestion, postnasal drip and sinus pressure  Negative for facial swelling, sore throat, trouble swallowing and voice change  Eyes: Negative for pain, discharge and visual disturbance  Respiratory: Negative for cough, shortness of breath and wheezing  Cardiovascular: Negative for chest pain, palpitations and leg swelling     Gastrointestinal: Negative for abdominal pain, blood in stool, constipation, diarrhea and nausea  Endocrine: Negative for polydipsia, polyphagia and polyuria  Genitourinary: Negative for difficulty urinating, hematuria and urgency  Musculoskeletal: Negative for arthralgias and myalgias  Skin: Negative for rash  Neurological: Positive for dizziness and headaches  Negative for tremors and weakness  Hematological: Negative for adenopathy  Does not bruise/bleed easily  Psychiatric/Behavioral: Negative for dysphoric mood, sleep disturbance and suicidal ideas  Objective:      /62 (BP Location: Left arm, Patient Position: Sitting, Cuff Size: Standard)   Pulse 76   Temp 97 6 °F (36 4 °C) (Temporal)   Resp 14   Ht 5' 3" (1 6 m)   Wt 70 2 kg (154 lb 12 8 oz)   LMP  (LMP Unknown)   SpO2 97%   BMI 27 42 kg/m²          Physical Exam  Constitutional:       General: She is not in acute distress  HENT:      Head: Normocephalic  Mouth/Throat:      Pharynx: No oropharyngeal exudate  Eyes:      General: No scleral icterus  Conjunctiva/sclera: Conjunctivae normal       Pupils: Pupils are equal, round, and reactive to light  Neck:      Thyroid: No thyromegaly  Cardiovascular:      Rate and Rhythm: Normal rate and regular rhythm  Heart sounds: Normal heart sounds  No murmur heard  Pulmonary:      Effort: Pulmonary effort is normal  No respiratory distress  Breath sounds: Normal breath sounds  No wheezing or rales  Abdominal:      General: Bowel sounds are normal  There is no distension  Palpations: Abdomen is soft  Tenderness: There is no abdominal tenderness  There is no guarding or rebound  Musculoskeletal:         General: No tenderness  Cervical back: Neck supple  Lymphadenopathy:      Cervical: No cervical adenopathy  Skin:     Coloration: Skin is not pale  Findings: No rash  Neurological:      Mental Status: She is alert and oriented to person, place, and time        Sensory: No sensory deficit  Motor: No weakness

## 2022-04-22 ENCOUNTER — OFFICE VISIT (OUTPATIENT)
Dept: OBGYN CLINIC | Facility: CLINIC | Age: 57
End: 2022-04-22
Payer: COMMERCIAL

## 2022-04-22 VITALS
TEMPERATURE: 99.3 F | HEART RATE: 88 BPM | BODY MASS INDEX: 27.78 KG/M2 | HEIGHT: 63 IN | DIASTOLIC BLOOD PRESSURE: 66 MMHG | WEIGHT: 156.8 LBS | OXYGEN SATURATION: 97 % | SYSTOLIC BLOOD PRESSURE: 110 MMHG

## 2022-04-22 DIAGNOSIS — Z01.419 ENCOUNTER FOR ANNUAL ROUTINE GYNECOLOGICAL EXAMINATION: Primary | ICD-10-CM

## 2022-04-22 DIAGNOSIS — N95.2 VAGINAL ATROPHY: ICD-10-CM

## 2022-04-22 DIAGNOSIS — Z12.4 SCREENING FOR CERVICAL CANCER: ICD-10-CM

## 2022-04-22 DIAGNOSIS — R21 RASH: ICD-10-CM

## 2022-04-22 PROCEDURE — G0145 SCR C/V CYTO,THINLAYER,RESCR: HCPCS | Performed by: OBSTETRICS & GYNECOLOGY

## 2022-04-22 PROCEDURE — 3008F BODY MASS INDEX DOCD: CPT | Performed by: INTERNAL MEDICINE

## 2022-04-22 PROCEDURE — G0101 CA SCREEN;PELVIC/BREAST EXAM: HCPCS | Performed by: OBSTETRICS & GYNECOLOGY

## 2022-04-22 PROCEDURE — G0476 HPV COMBO ASSAY CA SCREEN: HCPCS | Performed by: OBSTETRICS & GYNECOLOGY

## 2022-04-22 NOTE — PATIENT INSTRUCTIONS
Vaginal moisturizers can be used every day for 2 weeks, then twice weekly after that  I recommend water or silicone based lubricants such as Replens, HyaloGYN, Uberlube, Yes!, Revaree  Calcium and Osteoporosis   WHAT YOU NEED TO KNOW:   Why is calcium important for osteoporosis? Calcium is important for osteoporosis because calcium helps build bone mass  Osteoporosis is a long-term medical condition that causes your body to break down more bone than it makes  Your bones become weak, brittle, and more likely to fracture  How much calcium do I need? · Women:      ? 19 to 50 years: 1,000 mg    ? Over 50: 1,200 mg    ? Pregnant or breastfeeding, 19 to 50 years: 1,000 mg    · Men:      ? 19 to 70: 1,000 mg    ? Over 70: 1,200 mg    Which foods are high in calcium? The following list shows the number of calcium milligrams (mg) per serving  Your healthcare provider or dietitian can help you create a balanced meal plan for your calcium needs  · Dairy:      ? 1 cup of low-fat plain yogurt (415 mg) or low-fat fruit yogurt (245 to 384 mg)    ? 1½ ounces of shredded cheddar cheese (306 mg) or part skim mozzarella cheese (275 mg)    ? 1 cup of skim, 2%, or whole milk (300 mg)    ? 1 cup of cottage cheese made with 2% milk fat (138 mg)    ? ½ cup of frozen yogurt (103 mg)    · Other foods:      ? 1 cup of calcium-fortified orange juice (300 mg)    ? ½ cup of cooked arsh greens (220 mg)    ? 4 canned sardines, with bones (242 mg)    ? ½ cup of tofu (with added calcium) (204 mg)       How can I get extra calcium? · Add powdered milk to puddings, cocoa, custard, or hot cereal     · Sift powdered milk into flour when you make cakes, cookies, or breads  · Use low-fat or fat-free milk instead of water in pancake mix, mashed potatoes, pudding, or hot breakfast cereal     · Add low-fat or fat-free cheese to salad, soup, or pasta  · Add tofu (with added calcium) to vegetable stir-braun      · Take calcium supplements if you cannot get enough calcium from the foods you eat  Your body can absorb the most calcium from supplements when you take 500 mg or less at one time  Do not take more than 2,500 mg of calcium supplements each day  Wellness Visit for Adults   AMBULATORY CARE:   A wellness visit  is when you see your healthcare provider to get screened for health problems  Your healthcare provider will also give you advice on how to stay healthy  Write down your questions so you remember to ask them  Ask your healthcare provider how often you should have a wellness visit  What happens at a wellness visit:  Your healthcare provider will ask about your health, and your family history of health problems  This includes high blood pressure, heart disease, and cancer  He or she will ask if you have symptoms that concern you, if you smoke, and about your mood  You may also be asked about your intake of medicines, supplements, food, and alcohol  Any of the following may be done:  · Your weight  will be checked  Your height may also be checked so your body mass index (BMI) can be calculated  Your BMI shows if you are at a healthy weight  · Your blood pressure  and heart rate will be checked  Your temperature may also be checked  · Blood and urine tests  may be done  Blood tests may be done to check your cholesterol levels  Abnormal cholesterol levels increase your risk for heart disease and stroke  You may also need a blood or urine test to check for diabetes if you are at increased risk  Urine tests may be done to look for signs of an infection or kidney disease  · A physical exam  includes checking your heartbeat and lungs with a stethoscope  Your healthcare provider may also check your skin to look for sun damage  · Screening tests  may be recommended  A screening test is done to check for diseases that may not cause symptoms   The screening tests you may need depend on your age, gender, family history, and lifestyle habits  For example, colorectal screening may be recommended if you are 48years old or older  Screening tests you need if you are a woman:   · A Pap smear  is used to screen for cervical cancer  Pap smears are usually done every 3 to 5 years depending on your age  You may need them more often if you have had abnormal Pap smear test results in the past  Ask your healthcare provider how often you should have a Pap smear  · A mammogram  is an x-ray of your breasts to screen for breast cancer  Experts recommend mammograms every 2 years starting at age 48 years  You may need a mammogram at age 52 years or younger if you have an increased risk for breast cancer  Talk to your healthcare provider about when you should start having mammograms and how often you need them  Vaccines you may need:   · Get an influenza vaccine  every year  The influenza vaccine protects you from the flu  Several types of viruses cause the flu  The viruses change over time, so new vaccines are made each year  · Get a tetanus-diphtheria (Td) booster vaccine  every 10 years  This vaccine protects you against tetanus and diphtheria  Tetanus is a severe infection that may cause painful muscle spasms and lockjaw  Diphtheria is a severe bacterial infection that causes a thick covering in the back of your mouth and throat  · Get a human papillomavirus (HPV) vaccine  if you are female and aged 23 to 32 or male 23 to 24 and never received it  This vaccine protects you from HPV infection  HPV is the most common infection spread by sexual contact  HPV may also cause vaginal, penile, and anal cancers  · Get a pneumococcal vaccine  if you are aged 72 years or older  The pneumococcal vaccine is an injection given to protect you from pneumococcal disease  Pneumococcal disease is an infection caused by pneumococcal bacteria  The infection may cause pneumonia, meningitis, or an ear infection      · Get a shingles vaccine  if you are 61 or older, even if you have had shingles before  The shingles vaccine is an injection to protect you from the varicella-zoster virus  This is the same virus that causes chickenpox  Shingles is a painful rash that develops in people who had chickenpox or have been exposed to the virus  How to eat healthy:  My Plate is a model for planning healthy meals  It shows the types and amounts of foods that should go on your plate  Fruits and vegetables make up about half of your plate, and grains and protein make up the other half  A serving of dairy is included on the side of your plate  The amount of calories and serving sizes you need depends on your age, gender, weight, and height  Examples of healthy foods are listed below:  · Eat a variety of vegetables  such as dark green, red, and orange vegetables  You can also include canned vegetables low in sodium (salt) and frozen vegetables without added butter or sauces  · Eat a variety of fresh fruits , canned fruit in 100% juice, frozen fruit, and dried fruit  · Include whole grains  At least half of the grains you eat should be whole grains  Examples include whole-wheat bread, wheat pasta, brown rice, and whole-grain cereals such as oatmeal     · Eat a variety of protein foods such as seafood (fish and shellfish), lean meat, and poultry without skin (turkey and chicken)  Examples of lean meats include pork leg, shoulder, or tenderloin, and beef round, sirloin, tenderloin, and extra lean ground beef  Other protein foods include eggs and egg substitutes, beans, peas, soy products, nuts, and seeds  · Choose low-fat dairy products such as skim or 1% milk or low-fat yogurt, cheese, and cottage cheese  · Limit unhealthy fats  such as butter, hard margarine, and shortening  Exercise:  Exercise at least 30 minutes per day on most days of the week  Some examples of exercise include walking, biking, dancing, and swimming   You can also fit in more physical activity by taking the stairs instead of the elevator or parking farther away from stores  Include muscle strengthening activities 2 days each week  Regular exercise provides many health benefits  It helps you manage your weight, and decreases your risk for type 2 diabetes, heart disease, stroke, and high blood pressure  Exercise can also help improve your mood  Ask your healthcare provider about the best exercise plan for you  General health and safety guidelines:   · Do not smoke  Nicotine and other chemicals in cigarettes and cigars can cause lung damage  Ask your healthcare provider for information if you currently smoke and need help to quit  E-cigarettes or smokeless tobacco still contain nicotine  Talk to your healthcare provider before you use these products  · Limit alcohol  A drink of alcohol is 12 ounces of beer, 5 ounces of wine, or 1½ ounces of liquor  · Lose weight, if needed  Being overweight increases your risk of certain health conditions  These include heart disease, high blood pressure, type 2 diabetes, and certain types of cancer  · Protect your skin  Do not sunbathe or use tanning beds  Use sunscreen with a SPF 15 or higher  Apply sunscreen at least 15 minutes before you go outside  Reapply sunscreen every 2 hours  Wear protective clothing, hats, and sunglasses when you are outside  · Drive safely  Always wear your seatbelt  Make sure everyone in your car wears a seatbelt  A seatbelt can save your life if you are in an accident  Do not use your cell phone when you are driving  This could distract you and cause an accident  Pull over if you need to make a call or send a text message  · Practice safe sex  Use latex condoms if are sexually active and have more than one partner  Your healthcare provider may recommend screening tests for sexually transmitted infections (STIs)  · Wear helmets, lifejackets, and protective gear    Always wear a helmet when you ride a bike or motorcycle, go skiing, or play sports that could cause a head injury  Wear protective equipment when you play sports  Wear a lifejacket when you are on a boat or doing water sports  © Copyright [employer] 2022 Information is for End User's use only and may not be sold, redistributed or otherwise used for commercial purposes  All illustrations and images included in CareNotes® are the copyrighted property of [employer] M [employer]  or [facility]   The above information is an  only  It is not intended as medical advice for individual conditions or treatments  Talk to your doctor, nurse or pharmacist before following any medical regimen to see if it is safe and effective for you

## 2022-04-22 NOTE — ASSESSMENT & PLAN NOTE
Vulvar hygiene measures reviewed, provided information on vaginal moisturizers/lubricants, will f/u if sx persistent/bothersome

## 2022-04-22 NOTE — ASSESSMENT & PLAN NOTE
- Discussed ACOG guidelines for pap smear screening frequency: performed today given unclear previous pap smear history   - Discussed healthy lifestyle recommendations for diet, exercise and self breast awareness   - Discussed ACOG recommendations for screening mammograms: up to date  - Discussed age based recommendations for adequate calcium and vitamin D intake  No additional osteoporosis screening indicated at this time  - Discussed ACOG recommendations for colon cancer screening: not indicated at this time  - Safe sex practices were discussed and STI testing was not desired by the patient  - Contraceptive options were reviewed: n/a, postmenopausal   - Routine follow up in 1 year was recommended or sooner as needed  All questions and concerns were addressed

## 2022-04-22 NOTE — PROGRESS NOTES
Rob Espinosa is a 61 y o  female who presents for annual exam       Chief Complaint   Patient presents with   [address] pt    Gynecologic Exam     Last PAP 18 normal     New GYN  Occasional vaginal irritation with wiping with tissue - uses unscented wet wipes which helps   Denies PMB  Nor SA in several years       Last Pap: 2018 NILM/HPV neg   Last mammogram: 2022 BIRADS2, heterogenously dense   Colorectal cancer screening: Not on file 2017 - WNL   DEXA:  WNL       History of abnormal Pap smear: no  History of abnormal mammogram: yes - left breast biopsy 2016      Family history of uterine or ovarian cancer: no  Family history of breast cancer: mother - dx 42's,  age 59  Family history of colon cancer: no      Menstrual History:  OB History        4    Para   1    Term   1            AB   3    Living   1       SAB        IAB   1    Ectopic   2    Multiple        Live Births   1                    No LMP recorded (lmp unknown)   Patient is postmenopausal          Past Medical History:   Diagnosis Date    Acute exacerbation of chronic obstructive pulmonary disease (COPD) ([address] ) 2020    Asthma     exercise induced    Diabetes ([address] )     type 2    Diabetes mellitus ([address] )     Hypertension     Morbid obesity ([address] ) 2020    Osteoarthritis 2020     Past Surgical History:   Procedure Laterality Date    BREAST BIOPSY Left 2016    benign    CHOLECYSTECTOMY      HERNIA REPAIR      LAPAROSCOPY FOR ECTOPIC PREGNANCY      x2, both tubes removed     MAMMO STEREOTACTIC BREAST BIOPSY LEFT (ALL INC) Left     REPLACEMENT TOTAL KNEE Left     SMALL INTESTINE SURGERY      after hernia surgery      Family History   Problem Relation Age of Onset    Breast cancer Mother 36        cause of death    Heart attack Father         MI cause of death    No Known Problems Sister     No Known Problems Daughter     No Known Problems Maternal Grandmother     No Known Problems Maternal Grandfather     No Known Problems Paternal Grandmother     No Known Problems Paternal Grandfather     No Known Problems Sister     No Known Problems Sister     Colon cancer Neg Hx     Ovarian cancer Neg Hx        Social History     Tobacco Use    Smoking status: Former Smoker    Smokeless tobacco: Never Used   Vaping Use    Vaping Use: Never used   Substance Use Topics    Alcohol use: Yes     Comment: rare    Drug use: No          Current Outpatient Medications:     albuterol (PROVENTIL HFA,VENTOLIN HFA) 90 mcg/act inhaler, Inhale 2 puffs every 6 (six) hours as needed for wheezing, Disp: 54 g, Rfl: 3    aspirin (ECOTRIN LOW STRENGTH) 81 mg EC tablet, Take 1 tablet (81 mg total) by mouth daily, Disp: 90 tablet, Rfl: 3    atorvastatin (LIPITOR) 40 mg tablet, Take 1 tablet (40 mg total) by mouth daily, Disp: 90 tablet, Rfl: 3    Banophen 25 MG capsule, TAKE 1 CAPSULE (25 MG TOTAL) BY MOUTH DAILY AT BEDTIME AS NEEDED FOR ITCHING, Disp: 90 capsule, Rfl: 3    benzonatate (TESSALON PERLES) 100 mg capsule, Take 1 capsule (100 mg total) by mouth 3 (three) times a day as needed for cough, Disp: 20 capsule, Rfl: 0    calcipotriene (DOVONEX) 0 005 % cream, APPLY TO AFFECTED AREA TWICE A DAY 30 DAYS [facility] AREA HAND LEGS (Patient taking differently: Taken as needed ), Disp: 120 g, Rfl: 0    Calcium Carb-Cholecalciferol (Calcium Carbonate-Vitamin D3) 600-400 MG-UNIT TABS, TAKE 2 TABLETS BY MOUTH DAILY, Disp: 60 tablet, Rfl: 6    colchicine (COLCRYS) 0 6 mg tablet, Take 2 pills immediately and take 3rd pill an hour later, Disp: 3 tablet, Rfl: 0    escitalopram (LEXAPRO) 10 mg tablet, Take 1 tablet (10 mg total) by mouth daily with dinner, Disp: 30 tablet, Rfl: 5    fenofibrate (TRICOR) 145 mg tablet, Take 1 tablet (145 mg total) by mouth daily, Disp: 90 tablet, Rfl: 3    fluticasone-umeclidinium-vilanterol (Trelegy Ellipta) 100-62 5-25 MCG/INH inhaler, Inhale 1 puff daily Rinse mouth after use , Disp: 60 blister, Rfl: 3    glipiZIDE (GLUCOTROL) 5 mg tablet, Take 1 tablet (5 mg total) by mouth 2 (two) times a day with meals, Disp: 180 tablet, Rfl: 3    insulin glargine (Lantus SoloStar) 100 units/mL injection pen, Inject 50 Units under the skin daily, Disp: 15 mL, Rfl: 3    levocetirizine (XYZAL) 5 MG tablet, TAKE 1 TABLET (5 MG TOTAL) BY MOUTH EVERY EVENING, Disp: , Rfl:     magnesium oxide (MAG-OX) 400 mg, Take 1 tablet (400 mg total) by mouth 2 (two) times a day, Disp: 180 tablet, Rfl: 3    metFORMIN (GLUCOPHAGE) 1000 MG tablet, Take 1 tablet (1,000 mg total) by mouth 2 (two) times a day with meals, Disp: 180 tablet, Rfl: 3    metoprolol tartrate (LOPRESSOR) 25 mg tablet, Take 1 tablet (25 mg total) by mouth every 12 (twelve) hours, Disp: 180 tablet, Rfl: 3    montelukast (SINGULAIR) 10 mg tablet, TAKE 1 TABLET (10 MG TOTAL) BY MOUTH EVERY 24 HOURS, Disp: 90 tablet, Rfl: 3    mupirocin (BACTROBAN) 2 % ointment, Apply 1 application topically 3 (three) times a day, Disp: , Rfl:     oxybutynin (DITROPAN XL) 15 MG 24 hr tablet, TAKE 1 TABLET (15 MG TOTAL) BY MOUTH DAILY, Disp: 90 tablet, Rfl: 3    Ozempic, 1 MG/DOSE, 4 MG/3ML SOPN injection pen, Inject 0 75 mL (1 mg total) under the skin once a week, Disp: 6 mL, Rfl: 3    pantoprazole (PROTONIX) 40 mg tablet, Take 1 tablet (40 mg total) by mouth daily, Disp: 90 tablet, Rfl: 3    Comfort EZ Pen Needles 33G X 5 MM MISC, INJECT 4 TIMES TO 5 TIMES A DAY SUBCUTANEOUS WITH INSULINE (Patient not taking: Reported on 4/5/2022), Disp: 400 each, Rfl: 5    Easy Comfort Pen Needles 32G X 4 MM MISC, USE FOR INSULIN INJECTIONS ONCE DAILY AS DIRECTED (Patient not taking: Reported on 4/5/2022), Disp: 100 each, Rfl: 3    Easy Comfort Pen Needles 33G X 4 MM MISC, WITH INSULINE PEN AS NEEDED UP TO 5 TIMES X90 DAYS, Disp: 100 each, Rfl: 6    glucose blood test strip, Use as instructed to test three times daily  , Disp: 100 each, Rfl: 5    Allergies   Allergen Reactions    No Active Allergies            Review of Systems   Constitutional: Negative for appetite change, chills and fever  Eyes: Negative for visual disturbance  Respiratory: Negative for cough, chest tightness and shortness of breath  Cardiovascular: Negative for chest pain  Gastrointestinal: Negative for abdominal distention, abdominal pain, constipation, diarrhea, nausea and vomiting  Endocrine: Negative for cold intolerance and heat intolerance  Genitourinary: Negative for difficulty urinating, dyspareunia, dysuria, frequency, genital sores, pelvic pain, urgency, vaginal bleeding, vaginal discharge and vaginal pain  Musculoskeletal: Negative for arthralgias  Neurological: Negative for light-headedness and headaches  Hematological: Does not bruise/bleed easily  Psychiatric/Behavioral: Negative for behavioral problems  All other systems reviewed and are negative  /66 (BP Location: Right arm, Patient Position: Sitting, Cuff Size: Adult)   Pulse 88   Temp 99 3 °F (37 4 °C) (Tympanic)   Ht 5' 3" (1 6 m)   Wt 71 1 kg (156 lb 12 8 oz)   LMP  (LMP Unknown)   SpO2 97%   BMI 27 78 kg/m²         Physical Exam  Constitutional:       General: She is not in acute distress  Appearance: Normal appearance  Genitourinary:      Vulva, bladder and urethral meatus normal       No lesions in the vagina  Right Labia: No rash, tenderness, lesions or skin changes  Left Labia: No tenderness, lesions, skin changes or rash  No labial fusion noted  No inguinal adenopathy present in the right or left side  No vaginal discharge, erythema, tenderness, bleeding or ulceration  No vaginal prolapse present  Mild vaginal atrophy present  Right Adnexa: not tender, not full and no mass present  Left Adnexa: not tender, not full and no mass present  No cervical motion tenderness, discharge, friability, lesion or polyp  Uterus is not enlarged, fixed, tender or irregular  No uterine mass detected  Uterus is midaxial       Pelvic exam was performed with patient in the lithotomy position  Breasts:      Right: No swelling, bleeding, inverted nipple, mass, nipple discharge, skin change, tenderness, axillary adenopathy or supraclavicular adenopathy  Left: No swelling, bleeding, inverted nipple, mass, nipple discharge, skin change, tenderness, axillary adenopathy or supraclavicular adenopathy  HENT:      Head: Normocephalic and atraumatic  Neck:      Thyroid: No thyromegaly  Cardiovascular:      Rate and Rhythm: Normal rate and regular rhythm  Pulmonary:      Effort: Pulmonary effort is normal  No accessory muscle usage or respiratory distress  Abdominal:      General: There is no distension  Palpations: Abdomen is soft  Tenderness: There is no abdominal tenderness  There is no guarding or rebound  Musculoskeletal:         General: Normal range of motion  Cervical back: Normal range of motion and neck supple  Lymphadenopathy:      Upper Body:      Right upper body: No supraclavicular or axillary adenopathy  Left upper body: No supraclavicular or axillary adenopathy  Lower Body: No right inguinal and no right inguinal adenopathy  No left inguinal and no left inguinal adenopathy  Neurological:      General: No focal deficit present  Mental Status: She is alert  Skin:     General: Skin is warm and dry  Findings: No erythema  Psychiatric:         Mood and Affect: Mood normal          Behavior: Behavior normal    Vitals and nursing note reviewed  Exam conducted with a chaperone present  Encounter for annual routine gynecological examination  - Discussed ACOG guidelines for pap smear screening frequency: performed today given unclear previous pap smear history   - Discussed healthy lifestyle recommendations for diet, exercise and self breast awareness    - Discussed ACOG recommendations for screening mammograms: up to date  - Discussed age based recommendations for adequate calcium and vitamin D intake  No additional osteoporosis screening indicated at this time  - Discussed ACOG recommendations for colon cancer screening: not indicated at this time  - Safe sex practices were discussed and STI testing was not desired by the patient  - Contraceptive options were reviewed: n/a, postmenopausal   - Routine follow up in 1 year was recommended or sooner as needed  All questions and concerns were addressed         Vaginal atrophy  Vulvar hygiene measures reviewed, provided information on vaginal moisturizers/lubricants, will f/u if sx persistent/bothersome

## 2022-04-23 RX ORDER — CALCIPOTRIENE 50 UG/G
CREAM TOPICAL
Qty: 120 G | Refills: 0 | Status: SHIPPED | OUTPATIENT
Start: 2022-04-23 | End: 2022-05-20

## 2022-04-27 LAB
LAB AP GYN PRIMARY INTERPRETATION: NORMAL
Lab: NORMAL

## 2022-05-20 DIAGNOSIS — R21 RASH: ICD-10-CM

## 2022-05-20 RX ORDER — CALCIPOTRIENE 50 UG/G
CREAM TOPICAL
Qty: 120 G | Refills: 0 | Status: SHIPPED | OUTPATIENT
Start: 2022-05-20 | End: 2022-06-13

## 2022-06-13 DIAGNOSIS — Z79.4 TYPE 2 DIABETES MELLITUS WITHOUT COMPLICATION, WITH LONG-TERM CURRENT USE OF INSULIN (HCC): Primary | ICD-10-CM

## 2022-06-13 DIAGNOSIS — J01.90 ACUTE SINUSITIS, RECURRENCE NOT SPECIFIED, UNSPECIFIED LOCATION: ICD-10-CM

## 2022-06-13 DIAGNOSIS — R21 RASH: ICD-10-CM

## 2022-06-13 DIAGNOSIS — E11.9 TYPE 2 DIABETES MELLITUS WITHOUT COMPLICATION, WITH LONG-TERM CURRENT USE OF INSULIN (HCC): Primary | ICD-10-CM

## 2022-06-13 RX ORDER — PEN NEEDLE, DIABETIC 32GX 5/32"
NEEDLE, DISPOSABLE MISCELLANEOUS
Qty: 400 EACH | Refills: 5 | Status: SHIPPED | OUTPATIENT
Start: 2022-06-13

## 2022-06-13 RX ORDER — LEVOCETIRIZINE DIHYDROCHLORIDE 5 MG/1
TABLET, FILM COATED ORAL
Qty: 30 TABLET | Refills: 3 | Status: SHIPPED | OUTPATIENT
Start: 2022-06-13

## 2022-06-13 RX ORDER — CALCIPOTRIENE 50 UG/G
CREAM TOPICAL
Qty: 120 G | Refills: 0 | Status: SHIPPED | OUTPATIENT
Start: 2022-06-13 | End: 2022-07-19

## 2022-07-19 DIAGNOSIS — I10 ESSENTIAL HYPERTENSION: ICD-10-CM

## 2022-07-19 DIAGNOSIS — R21 RASH: ICD-10-CM

## 2022-07-19 RX ORDER — CALCIPOTRIENE 50 UG/G
CREAM TOPICAL
Qty: 120 G | Refills: 0 | Status: SHIPPED | OUTPATIENT
Start: 2022-07-19 | End: 2022-08-22

## 2022-07-19 RX ORDER — ASPIRIN 81 MG/1
TABLET, COATED ORAL
Qty: 90 TABLET | Refills: 3 | Status: SHIPPED | OUTPATIENT
Start: 2022-07-19

## 2022-07-28 DIAGNOSIS — F32.A DEPRESSION, UNSPECIFIED DEPRESSION TYPE: ICD-10-CM

## 2022-07-28 RX ORDER — OXYBUTYNIN CHLORIDE 15 MG/1
15 TABLET, EXTENDED RELEASE ORAL DAILY
Qty: 90 TABLET | Refills: 3 | Status: SHIPPED | OUTPATIENT
Start: 2022-07-28

## 2022-08-22 DIAGNOSIS — R21 RASH: ICD-10-CM

## 2022-08-22 RX ORDER — CALCIPOTRIENE 50 UG/G
CREAM TOPICAL
Qty: 120 G | Refills: 0 | Status: SHIPPED | OUTPATIENT
Start: 2022-08-22

## 2022-08-25 ENCOUNTER — OFFICE VISIT (OUTPATIENT)
Dept: FAMILY MEDICINE CLINIC | Facility: CLINIC | Age: 57
End: 2022-08-25
Payer: COMMERCIAL

## 2022-08-25 VITALS
TEMPERATURE: 98.1 F | DIASTOLIC BLOOD PRESSURE: 62 MMHG | OXYGEN SATURATION: 99 % | HEIGHT: 63 IN | RESPIRATION RATE: 16 BRPM | HEART RATE: 79 BPM | WEIGHT: 154 LBS | BODY MASS INDEX: 27.29 KG/M2 | SYSTOLIC BLOOD PRESSURE: 122 MMHG

## 2022-08-25 DIAGNOSIS — M19.90 ARTHRITIS: ICD-10-CM

## 2022-08-25 DIAGNOSIS — M85.80 OSTEOPENIA, UNSPECIFIED LOCATION: ICD-10-CM

## 2022-08-25 DIAGNOSIS — Z59.9 FINANCIAL DIFFICULTIES: ICD-10-CM

## 2022-08-25 DIAGNOSIS — R80.8 OTHER PROTEINURIA: ICD-10-CM

## 2022-08-25 DIAGNOSIS — E11.9 DIABETES MELLITUS WITHOUT COMPLICATION (HCC): ICD-10-CM

## 2022-08-25 DIAGNOSIS — J44.1 ACUTE EXACERBATION OF COPD WITH ASTHMA (HCC): ICD-10-CM

## 2022-08-25 DIAGNOSIS — Z00.01 ENCOUNTER FOR GENERAL ADULT MEDICAL EXAMINATION WITH ABNORMAL FINDINGS: Primary | ICD-10-CM

## 2022-08-25 DIAGNOSIS — E11.69 HYPERLIPIDEMIA ASSOCIATED WITH TYPE 2 DIABETES MELLITUS (HCC): ICD-10-CM

## 2022-08-25 DIAGNOSIS — J45.909 UNCOMPLICATED ASTHMA, UNSPECIFIED ASTHMA SEVERITY, UNSPECIFIED WHETHER PERSISTENT: ICD-10-CM

## 2022-08-25 DIAGNOSIS — E78.5 HYPERLIPIDEMIA ASSOCIATED WITH TYPE 2 DIABETES MELLITUS (HCC): ICD-10-CM

## 2022-08-25 DIAGNOSIS — Z59.82 INABILITY TO ACQUIRE TRANSPORTATION: ICD-10-CM

## 2022-08-25 DIAGNOSIS — E11.8 TYPE II DIABETES MELLITUS WITH COMPLICATION (HCC): ICD-10-CM

## 2022-08-25 DIAGNOSIS — J43.9 PULMONARY EMPHYSEMA, UNSPECIFIED EMPHYSEMA TYPE (HCC): ICD-10-CM

## 2022-08-25 DIAGNOSIS — J45.901 ACUTE EXACERBATION OF COPD WITH ASTHMA (HCC): ICD-10-CM

## 2022-08-25 DIAGNOSIS — I10 ESSENTIAL HYPERTENSION: ICD-10-CM

## 2022-08-25 LAB
SL AMB  POCT GLUCOSE, UA: ABNORMAL
SL AMB LEUKOCYTE ESTERASE,UA: ABNORMAL
SL AMB POCT BILIRUBIN,UA: ABNORMAL
SL AMB POCT BLOOD,UA: ABNORMAL
SL AMB POCT CLARITY,UA: CLEAR
SL AMB POCT COLOR,UA: YELLOW
SL AMB POCT HEMOGLOBIN AIC: 9.4 (ref ?–6.5)
SL AMB POCT KETONES,UA: ABNORMAL
SL AMB POCT NITRITE,UA: ABNORMAL
SL AMB POCT PH,UA: 5
SL AMB POCT SPECIFIC GRAVITY,UA: 1.01
SL AMB POCT URINE PROTEIN: 30
SL AMB POCT UROBILINOGEN: ABNORMAL

## 2022-08-25 PROCEDURE — 3046F HEMOGLOBIN A1C LEVEL >9.0%: CPT | Performed by: INTERNAL MEDICINE

## 2022-08-25 PROCEDURE — 3066F NEPHROPATHY DOC TX: CPT | Performed by: INTERNAL MEDICINE

## 2022-08-25 PROCEDURE — 83036 HEMOGLOBIN GLYCOSYLATED A1C: CPT | Performed by: INTERNAL MEDICINE

## 2022-08-25 PROCEDURE — 81002 URINALYSIS NONAUTO W/O SCOPE: CPT | Performed by: INTERNAL MEDICINE

## 2022-08-25 PROCEDURE — 3074F SYST BP LT 130 MM HG: CPT | Performed by: INTERNAL MEDICINE

## 2022-08-25 PROCEDURE — 99214 OFFICE O/P EST MOD 30 MIN: CPT | Performed by: INTERNAL MEDICINE

## 2022-08-25 PROCEDURE — 3061F NEG MICROALBUMINURIA REV: CPT | Performed by: INTERNAL MEDICINE

## 2022-08-25 PROCEDURE — G0439 PPPS, SUBSEQ VISIT: HCPCS | Performed by: INTERNAL MEDICINE

## 2022-08-25 PROCEDURE — 3078F DIAST BP <80 MM HG: CPT | Performed by: INTERNAL MEDICINE

## 2022-08-25 RX ORDER — FENOFIBRATE 145 MG/1
145 TABLET, COATED ORAL DAILY
Qty: 90 TABLET | Refills: 3 | Status: SHIPPED | OUTPATIENT
Start: 2022-08-25 | End: 2022-09-29 | Stop reason: SDUPTHER

## 2022-08-25 RX ORDER — SEMAGLUTIDE 1.34 MG/ML
1 INJECTION, SOLUTION SUBCUTANEOUS WEEKLY
Qty: 6 ML | Refills: 3 | Status: SHIPPED | OUTPATIENT
Start: 2022-08-25 | End: 2022-09-29 | Stop reason: SDUPTHER

## 2022-08-25 RX ORDER — ATORVASTATIN CALCIUM 40 MG/1
40 TABLET, FILM COATED ORAL DAILY
Qty: 90 TABLET | Refills: 3 | Status: SHIPPED | OUTPATIENT
Start: 2022-08-25 | End: 2022-09-29 | Stop reason: SDUPTHER

## 2022-08-25 RX ORDER — INSULIN GLARGINE 100 [IU]/ML
50 INJECTION, SOLUTION SUBCUTANEOUS DAILY
Qty: 15 ML | Refills: 3 | Status: SHIPPED | OUTPATIENT
Start: 2022-08-25 | End: 2022-09-29 | Stop reason: SDUPTHER

## 2022-08-25 RX ORDER — GLIPIZIDE 5 MG/1
5 TABLET ORAL 2 TIMES DAILY WITH MEALS
Qty: 180 TABLET | Refills: 3 | Status: SHIPPED | OUTPATIENT
Start: 2022-08-25 | End: 2022-09-29 | Stop reason: SDUPTHER

## 2022-08-25 RX ORDER — PANTOPRAZOLE SODIUM 40 MG/1
40 TABLET, DELAYED RELEASE ORAL DAILY
Qty: 90 TABLET | Refills: 3 | Status: SHIPPED | OUTPATIENT
Start: 2022-08-25 | End: 2022-09-29 | Stop reason: SDUPTHER

## 2022-08-25 RX ORDER — MONTELUKAST SODIUM 10 MG/1
10 TABLET ORAL EVERY 24 HOURS
Qty: 90 TABLET | Refills: 3 | Status: SHIPPED | OUTPATIENT
Start: 2022-08-25

## 2022-08-25 SDOH — ECONOMIC STABILITY - INCOME SECURITY: PROBLEM RELATED TO HOUSING AND ECONOMIC CIRCUMSTANCES, UNSPECIFIED: Z59.9

## 2022-08-25 SDOH — ECONOMIC STABILITY - TRANSPORTATION SECURITY: TRANSPORTATION INSECURITY: Z59.82

## 2022-08-25 NOTE — PROGRESS NOTES
Assessment and Plan:     Problem List Items Addressed This Visit    None          Preventive health issues were discussed with patient, and age appropriate screening tests were ordered as noted in patient's After Visit Summary  Personalized health advice and appropriate referrals for health education or preventive services given if needed, as noted in patient's After Visit Summary       History of Present Illness:     Patient presents for a Medicare Wellness Visit    HPI   Patient Care Team:  Indira Cha MD as PCP - General (Internal Medicine)  Indira Cha MD as PCP - [address] [place] [address] (RTE)     Review of Systems:     Review of Systems     Problem List:     Patient Active Problem List   Diagnosis    Diabetes mellitus ([address] )    Essential hypertension    Hyperlipidemia    Pelvic pain    Acute exacerbation of chronic obstructive pulmonary disease (COPD) ([address] )    Cushing's syndrome ([address] )    Leukocytosis    Osteoarthritis    Type 2 diabetes mellitus without complication ([address] )    Vaginal atrophy    Encounter for annual routine gynecological examination      Past Medical and Surgical History:     Past Medical History:   Diagnosis Date    Acute exacerbation of chronic obstructive pulmonary disease (COPD) ([address] ) 2/27/2020    Asthma     exercise induced    Diabetes ([address] )     type 2    Diabetes mellitus ([address] )     Hypertension     Morbid obesity ([address] ) 4/7/2020    Osteoarthritis 4/7/2020     Past Surgical History:   Procedure Laterality Date    BREAST BIOPSY Left 2016    benign    CHOLECYSTECTOMY      HERNIA REPAIR      LAPAROSCOPY FOR ECTOPIC PREGNANCY      x2, both tubes removed     MAMMO STEREOTACTIC BREAST BIOPSY LEFT (ALL INC) Left     REPLACEMENT TOTAL KNEE Left     SMALL INTESTINE SURGERY      after hernia surgery       Family History:     Family History   Problem Relation Age of Onset    Breast cancer Mother 36        cause of death    Heart attack Father         MI cause of death    No Known Problems Sister     No Known Problems Daughter     No Known Problems Maternal Grandmother     No Known Problems Maternal Grandfather     No Known Problems Paternal Grandmother     No Known Problems Paternal Grandfather     No Known Problems Sister     No Known Problems Sister     Colon cancer Neg Hx     Ovarian cancer Neg Hx       Social History:     Social History     Socioeconomic History    Marital status: Single     Spouse name: None    Number of children: None    Years of education: None    Highest education level: None   Occupational History    None   Tobacco Use    Smoking status: Former Smoker    Smokeless tobacco: Never Used   Vaping Use    Vaping Use: Never used   Substance and Sexual Activity    Alcohol use: Yes     Comment: rare    Drug use: No    Sexual activity: Not Currently     Birth control/protection: Surgical   Other Topics Concern    None   Social History Narrative    None     Social Determinants of Health     Financial Resource Strain: Not on file   Food Insecurity: Not on file   Transportation Needs: Not on file   Physical Activity: Not on file   Stress: Not on file   Social Connections: Not on file   Intimate Partner Violence: Not on file   Housing Stability: Not on file      Medications and Allergies:     Current Outpatient Medications   Medication Sig Dispense Refill    albuterol (PROVENTIL HFA,VENTOLIN HFA) 90 mcg/act inhaler Inhale 2 puffs every 6 (six) hours as needed for wheezing 54 g 3    Aspirin Low Dose 81 MG EC tablet TAKE 1 TABLET (81 MG TOTAL) BY MOUTH DAILY 90 tablet 3    atorvastatin (LIPITOR) 40 mg tablet Take 1 tablet (40 mg total) by mouth daily 90 tablet 3    Banophen 25 MG capsule TAKE 1 CAPSULE (25 MG TOTAL) BY MOUTH DAILY AT BEDTIME AS NEEDED FOR ITCHING 90 capsule 3    benzonatate (TESSALON PERLES) 100 mg capsule Take 1 capsule (100 mg total) by mouth 3 (three) times a day as needed for cough 20 capsule 0    calcipotriene (DOVONEX) 0 005 % cream APPLY TO AFFECTED AREA TWICE A DAY 30 DAYS LARGER AREA HAND LEGS 120 g 0    Calcium Carb-Cholecalciferol (Calcium Carbonate-Vitamin D3) 600-400 MG-UNIT TABS TAKE 2 TABLETS BY MOUTH DAILY 60 tablet 6    colchicine (COLCRYS) 0 6 mg tablet Take 2 pills immediately and take 3rd pill an hour later 3 tablet 0    Comfort EZ Pen Needles 33G X 5 MM MISC INJECT 4 TIMES TO 5 TIMES A DAY SUBCUTANEOUS WITH INSULINE 400 each 5    Easy Comfort Pen Needles 32G X 4 MM MISC USE FOR INSULIN INJECTIONS ONCE DAILY AS DIRECTED 100 each 3    Easy Comfort Pen Needles 33G X 4 MM MISC WITH INSULINE PEN AS NEEDED UP TO 5 TIMES X90 DAYS 400 each 5    escitalopram (LEXAPRO) 10 mg tablet Take 1 tablet (10 mg total) by mouth daily with dinner 30 tablet 5    fenofibrate (TRICOR) 145 mg tablet Take 1 tablet (145 mg total) by mouth daily 90 tablet 3    glipiZIDE (GLUCOTROL) 5 mg tablet Take 1 tablet (5 mg total) by mouth 2 (two) times a day with meals 180 tablet 3    glucose blood test strip Use as instructed to test three times daily   100 each 5    insulin glargine (Lantus SoloStar) 100 units/mL injection pen Inject 50 Units under the skin daily 15 mL 3    levocetirizine (XYZAL) 5 MG tablet TAKE 1 TABLET (5 MG TOTAL) BY MOUTH EVERY EVENING 30 tablet 3    magnesium oxide (MAG-OX) 400 mg Take 1 tablet (400 mg total) by mouth 2 (two) times a day 180 tablet 3    metFORMIN (GLUCOPHAGE) 1000 MG tablet Take 1 tablet (1,000 mg total) by mouth 2 (two) times a day with meals 180 tablet 3    metoprolol tartrate (LOPRESSOR) 25 mg tablet Take 1 tablet (25 mg total) by mouth every 12 (twelve) hours 180 tablet 3    montelukast (SINGULAIR) 10 mg tablet TAKE 1 TABLET (10 MG TOTAL) BY MOUTH EVERY 24 HOURS 90 tablet 3    mupirocin (BACTROBAN) 2 % ointment Apply 1 application topically 3 (three) times a day      oxybutynin (DITROPAN XL) 15 MG 24 hr tablet TAKE 1 TABLET (15 MG TOTAL) BY MOUTH DAILY 90 tablet 3    Ozempic, 1 MG/DOSE, 4 MG/3ML SOPN injection pen Inject 0 75 mL (1 mg total) under the skin once a week 6 mL 3    pantoprazole (PROTONIX) 40 mg tablet Take 1 tablet (40 mg total) by mouth daily 90 tablet 3    fluticasone-umeclidinium-vilanterol (Trelegy Ellipta) 100-62 5-25 MCG/INH inhaler Inhale 1 puff daily Rinse mouth after use  60 blister 3     No current facility-administered medications for this visit  Allergies   Allergen Reactions    No Active Allergies       Immunizations:     Immunization History   Administered Date(s) Administered    Fluzone Split Quad 0 25 mL 09/28/2016    Fluzone Split Quad 0 5 mL 10/13/2014, 11/15/2017    INFLUENZA 01/08/2016, 09/28/2016, 11/15/2017    Influenza, injectable, quadrivalent, preservative free 0 5 mL 11/13/2019    Influenza, recombinant, quadrivalent,injectable, preservative free 09/19/2018, 12/03/2020, 12/15/2021    Pneumococcal Conjugate 13-Valent 12/19/2018    Pneumococcal Polysaccharide PPV23 01/08/2016    Tdap 01/05/2015      Health Maintenance:         Topic Date Due    Breast Cancer Screening: Mammogram  04/04/2023    Colorectal Cancer Screening  04/05/2023    Cervical Cancer Screening  04/22/2025    HIV Screening  Completed    Hepatitis C Screening  Completed         Topic Date Due    COVID-19 Vaccine (1) Never done    Influenza Vaccine (1) 09/01/2022      Medicare Screening Tests and Risk Assessments:     Shena Morgan is here for her Subsequent Wellness visit  Health Risk Assessment:   Patient rates overall health as good  Patient feels that their physical health rating is same  Patient is satisfied with their life  Eyesight was rated as same  Hearing was rated as same  Patient feels that their emotional and mental health rating is same  Patients states they are never, rarely angry  Patient states they are never, rarely unusually tired/fatigued  Pain experienced in the last 7 days has been none   Patient states that she has experienced no weight loss or gain in last 6 months  Fall Risk Screening: In the past year, patient has experienced: no history of falling in past year      Urinary Incontinence Screening:   Patient has not leaked urine accidently in the last six months  Home Safety:  Patient does not have trouble with stairs inside or outside of their home  Patient has working smoke alarms and has working carbon monoxide detector  Home safety hazards include: none  Nutrition:   Current diet is Regular  Medications:   Patient is not currently taking any over-the-counter supplements  Patient is able to manage medications  Activities of Daily Living (ADLs)/Instrumental Activities of Daily Living (IADLs):   Walk and transfer into and out of bed and chair?: Yes  Dress and groom yourself?: Yes    Bathe or shower yourself?: Yes    Feed yourself?  Yes  Do your laundry/housekeeping?: Yes  Manage your money, pay your bills and track your expenses?: Yes  Make your own meals?: Yes    Do your own shopping?: Yes    Previous Hospitalizations:   Any hospitalizations or ED visits within the last 12 months?: No      Advance Care Planning:   Living will: No    Durable POA for healthcare: No    Advanced directive counseling given: No    Five wishes given: No      PREVENTIVE SCREENINGS      Cardiovascular Screening:    General: Screening Not Indicated, History Lipid Disorder and Risks and Benefits Discussed      Diabetes Screening:     General: Screening Not Indicated, History Diabetes, Risks and Benefits Discussed and Screening Current      Colorectal Cancer Screening:     General: Screening Current      Breast Cancer Screening:     General: Screening Current and Risks and Benefits Discussed      Cervical Cancer Screening:    General: Screening Current and Risks and Benefits Discussed      Osteoporosis Screening:    General: Risks and Benefits Discussed      Abdominal Aortic Aneurysm (AAA) Screening:        General: Risks and Benefits Discussed      Lung Cancer Screening: General: Screening Not Indicated and Risks and Benefits Discussed      Hepatitis C Screening:    General: Screening Current and Risks and Benefits Discussed    Screening, Brief Intervention, and Referral to Treatment (SBIRT)    Screening      Single Item Drug Screening:  How often have you used an illegal drug (including marijuana) or a prescription medication for non-medical reasons in the past year? never    Single Item Drug Screen Score: 0  Interpretation: Negative screen for possible drug use disorder    Other Counseling Topics:   Car/seat belt/driving safety, skin self-exam, sunscreen and calcium and vitamin D intake and regular weightbearing exercise       No exam data present     Physical Exam:     Ht 5' 3" (1 6 m)   LMP  (LMP Unknown)   BMI 27 78 kg/m²     Physical Exam     Carrington Squires MD

## 2022-08-25 NOTE — PATIENT INSTRUCTIONS
Medicare Preventive Visit Patient Instructions  Thank you for completing your Welcome to Medicare Visit or Medicare Annual Wellness Visit today  Your next wellness visit will be due in one year (8/26/2023)  The screening/preventive services that you may require over the next 5-10 years are detailed below  Some tests may not apply to you based off risk factors and/or age  Screening tests ordered at today's visit but not completed yet may show as past due  Also, please note that scanned in results may not display below  Preventive Screenings:  Service Recommendations Previous Testing/Comments   Colorectal Cancer Screening  * Colonoscopy    * Fecal Occult Blood Test (FOBT)/Fecal Immunochemical Test (FIT)  * Fecal DNA/Cologuard Test  * Flexible Sigmoidoscopy Age: 39-70 years old   Colonoscopy: every 10 years (may be performed more frequently if at higher risk)  OR  FOBT/FIT: every 1 year  OR  Cologuard: every 3 years  OR  Sigmoidoscopy: every 5 years  Screening may be recommended earlier than age 39 if at higher risk for colorectal cancer  Also, an individualized decision between you and your healthcare provider will decide whether screening between the ages of 74-80 would be appropriate  Colonoscopy: Not on file  FOBT/FIT: Not on file  Cologuard: 04/05/2020  Sigmoidoscopy: Not on file    Screening Current     Breast Cancer Screening Age: 36 years old  Frequency: every 1-2 years  Not required if history of left and right mastectomy Mammogram: 04/04/2022    Screening Current   Cervical Cancer Screening Between the ages of 21-29, pap smear recommended once every 3 years  Between the ages of 33-67, can perform pap smear with HPV co-testing every 5 years     Recommendations may differ for women with a history of total hysterectomy, cervical cancer, or abnormal pap smears in past  Pap Smear: 04/22/2022    Screening Current   Hepatitis C Screening Once for adults born between 1945 and 1965  More frequently in patients at high risk for Hepatitis C Hep C Antibody: 03/25/2019    Screening Current   Diabetes Screening 1-2 times per year if you're at risk for diabetes or have pre-diabetes Fasting glucose: 191 mg/dL (3/23/2022)  A1C: 9 9 % (3/23/2022)  Screening Not Indicated  History Diabetes   Cholesterol Screening Once every 5 years if you don't have a lipid disorder  May order more often based on risk factors  Lipid panel: 12/06/2021    Screening Not Indicated  History Lipid Disorder     Other Preventive Screenings Covered by Medicare:  1  Abdominal Aortic Aneurysm (AAA) Screening: covered once if your at risk  You're considered to be at risk if you have a family history of AAA  2  Lung Cancer Screening: covers low dose CT scan once per year if you meet all of the following conditions: (1) Age 50-69; (2) No signs or symptoms of lung cancer; (3) Current smoker or have quit smoking within the last 15 years; (4) You have a tobacco smoking history of at least 20 pack years (packs per day multiplied by number of years you smoked); (5) You get a written order from a healthcare provider  3  Glaucoma Screening: covered annually if you're considered high risk: (1) You have diabetes OR (2) Family history of glaucoma OR (3)  aged 48 and older OR (3)  American aged 72 and older  3  Osteoporosis Screening: covered every 2 years if you meet one of the following conditions: (1) You're estrogen deficient and at risk for osteoporosis based off medical history and other findings; (2) Have a vertebral abnormality; (3) On glucocorticoid therapy for more than 3 months; (4) Have primary hyperparathyroidism; (5) On osteoporosis medications and need to assess response to drug therapy  · Last bone density test (DXA Scan): 04/17/2019   5  HIV Screening: covered annually if you're between the age of 15-65  Also covered annually if you are younger than 13 and older than 72 with risk factors for HIV infection   For pregnant patients, it is covered up to 3 times per pregnancy  Immunizations:  Immunization Recommendations   Influenza Vaccine Annual influenza vaccination during flu season is recommended for all persons aged >= 6 months who do not have contraindications   Pneumococcal Vaccine   * Pneumococcal conjugate vaccine = PCV13 (Prevnar 13), PCV15 (Vaxneuvance), PCV20 (Prevnar 20)  * Pneumococcal polysaccharide vaccine = PPSV23 (Pneumovax) Adults 25-60 years old: 1-3 doses may be recommended based on certain risk factors  Adults 72 years old: 1-2 doses may be recommended based off what pneumonia vaccine you previously received   Hepatitis B Vaccine 3 dose series if at intermediate or high risk (ex: diabetes, end stage renal disease, liver disease)   Tetanus (Td) Vaccine - COST NOT COVERED BY MEDICARE PART B Following completion of primary series, a booster dose should be given every 10 years to maintain immunity against tetanus  Td may also be given as tetanus wound prophylaxis  Tdap Vaccine - COST NOT COVERED BY MEDICARE PART B Recommended at least once for all adults  For pregnant patients, recommended with each pregnancy  Shingles Vaccine (Shingrix) - COST NOT COVERED BY MEDICARE PART B  2 shot series recommended in those aged 48 and above     Health Maintenance Due:      Topic Date Due    Breast Cancer Screening: Mammogram  04/04/2023    Colorectal Cancer Screening  04/05/2023    Cervical Cancer Screening  04/22/2025    HIV Screening  Completed    Hepatitis C Screening  Completed     Immunizations Due:      Topic Date Due    COVID-19 Vaccine (1) Never done    Influenza Vaccine (1) 09/01/2022     Advance Directives   What are advance directives? Advance directives are legal documents that state your wishes and plans for medical care  These plans are made ahead of time in case you lose your ability to make decisions for yourself   Advance directives can apply to any medical decision, such as the treatments you want, and if you want to donate organs  What are the types of advance directives? There are many types of advance directives, and each state has rules about how to use them  You may choose a combination of any of the following:  · Living will: This is a written record of the treatment you want  You can also choose which treatments you do not want, which to limit, and which to stop at a certain time  This includes surgery, medicine, IV fluid, and tube feedings  · Durable power of  for healthcare [facility]): This is a written record that states who you want to make healthcare choices for you when you are unable to make them for yourself  This person, called a proxy, is usually a family member or a friend  You may choose more than 1 proxy  · Do not resuscitate (DNR) order:  A DNR order is used in case your heart stops beating or you stop breathing  It is a request not to have certain forms of treatment, such as CPR  A DNR order may be included in other types of advance directives  · Medical directive: This covers the care that you want if you are in a coma, near death, or unable to make decisions for yourself  You can list the treatments you want for each condition  Treatment may include pain medicine, surgery, blood transfusions, dialysis, IV or tube feedings, and a ventilator (breathing machine)  · Values history: This document has questions about your views, beliefs, and how you feel and think about life  This information can help others choose the care that you would choose  Why are advance directives important? An advance directive helps you control your care  Although spoken wishes may be used, it is better to have your wishes written down  Spoken wishes can be misunderstood, or not followed  Treatments may be given even if you do not want them  An advance directive may make it easier for your family to make difficult choices about your care     Weight Management   Why it is important to manage your weight: Being overweight increases your risk of health conditions such as heart disease, high blood pressure, type 2 diabetes, and certain types of cancer  It can also increase your risk for osteoarthritis, sleep apnea, and other respiratory problems  Aim for a slow, steady weight loss  Even a small amount of weight loss can lower your risk of health problems  How to lose weight safely:  A safe and healthy way to lose weight is to eat fewer calories and get regular exercise  You can lose up about 1 pound a week by decreasing the number of calories you eat by 500 calories each day  Healthy meal plan for weight management:  A healthy meal plan includes a variety of foods, contains fewer calories, and helps you stay healthy  A healthy meal plan includes the following:  · Eat whole-grain foods more often  A healthy meal plan should contain fiber  Fiber is the part of grains, fruits, and vegetables that is not broken down by your body  Whole-grain foods are healthy and provide extra fiber in your diet  Some examples of whole-grain foods are whole-wheat breads and pastas, oatmeal, brown rice, and bulgur  · Eat a variety of vegetables every day  Include dark, leafy greens such as spinach, kale, arsh greens, and mustard greens  Eat yellow and orange vegetables such as carrots, sweet potatoes, and winter squash  · Eat a variety of fruits every day  Choose fresh or canned fruit (canned in its own juice or light syrup) instead of juice  Fruit juice has very little or no fiber  · Eat low-fat dairy foods  Drink fat-free (skim) milk or 1% milk  Eat fat-free yogurt and low-fat cottage cheese  Try low-fat cheeses such as mozzarella and other reduced-fat cheeses  · Choose meat and other protein foods that are low in fat  Choose beans or other legumes such as split peas or lentils  Choose fish, skinless poultry (chicken or turkey), or lean cuts of red meat (beef or pork)   Before you cook meat or poultry, cut off any visible fat    · Use less fat and oil  Try baking foods instead of frying them  Add less fat, such as margarine, sour cream, regular salad dressing and mayonnaise to foods  Eat fewer high-fat foods  Some examples of high-fat foods include french fries, doughnuts, ice cream, and cakes  · Eat fewer sweets  Limit foods and drinks that are high in sugar  This includes candy, cookies, regular soda, and sweetened drinks  Exercise:  Exercise at least 30 minutes per day on most days of the week  Some examples of exercise include walking, biking, dancing, and swimming  You can also fit in more physical activity by taking the stairs instead of the elevator or parking farther away from stores  Ask your healthcare provider about the best exercise plan for you  © Copyright [employer] 2018 Information is for End User's use only and may not be sold, redistributed or otherwise used for commercial purposes  All illustrations and images included in CareNotes® are the copyrighted property of [employer] , [employer]  or [address] [place], [place] [address]pa[facility]    Type 2 Diabetes Management for Adults   AMBULATORY CARE:   Type 2 diabetes  is a disease that affects how your body uses glucose (sugar)  Either your body cannot make enough insulin, or it cannot use the insulin correctly  It is important to keep diabetes controlled to prevent damage to your heart, blood vessels, and other organs  Have someone call your local emergency number (911 in the [address] [facility]) if:   · You cannot be woken  · You have signs of diabetic ketoacidosis:     ? confusion, fatigue    ? vomiting    ? rapid heartbeat    ? fruity smelling breath    ? extreme thirst    ? dry mouth and skin    · You have any of the following signs of a heart attack:      ?  Squeezing, pressure, or pain in your chest    ? You may  also have any of the following:     § Discomfort or pain in your back, neck, jaw, stomach, or arm    § Shortness of breath    § Nausea or vomiting    § Lightheadedness or a sudden cold sweat    · You have any of the following signs of a stroke:      ? Numbness or drooping on one side of your face     ? Weakness in an arm or leg    ? Confusion or difficulty speaking    ? Dizziness, a severe headache, or vision loss    Call your doctor or diabetes care team if:   · You have a sore or wound that will not heal     · You have a change in the amount you urinate  · Your blood sugar levels are higher than your target goals  · You often have lower blood sugar levels than your target goals  · Your skin is red, dry, warm, or swollen  · You have trouble coping with diabetes, or you feel anxious or depressed  · You have questions or concerns about your condition or care  What you need to know about high blood sugar levels:  High blood sugar levels may not cause any symptoms  You may feel more thirsty or urinate more often than usual  Over time, high blood sugar levels can damage your nerves, blood vessels, tissues, and organs  The following can increase your blood sugar levels:  · Large meals or large amounts of carbohydrates at one time    · Less physical activity    · Stress    · Illness    · A lower dose of medicine or insulin, or a late dose    What you need to know about low blood sugar levels: You can prevent symptoms such as shakiness, dizziness, irritability, or confusion by preventing your blood sugar levels from going too low  · Treat a low blood sugar level right away  ? Drink 4 ounces of juice or have 1 tube of glucose gel  ? Check your blood sugar level again 10 to 15 minutes later  ? When the level goes back to normal, eat a meal or snack to prevent another decrease  · Keep glucose gel, raisins, or hard candy with you at all times to treat a low blood sugar level  · Your blood sugar level can get too low if you take diabetes medicine or insulin and do not eat enough food  · If you use insulin, check your blood sugar level before you exercise  ?  If your blood sugar level is below 100 mg/dL, eat 4 crackers or 2 ounces of raisins, or drink 4 ounces of juice  ? Check your level every 30 minutes if you exercise more than 1 hour  ? You may need a snack during or after exercise  What you can do to manage your blood sugar levels:   · Check your blood sugar levels as directed and as needed  Several items are available to use to check your levels  You may need to check by testing a drop of blood in a glucose monitor  You may instead be given a continuous glucose monitoring (CGM) device  The device is worn at all times  The CGM checks your blood sugar level every 5 minutes  It sends results to an electronic device such as a smart phone  A CGM can be used with or without an insulin pump  Talk with your provider to find out which method is best for you  The goal for blood sugar levels before meals  is between 80 and 130 mg/dL and 2 hours after eating  is lower than 180 mg/dL  · Make healthy food choices  Work with a dietitian to develop a meal plan that works for you and your schedule  A dietitian can help you learn how to eat the right amount of carbohydrates during your meals and snacks  Carbohydrates can raise your blood sugar level if you eat too many at one time  Examples of foods that contain carbohydrates are breads, cereals, rice, pasta, and sweets  · Get regular physical activity  Physical activity can help you get to your target blood sugar level goal and manage your weight  Get at least 150 minutes of moderate to vigorous aerobic physical activity each week  Do not miss more than 2 days in a row  Do not sit longer than 30 minutes at a time  Your healthcare provider can help you create an activity plan  The plan can include the best activities for you and can help you build your strength and endurance  · Maintain a healthy weight  Ask your healthcare provider what a healthy weight is for you   Ask him or her to help you create a safe weight loss plan if you are overweight  · Take your diabetes medicine or insulin as directed  You may need diabetes medicine, insulin, or both to help control your blood sugar levels  Your healthcare provider will teach you how and when to take your diabetes medicine or insulin  You will also be taught about side effects oral diabetes medicine can cause  Insulin may be injected, or given through a pump or pen  You and your care team will discuss which method is best for you  ? An insulin pump  is an implanted device that gives your insulin 24 hours a day  An insulin pump prevents the need for multiple insulin injections in a day  ? An insulin pen  is a device prefilled with the right amount of insulin  ? You and your family members will be taught how to draw up and give insulin  if this is the best method for you  Your education team will also teach you how to dispose of needles and syringes  ? You will learn how much insulin you need  and when to give it  You will be taught when not to give insulin  You will also be taught what to do if your blood sugar level drops too low  This may happen if you take insulin and do not eat the right amount of carbohydrates  Other things you can do to manage type 2 diabetes:   · Wear medical alert identification  Wear medical alert jewelry or carry a card that says you have diabetes  Ask your provider where to get these items  · Do not smoke  Nicotine and other chemicals in cigarettes and cigars can cause lung and blood vessel damage  It also makes it more difficult to manage your diabetes  Ask your provider for information if you currently smoke and need help to quit  Do not use e-cigarettes or smokeless tobacco in place of cigarettes or to help you quit  They still contain nicotine  · Check your feet each day for cuts, scratches, calluses, or other wounds  Look for redness and swelling, and feel for warmth   Wear shoes that fit well  Check your shoes for rocks or other objects that can hurt your feet  Do not walk barefoot or wear shoes without socks  Wear cotton socks to help keep your feet dry  · Ask about vaccines you may need  You have a higher risk for serious illness if you get the flu, pneumonia, COVID-19, or hepatitis  Ask your provider if you should get vaccines to prevent these or other diseases, and when to get the vaccines  · Talk to your care team if you become stressed about diabetes care  Sometimes being able to fit diabetes care into your life can cause increased stress  The stress can cause you not to take care of yourself properly  Your care team can help by offering tips about self-care  Your care team may suggest you talk to a mental health provider  The provider can listen and offer help with self-care issues  Follow up with your doctor or diabetes care team as directed: You may need to have blood tests done before your follow-up visit  The test results will show if changes need to be made in your treatment or self-care  Write down your questions so you remember to ask them during your visits  Talk to your provider if you cannot afford your medicine  © Copyright [employer] 2022 Information is for End User's use only and may not be sold, redistributed or otherwise used for commercial purposes  All illustrations and images included in CareNotes® are the copyrighted property of [employer]  or [employer] [place]  The above information is an  only  It is not intended as medical advice for individual conditions or treatments  Talk to your doctor, nurse or pharmacist before following any medical regimen to see if it is safe and effective for you

## 2022-08-25 NOTE — PROGRESS NOTES
Assessment/Plan:         Diagnoses and all orders for this visit:    Encounter for general adult medical examination with abnormal findings; done in detail     -     POCT urine dip    Financial difficulties; FU w     Inability to acquire transportation    Type II diabetes mellitus with complication ([place] [address] ); life style Mod  Re Start; Meds on Regular Basisi  -     POCT hemoglobin A1c  -     POCT urine dip  -     Ambulatory Referral to Ophthalmology; Future  -     Ambulatory Referral to Podiatry; Future  RTc in 1-2 mos w :  -     Protein, Total W/Creat, 24 Hour Urine  -     UA (URINE) with reflex to Scope; Future  -     Comprehensive metabolic panel; Future  -     CBC and differential; Future  -     Magnesium; Future  -     glipiZIDE (GLUCOTROL) 5 mg tablet; Take 1 tablet (5 mg total) by mouth 2 (two) times a day with meals  -     Ozempic, 1 MG/DOSE, 4 MG/3ML SOPN injection pen; Inject 0 75 mL (1 mg total) under the skin once a week    Hyperlipidemia associated with type 2 diabetes mellitus ([place] [address] )  -     Lipid panel; Future  -     atorvastatin (LIPITOR) 40 mg tablet; Take 1 tablet (40 mg total) by mouth daily  -     fenofibrate (TRICOR) 145 mg tablet; Take 1 tablet (145 mg total) by mouth daily    Arthritis  -     XR knee 3 vw left non injury; Future  -     XR knee 3 vw right non injury; Future  -     XR spine lumbar minimum 4 views non injury; Future    Other proteinuria  -     Protein, Total W/Creat, 24 Hour Urine    Osteopenia, unspecified location  -     Calcium Carbonate-Vitamin D3 600-400 MG-UNIT TABS; Take 2 tablets by mouth daily    Acute exacerbation of COPD with asthma ([facility])  -     fluticasone-umeclidinium-vilanterol (Trelegy Ellipta) 100-62 5-25 MCG/INH inhaler; Inhale 1 puff daily Rinse mouth after use  Pulmonary emphysema, unspecified emphysema type ([place] [address] )  -     fluticasone-umeclidinium-vilanterol (Trelegy Ellipta) 100-62 5-25 MCG/INH inhaler;  Inhale 1 puff daily Rinse mouth after use     Diabetes mellitus without complication (HCC)  -     Insulin Glargine Solostar (Lantus SoloStar) 100 UNIT/ML SOPN; Inject 50 Units under the skin daily  -     magnesium oxide (MAG-OX) 400 mg; Take 1 tablet (400 mg total) by mouth 2 (two) times a day  -     metFORMIN (GLUCOPHAGE) 1000 MG tablet; Take 1 tablet (1,000 mg total) by mouth 2 (two) times a day with meals    Essential hypertension  -     metoprolol tartrate (LOPRESSOR) 25 mg tablet; Take 1 tablet (25 mg total) by mouth every 12 (twelve) hours    Uncomplicated asthma, unspecified asthma severity, unspecified whether persistent  -     montelukast (SINGULAIR) 10 mg tablet; Take 1 tablet (10 mg total) by mouth every 24 hours  -     pantoprazole (PROTONIX) 40 mg tablet; Take 1 tablet (40 mg total) by mouth daily        Subjective:      Patient ID: Margarita Mace is a 61 y o  female  [address] is here for AWV and Regular check Up, she Finally started taking her meds on regular basis, recently, No recent Blood work, med list reviewed w  Pt,  The following portions of the patient's history were reviewed and updated as appropriate: allergies, current medications, past family history, past medical history, past social history, past surgical history and problem list     Review of Systems   Constitutional: Positive for fatigue  Negative for chills and fever  HENT: Negative for congestion, facial swelling, sore throat, trouble swallowing and voice change  Eyes: Negative for pain, discharge and visual disturbance  Respiratory: Negative for cough, shortness of breath and wheezing  Cardiovascular: Negative for chest pain, palpitations and leg swelling  Gastrointestinal: Negative for abdominal pain, blood in stool, constipation, diarrhea and nausea  Endocrine: Negative for polydipsia, polyphagia and polyuria  Genitourinary: Negative for difficulty urinating, hematuria and urgency  Musculoskeletal: Positive for arthralgias and back pain  Negative for myalgias  Skin: Negative for rash  Neurological: Positive for dizziness  Negative for tremors, weakness and headaches  Hematological: Negative for adenopathy  Does not bruise/bleed easily  Psychiatric/Behavioral: Negative for dysphoric mood, sleep disturbance and suicidal ideas  Objective:      /62 (BP Location: Left arm, Patient Position: Sitting, Cuff Size: Standard)   Pulse 79   Temp 98 1 °F (36 7 °C) (Tympanic)   Resp 16   Ht 5' 3" (1 6 m)   Wt 69 9 kg (154 lb)   LMP  (LMP Unknown)   SpO2 99%   BMI 27 28 kg/m²          Physical Exam  Constitutional:       General: She is not in acute distress  HENT:      Head: Normocephalic  Mouth/Throat:      Pharynx: No oropharyngeal exudate  Eyes:      General: No scleral icterus  Conjunctiva/sclera: Conjunctivae normal       Pupils: Pupils are equal, round, and reactive to light  Neck:      Thyroid: No thyromegaly  Cardiovascular:      Rate and Rhythm: Normal rate and regular rhythm  Heart sounds: Normal heart sounds  No murmur heard  Pulmonary:      Effort: Pulmonary effort is normal  No respiratory distress  Breath sounds: Normal breath sounds  No wheezing or rales  Abdominal:      General: Bowel sounds are normal  There is no distension  Palpations: Abdomen is soft  Tenderness: There is no abdominal tenderness  There is no guarding or rebound  Musculoskeletal:         General: Tenderness present  Cervical back: Neck supple  Lymphadenopathy:      Cervical: No cervical adenopathy  Skin:     Coloration: Skin is not pale  Findings: No rash  Neurological:      Mental Status: She is alert and oriented to person, place, and time  Sensory: No sensory deficit  Motor: No weakness

## 2022-09-15 ENCOUNTER — APPOINTMENT (OUTPATIENT)
Dept: LAB | Facility: HOSPITAL | Age: 57
End: 2022-09-15
Payer: COMMERCIAL

## 2022-09-15 ENCOUNTER — HOSPITAL ENCOUNTER (OUTPATIENT)
Dept: RADIOLOGY | Facility: HOSPITAL | Age: 57
End: 2022-09-15
Payer: COMMERCIAL

## 2022-09-15 DIAGNOSIS — E11.69 HYPERLIPIDEMIA ASSOCIATED WITH TYPE 2 DIABETES MELLITUS (HCC): ICD-10-CM

## 2022-09-15 DIAGNOSIS — E11.8 TYPE II DIABETES MELLITUS WITH COMPLICATION (HCC): ICD-10-CM

## 2022-09-15 DIAGNOSIS — M19.90 ARTHRITIS: ICD-10-CM

## 2022-09-15 DIAGNOSIS — E78.5 HYPERLIPIDEMIA ASSOCIATED WITH TYPE 2 DIABETES MELLITUS (HCC): ICD-10-CM

## 2022-09-15 LAB
ALBUMIN SERPL BCP-MCNC: 4.2 G/DL (ref 3.5–5)
ALP SERPL-CCNC: 120 U/L (ref 43–122)
ALT SERPL W P-5'-P-CCNC: 39 U/L
ANION GAP SERPL CALCULATED.3IONS-SCNC: 9 MMOL/L (ref 5–14)
AST SERPL W P-5'-P-CCNC: 29 U/L (ref 14–36)
BASOPHILS # BLD AUTO: 0.1 THOUSANDS/ΜL (ref 0–0.1)
BASOPHILS NFR BLD AUTO: 1 % (ref 0–1)
BILIRUB SERPL-MCNC: 0.33 MG/DL (ref 0.2–1)
BUN SERPL-MCNC: 12 MG/DL (ref 5–25)
CALCIUM SERPL-MCNC: 9.9 MG/DL (ref 8.4–10.2)
CHLORIDE SERPL-SCNC: 98 MMOL/L (ref 96–108)
CHOLEST SERPL-MCNC: 220 MG/DL
CO2 SERPL-SCNC: 33 MMOL/L (ref 21–32)
CREAT SERPL-MCNC: 0.6 MG/DL (ref 0.6–1.2)
EOSINOPHIL # BLD AUTO: 0.31 THOUSAND/ΜL (ref 0–0.61)
EOSINOPHIL NFR BLD AUTO: 3 % (ref 0–6)
ERYTHROCYTE [DISTWIDTH] IN BLOOD BY AUTOMATED COUNT: 13 % (ref 11.6–15.1)
GFR SERPL CREATININE-BSD FRML MDRD: 100 ML/MIN/1.73SQ M
GLUCOSE P FAST SERPL-MCNC: 215 MG/DL (ref 70–99)
HCT VFR BLD AUTO: 43.3 % (ref 34.8–46.1)
HDLC SERPL-MCNC: 41 MG/DL
HGB BLD-MCNC: 14.2 G/DL (ref 11.5–15.4)
IMM GRANULOCYTES # BLD AUTO: 0.03 THOUSAND/UL (ref 0–0.2)
IMM GRANULOCYTES NFR BLD AUTO: 0 % (ref 0–2)
LDLC SERPL CALC-MCNC: 139 MG/DL
LYMPHOCYTES # BLD AUTO: 3.93 THOUSANDS/ΜL (ref 0.6–4.47)
LYMPHOCYTES NFR BLD AUTO: 33 % (ref 14–44)
MAGNESIUM SERPL-MCNC: 1.6 MG/DL (ref 1.6–2.3)
MCH RBC QN AUTO: 26.6 PG (ref 26.8–34.3)
MCHC RBC AUTO-ENTMCNC: 32.8 G/DL (ref 31.4–37.4)
MCV RBC AUTO: 81 FL (ref 82–98)
MONOCYTES # BLD AUTO: 0.65 THOUSAND/ΜL (ref 0.17–1.22)
MONOCYTES NFR BLD AUTO: 5 % (ref 4–12)
NEUTROPHILS # BLD AUTO: 7.04 THOUSANDS/ΜL (ref 1.85–7.62)
NEUTS SEG NFR BLD AUTO: 58 % (ref 43–75)
NONHDLC SERPL-MCNC: 179 MG/DL
NRBC BLD AUTO-RTO: 0 /100 WBCS
PLATELET # BLD AUTO: 315 THOUSANDS/UL (ref 149–390)
PMV BLD AUTO: 10.4 FL (ref 8.9–12.7)
POTASSIUM SERPL-SCNC: 4.9 MMOL/L (ref 3.5–5.3)
PROT SERPL-MCNC: 7.8 G/DL (ref 6.4–8.4)
RBC # BLD AUTO: 5.34 MILLION/UL (ref 3.81–5.12)
SODIUM SERPL-SCNC: 140 MMOL/L (ref 135–147)
TRIGL SERPL-MCNC: 201 MG/DL
WBC # BLD AUTO: 12.06 THOUSAND/UL (ref 4.31–10.16)

## 2022-09-15 PROCEDURE — 80053 COMPREHEN METABOLIC PANEL: CPT

## 2022-09-15 PROCEDURE — 80061 LIPID PANEL: CPT

## 2022-09-15 PROCEDURE — 36415 COLL VENOUS BLD VENIPUNCTURE: CPT

## 2022-09-15 PROCEDURE — 85025 COMPLETE CBC W/AUTO DIFF WBC: CPT

## 2022-09-15 PROCEDURE — 72110 X-RAY EXAM L-2 SPINE 4/>VWS: CPT

## 2022-09-15 PROCEDURE — 83735 ASSAY OF MAGNESIUM: CPT

## 2022-09-15 PROCEDURE — 73562 X-RAY EXAM OF KNEE 3: CPT

## 2022-09-17 ENCOUNTER — APPOINTMENT (OUTPATIENT)
Dept: LAB | Facility: HOSPITAL | Age: 57
End: 2022-09-17
Payer: COMMERCIAL

## 2022-09-17 DIAGNOSIS — E11.9 TYPE 2 DIABETES MELLITUS WITHOUT COMPLICATION, WITH LONG-TERM CURRENT USE OF INSULIN (HCC): Primary | ICD-10-CM

## 2022-09-17 DIAGNOSIS — Z79.4 TYPE 2 DIABETES MELLITUS WITHOUT COMPLICATION, WITH LONG-TERM CURRENT USE OF INSULIN (HCC): Primary | ICD-10-CM

## 2022-09-17 LAB
CREAT 24H UR-MRATE: 1 G/24HR (ref 0.6–1.8)
PROT 24H UR-MCNC: <75 MG/24 HRS (ref 40–150)
SPECIMEN VOL UR: 1250 ML
SPECIMEN VOL UR: 1250 ML

## 2022-09-17 PROCEDURE — 82570 ASSAY OF URINE CREATININE: CPT

## 2022-09-17 PROCEDURE — 84156 ASSAY OF PROTEIN URINE: CPT

## 2022-09-29 ENCOUNTER — OFFICE VISIT (OUTPATIENT)
Dept: FAMILY MEDICINE CLINIC | Facility: CLINIC | Age: 57
End: 2022-09-29
Payer: COMMERCIAL

## 2022-09-29 VITALS
OXYGEN SATURATION: 98 % | TEMPERATURE: 97.8 F | RESPIRATION RATE: 16 BRPM | DIASTOLIC BLOOD PRESSURE: 70 MMHG | SYSTOLIC BLOOD PRESSURE: 116 MMHG | BODY MASS INDEX: 27.29 KG/M2 | HEART RATE: 66 BPM | HEIGHT: 63 IN | WEIGHT: 154 LBS

## 2022-09-29 DIAGNOSIS — G89.29 CHRONIC THORACIC BACK PAIN, UNSPECIFIED BACK PAIN LATERALITY: ICD-10-CM

## 2022-09-29 DIAGNOSIS — Z23 FLU VACCINE NEED: ICD-10-CM

## 2022-09-29 DIAGNOSIS — E11.69 HYPERLIPIDEMIA ASSOCIATED WITH TYPE 2 DIABETES MELLITUS (HCC): ICD-10-CM

## 2022-09-29 DIAGNOSIS — R10.84 GENERALIZED ABDOMINAL PAIN: Primary | ICD-10-CM

## 2022-09-29 DIAGNOSIS — I10 ESSENTIAL HYPERTENSION: ICD-10-CM

## 2022-09-29 DIAGNOSIS — E11.9 DIABETES MELLITUS WITHOUT COMPLICATION (HCC): ICD-10-CM

## 2022-09-29 DIAGNOSIS — M17.0 OSTEOARTHRITIS OF BOTH KNEES, UNSPECIFIED OSTEOARTHRITIS TYPE: ICD-10-CM

## 2022-09-29 DIAGNOSIS — F17.210 CIGARETTE SMOKER: ICD-10-CM

## 2022-09-29 DIAGNOSIS — E11.8 TYPE II DIABETES MELLITUS WITH COMPLICATION (HCC): ICD-10-CM

## 2022-09-29 DIAGNOSIS — E78.5 HYPERLIPIDEMIA ASSOCIATED WITH TYPE 2 DIABETES MELLITUS (HCC): ICD-10-CM

## 2022-09-29 DIAGNOSIS — J45.909 UNCOMPLICATED ASTHMA, UNSPECIFIED ASTHMA SEVERITY, UNSPECIFIED WHETHER PERSISTENT: ICD-10-CM

## 2022-09-29 DIAGNOSIS — M54.6 CHRONIC THORACIC BACK PAIN, UNSPECIFIED BACK PAIN LATERALITY: ICD-10-CM

## 2022-09-29 PROCEDURE — G0008 ADMIN INFLUENZA VIRUS VAC: HCPCS

## 2022-09-29 PROCEDURE — 3078F DIAST BP <80 MM HG: CPT | Performed by: INTERNAL MEDICINE

## 2022-09-29 PROCEDURE — 3074F SYST BP LT 130 MM HG: CPT | Performed by: INTERNAL MEDICINE

## 2022-09-29 PROCEDURE — 90682 RIV4 VACC RECOMBINANT DNA IM: CPT

## 2022-09-29 PROCEDURE — 99214 OFFICE O/P EST MOD 30 MIN: CPT | Performed by: INTERNAL MEDICINE

## 2022-09-29 RX ORDER — GLIPIZIDE 5 MG/1
5 TABLET ORAL 2 TIMES DAILY WITH MEALS
Qty: 180 TABLET | Refills: 3 | Status: SHIPPED | OUTPATIENT
Start: 2022-09-29

## 2022-09-29 RX ORDER — SEMAGLUTIDE 1.34 MG/ML
1 INJECTION, SOLUTION SUBCUTANEOUS WEEKLY
Qty: 6 ML | Refills: 3 | Status: SHIPPED | OUTPATIENT
Start: 2022-09-29

## 2022-09-29 RX ORDER — FENOFIBRATE 145 MG/1
145 TABLET, COATED ORAL DAILY
Qty: 90 TABLET | Refills: 3 | Status: SHIPPED | OUTPATIENT
Start: 2022-09-29

## 2022-09-29 RX ORDER — PANTOPRAZOLE SODIUM 40 MG/1
40 TABLET, DELAYED RELEASE ORAL DAILY
Qty: 90 TABLET | Refills: 3 | Status: SHIPPED | OUTPATIENT
Start: 2022-09-29

## 2022-09-29 RX ORDER — ATORVASTATIN CALCIUM 40 MG/1
40 TABLET, FILM COATED ORAL DAILY
Qty: 90 TABLET | Refills: 3 | Status: SHIPPED | OUTPATIENT
Start: 2022-09-29

## 2022-09-29 RX ORDER — INSULIN GLARGINE 100 [IU]/ML
50 INJECTION, SOLUTION SUBCUTANEOUS DAILY
Qty: 15 ML | Refills: 3 | Status: SHIPPED | OUTPATIENT
Start: 2022-09-29

## 2022-09-29 NOTE — PROGRESS NOTES
Name: Vilma Mota      : 1965      MRN: [id number]  Encounter Provider: Carrington Squires MD  Encounter Date: 2022   Encounter department: [address] [place] [address]     1  Generalized abdominal pain  -     US abdomen complete; Future; Expected date: 2022    2  Uncomplicated asthma, unspecified asthma severity, unspecified whether persistent  -     pantoprazole (PROTONIX) 40 mg tablet; Take 1 tablet (40 mg total) by mouth daily    3  Type II diabetes mellitus with complication (HCC)  -     Ozempic, 1 MG/DOSE, 4 MG/3ML SOPN injection pen; Inject 0 75 mL (1 mg total) under the skin once a week  -     glipiZIDE (GLUCOTROL) 5 mg tablet; Take 1 tablet (5 mg total) by mouth 2 (two) times a day with meals  -     UA (URINE) with reflex to Scope; Future; Expected date: 10/06/2022  -     Comprehensive metabolic panel; Future  -     CBC and differential; Future  -     Magnesium; Future    4  Essential hypertension  -     metoprolol tartrate (LOPRESSOR) 25 mg tablet; Take 1 tablet (25 mg total) by mouth every 12 (twelve) hours    5  Diabetes mellitus without complication (HCC)  -     metFORMIN (GLUCOPHAGE) 1000 MG tablet; Take 1 tablet (1,000 mg total) by mouth 2 (two) times a day with meals  -     magnesium oxide (MAG-OX) 400 mg; Take 1 tablet (400 mg total) by mouth 2 (two) times a day  -     Insulin Glargine Solostar (Lantus SoloStar) 100 UNIT/ML SOPN; Inject 0 5 mL (50 Units total) under the skin daily    6  Hyperlipidemia associated with type 2 diabetes mellitus (HCC)  -     fenofibrate (TRICOR) 145 mg tablet; Take 1 tablet (145 mg total) by mouth daily  -     atorvastatin (LIPITOR) 40 mg tablet; Take 1 tablet (40 mg total) by mouth daily  -     Lipid panel; Future    7  Cigarette smoker  -     CT lung screening program; Future; Expected date: 2022    8   Osteoarthritis of both knees, unspecified osteoarthritis type  -     Ambulatory Referral to Orthopedic Surgery; Future    9  Chronic thoracic back pain, unspecified back pain laterality  -     Ambulatory Referral to Comprehensive Spine Program; Future    10  Flu vaccine need  -     influenza vaccine, quadrivalent, recombinant, PF, 0 5 mL, for patients 18 yr+ (FLUBLOK)    Life style mod  FU w ophthalmology  FU w Podiatry  RTC in 3 mos w Blood  work         Subjective      Kendrick Orr is here for Regular check up, she feels ok, recent Blood work and med list Reviewed w Pt in detail,  Review of Systems   Constitutional: Negative for chills, fatigue and fever  HENT: Negative for congestion, facial swelling, sore throat, trouble swallowing and voice change  Eyes: Negative for pain, discharge and visual disturbance  Respiratory: Negative for cough, shortness of breath and wheezing  Cardiovascular: Negative for chest pain, palpitations and leg swelling  Gastrointestinal: Negative for abdominal pain, blood in stool, constipation, diarrhea and nausea  Endocrine: Negative for polydipsia, polyphagia and polyuria  Genitourinary: Negative for difficulty urinating, hematuria and urgency  Musculoskeletal: Negative for arthralgias and myalgias  Skin: Negative for rash  Neurological: Negative for dizziness, tremors, weakness and headaches  Hematological: Negative for adenopathy  Does not bruise/bleed easily  Psychiatric/Behavioral: Negative for dysphoric mood, sleep disturbance and suicidal ideas         Current Outpatient Medications on File Prior to Visit   Medication Sig    albuterol (PROVENTIL HFA,VENTOLIN HFA) 90 mcg/act inhaler Inhale 2 puffs every 6 (six) hours as needed for wheezing    Aspirin Low Dose 81 MG EC tablet TAKE 1 TABLET (81 MG TOTAL) BY MOUTH DAILY    Banophen 25 MG capsule TAKE 1 CAPSULE (25 MG TOTAL) BY MOUTH DAILY AT BEDTIME AS NEEDED FOR ITCHING    calcipotriene (DOVONEX) 0 005 % cream APPLY TO AFFECTED AREA TWICE A DAY 30 DAYS LARGER AREA HAND LEGS    Calcium Carbonate-Vitamin D3 600-400 MG-UNIT TABS Take 2 tablets by mouth daily    colchicine (COLCRYS) 0 6 mg tablet Take 2 pills immediately and take 3rd pill an hour later    Comfort EZ Pen Needles 33G X 5 MM MISC INJECT 4 TIMES TO 5 TIMES A DAY SUBCUTANEOUS WITH INSULINE    Easy Comfort Pen Needles 32G X 4 MM MISC USE FOR INSULIN INJECTIONS ONCE DAILY AS DIRECTED    Easy Comfort Pen Needles 33G X 4 MM MISC WITH INSULINE PEN AS NEEDED UP TO 5 TIMES X90 DAYS    escitalopram (LEXAPRO) 10 mg tablet Take 1 tablet (10 mg total) by mouth daily with dinner    fluticasone-umeclidinium-vilanterol (Trelegy Ellipta) 100-62 5-25 MCG/INH inhaler Inhale 1 puff daily Rinse mouth after use   glucose blood test strip Use as instructed to test three times daily      levocetirizine (XYZAL) 5 MG tablet TAKE 1 TABLET (5 MG TOTAL) BY MOUTH EVERY EVENING    montelukast (SINGULAIR) 10 mg tablet Take 1 tablet (10 mg total) by mouth every 24 hours    oxybutynin (DITROPAN XL) 15 MG 24 hr tablet TAKE 1 TABLET (15 MG TOTAL) BY MOUTH DAILY    [DISCONTINUED] atorvastatin (LIPITOR) 40 mg tablet Take 1 tablet (40 mg total) by mouth daily    [DISCONTINUED] fenofibrate (TRICOR) 145 mg tablet Take 1 tablet (145 mg total) by mouth daily    [DISCONTINUED] glipiZIDE (GLUCOTROL) 5 mg tablet Take 1 tablet (5 mg total) by mouth 2 (two) times a day with meals    [DISCONTINUED] Insulin Glargine Solostar (Lantus SoloStar) 100 UNIT/ML SOPN Inject 50 Units under the skin daily    [DISCONTINUED] magnesium oxide (MAG-OX) 400 mg Take 1 tablet (400 mg total) by mouth 2 (two) times a day    [DISCONTINUED] metFORMIN (GLUCOPHAGE) 1000 MG tablet Take 1 tablet (1,000 mg total) by mouth 2 (two) times a day with meals    [DISCONTINUED] metoprolol tartrate (LOPRESSOR) 25 mg tablet Take 1 tablet (25 mg total) by mouth every 12 (twelve) hours    [DISCONTINUED] Ozempic, 1 MG/DOSE, 4 MG/3ML SOPN injection pen Inject 0 75 mL (1 mg total) under the skin once a week  [DISCONTINUED] pantoprazole (PROTONIX) 40 mg tablet Take 1 tablet (40 mg total) by mouth daily       Objective     /70 (BP Location: Left arm, Patient Position: Sitting, Cuff Size: Standard)   Pulse 66   Temp 97 8 °F (36 6 °C) (Tympanic)   Resp 16   Ht 5' 3" (1 6 m)   Wt 69 9 kg (154 lb)   LMP  (LMP Unknown)   SpO2 98%   BMI 27 28 kg/m²     Physical Exam  Constitutional:       General: She is not in acute distress  HENT:      Head: Normocephalic  Mouth/Throat:      Pharynx: No oropharyngeal exudate  Eyes:      General: No scleral icterus  Conjunctiva/sclera: Conjunctivae normal       Pupils: Pupils are equal, round, and reactive to light  Neck:      Thyroid: No thyromegaly  Cardiovascular:      Rate and Rhythm: Normal rate and regular rhythm  Heart sounds: Normal heart sounds  No murmur heard  Pulmonary:      Effort: Pulmonary effort is normal  No respiratory distress  Breath sounds: Normal breath sounds  No wheezing or rales  Abdominal:      General: Bowel sounds are normal  There is no distension  Palpations: Abdomen is soft  Tenderness: There is no abdominal tenderness  There is no guarding or rebound  Comments: Abdominal wall Lumps? ??   Musculoskeletal:         General: No tenderness  Cervical back: Neck supple  Lymphadenopathy:      Cervical: No cervical adenopathy  Skin:     Coloration: Skin is not pale  Findings: No rash  Neurological:      Mental Status: She is alert and oriented to person, place, and time  Sensory: No sensory deficit  Motor: No weakness         Earnestine Rae MD

## 2022-09-29 NOTE — PATIENT INSTRUCTIONS

## 2022-09-30 DIAGNOSIS — G89.29 CHRONIC THORACIC BACK PAIN, UNSPECIFIED BACK PAIN LATERALITY: Primary | ICD-10-CM

## 2022-09-30 DIAGNOSIS — M54.6 CHRONIC THORACIC BACK PAIN, UNSPECIFIED BACK PAIN LATERALITY: Primary | ICD-10-CM

## 2022-10-06 ENCOUNTER — TELEPHONE (OUTPATIENT)
Dept: PSYCHIATRY | Facility: CLINIC | Age: 57
End: 2022-10-06

## 2022-10-28 ENCOUNTER — APPOINTMENT (OUTPATIENT)
Dept: LAB | Facility: HOSPITAL | Age: 57
End: 2022-10-28
Attending: PODIATRIST
Payer: COMMERCIAL

## 2022-10-28 DIAGNOSIS — E11.69 HYPERLIPIDEMIA ASSOCIATED WITH TYPE 2 DIABETES MELLITUS (HCC): ICD-10-CM

## 2022-10-28 DIAGNOSIS — E11.8 TYPE II DIABETES MELLITUS WITH COMPLICATION (HCC): ICD-10-CM

## 2022-10-28 DIAGNOSIS — L60.3 MEDIAN CANALIFORM NAIL DYSTROPHY: ICD-10-CM

## 2022-10-28 DIAGNOSIS — E78.5 HYPERLIPIDEMIA ASSOCIATED WITH TYPE 2 DIABETES MELLITUS (HCC): ICD-10-CM

## 2022-10-28 LAB
ALBUMIN SERPL BCP-MCNC: 4.1 G/DL (ref 3.5–5)
ALP SERPL-CCNC: 118 U/L (ref 43–122)
ALT SERPL W P-5'-P-CCNC: 21 U/L
ANION GAP SERPL CALCULATED.3IONS-SCNC: 7 MMOL/L (ref 5–14)
AST SERPL W P-5'-P-CCNC: 20 U/L (ref 14–36)
BASOPHILS # BLD AUTO: 0.11 THOUSANDS/ÂΜL (ref 0–0.1)
BASOPHILS NFR BLD AUTO: 1 % (ref 0–1)
BILIRUB DIRECT SERPL-MCNC: 0.14 MG/DL (ref 0–0.2)
BILIRUB SERPL-MCNC: 0.4 MG/DL (ref 0.2–1)
BUN SERPL-MCNC: 13 MG/DL (ref 5–25)
CALCIUM SERPL-MCNC: 9.8 MG/DL (ref 8.4–10.2)
CHLORIDE SERPL-SCNC: 102 MMOL/L (ref 96–108)
CHOLEST SERPL-MCNC: 147 MG/DL
CO2 SERPL-SCNC: 33 MMOL/L (ref 21–32)
CREAT SERPL-MCNC: 0.57 MG/DL (ref 0.6–1.2)
EOSINOPHIL # BLD AUTO: 0.28 THOUSAND/ÂΜL (ref 0–0.61)
EOSINOPHIL NFR BLD AUTO: 2 % (ref 0–6)
ERYTHROCYTE [DISTWIDTH] IN BLOOD BY AUTOMATED COUNT: 12.8 % (ref 11.6–15.1)
GFR SERPL CREATININE-BSD FRML MDRD: 102 ML/MIN/1.73SQ M
GLUCOSE P FAST SERPL-MCNC: 220 MG/DL (ref 70–99)
HCT VFR BLD AUTO: 44.1 % (ref 34.8–46.1)
HDLC SERPL-MCNC: 41 MG/DL
HGB BLD-MCNC: 14.1 G/DL (ref 11.5–15.4)
IMM GRANULOCYTES # BLD AUTO: 0.03 THOUSAND/UL (ref 0–0.2)
IMM GRANULOCYTES NFR BLD AUTO: 0 % (ref 0–2)
LDLC SERPL CALC-MCNC: 74 MG/DL
LYMPHOCYTES # BLD AUTO: 4.45 THOUSANDS/ÂΜL (ref 0.6–4.47)
LYMPHOCYTES NFR BLD AUTO: 36 % (ref 14–44)
MAGNESIUM SERPL-MCNC: 1.7 MG/DL (ref 1.6–2.3)
MCH RBC QN AUTO: 26.9 PG (ref 26.8–34.3)
MCHC RBC AUTO-ENTMCNC: 32 G/DL (ref 31.4–37.4)
MCV RBC AUTO: 84 FL (ref 82–98)
MONOCYTES # BLD AUTO: 0.58 THOUSAND/ÂΜL (ref 0.17–1.22)
MONOCYTES NFR BLD AUTO: 5 % (ref 4–12)
NEUTROPHILS # BLD AUTO: 6.86 THOUSANDS/ÂΜL (ref 1.85–7.62)
NEUTS SEG NFR BLD AUTO: 56 % (ref 43–75)
NONHDLC SERPL-MCNC: 106 MG/DL
NRBC BLD AUTO-RTO: 0 /100 WBCS
PLATELET # BLD AUTO: 295 THOUSANDS/UL (ref 149–390)
PMV BLD AUTO: 10.2 FL (ref 8.9–12.7)
POTASSIUM SERPL-SCNC: 4.3 MMOL/L (ref 3.5–5.3)
PROT SERPL-MCNC: 7.6 G/DL (ref 6.4–8.4)
RBC # BLD AUTO: 5.25 MILLION/UL (ref 3.81–5.12)
SODIUM SERPL-SCNC: 142 MMOL/L (ref 135–147)
TRIGL SERPL-MCNC: 160 MG/DL
WBC # BLD AUTO: 12.31 THOUSAND/UL (ref 4.31–10.16)

## 2022-10-28 PROCEDURE — 80061 LIPID PANEL: CPT

## 2022-10-28 PROCEDURE — 36415 COLL VENOUS BLD VENIPUNCTURE: CPT

## 2022-10-28 PROCEDURE — 85025 COMPLETE CBC W/AUTO DIFF WBC: CPT

## 2022-10-28 PROCEDURE — 82248 BILIRUBIN DIRECT: CPT

## 2022-10-28 PROCEDURE — 80053 COMPREHEN METABOLIC PANEL: CPT

## 2022-10-28 PROCEDURE — 83735 ASSAY OF MAGNESIUM: CPT

## 2022-11-01 DIAGNOSIS — J01.90 ACUTE SINUSITIS, RECURRENCE NOT SPECIFIED, UNSPECIFIED LOCATION: ICD-10-CM

## 2022-11-01 DIAGNOSIS — R21 RASH: ICD-10-CM

## 2022-11-01 DIAGNOSIS — F32.A DEPRESSION, UNSPECIFIED DEPRESSION TYPE: ICD-10-CM

## 2022-11-01 RX ORDER — ESCITALOPRAM OXALATE 10 MG/1
10 TABLET ORAL
Qty: 30 TABLET | Refills: 5 | Status: SHIPPED | OUTPATIENT
Start: 2022-11-01

## 2022-11-01 RX ORDER — CALCIPOTRIENE 50 UG/G
CREAM TOPICAL
Qty: 120 G | Refills: 0 | Status: SHIPPED | OUTPATIENT
Start: 2022-11-01

## 2022-11-01 RX ORDER — LEVOCETIRIZINE DIHYDROCHLORIDE 5 MG/1
TABLET, FILM COATED ORAL
Qty: 30 TABLET | Refills: 3 | Status: SHIPPED | OUTPATIENT
Start: 2022-11-01

## 2022-12-12 ENCOUNTER — HOSPITAL ENCOUNTER (EMERGENCY)
Facility: HOSPITAL | Age: 57
Discharge: HOME | End: 2022-12-12
Attending: EMERGENCY MEDICINE

## 2022-12-12 VITALS
WEIGHT: 154.8 LBS | BODY MASS INDEX: 27.43 KG/M2 | RESPIRATION RATE: 16 BRPM | HEIGHT: 63 IN | OXYGEN SATURATION: 99 % | DIASTOLIC BLOOD PRESSURE: 71 MMHG | TEMPERATURE: 97.5 F | SYSTOLIC BLOOD PRESSURE: 125 MMHG | HEART RATE: 72 BPM

## 2022-12-12 DIAGNOSIS — B30.9 ACUTE VIRAL CONJUNCTIVITIS OF LEFT EYE: Primary | ICD-10-CM

## 2022-12-12 RX ORDER — ERYTHROMYCIN 5 MG/G
OINTMENT OPHTHALMIC
Qty: 3.5 G | Refills: 0 | Status: SHIPPED | OUTPATIENT
Start: 2022-12-12

## 2022-12-12 NOTE — ED PROVIDER NOTES
History  Chief Complaint   Patient presents with   • Eye Problem     States left eye had had drainage and concerned about pink eye- started with itchy feeling Thursday; also a dry cough  18-year-old female present emerged department with drainage from the left eye for the past 2 days  Notes that his pain is she also has dry cough and rhinorrhea  No fever chills  No nausea vomiting diarrhea  No chest pain or shortness of breath  History provided by:  Patient  Eye Problem  Location:  Left eye  Severity:  Mild  Onset quality:  Gradual  Duration:  1 day  Timing:  Constant  Progression:  Unchanged  Chronicity:  New  Associated symptoms: discharge and itching    Associated symptoms: no vomiting        Prior to Admission Medications   Prescriptions Last Dose Informant Patient Reported? Taking?    Aspirin Low Dose 81 MG EC tablet   No No   Sig: TAKE 1 TABLET (81 MG TOTAL) BY MOUTH DAILY   Banophen 25 MG capsule   No No   Sig: TAKE 1 CAPSULE (25 MG TOTAL) BY MOUTH DAILY AT BEDTIME AS NEEDED FOR ITCHING   Calcium Carbonate-Vitamin D3 600-400 MG-UNIT TABS   No No   Sig: Take 2 tablets by mouth daily   Comfort EZ Pen Needles 33G X 5 MM MISC   No No   Sig: INJECT 4 TIMES TO 5 TIMES A DAY SUBCUTANEOUS WITH INSULINE   Easy Comfort Pen Needles 32G X 4 MM MISC   No No   Sig: USE FOR INSULIN INJECTIONS ONCE DAILY AS DIRECTED   Easy Comfort Pen Needles 33G X 4 MM MISC   No No   Sig: WITH INSULINE PEN AS NEEDED UP TO 5 TIMES X90 DAYS   Insulin Glargine Solostar (Lantus SoloStar) 100 UNIT/ML SOPN   No No   Sig: Inject 0 5 mL (50 Units total) under the skin daily   Ozempic, 1 MG/DOSE, 4 MG/3ML SOPN injection pen   No No   Sig: Inject 0 75 mL (1 mg total) under the skin once a week   albuterol (PROVENTIL HFA,VENTOLIN HFA) 90 mcg/act inhaler   No No   Sig: Inhale 2 puffs every 6 (six) hours as needed for wheezing   atorvastatin (LIPITOR) 40 mg tablet   No No   Sig: Take 1 tablet (40 mg total) by mouth daily   calcipotriene (DOVONEX) 0 005 % cream   No No   Sig: APPLY TO AFFECTED AREA TWICE A DAY 30 DAYS LARRGER AREA HAND LEGS   colchicine (COLCRYS) 0 6 mg tablet   No No   Sig: Take 2 pills immediately and take 3rd pill an hour later   escitalopram (LEXAPRO) 10 mg tablet   No No   Sig: TAKE 1 TABLET (10 MG TOTAL) BY MOUTH DAILY WITH DINNER   fenofibrate (TRICOR) 145 mg tablet   No No   Sig: Take 1 tablet (145 mg total) by mouth daily   fluticasone-umeclidinium-vilanterol (Trelegy Ellipta) 100-62 5-25 MCG/INH inhaler   No No   Sig: Inhale 1 puff daily Rinse mouth after use  glipiZIDE (GLUCOTROL) 5 mg tablet   No No   Sig: Take 1 tablet (5 mg total) by mouth 2 (two) times a day with meals   glucose blood test strip   No No   Sig: Use as instructed to test three times daily     levocetirizine (XYZAL) 5 MG tablet   No No   Sig: TAKE 1 TABLET (5 MG TOTAL) BY MOUTH EVERY EVENING   magnesium oxide (MAG-OX) 400 mg   No No   Sig: Take 1 tablet (400 mg total) by mouth 2 (two) times a day   metFORMIN (GLUCOPHAGE) 1000 MG tablet   No No   Sig: Take 1 tablet (1,000 mg total) by mouth 2 (two) times a day with meals   metoprolol tartrate (LOPRESSOR) 25 mg tablet   No No   Sig: Take 1 tablet (25 mg total) by mouth every 12 (twelve) hours   montelukast (SINGULAIR) 10 mg tablet   No No   Sig: Take 1 tablet (10 mg total) by mouth every 24 hours   oxybutynin (DITROPAN XL) 15 MG 24 hr tablet   No No   Sig: TAKE 1 TABLET (15 MG TOTAL) BY MOUTH DAILY   pantoprazole (PROTONIX) 40 mg tablet   No No   Sig: Take 1 tablet (40 mg total) by mouth daily      Facility-Administered Medications: None       Past Medical History:   Diagnosis Date   • Acute exacerbation of chronic obstructive pulmonary disease (COPD) ([facility] ) 2/27/2020   • Asthma     exercise induced   • Diabetes ([facility] )     type 2   • Diabetes mellitus ([facility] )    • Hypertension    • Morbid obesity ([facility] ) 4/7/2020   • Osteoarthritis 4/7/2020       Past Surgical History:   Procedure Laterality Date   • BREAST BIOPSY Left 2016    benign   • CHOLECYSTECTOMY     • HERNIA REPAIR     • LAPAROSCOPY FOR ECTOPIC PREGNANCY      x2, both tubes removed    • MAMMO STEREOTACTIC BREAST BIOPSY LEFT (ALL INC) Left    • REPLACEMENT TOTAL KNEE Left    • SMALL INTESTINE SURGERY      after hernia surgery        Family History   Problem Relation Age of Onset   • Breast cancer Mother 36        cause of death   • Heart attack Father         MI cause of death   • No Known Problems Sister    • No Known Problems Daughter    • No Known Problems Maternal Grandmother    • No Known Problems Maternal Grandfather    • No Known Problems Paternal Grandmother    • No Known Problems Paternal Grandfather    • No Known Problems Sister    • No Known Problems Sister    • Colon cancer Neg Hx    • Ovarian cancer Neg Hx      I have reviewed and agree with the history as documented  E-Cigarette/Vaping   • E-Cigarette Use Never User      E-Cigarette/Vaping Substances   • Nicotine No    • THC No    • CBD No    • Flavoring No      Social History     Tobacco Use   • Smoking status: Former   • Smokeless tobacco: Never   Vaping Use   • Vaping Use: Never used   Substance Use Topics   • Alcohol use: Yes     Comment: rare   • Drug use: No       Review of Systems   Constitutional: Negative for chills and fever  HENT: Negative for ear pain and sore throat  Eyes: Positive for discharge and itching  Negative for pain and visual disturbance  Respiratory: Negative for cough and shortness of breath  Cardiovascular: Negative for chest pain and palpitations  Gastrointestinal: Negative for abdominal pain and vomiting  Genitourinary: Negative for dysuria and hematuria  Musculoskeletal: Negative for arthralgias and back pain  Skin: Negative for color change and rash  Neurological: Negative for seizures and syncope  All other systems reviewed and are negative  Physical Exam  Physical Exam  Vitals and nursing note reviewed     Constitutional:       General: She is not in acute distress  Appearance: She is well-developed  HENT:      Head: Normocephalic and atraumatic  Nose: Congestion and rhinorrhea present  Mouth/Throat:      Mouth: Mucous membranes are moist    Eyes:      Extraocular Movements: Extraocular movements intact  Pupils: Pupils are equal, round, and reactive to light  Comments: Mild left conjunctival erythema but no discharge  Cardiovascular:      Rate and Rhythm: Normal rate and regular rhythm  Heart sounds: No murmur heard  Pulmonary:      Effort: Pulmonary effort is normal  No respiratory distress  Breath sounds: Normal breath sounds  Abdominal:      Palpations: Abdomen is soft  Tenderness: There is no abdominal tenderness  Musculoskeletal:         General: No swelling  Cervical back: Neck supple  Skin:     General: Skin is warm and dry  Capillary Refill: Capillary refill takes less than 2 seconds  Neurological:      Mental Status: She is alert  Psychiatric:         Mood and Affect: Mood normal          Vital Signs  ED Triage Vitals [12/12/22 1030]   Temperature Pulse Respirations Blood Pressure SpO2   97 5 °F (36 4 °C) 72 16 125/71 99 %      Temp Source Heart Rate Source Patient Position - Orthostatic VS BP Location FiO2 (%)   Oral Monitor Sitting Left arm --      Pain Score       No Pain           Vitals:    12/12/22 1030   BP: 125/71   Pulse: 72   Patient Position - Orthostatic VS: Sitting         Visual Acuity  Visual Acuity    Flowsheet Row Most Recent Value   Visual acuity R eye is 20/20   Visual acuity Left eye is 20/20   Visual acuity in both eyes is 20/20   Wearing corrective eyewear/lenses?  Yes          ED Medications  Medications - No data to display    Diagnostic Studies  Results Reviewed     None                 No orders to display              Procedures  Procedures         ED Course                               SBIRT 22yo+    Flowsheet Row Most Recent Value   SBIRT (25 yo +) In order to provide better care to our patients, we are screening all of our patients for alcohol and drug use  Would it be okay to ask you these screening questions? No Filed at: 12/12/2022 1039                    MDM  Number of Diagnoses or Management Options  Acute viral conjunctivitis of left eye  Diagnosis management comments: Bilateral conjunctivitis, will treat with erythromycin  Disposition  Final diagnoses:   Acute viral conjunctivitis of left eye     Time reflects when diagnosis was documented in both MDM as applicable and the Disposition within this note     Time User Action Codes Description Comment    12/12/2022 11:06 AM Navi Leigh Add [B30 9] Acute viral conjunctivitis of left eye       ED Disposition     ED Disposition   Discharge    Condition   Stable    Date/Time   Mon Dec 12, 2022 11:06 AM    Comment   Clare Buckley discharge to home/self care  Follow-up Information     Follow up With Specialties Details Why Contact Info Additional [address]   [address], [address] [place] [phone]  [address] [place] [address], [address], [address] [place], [place], [address] [place] [address]          Patient's Medications   Discharge Prescriptions    ERYTHROMYCIN (ILOTYCIN) OPHTHALMIC OINTMENT    Place a 1/2 inch ribbon of ointment into the left lower eyelid  Apply twice a day for 5 days       Start Date: 12/12/2022End Date: --       Order Dose: --       Quantity: 3 5 g    Refills: 0       No discharge procedures on file      PDMP Review     None          ED Provider  Electronically Signed by           Omkar Cristobal MD  12/12/22 0471

## 2022-12-22 DIAGNOSIS — R21 RASH: ICD-10-CM

## 2022-12-22 DIAGNOSIS — E11.9 DIABETES MELLITUS WITHOUT COMPLICATION (HCC): ICD-10-CM

## 2022-12-22 RX ORDER — INSULIN GLARGINE 100 [IU]/ML
INJECTION, SOLUTION SUBCUTANEOUS
Qty: 15 ML | Refills: 3 | Status: SHIPPED | OUTPATIENT
Start: 2022-12-22

## 2022-12-22 RX ORDER — CALCIPOTRIENE 50 UG/G
CREAM TOPICAL
Qty: 120 G | Refills: 0 | Status: SHIPPED | OUTPATIENT
Start: 2022-12-22

## 2023-01-05 ENCOUNTER — OFFICE VISIT (OUTPATIENT)
Dept: FAMILY MEDICINE CLINIC | Facility: CLINIC | Age: 58
End: 2023-01-05

## 2023-01-05 VITALS
WEIGHT: 153 LBS | HEIGHT: 63 IN | DIASTOLIC BLOOD PRESSURE: 62 MMHG | SYSTOLIC BLOOD PRESSURE: 106 MMHG | TEMPERATURE: 98.9 F | BODY MASS INDEX: 27.11 KG/M2 | OXYGEN SATURATION: 97 % | HEART RATE: 83 BPM | RESPIRATION RATE: 16 BRPM

## 2023-01-05 DIAGNOSIS — E11.9 DIABETES MELLITUS WITHOUT COMPLICATION (HCC): ICD-10-CM

## 2023-01-05 DIAGNOSIS — F17.210 CIGARETTE SMOKER: ICD-10-CM

## 2023-01-05 DIAGNOSIS — E11.69 HYPERLIPIDEMIA ASSOCIATED WITH TYPE 2 DIABETES MELLITUS (HCC): ICD-10-CM

## 2023-01-05 DIAGNOSIS — J45.901 ACUTE EXACERBATION OF COPD WITH ASTHMA (HCC): ICD-10-CM

## 2023-01-05 DIAGNOSIS — E78.5 HYPERLIPIDEMIA ASSOCIATED WITH TYPE 2 DIABETES MELLITUS (HCC): ICD-10-CM

## 2023-01-05 DIAGNOSIS — E24.9 CUSHING'S SYNDROME (HCC): ICD-10-CM

## 2023-01-05 DIAGNOSIS — M77.8 LEFT SHOULDER TENDONITIS: ICD-10-CM

## 2023-01-05 DIAGNOSIS — E11.8 TYPE II DIABETES MELLITUS WITH COMPLICATION (HCC): ICD-10-CM

## 2023-01-05 DIAGNOSIS — I10 ESSENTIAL HYPERTENSION: ICD-10-CM

## 2023-01-05 DIAGNOSIS — Z12.11 SCREEN FOR COLON CANCER: Primary | ICD-10-CM

## 2023-01-05 DIAGNOSIS — J30.9 ALLERGIC RHINITIS, UNSPECIFIED SEASONALITY, UNSPECIFIED TRIGGER: ICD-10-CM

## 2023-01-05 DIAGNOSIS — J44.1 ACUTE EXACERBATION OF COPD WITH ASTHMA (HCC): ICD-10-CM

## 2023-01-05 LAB — SL AMB POCT HEMOGLOBIN AIC: 7.8 (ref ?–6.5)

## 2023-01-05 RX ORDER — ATORVASTATIN CALCIUM 40 MG/1
40 TABLET, FILM COATED ORAL DAILY
Qty: 90 TABLET | Refills: 3 | Status: SHIPPED | OUTPATIENT
Start: 2023-01-05

## 2023-01-05 RX ORDER — CETIRIZINE HYDROCHLORIDE 10 MG/1
10 TABLET ORAL DAILY
Qty: 30 TABLET | Refills: 3 | Status: SHIPPED | OUTPATIENT
Start: 2023-01-05

## 2023-01-05 RX ORDER — FENOFIBRATE 145 MG/1
145 TABLET, COATED ORAL DAILY
Qty: 90 TABLET | Refills: 3 | Status: SHIPPED | OUTPATIENT
Start: 2023-01-05

## 2023-01-05 RX ORDER — SEMAGLUTIDE 1.34 MG/ML
1 INJECTION, SOLUTION SUBCUTANEOUS WEEKLY
Qty: 6 ML | Refills: 3 | Status: SHIPPED | OUTPATIENT
Start: 2023-01-05

## 2023-01-05 RX ORDER — GLIPIZIDE 5 MG/1
5 TABLET ORAL 2 TIMES DAILY WITH MEALS
Qty: 180 TABLET | Refills: 3 | Status: SHIPPED | OUTPATIENT
Start: 2023-01-05

## 2023-01-05 NOTE — PROGRESS NOTES
Name: Allan Barrera      : 1965      MRN: [id number]  Encounter Provider: Devi Muro MD  Encounter Date: 2023   Encounter department: [address] [place] [address]     1  Screen for colon cancer  -     Ambulatory referral for colonoscopy; Future    2  Hyperlipidemia associated with type 2 diabetes mellitus (HCC)  -     atorvastatin (LIPITOR) 40 mg tablet; Take 1 tablet (40 mg total) by mouth daily  -     fenofibrate (TRICOR) 145 mg tablet; Take 1 tablet (145 mg total) by mouth daily  -     Lipid panel; Future  -     TSH, 3rd generation; Future; Expected date: 2023  -     T4, free; Future; Expected date: 2023    3  Type II diabetes mellitus with complication (HCC)  -     glipiZIDE (GLUCOTROL) 5 mg tablet; Take 1 tablet (5 mg total) by mouth 2 (two) times a day with meals  -     Ozempic, 1 MG/DOSE, 4 MG/3ML SOPN injection pen; Inject 0 75 mL (1 mg total) under the skin once a week  -     POCT hemoglobin A1c  -     UA (URINE) with reflex to Scope; Future; Expected date: 2023  -     Comprehensive metabolic panel; Future  -     CBC and differential; Future  -     Magnesium; Future  -     TSH, 3rd generation; Future; Expected date: 2023  -     T4, free; Future; Expected date: 2023    4  Diabetes mellitus without complication (HCC)  -     magnesium oxide (MAG-OX) 400 mg; Take 1 tablet (400 mg total) by mouth 2 (two) times a day  -     metFORMIN (GLUCOPHAGE) 1000 MG tablet; Take 1 tablet (1,000 mg total) by mouth 2 (two) times a day with meals    5  Essential hypertension  -     metoprolol tartrate (LOPRESSOR) 25 mg tablet; Take 1 tablet (25 mg total) by mouth every 12 (twelve) hours    6  Left shoulder tendonitis  -     XR shoulder 2+ vw left; Future; Expected date: 2023  -     Diclofenac Sodium (VOLTAREN) 1 %; Apply 2 g topically 4 (four) times a day  -     TSH, 3rd generation;  Future; Expected date: 2023  -     T4, free; Future; Expected date: 01/05/2023    7  Allergic rhinitis, unspecified seasonality, unspecified trigger  -     cetirizine (ZyrTEC) 10 mg tablet; Take 1 tablet (10 mg total) by mouth daily In the evening    8  Cigarette smoker  -     TSH, 3rd generation; Future; Expected date: 01/05/2023  -     T4, free; Future; Expected date: 01/05/2023    9  Acute exacerbation of COPD with asthma ([facility] )    10  Cushing's syndrome (HCC)    life style mod  Consider Increasing Ozempic to 2 mg weekly    RTC in 3 mos w Blood  work       Subjective      Kendrick Orr is here for Regular check up, she feels OK, recent blood work and med list reviewed w pt in detail    Review of Systems   Constitutional: Negative for chills, fatigue and fever  HENT: Negative for congestion, facial swelling, sore throat, trouble swallowing and voice change  Eyes: Negative for pain, discharge and visual disturbance  Respiratory: Negative for cough, shortness of breath and wheezing  Cardiovascular: Negative for chest pain, palpitations and leg swelling  Gastrointestinal: Negative for abdominal pain, blood in stool, constipation, diarrhea and nausea  Endocrine: Negative for polydipsia, polyphagia and polyuria  Genitourinary: Negative for difficulty urinating, hematuria and urgency  Musculoskeletal: Negative for arthralgias and myalgias  Skin: Negative for rash  Neurological: Negative for dizziness, tremors, weakness and headaches  Hematological: Negative for adenopathy  Does not bruise/bleed easily  Psychiatric/Behavioral: Negative for dysphoric mood, sleep disturbance and suicidal ideas         Current Outpatient Medications on File Prior to Visit   Medication Sig   • albuterol (PROVENTIL HFA,VENTOLIN HFA) 90 mcg/act inhaler Inhale 2 puffs every 6 (six) hours as needed for wheezing   • Aspirin Low Dose 81 MG EC tablet TAKE 1 TABLET (81 MG TOTAL) BY MOUTH DAILY   • Banophen 25 MG capsule TAKE 1 CAPSULE (25 MG TOTAL) BY MOUTH DAILY AT BEDTIME AS NEEDED FOR ITCHING   • calcipotriene (DOVONEX) 0 005 % cream APPLY TO AFFECTED AREA TWICE A DAY 30 DAYS LARRGER AREA HAND LEGS   • Calcium Carbonate-Vitamin D3 600-400 MG-UNIT TABS Take 2 tablets by mouth daily   • colchicine (COLCRYS) 0 6 mg tablet Take 2 pills immediately and take 3rd pill an hour later   • Comfort EZ Pen Needles 33G X 5 MM MISC INJECT 4 TIMES TO 5 TIMES A DAY SUBCUTANEOUS WITH INSULINE   • Easy Comfort Pen Needles 32G X 4 MM MISC USE FOR INSULIN INJECTIONS ONCE DAILY AS DIRECTED   • Easy Comfort Pen Needles 33G X 4 MM MISC WITH INSULINE PEN AS NEEDED UP TO 5 TIMES X90 DAYS   • erythromycin (ILOTYCIN) ophthalmic ointment Place a 1/2 inch ribbon of ointment into the left lower eyelid  Apply twice a day for 5 days   • escitalopram (LEXAPRO) 10 mg tablet TAKE 1 TABLET (10 MG TOTAL) BY MOUTH DAILY WITH DINNER   • glucose blood test strip Use as instructed to test three times daily     • Lantus SoloStar 100 units/mL SOPN INJECT 0 5 ML (50 UNITS TOTAL) UNDER THE SKIN DAILY   • levocetirizine (XYZAL) 5 MG tablet TAKE 1 TABLET (5 MG TOTAL) BY MOUTH EVERY EVENING   • montelukast (SINGULAIR) 10 mg tablet Take 1 tablet (10 mg total) by mouth every 24 hours   • oxybutynin (DITROPAN XL) 15 MG 24 hr tablet TAKE 1 TABLET (15 MG TOTAL) BY MOUTH DAILY   • pantoprazole (PROTONIX) 40 mg tablet Take 1 tablet (40 mg total) by mouth daily   • [DISCONTINUED] atorvastatin (LIPITOR) 40 mg tablet Take 1 tablet (40 mg total) by mouth daily   • [DISCONTINUED] fenofibrate (TRICOR) 145 mg tablet Take 1 tablet (145 mg total) by mouth daily   • [DISCONTINUED] glipiZIDE (GLUCOTROL) 5 mg tablet Take 1 tablet (5 mg total) by mouth 2 (two) times a day with meals   • [DISCONTINUED] magnesium oxide (MAG-OX) 400 mg Take 1 tablet (400 mg total) by mouth 2 (two) times a day   • [DISCONTINUED] metFORMIN (GLUCOPHAGE) 1000 MG tablet Take 1 tablet (1,000 mg total) by mouth 2 (two) times a day with meals   • [DISCONTINUED] metoprolol tartrate (LOPRESSOR) 25 mg tablet Take 1 tablet (25 mg total) by mouth every 12 (twelve) hours   • [DISCONTINUED] Ozempic, 1 MG/DOSE, 4 MG/3ML SOPN injection pen Inject 0 75 mL (1 mg total) under the skin once a week   • fluticasone-umeclidinium-vilanterol (Trelegy Ellipta) 100-62 5-25 MCG/INH inhaler Inhale 1 puff daily Rinse mouth after use  Objective     /62 (BP Location: Left arm, Patient Position: Sitting, Cuff Size: Standard)   Pulse 83   Temp 98 9 °F (37 2 °C)   Resp 16   Ht 5' 3" (1 6 m)   Wt 69 4 kg (153 lb)   LMP  (LMP Unknown)   SpO2 97%   BMI 27 10 kg/m²     Physical Exam  Constitutional:       General: She is not in acute distress  HENT:      Head: Normocephalic  Mouth/Throat:      Pharynx: No oropharyngeal exudate  Eyes:      General: No scleral icterus  Conjunctiva/sclera: Conjunctivae normal       Pupils: Pupils are equal, round, and reactive to light  Neck:      Thyroid: No thyromegaly  Cardiovascular:      Rate and Rhythm: Normal rate and regular rhythm  Heart sounds: Normal heart sounds  No murmur heard  Pulmonary:      Effort: Pulmonary effort is normal  No respiratory distress  Breath sounds: Normal breath sounds  No wheezing or rales  Abdominal:      General: Bowel sounds are normal  There is no distension  Palpations: Abdomen is soft  Tenderness: There is no abdominal tenderness  There is no guarding or rebound  Musculoskeletal:         General: No tenderness  Cervical back: Neck supple  Lymphadenopathy:      Cervical: No cervical adenopathy  Skin:     Coloration: Skin is not pale  Findings: No rash  Neurological:      Mental Status: She is alert and oriented to person, place, and time  Sensory: No sensory deficit  Motor: No weakness         Joselyn Huffman MD

## 2023-01-24 ENCOUNTER — HOSPITAL ENCOUNTER (OUTPATIENT)
Dept: RADIOLOGY | Facility: HOSPITAL | Age: 58
Discharge: HOME | End: 2023-01-24

## 2023-01-24 DIAGNOSIS — M77.8 LEFT SHOULDER TENDONITIS: ICD-10-CM

## 2023-01-26 DIAGNOSIS — R21 RASH: ICD-10-CM

## 2023-01-26 RX ORDER — CALCIPOTRIENE 50 UG/G
CREAM TOPICAL
Qty: 120 G | Refills: 0 | Status: SHIPPED | OUTPATIENT
Start: 2023-01-26

## 2023-02-02 ENCOUNTER — HOSPITAL ENCOUNTER (OUTPATIENT)
Dept: CT IMAGING | Facility: HOSPITAL | Age: 58
End: 2023-02-02

## 2023-02-02 DIAGNOSIS — F17.210 CIGARETTE SMOKER: ICD-10-CM

## 2023-02-15 DIAGNOSIS — J44.1 ACUTE EXACERBATION OF COPD WITH ASTHMA (HCC): ICD-10-CM

## 2023-02-15 DIAGNOSIS — J45.901 ACUTE EXACERBATION OF COPD WITH ASTHMA (HCC): ICD-10-CM

## 2023-02-15 DIAGNOSIS — R21 RASH: ICD-10-CM

## 2023-02-15 DIAGNOSIS — M77.8 LEFT SHOULDER TENDONITIS: ICD-10-CM

## 2023-02-15 DIAGNOSIS — J43.9 PULMONARY EMPHYSEMA, UNSPECIFIED EMPHYSEMA TYPE (HCC): ICD-10-CM

## 2023-02-15 RX ORDER — CALCIPOTRIENE 50 UG/G
CREAM TOPICAL
Qty: 120 G | Refills: 0 | Status: SHIPPED | OUTPATIENT
Start: 2023-02-15

## 2023-02-15 RX ORDER — FLUTICASONE FUROATE, UMECLIDINIUM BROMIDE AND VILANTEROL TRIFENATATE 100; 62.5; 25 UG/1; UG/1; UG/1
POWDER RESPIRATORY (INHALATION)
Qty: 60 EACH | Refills: 5 | Status: SHIPPED | OUTPATIENT
Start: 2023-02-15

## 2023-03-21 ENCOUNTER — HOSPITAL ENCOUNTER (EMERGENCY)
Facility: HOSPITAL | Age: 58
Discharge: HOME | End: 2023-03-21
Attending: INTERNAL MEDICINE | Admitting: INTERNAL MEDICINE

## 2023-03-21 ENCOUNTER — APPOINTMENT (EMERGENCY)
Dept: RADIOLOGY | Facility: HOSPITAL | Age: 58
End: 2023-03-21

## 2023-03-21 VITALS
RESPIRATION RATE: 16 BRPM | BODY MASS INDEX: 27.22 KG/M2 | TEMPERATURE: 97.8 F | HEART RATE: 78 BPM | OXYGEN SATURATION: 98 % | DIASTOLIC BLOOD PRESSURE: 63 MMHG | SYSTOLIC BLOOD PRESSURE: 114 MMHG | WEIGHT: 153.66 LBS

## 2023-03-21 DIAGNOSIS — M25.512 LEFT SHOULDER PAIN: Primary | ICD-10-CM

## 2023-03-21 RX ORDER — LIDOCAINE 40 MG/G
CREAM TOPICAL AS NEEDED
Qty: 30 G | Refills: 0 | Status: SHIPPED | OUTPATIENT
Start: 2023-03-21

## 2023-03-21 RX ORDER — KETOROLAC TROMETHAMINE 30 MG/ML
15 INJECTION, SOLUTION INTRAMUSCULAR; INTRAVENOUS ONCE
Status: COMPLETED | OUTPATIENT
Start: 2023-03-21 | End: 2023-03-21

## 2023-03-21 RX ORDER — LIDOCAINE 50 MG/G
1 PATCH TOPICAL ONCE
Status: DISCONTINUED | OUTPATIENT
Start: 2023-03-21 | End: 2023-03-21 | Stop reason: HOSPADM

## 2023-03-21 RX ADMIN — KETOROLAC TROMETHAMINE 15 MG: 30 INJECTION, SOLUTION INTRAMUSCULAR; INTRAVENOUS at 14:55

## 2023-03-21 RX ADMIN — LIDOCAINE 1 PATCH: 50 PATCH TOPICAL at 15:28

## 2023-03-21 NOTE — ED PROVIDER NOTES
History  Chief Complaint   Patient presents with   • Arm Pain     Left shoulder to elbow  Ranulfo Mata in October, her PCP sent to for x-rays which she did, but did not get results and her arm is progressively getting worse       50-year-old female with past medical history of type 2 diabetes mellitus, COPD, osteoarthritis, hypertension presents to emergency department complaining of chronic left shoulder pain  She states that the pain extends from her left shoulder care home to her elbow  She reports that this has been ongoing for months, starting a month or so after a fall in October, but that she has gradually noticed a decrease in her range of motion due to pain  No sudden changes or worsening  She states that the Tylenol she was told to take does not do anything for it, which is why she presented today  She has been seen for this by her PCP, diagnosed with shoulder tendinitis, states that she never got her x-ray results back from them  She has not yet followed up with orthopedics  She denies any chest pain, shortness of breath, palpitations, fever, chills, swelling, numbness, weakness, tingling, rash, erythema  History provided by:  Patient and medical records  Arm Pain  Associated symptoms: no abdominal pain, no chest pain, no fever, no rash, no shortness of breath and no vomiting        Prior to Admission Medications   Prescriptions Last Dose Informant Patient Reported? Taking?    Aspirin Low Dose 81 MG EC tablet   No No   Sig: TAKE 1 TABLET (81 MG TOTAL) BY MOUTH DAILY   Banophen 25 MG capsule   No No   Sig: TAKE 1 CAPSULE (25 MG TOTAL) BY MOUTH DAILY AT BEDTIME AS NEEDED FOR ITCHING   Calcium Carbonate-Vitamin D3 600-400 MG-UNIT TABS   No No   Sig: Take 2 tablets by mouth daily   Comfort EZ Pen Needles 33G X 5 MM MISC   No No   Sig: INJECT 4 TIMES TO 5 TIMES A DAY SUBCUTANEOUS WITH INSULINE   Diclofenac Sodium (VOLTAREN) 1 %   No No   Sig: APPLY 2 G TOPICALLY 4 (FOUR) TIMES A DAY   Easy Comfort Pen Needles 32G X 4 MM MISC   No No   Sig: USE FOR INSULIN INJECTIONS ONCE DAILY AS DIRECTED   Easy Comfort Pen Needles 33G X 4 MM MISC   No No   Sig: WITH INSULINE PEN AS NEEDED UP TO 5 TIMES X90 DAYS   Lantus SoloStar 100 units/mL SOPN   No No   Sig: INJECT 0 5 ML (50 UNITS TOTAL) UNDER THE SKIN DAILY   Ozempic, 1 MG/DOSE, 4 MG/3ML SOPN injection pen   No No   Sig: Inject 0 75 mL (1 mg total) under the skin once a week   Trelegy Ellipta 100-62 5-25 MCG/ACT inhaler   No No   Sig: INHALE 1 PUFF DAILY RINSE MOUTH AFTER USE    albuterol (PROVENTIL HFA,VENTOLIN HFA) 90 mcg/act inhaler   No No   Sig: Inhale 2 puffs every 6 (six) hours as needed for wheezing   atorvastatin (LIPITOR) 40 mg tablet   No No   Sig: Take 1 tablet (40 mg total) by mouth daily   calcipotriene (DOVONEX) 0 005 % cream   No No   Sig: APPLY TO AFFECTED AREA TWICE A DAY 30 DAYS LARRGER AREA HAND LEGS   cetirizine (ZyrTEC) 10 mg tablet   No No   Sig: Take 1 tablet (10 mg total) by mouth daily In the evening   colchicine (COLCRYS) 0 6 mg tablet   No No   Sig: Take 2 pills immediately and take 3rd pill an hour later   erythromycin (ILOTYCIN) ophthalmic ointment   No No   Sig: Place a 1/2 inch ribbon of ointment into the left lower eyelid  Apply twice a day for 5 days   escitalopram (LEXAPRO) 10 mg tablet   No No   Sig: TAKE 1 TABLET (10 MG TOTAL) BY MOUTH DAILY WITH DINNER   fenofibrate (TRICOR) 145 mg tablet   No No   Sig: Take 1 tablet (145 mg total) by mouth daily   glipiZIDE (GLUCOTROL) 5 mg tablet   No No   Sig: Take 1 tablet (5 mg total) by mouth 2 (two) times a day with meals   glucose blood test strip   No No   Sig: Use as instructed to test three times daily     levocetirizine (XYZAL) 5 MG tablet   No No   Sig: TAKE 1 TABLET (5 MG TOTAL) BY MOUTH EVERY EVENING   magnesium oxide (MAG-OX) 400 mg   No No   Sig: Take 1 tablet (400 mg total) by mouth 2 (two) times a day   metFORMIN (GLUCOPHAGE) 1000 MG tablet   No No   Sig: Take 1 tablet (1,000 mg total) by mouth 2 (two) times a day with meals   metoprolol tartrate (LOPRESSOR) 25 mg tablet   No No   Sig: Take 1 tablet (25 mg total) by mouth every 12 (twelve) hours   montelukast (SINGULAIR) 10 mg tablet   No No   Sig: Take 1 tablet (10 mg total) by mouth every 24 hours   oxybutynin (DITROPAN XL) 15 MG 24 hr tablet   No No   Sig: TAKE 1 TABLET (15 MG TOTAL) BY MOUTH DAILY   pantoprazole (PROTONIX) 40 mg tablet   No No   Sig: Take 1 tablet (40 mg total) by mouth daily      Facility-Administered Medications: None       Past Medical History:   Diagnosis Date   • Acute exacerbation of chronic obstructive pulmonary disease (COPD) ([facility] ) 2/27/2020   • Asthma     exercise induced   • Diabetes ([place] [address] )     type 2   • Diabetes mellitus ([place] [address] )    • Hypertension    • Morbid obesity ([place] [address] ) 4/7/2020   • Osteoarthritis 4/7/2020       Past Surgical History:   Procedure Laterality Date   • BREAST BIOPSY Left 2016    benign   • CHOLECYSTECTOMY     • HERNIA REPAIR     • LAPAROSCOPY FOR ECTOPIC PREGNANCY      x2, both tubes removed    • MAMMO STEREOTACTIC BREAST BIOPSY LEFT (ALL INC) Left    • REPLACEMENT TOTAL KNEE Left    • SMALL INTESTINE SURGERY      after hernia surgery        Family History   Problem Relation Age of Onset   • Breast cancer Mother 36        cause of death   • Heart attack Father         MI cause of death   • No Known Problems Sister    • No Known Problems Daughter    • No Known Problems Maternal Grandmother    • No Known Problems Maternal Grandfather    • No Known Problems Paternal Grandmother    • No Known Problems Paternal Grandfather    • No Known Problems Sister    • No Known Problems Sister    • Colon cancer Neg Hx    • Ovarian cancer Neg Hx      I have reviewed and agree with the history as documented      E-Cigarette/Vaping   • E-Cigarette Use Never User      E-Cigarette/Vaping Substances   • Nicotine No    • THC No    • CBD No    • Flavoring No      Social History     Tobacco Use   • Smoking status: Former   • Smokeless tobacco: Never   Vaping Use   • Vaping Use: Never used   Substance Use Topics   • Alcohol use: Yes     Comment: rare   • Drug use: No       Review of Systems   Constitutional: Negative for chills and fever  Respiratory: Negative for chest tightness and shortness of breath  Cardiovascular: Negative for chest pain, palpitations and leg swelling  Gastrointestinal: Negative for abdominal pain and vomiting  Musculoskeletal: Positive for arthralgias  Negative for back pain and joint swelling  Skin: Negative for color change, rash and wound  Neurological: Negative for dizziness, syncope, weakness and numbness  All other systems reviewed and are negative  Physical Exam  Physical Exam  Vitals and nursing note reviewed  Constitutional:       General: She is not in acute distress  Appearance: Normal appearance  She is not ill-appearing, toxic-appearing or diaphoretic  HENT:      Head: Normocephalic and atraumatic  Mouth/Throat:      Mouth: Mucous membranes are moist       Pharynx: Oropharynx is clear  Cardiovascular:      Rate and Rhythm: Normal rate and regular rhythm  Pulses:           Radial pulses are 2+ on the right side and 2+ on the left side  Heart sounds: Normal heart sounds  Pulmonary:      Effort: Pulmonary effort is normal  No respiratory distress  Breath sounds: Normal breath sounds  Musculoskeletal:         General: No swelling  Right shoulder: Normal       Left shoulder: Tenderness and bony tenderness present  No swelling, deformity or crepitus  Decreased range of motion (abduction, extension )  Normal strength  Left upper arm: Normal       Left elbow: Normal       Left wrist: Normal       Left hand: Normal       Cervical back: Normal       Thoracic back: Normal    Skin:     General: Skin is warm and dry  Capillary Refill: Capillary refill takes less than 2 seconds  Findings: No bruising, erythema or rash     Neurological: Mental Status: She is alert  Sensory: No sensory deficit  Gait: Gait normal          Vital Signs  ED Triage Vitals [03/21/23 1411]   Temperature Pulse Respirations Blood Pressure SpO2   97 8 °F (36 6 °C) 78 16 114/63 98 %      Temp Source Heart Rate Source Patient Position - Orthostatic VS BP Location FiO2 (%)   Tympanic Monitor Sitting Right arm --      Pain Score       --           Vitals:    03/21/23 1411   BP: 114/63   Pulse: 78   Patient Position - Orthostatic VS: Sitting         Visual Acuity      ED Medications  Medications   ketorolac (TORADOL) injection 15 mg (15 mg Intramuscular Given 3/21/23 1455)       Diagnostic Studies  Results Reviewed     None                 XR shoulder 2+ views LEFT   ED Interpretation by Radha Nielsen PA-C (03/21 1519)   No acute fx or dislocation  Sclerotic density still present  Final Result by Saskia Esparza MD (03/22 0801)      No acute osseous abnormality  Workstation performed: [id number]                    Procedures  Procedures         ED Course  ED Course as of 03/23/23 1713   Tue Mar 21, 2023   1444 L shoulder pain multiple months  Slowly worsening  No acute changes  1444 Decreased ROM especially with reaching backward  1519 Imaging review done by myself and preliminary results were discussed with the patient and family members  Discussion was had with patient and family members that this read is preliminary and therefore discrepancies between this read and the final read by the radiologist are possible  Patient and family members were informed that any discrepancies found by would be relayed by phone call  SBIRT 22yo+    Flowsheet Row Most Recent Value   SBIRT (23 yo +)    In order to provide better care to our patients, we are screening all of our patients for alcohol and drug use  Would it be okay to ask you these screening questions?  No Filed at: 03/21/2023 1433                    Medical Decision Making  5 months of left shoulder pain  Gradually worsening range of motion  Denies weakness, numbness, tingling  Arthritis versus tendinitis versus adhesive capsulitis versus tendinopathy versus rotator cuff injury  Decreased active range of motion secondary to pain  Strength, sensation intact  Distal perfusion intact  No overlying skin changes  No swelling or effusion  Afebrile  No crepitus  Do not suspect infectious etiology  Muscular and bony tenderness noted to shoulder on exam   X-ray ordered  No acute fracture or dislocation noted on my wet read  Plan for continued pain control, referral to orthopedics  All imaging and/or lab testing discussed with patient, strict return to ED precautions discussed  Patient recommended to follow up promptly with appropriate outpatient provider  Patient and/or family members verbalizes understanding and agrees with plan  Patient and/or family members were given opportunity to ask questions, all questions were answered at this time  Patient is stable for discharge      Portions of the record may have been created with voice recognition software  Occasional wrong word or "sound a like" substitutions may have occurred due to the inherent limitations of voice recognition software  Read the chart carefully and recognize, using context, where substitutions have occurred  Amount and/or Complexity of Data Reviewed  Radiology: ordered and independent interpretation performed  Risk  OTC drugs  Prescription drug management            Disposition  Final diagnoses:   Left shoulder pain     Time reflects when diagnosis was documented in both MDM as applicable and the Disposition within this note     Time User Action Codes Description Comment    3/21/2023  3:07 PM Sheng Mobley Add [A29 051] Left shoulder pain       ED Disposition     ED Disposition   Discharge    Condition   Stable    Date/Time   Tue Mar 21, 2023  3:22 PM    [address] [place] discharge to home/self care  Follow-up Information     Follow up With Specialties Details Why Contact Info Additional [address] [facility] Schedule an appointment as soon as possible for a visit  For follow up regarding your symptoms [address] [facility]  [place] [address] [phone]  [address] [place] [address], [address] [facility], [address] [facility], [facility], [place], [address] [phone]          Discharge Medication List as of 3/21/2023  3:22 PM      START taking these medications    Details   lidocaine (LMX) 4 % cream Apply topically as needed for mild pain, Starting Tue 3/21/2023, Normal         CONTINUE these medications which have NOT CHANGED    Details   albuterol (PROVENTIL HFA,VENTOLIN HFA) 90 mcg/act inhaler Inhale 2 puffs every 6 (six) hours as needed for wheezing, Starting Tue 4/5/2022, Normal      Aspirin Low Dose 81 MG EC tablet TAKE 1 TABLET (81 MG TOTAL) BY MOUTH DAILY, Normal      atorvastatin (LIPITOR) 40 mg tablet Take 1 tablet (40 mg total) by mouth daily, Starting Thu 1/5/2023, Normal      Banophen 25 MG capsule TAKE 1 CAPSULE (25 MG TOTAL) BY MOUTH DAILY AT BEDTIME AS NEEDED FOR ITCHING, Normal      calcipotriene (DOVONEX) 0 005 % cream APPLY TO AFFECTED AREA TWICE A DAY 30 DAYS LARRGER AREA HAND LEGS, Normal      Calcium Carbonate-Vitamin D3 600-400 MG-UNIT TABS Take 2 tablets by mouth daily, Starting Thu 8/25/2022, Normal      cetirizine (ZyrTEC) 10 mg tablet Take 1 tablet (10 mg total) by mouth daily In the evening, Starting Thu 1/5/2023, Normal      colchicine (COLCRYS) 0 6 mg tablet Take 2 pills immediately and take 3rd pill an hour later, Normal      !! Comfort EZ Pen Needles 33G X 5 MM MISC INJECT 4 TIMES TO 5 TIMES A DAY SUBCUTANEOUS WITH INSULINE, Normal      Diclofenac Sodium (VOLTAREN) 1 % APPLY 2 G TOPICALLY 4 (FOUR) TIMES A DAY, Starting Wed 2/15/2023, Normal      !!  Easy Comfort Pen Needles 32G X 4 MM MISC USE FOR INSULIN INJECTIONS ONCE DAILY AS DIRECTED, Normal      !! Easy Comfort Pen Needles 33G X 4 MM MISC WITH INSULINE PEN AS NEEDED UP TO 5 TIMES X90 DAYS, Normal      erythromycin (ILOTYCIN) ophthalmic ointment Place a 1/2 inch ribbon of ointment into the left lower eyelid  Apply twice a day for 5 days, Normal      escitalopram (LEXAPRO) 10 mg tablet TAKE 1 TABLET (10 MG TOTAL) BY MOUTH DAILY WITH DINNER, Starting Tue 11/1/2022, Normal      fenofibrate (TRICOR) 145 mg tablet Take 1 tablet (145 mg total) by mouth daily, Starting Thu 1/5/2023, Normal      glipiZIDE (GLUCOTROL) 5 mg tablet Take 1 tablet (5 mg total) by mouth 2 (two) times a day with meals, Starting Thu 1/5/2023, Normal      glucose blood test strip Use as instructed to test three times daily  , Normal      Lantus SoloStar 100 units/mL SOPN INJECT 0 5 ML (50 UNITS TOTAL) UNDER THE SKIN DAILY, Normal      levocetirizine (XYZAL) 5 MG tablet TAKE 1 TABLET (5 MG TOTAL) BY MOUTH EVERY EVENING, Normal      magnesium oxide (MAG-OX) 400 mg Take 1 tablet (400 mg total) by mouth 2 (two) times a day, Starting Thu 1/5/2023, Normal      metFORMIN (GLUCOPHAGE) 1000 MG tablet Take 1 tablet (1,000 mg total) by mouth 2 (two) times a day with meals, Starting Thu 1/5/2023, Normal      metoprolol tartrate (LOPRESSOR) 25 mg tablet Take 1 tablet (25 mg total) by mouth every 12 (twelve) hours, Starting Thu 1/5/2023, Normal      montelukast (SINGULAIR) 10 mg tablet Take 1 tablet (10 mg total) by mouth every 24 hours, Starting Thu 8/25/2022, Normal      oxybutynin (DITROPAN XL) 15 MG 24 hr tablet TAKE 1 TABLET (15 MG TOTAL) BY MOUTH DAILY, Starting Thu 7/28/2022, Normal      Ozempic, 1 MG/DOSE, 4 MG/3ML SOPN injection pen Inject 0 75 mL (1 mg total) under the skin once a week, Starting Thu 1/5/2023, Normal      pantoprazole (PROTONIX) 40 mg tablet Take 1 tablet (40 mg total) by mouth daily, Starting Thu 9/29/2022, Normal      Trelegy Ellipta 100-62 5-25 MCG/ACT inhaler INHALE 1 PUFF DAILY RINSE MOUTH AFTER USE , Normal       !! - Potential duplicate medications found  Please discuss with provider                PDMP Review     None          ED Provider  Electronically Signed by           Latrell Cage PA-C  03/23/23 9789

## 2023-03-21 NOTE — DISCHARGE INSTRUCTIONS
Follow up with Orthopedics  You may continue to use Voltaren gel  Use lidocaine cream as needed  Continue tylenol prescribed by PCP  Return to ED for new or worsening symptoms as discussed

## 2023-04-01 ENCOUNTER — RA CDI HCC (OUTPATIENT)
Dept: OTHER | Facility: HOSPITAL | Age: 58
End: 2023-04-01

## 2023-04-01 NOTE — PROGRESS NOTES
[facility]  coding opportunities     E11 [id number], J43 9     Chart Reviewed number of suggestions sent to Provider: 2     Patients Insurance     Medicare Insurance: [employer]

## 2023-04-13 ENCOUNTER — APPOINTMENT (OUTPATIENT)
Dept: LAB | Facility: HOSPITAL | Age: 58
End: 2023-04-13

## 2023-04-13 DIAGNOSIS — R41.3 MEMORY LOSS: Primary | ICD-10-CM

## 2023-05-01 ENCOUNTER — EVALUATION (OUTPATIENT)
Dept: PHYSICAL THERAPY | Facility: CLINIC | Age: 58
End: 2023-05-01

## 2023-05-01 DIAGNOSIS — M75.42 IMPINGEMENT SYNDROME OF LEFT SHOULDER: Primary | ICD-10-CM

## 2023-05-01 NOTE — PROGRESS NOTES
PT Evaluation     Today's date: 2023  Patient name: Bee Myles  : 1965  MRN: [id number]  Referring provider: Allie Lux MD  Dx:   Encounter Diagnosis     ICD-10-CM    1  Impingement syndrome of left shoulder  M75 42 Ambulatory Referral to Physical Therapy          Start Time: 1109  Stop Time: 1210  Total time in clinic (min): 61 minutes    Assessment  Assessment details: Pt is a 60 yof presenting to therapy with chronic L shoulder pain, signs and symptoms consistent with potential rotator cuff tear and adverse neurodynamics of L median nerve  Pt displays significant L median nerve tension reproducing LUE symptoms  Pt also displays limited AROM of L shoulder in all planes and pain/weakness generally with resistive testing of LUE compared to RUE  Due to pain, ROM deficits, and weakness, pt has difficulty dressing/bathing and performing functional activities around the home  Pt will benefit from continued therapy twice a week for 6-8 weeks to improve strength, ROM, and pain  Impairments: abnormal or restricted ROM, activity intolerance, impaired physical strength, lacks appropriate home exercise program and pain with function    Symptom irritability: moderateUnderstanding of Dx/Px/POC: good   Prognosis: good    Goals  Short term goals (3-4 weeks)  1  Pt will display independence with understanding and performance of HEP to allow for carryover of plan of care at home  2  Pt will improve FOTO score from initial evaluation to show improvement in pain and function  3  Pt will improve shoulder flexion and abduction AROM by 10 degrees to improve dressing, bathing, lifting, and reaching  4  Pt will improve L shoulder ER/IR strength to 4/5 to improve shoulder stability strength with lifting and reaching  Long term goals  1  Pt will score equal or better than projected score on FOTO to show improvement in pain and function    2  Pt will improve shoulder flexion and abduction AROM by 20 degrees to improve dressing, bathing, lifting, and reaching  3  Pt will improve L shoulder ER/IR strength to 4+/5 to improve shoulder stability strength with lifting and reaching  4  Pt will improve median nerve mobility to minimally limited to decrease tightness/pain of LUE  Plan  Patient would benefit from: skilled physical therapy  Planned modality interventions: TENS, thermotherapy: hydrocollator packs, traction, ultrasound, cryotherapy and low level laser therapy  Planned therapy interventions: body mechanics training, therapeutic training, therapeutic exercise, therapeutic activities, stretching, strengthening, neuromuscular re-education, patient education, home exercise program, functional ROM exercises, flexibility, manual therapy, Howard taping, joint mobilization and balance  Frequency: 2x week  Duration in weeks: 8  Treatment plan discussed with: patient        Subjective Evaluation    History of Present Illness  Mechanism of injury: Pt presents to therapy with L shoulder pain that she has had since  of this year  Pt reports in 2022 she fell off her bathtub and hit her shoulder against the wall  She got up after a little bit and everything was fine and she had no pain  3 months following the fall, pt started feeling pain in L hip and shoulder  She fell outside doctors office again last month and hit L shoulder/elbow again  Pt reports the entire L arm hurts from the shoulder down to the middle of the forearm  X-rays of L arm were taken in multiple places with no osseous abnormalities noted  Pt reports difficulty dressing/bathing and lifting objects  Pt also admits to L sided neck pain associated with L shoulder/arm pain  Pt is scheduled for an MRI of L shoulder at the end of the month  Pain  Current pain ratin  At best pain rating: 3  At worst pain ratin  Quality: tight  Alleviating factors: lidocaine patches, tylenol    Aggravating factors: lifting and overhead activity  Progression: worsening      Diagnostic Tests  X-ray: normal  Patient Goals  Patient goals for therapy: decreased pain (return to normal dressing/bathing)          Objective     General Comments:      Shoulder Comments   Cervical ROM  Flex: WNL no pain  Ext: WNL pain in L shoulder blade  R SB: WNL with L sided stretch  L SB: WNL no pain  R rot: WNL no pain  L rot: WNL no pain    UE ROM  Shoulder flex: 159 R, 105 L  Shoulder Abd: 166 R, 79 L  Functional ER: T4 R, T2 L  Functional IR: T7 R, L lateral L side    UE strength  UT Shru/5 bilat  Shoulder flex: 3-/5 L with pain, 4/5 R  Shoulder abd: 2+/5 L with pain, 4/5 R  ER: 3+/5 L with posterolateral L shoulder pain, 4/5 R  IR: 3+/5 L with posterolateral L shoulder pain, 4+/5 R  Elbow flex: 4+/5 L, 5/5 R  Elbow ext: 5/5 bilat  Wrist flex: 4+/5 L, 5/5 R  Wrist ext: 5/5 bilat  Gripping: Weakness LUE compared to RUE     Special tests  -compression: (-)  -distraction: (-)  -spurlings: (-) bilat  -ULTT: (+) median nerve LUE maximally limited, (-) ulnar, radial, (-) all RUE  -ambrose keith: (-) bilat; reproduction L side of posterior RC pain  -infraspinatus: (+) L, (-) R  -painful arc: unable to determine due to ROM deficits  -drop arm: (-) bilat; *significant UT compensation and pain on RUE    Palpation: pain with palpation to supraspinatus, infraspinatus, teres major/minor LUE               Precautions: DM, Cushing's syndrome, type 2 DM, COPD, HTN, OA, hyperlipidemia, pelvic pain, leukocytosis, morbid obesity, h/o hernia repair, h/o L knee replacement      Manuals             Cervical lateral glides (progress to central gliders median nerve bias) nv            Median nerve glides manual nv                                      Neuro Re-Ed             Shoulder abd iso (HEP) 10x            Shoulder ER iso (HEP) 10x            Shoulder IR iso (HEP) 10x            Shoulder flex iso (HEP) 10x            Median nerve glides nv            Resisted ER/IR nv Rows/ext nv            Ther Ex 5/1            pulleys nv            Supine shoulder flex nv            SL abd nv            SL ER nv                                                                Ther Activity 5/1                                      Gait Training 5/1                                      Modalities 5/1

## 2023-05-04 ENCOUNTER — TELEPHONE (OUTPATIENT)
Dept: INFECTIOUS DISEASES | Facility: CLINIC | Age: 58
End: 2023-05-04

## 2023-05-04 NOTE — TELEPHONE ENCOUNTER
Contacted patient today to schedule per William Araujo who discussed case with me  Patient has multiple doctors visits coming up and has scheduled for next month  She states she wasn't aware of the referral but would like appointment to figure out what's going on with her

## 2023-05-05 ENCOUNTER — OFFICE VISIT (OUTPATIENT)
Dept: PHYSICAL THERAPY | Facility: CLINIC | Age: 58
End: 2023-05-05

## 2023-05-05 DIAGNOSIS — M75.42 IMPINGEMENT SYNDROME OF LEFT SHOULDER: Primary | ICD-10-CM

## 2023-05-05 NOTE — PROGRESS NOTES
"Daily Note     Today's date: 2023  Patient name: Emmie Maya  : 1965  MRN: [id number]  Referring provider: Rachel Stafford MD  Dx:   Encounter Diagnosis     ICD-10-CM    1  Impingement syndrome of left shoulder  M75 42           Start Time: 1141  Stop Time: 1230  Total time in clinic (min): 49 minutes    Subjective: Pt presents to therapy reporting her shoulder feels \"a little ok\" today  Objective: See treatment diary below      Assessment: Tolerated treatment well  Patient tolerated resisted ER/IR isometrics and concentric exercises with good form following initial cuing  Minor ROM deficits were noted during AAROM exercises into shoulder flexion (supine shoulder flexion and pulleys)  SL ER was performed with 2# initially, though pt reported too much pain, so weight was regressed to 1#  Following regression, pt reported the same amount of pain  Pt was advised to limit ROM of motion due to no change in pain with changing weight (2# to no weight)  Pt reported improvement in pain with limited range  Pt continued to experience upper arm pain with median nerve tension during manual glides  Pt tolerated cervical lateral glides well with minimal restrictions noted  Due to this, lateral glides will be progressed to central gliders with median nerve bias next session  Continue to progress pt as tolerated next visit  Plan: Continue per plan of care        Precautions: DM, Cushing's syndrome, type 2 DM, COPD, HTN, OA, hyperlipidemia, pelvic pain, leukocytosis, morbid obesity, h/o hernia repair, h/o L knee replacement      Manuals            Cervical lateral glides (progress to central gliders median nerve bias) nv Gr III L to R; central gliders nv           Median nerve glides manual nv NS                                     Neuro Re-Ed            Shoulder abd iso (HEP) 10x            Shoulder ER iso (HEP) 10x 10x rtb ER walkouts           Shoulder IR iso (HEP) 10x 10x rtb ER " walkouts           Shoulder flex iso (HEP) 10x            Median nerve glides nv            Resisted ER/IR nv rtb 15x ea side           No monies  nv           Prone I, T, Y  hold           Rows/ext nv 15x 10# ea           Ther Ex 5/1 5/5           pulleys nv 5'           Supine shoulder flex nv 2x10 light yellow stick           SL abd nv nv           SL ER nv 2#/1# 2x15           SA press  nv                                                  Ther Activity 5/1 5/5                                     Gait Training 5/1 5/5                                     Modalities 5/1 5/5

## 2023-05-08 ENCOUNTER — OFFICE VISIT (OUTPATIENT)
Dept: PHYSICAL THERAPY | Facility: CLINIC | Age: 58
End: 2023-05-08

## 2023-05-08 DIAGNOSIS — M75.42 IMPINGEMENT SYNDROME OF LEFT SHOULDER: Primary | ICD-10-CM

## 2023-05-08 NOTE — PROGRESS NOTES
Daily Note     Today's date: 2023  Patient name: Dottie Patel  : 1965  MRN: [id number]  Referring provider: Annmarie Soni MD  Dx:   Encounter Diagnosis     ICD-10-CM    1  Impingement syndrome of left shoulder  M75 42           Start Time: 1031  Stop Time: 1120  Total time in clinic (min): 49 minutes    Subjective: Pt reports significant pain this weekend following Friday's session  Objective: See treatment diary below      Assessment: Tolerated treatment well  Due to pt soreness following previous session, exercises were limited to light resistance with minimal to no pain  Pt continues to limit R SL ER ROM due to pain at end range  SL abduction was performed without weight due to  pain elicited with 1# weight  Pt experienced decrease in pain level with decreased weight  Central gliders were performed this session with median nerve bias  Improvement in tension was noted as well as cervical mobility  Pt continues to display pain reproduced with end range ER; trial [facility] mobilizations next visit  Continue to progress pt next visit  Plan: Continue per plan of care        Precautions: DM, Cushing's syndrome, type 2 DM, COPD, HTN, OA, hyperlipidemia, pelvic pain, leukocytosis, morbid obesity, h/o hernia repair, h/o L knee replacement      Manuals           Cervical lateral glides (progress to central gliders median nerve bias) nv Gr III L to R; central gliders nv Gr III L to R; central gliders median nerve bias           Median nerve glides manual nv NS           GH mobilizations   nv into ER                       Neuro Re-Ed           Shoulder abd iso (HEP) 10x            Shoulder ER iso (HEP) 10x 10x rtb ER walkouts           Shoulder IR iso (HEP) 10x 10x rtb ER walkouts           Shoulder flex iso (HEP) 10x            Median nerve glides nv            Resisted ER/IR nv rtb 15x ea side           No monies  nv otb 2x15          Prone I, T, Y  hold           SA press 3# 2x10          Rows/ext nv 15x 10# ea           Ther Ex 5/1 5/5 5/8          pulleys nv 5' 5'          Supine shoulder flex nv 2x10 light yellow stick 2x15 light yellow stick          SL abd nv nv 2x10 no wt          SL ER nv 2#/1# 2x15 1# 2x15                                                              Ther Activity 5/1 5/5 5/8                                    Gait Training 5/1 5/5 5/8                                    Modalities 5/1 5/5 5/8

## 2023-05-10 ENCOUNTER — CONSULT (OUTPATIENT)
Dept: SURGERY | Facility: CLINIC | Age: 58
End: 2023-05-10

## 2023-05-10 VITALS
HEIGHT: 63 IN | HEART RATE: 74 BPM | BODY MASS INDEX: 26.75 KG/M2 | SYSTOLIC BLOOD PRESSURE: 120 MMHG | DIASTOLIC BLOOD PRESSURE: 62 MMHG | TEMPERATURE: 98 F | OXYGEN SATURATION: 97 % | WEIGHT: 151 LBS

## 2023-05-10 DIAGNOSIS — R19.06 ABDOMINAL WALL MASS OF EPIGASTRIC REGION: Primary | ICD-10-CM

## 2023-05-10 DIAGNOSIS — R10.84 GENERALIZED ABDOMINAL PAIN: Primary | ICD-10-CM

## 2023-05-10 NOTE — LETTER
May 10, 2023     [address]     Patient: Steffi Larios   YOB: 1965   Date of Visit: 5/10/2023       Dear Rodrick Suarez,    Thank you for referring Steffi Larios to me for evaluation  Below are my notes for this consultation  If you have questions, please do not hesitate to call me  I look forward to following your patient along with you  Sincerely,    Pilo Bess MD        CC: No Recipients  Zev Denton MD  5/10/2023  6:21 PM  Sign when Signing Visit  Chief Complaint: Possible abdominal wall hernia  History of Present Illness: Simeon Mcneill is a 67-year-old  female originally from [place]  She presents the office today with a possible incisional hernia  Simeon Mcneill' history consists of a laparoscopic cholecystectomy done many years ago in [place]  She subsequently developed a small bowel obstruction necessitating exploratory laparotomy and a bowel resection  She subsequent developed an incisional hernia and apparently underwent repair of the hernia  After that she underwent another laparoscopic cholecystectomy since they apparently left a large portion of the gallbladder and at that time  She presents the office today with a bulge in her abdomen  This appears in the epigastric area just above and slightly to the right of her midline incision  She says it hurts with pressure placed on it but other than that it does not cause pain  She denies any GI component no nausea vomiting diarrhea constipation  She is here today in regards to this        Past Medical History:   Past Medical History:   Diagnosis Date   • Acute exacerbation of chronic obstructive pulmonary disease (COPD) ([facility] [address] ) 2/27/2020   • Asthma     exercise induced   • Diabetes ([address] )     type 2   • Diabetes mellitus ([address] )    • Hypertension    • Morbid obesity ([address] ) 4/7/2020   • Osteoarthritis 4/7/2020         Past Surgical History:    Past Surgical History:   Procedure Laterality Date   • BREAST BIOPSY Left 2016    benign   • CHOLECYSTECTOMY     • HERNIA REPAIR     • LAPAROSCOPY FOR ECTOPIC PREGNANCY      x2, both tubes removed    • MAMMO STEREOTACTIC BREAST BIOPSY LEFT (ALL INC) Left    • REPLACEMENT TOTAL KNEE Left    • SMALL INTESTINE SURGERY      after hernia surgery          Allergies:     Allergies   Allergen Reactions   • No Active Allergies          Medications:    Current Outpatient Medications:   •  albuterol (PROVENTIL HFA,VENTOLIN HFA) 90 mcg/act inhaler, Inhale 2 puffs every 6 (six) hours as needed for wheezing, Disp: 54 g, Rfl: 3  •  Aspirin Low Dose 81 MG EC tablet, TAKE 1 TABLET (81 MG TOTAL) BY MOUTH DAILY, Disp: 90 tablet, Rfl: 3  •  atorvastatin (LIPITOR) 40 mg tablet, Take 1 tablet (40 mg total) by mouth daily, Disp: 90 tablet, Rfl: 3  •  Banophen 25 MG capsule, TAKE 1 CAPSULE (25 MG TOTAL) BY MOUTH DAILY AT BEDTIME AS NEEDED FOR ITCHING, Disp: 90 capsule, Rfl: 3  •  calcipotriene (DOVONEX) 0 005 % cream, APPLY TO AFFECTED AREA TWICE A DAY 30 DAYS [facility] AREA HAND LEGS, Disp: 120 g, Rfl: 0  •  Calcium + Vitamin D3 600-10 MG-MCG TABS, TAKE 2 TABLETS BY MOUTH DAILY, Disp: 60 tablet, Rfl: 6  •  cetirizine (ZyrTEC) 10 mg tablet, TAKE 1 TABLET (10 MG TOTAL) BY MOUTH DAILY IN THE EVENING, Disp: 30 tablet, Rfl: 3  •  colchicine (COLCRYS) 0 6 mg tablet, Take 2 pills immediately and take 3rd pill an hour later, Disp: 3 tablet, Rfl: 0  •  Comfort EZ Pen Needles 33G X 5 MM MISC, INJECT 4 TIMES TO 5 TIMES A DAY SUBCUTANEOUS WITH INSULINE, Disp: 400 each, Rfl: 5  •  Diclofenac Sodium (VOLTAREN) 1 %, Apply 2 g topically 4 (four) times a day, Disp: 200 g, Rfl: 1  •  Easy Comfort Pen Needles 32G X 4 MM MISC, USE FOR INSULIN INJECTIONS ONCE DAILY AS DIRECTED, Disp: 100 each, Rfl: 3  •  Easy Comfort Pen Needles 33G X 4 MM MISC, WITH INSULINE PEN AS NEEDED UP TO 5 TIMES X90 DAYS, Disp: 400 each, Rfl: 5  •  escitalopram (LEXAPRO) 10 mg tablet, Take 1 tablet (10 mg total) by mouth daily with dinner, Disp: 30 tablet, Rfl: 5  •  fenofibrate (TRICOR) 145 mg tablet, Take 1 tablet (145 mg total) by mouth daily, Disp: 90 tablet, Rfl: 3  •  fluticasone-umeclidinium-vilanterol (Trelegy Ellipta) 100-62 5-25 mcg/actuation inhaler, Inhale 1 puff daily Rinse mouth after use , Disp: 60 each, Rfl: 5  •  glipiZIDE (GLUCOTROL) 5 mg tablet, Take 1 tablet (5 mg total) by mouth 2 (two) times a day with meals, Disp: 180 tablet, Rfl: 3  •  glucose blood test strip, Use as instructed to test three times daily  , Disp: 100 each, Rfl: 5  •  Insulin Glargine Solostar (Lantus SoloStar) 100 UNIT/ML SOPN, Inject 1 mL (100 Units total) under the skin in the morning, Disp: 15 mL, Rfl: 3  •  ketorolac (TORADOL) 10 mg tablet, Take 1 tablet (10 mg total) by mouth 2 (two) times a day with meals, Disp: 10 tablet, Rfl: 0  •  levocetirizine (XYZAL) 5 MG tablet, TAKE 1 TABLET (5 MG TOTAL) BY MOUTH EVERY EVENING, Disp: 30 tablet, Rfl: 3  •  lidocaine (LMX) 4 % cream, Apply topically as needed for mild pain, Disp: 30 g, Rfl: 0  •  magnesium Oxide (MAG-OX) 400 mg TABS, Take 1 tablet (400 mg total) by mouth 2 (two) times a day, Disp: 180 tablet, Rfl: 3  •  Melatonin 10 MG TABS, Take 1 tablet (10 mg total) by mouth daily at bedtime, Disp: 30 tablet, Rfl: 3  •  metFORMIN (GLUCOPHAGE) 1000 MG tablet, Take 1 tablet (1,000 mg total) by mouth 2 (two) times a day with meals, Disp: 180 tablet, Rfl: 3  •  metoprolol tartrate (LOPRESSOR) 25 mg tablet, Take 1 tablet (25 mg total) by mouth every 12 (twelve) hours, Disp: 180 tablet, Rfl: 3  •  montelukast (SINGULAIR) 10 mg tablet, Take 1 tablet (10 mg total) by mouth every 24 hours, Disp: 90 tablet, Rfl: 3  •  mupirocin (BACTROBAN) 2 % ointment, Apply topically 3 (three) times a day, Disp: 22 g, Rfl: 0  •  oxybutynin (DITROPAN XL) 15 MG 24 hr tablet, TAKE 1 TABLET (15 MG TOTAL) BY MOUTH DAILY, Disp: 90 tablet, Rfl: 3  •  Ozempic, 1 MG/DOSE, 4 MG/3ML injection pen, Inject 0 75 mL (1 mg total) under the skin once a week, Disp: 6 mL, Rfl: 3  •  pantoprazole (PROTONIX) 40 mg tablet, Take 1 tablet (40 mg total) by mouth daily, Disp: 90 tablet, Rfl: 3  •  vitamin B-12 (VITAMIN B-12) 1,000 mcg tablet, Take 1 tablet (1,000 mcg total) by mouth daily, Disp: 90 tablet, Rfl: 3      Social History:  Social History     Social History     Substance and Sexual Activity   Alcohol Use Yes    Comment: rare     Social History     Substance and Sexual Activity   Drug Use No     Social History     Tobacco Use   Smoking Status Former   Smokeless Tobacco Never         Family History:    Family History   Problem Relation Age of Onset   • Breast cancer Mother 36        cause of death   • Heart attack Father         MI cause of death   • No Known Problems Sister    • No Known Problems Daughter    • No Known Problems Maternal Grandmother    • No Known Problems Maternal Grandfather    • No Known Problems Paternal Grandmother    • No Known Problems Paternal Grandfather    • No Known Problems Sister    • No Known Problems Sister    • Colon cancer Neg Hx    • Ovarian cancer Neg Hx          Review of Systems:    As per the HPI  Additionally she has chronic left shoulder pain receiving physical therapy for this     Occasional dyspnea on exertion  Some memory loss  She denies weight loss weight gain fever chills night sweats nausea vomiting diarrhea constipation etc   Vitals:  Vitals:    05/10/23 1202   BP: 120/62   Pulse: 74   Temp: 98 °F (36 7 °C)   SpO2: 97%       Physical Exam:  Middle-aged  female 5 foot 351 pounds  She is awake alert no distress  Vital signs as above    Abdomen she has a protuberant abdomen  She has a midline incision from mid epigastric to just above the pubis  In the epigastric area just above into the right is an extremely firm area that is tender to deep palpation  No obvious incisional hernia is noted  Lab Results: I have personally reviewed pertinent reports  See below    Imaging: I have personally reviewed pertinent reports  EKG, Pathology, and Other Studies: I have personally reviewed pertinent reports  No visits with results within 1 Day(s) from this visit     Latest known visit with results is:   Appointment on 04/11/2023   Component Date Value   • Color, UA 04/13/2023 Yellow    • Clarity, UA 04/13/2023 Clear    • Specific Gravity, UA 04/13/2023 1 015    • pH, UA 04/13/2023 5 0    • Leukocytes, UA 04/13/2023 25 0 (A)    • Nitrite, UA 04/13/2023 Negative    • Protein, UA 04/13/2023 Negative    • Glucose, UA 04/13/2023 Negative    • Ketones, UA 04/13/2023 Negative    • Bilirubin, UA 04/13/2023 Negative    • Occult Blood, UA 04/13/2023 Negative    • UROBILINOGEN UA 04/13/2023 Negative    • Sodium 04/13/2023 143    • Potassium 04/13/2023 4 2    • Chloride 04/13/2023 103    • CO2 04/13/2023 30    • ANION GAP 04/13/2023 10    • BUN 04/13/2023 14    • Creatinine 04/13/2023 0 61    • Glucose, Fasting 04/13/2023 171 (H)    • Calcium 04/13/2023 10 6 (H)    • AST 04/13/2023 11 (L)    • ALT 04/13/2023 14    • Alkaline Phosphatase 04/13/2023 78    • Total Protein 04/13/2023 7 3    • Albumin 04/13/2023 4 3    • Total Bilirubin 04/13/2023 0 37    • eGFR 04/13/2023 100    • WBC 04/13/2023 10 69 (H)    • RBC 04/13/2023 4 95    • Hemoglobin 04/13/2023 12 9    • Hematocrit 04/13/2023 41 6    • MCV 04/13/2023 84    • MCH 04/13/2023 26 1 (L)    • MCHC 04/13/2023 31 0 (L)    • RDW 04/13/2023 13 4    • MPV 04/13/2023 9 5    • Platelets 03/54/8570 305    • nRBC 04/13/2023 0    • Neutrophils Relative 04/13/2023 50    • Immat GRANS % 04/13/2023 0    • Lymphocytes Relative 04/13/2023 42    • Monocytes Relative 04/13/2023 5    • Eosinophils Relative 04/13/2023 2    • Basophils Relative 04/13/2023 1    • Neutrophils Absolute 04/13/2023 5 26    • Immature Grans Absolute 04/13/2023 0 03    • Lymphocytes Absolute 04/13/2023 4 53 (H)    • Monocytes Absolute 04/13/2023 0 53    • Eosinophils Absolute 04/13/2023 0 24    • Basophils Absolute 04/13/2023 0 10    • Magnesium 04/13/2023 1 4 (L)    • Cholesterol 04/13/2023 137    • Triglycerides 04/13/2023 152 (H)    • HDL, Direct 04/13/2023 41 (L)    • LDL Calculated 04/13/2023 66    • Non-HDL-Chol (CHOL-HDL) 04/13/2023 96    • Uric Acid 04/13/2023 4 6    • TSH 3RD GENERATON 04/13/2023 1 924    • Free T4 04/13/2023 1 14    • Vitamin B-12 04/13/2023 282    • Vit D, 25-Hydroxy 04/13/2023 46 2    • Folate 04/13/2023 9 5    • Lyme Total Antibodies 04/13/2023 0 4    • SYPHILIS TOTAL ANTIBODY 04/13/2023 Reactive (A)    • RBC, UA 04/13/2023 None Seen    • WBC, UA 04/13/2023 0-1    • Epithelial Cells 04/13/2023 Occasional    • Bacteria, UA 04/13/2023 None Seen    • RPR 04/13/2023 Non-Reactive    • T pallidum Antibodies (F* 04/13/2023 Reactive (A)          Impression:  Abdominal wall mass  Rule out incisional hernia? Rule out abdominal mass? Old mesh? Plan:  The patient needs a CT scan of the abdomen and pelvis and we will proceed with this at the earliest possible date  We need more information to diagnose her problem and ultimately treated    She understands and we will proceed with a CT scan

## 2023-05-10 NOTE — PROGRESS NOTES
Chief Complaint: Possible abdominal wall hernia  History of Present Illness: Eric Bellamy is a 41-year-old  female originally from [place]  She presents the office today with a possible incisional hernia  Eric Bellamy' history consists of a laparoscopic cholecystectomy done many years ago in [place]  She subsequently developed a small bowel obstruction necessitating exploratory laparotomy and a bowel resection  She subsequent developed an incisional hernia and apparently underwent repair of the hernia  After that she underwent another laparoscopic cholecystectomy since they apparently left a large portion of the gallbladder and at that time  She presents the office today with a bulge in her abdomen  This appears in the epigastric area just above and slightly to the right of her midline incision  She says it hurts with pressure placed on it but other than that it does not cause pain  She denies any GI component no nausea vomiting diarrhea constipation  She is here today in regards to this  Past Medical History:   Past Medical History:   Diagnosis Date   • Acute exacerbation of chronic obstructive pulmonary disease (COPD) ([facility] [address] ) 2/27/2020   • Asthma     exercise induced   • Diabetes ([facility] [address] )     type 2   • Diabetes mellitus ([facility] [address] )    • Hypertension    • Morbid obesity ([facility] [address] ) 4/7/2020   • Osteoarthritis 4/7/2020         Past Surgical History:    Past Surgical History:   Procedure Laterality Date   • BREAST BIOPSY Left 2016    benign   • CHOLECYSTECTOMY     • HERNIA REPAIR     • LAPAROSCOPY FOR ECTOPIC PREGNANCY      x2, both tubes removed    • MAMMO STEREOTACTIC BREAST BIOPSY LEFT (ALL INC) Left    • REPLACEMENT TOTAL KNEE Left    • SMALL INTESTINE SURGERY      after hernia surgery          Allergies:     Allergies   Allergen Reactions   • No Active Allergies          Medications:    Current Outpatient Medications:   •  albuterol (PROVENTIL HFA,VENTOLIN HFA) 90 mcg/act inhaler, Inhale 2 puffs every 6 (six) hours as needed for wheezing, Disp: 54 g, Rfl: 3  •  Aspirin Low Dose 81 MG EC tablet, TAKE 1 TABLET (81 MG TOTAL) BY MOUTH DAILY, Disp: 90 tablet, Rfl: 3  •  atorvastatin (LIPITOR) 40 mg tablet, Take 1 tablet (40 mg total) by mouth daily, Disp: 90 tablet, Rfl: 3  •  Banophen 25 MG capsule, TAKE 1 CAPSULE (25 MG TOTAL) BY MOUTH DAILY AT BEDTIME AS NEEDED FOR ITCHING, Disp: 90 capsule, Rfl: 3  •  calcipotriene (DOVONEX) 0 005 % cream, APPLY TO AFFECTED AREA TWICE A DAY 30 DAYS [facility] LEGS, Disp: 120 g, Rfl: 0  •  Calcium + Vitamin D3 600-10 MG-MCG TABS, TAKE 2 TABLETS BY MOUTH DAILY, Disp: 60 tablet, Rfl: 6  •  cetirizine (ZyrTEC) 10 mg tablet, TAKE 1 TABLET (10 MG TOTAL) BY MOUTH DAILY IN THE EVENING, Disp: 30 tablet, Rfl: 3  •  colchicine (COLCRYS) 0 6 mg tablet, Take 2 pills immediately and take 3rd pill an hour later, Disp: 3 tablet, Rfl: 0  •  Comfort EZ Pen Needles 33G X 5 MM MISC, INJECT 4 TIMES TO 5 TIMES A DAY SUBCUTANEOUS WITH INSULINE, Disp: 400 each, Rfl: 5  •  Diclofenac Sodium (VOLTAREN) 1 %, Apply 2 g topically 4 (four) times a day, Disp: 200 g, Rfl: 1  •  Easy Comfort Pen Needles 32G X 4 MM MISC, USE FOR INSULIN INJECTIONS ONCE DAILY AS DIRECTED, Disp: 100 each, Rfl: 3  •  Easy Comfort Pen Needles 33G X 4 MM MISC, WITH INSULINE PEN AS NEEDED UP TO 5 TIMES X90 DAYS, Disp: 400 each, Rfl: 5  •  escitalopram (LEXAPRO) 10 mg tablet, Take 1 tablet (10 mg total) by mouth daily with dinner, Disp: 30 tablet, Rfl: 5  •  fenofibrate (TRICOR) 145 mg tablet, Take 1 tablet (145 mg total) by mouth daily, Disp: 90 tablet, Rfl: 3  •  fluticasone-umeclidinium-vilanterol (Trelegy Ellipta) 100-62 5-25 mcg/actuation inhaler, Inhale 1 puff daily Rinse mouth after use , Disp: 60 each, Rfl: 5  •  glipiZIDE (GLUCOTROL) 5 mg tablet, Take 1 tablet (5 mg total) by mouth 2 (two) times a day with meals, Disp: 180 tablet, Rfl: 3  •  glucose blood test strip, Use as instructed to test three times daily  , Disp: 100 each, Rfl: 5  •  Insulin Glargine Solostar (Lantus SoloStar) 100 UNIT/ML SOPN, Inject 1 mL (100 Units total) under the skin in the morning, Disp: 15 mL, Rfl: 3  •  ketorolac (TORADOL) 10 mg tablet, Take 1 tablet (10 mg total) by mouth 2 (two) times a day with meals, Disp: 10 tablet, Rfl: 0  •  levocetirizine (XYZAL) 5 MG tablet, TAKE 1 TABLET (5 MG TOTAL) BY MOUTH EVERY EVENING, Disp: 30 tablet, Rfl: 3  •  lidocaine (LMX) 4 % cream, Apply topically as needed for mild pain, Disp: 30 g, Rfl: 0  •  magnesium Oxide (MAG-OX) 400 mg TABS, Take 1 tablet (400 mg total) by mouth 2 (two) times a day, Disp: 180 tablet, Rfl: 3  •  Melatonin 10 MG TABS, Take 1 tablet (10 mg total) by mouth daily at bedtime, Disp: 30 tablet, Rfl: 3  •  metFORMIN (GLUCOPHAGE) 1000 MG tablet, Take 1 tablet (1,000 mg total) by mouth 2 (two) times a day with meals, Disp: 180 tablet, Rfl: 3  •  metoprolol tartrate (LOPRESSOR) 25 mg tablet, Take 1 tablet (25 mg total) by mouth every 12 (twelve) hours, Disp: 180 tablet, Rfl: 3  •  montelukast (SINGULAIR) 10 mg tablet, Take 1 tablet (10 mg total) by mouth every 24 hours, Disp: 90 tablet, Rfl: 3  •  mupirocin (BACTROBAN) 2 % ointment, Apply topically 3 (three) times a day, Disp: 22 g, Rfl: 0  •  oxybutynin (DITROPAN XL) 15 MG 24 hr tablet, TAKE 1 TABLET (15 MG TOTAL) BY MOUTH DAILY, Disp: 90 tablet, Rfl: 3  •  Ozempic, 1 MG/DOSE, 4 MG/3ML injection pen, Inject 0 75 mL (1 mg total) under the skin once a week, Disp: 6 mL, Rfl: 3  •  pantoprazole (PROTONIX) 40 mg tablet, Take 1 tablet (40 mg total) by mouth daily, Disp: 90 tablet, Rfl: 3  •  vitamin B-12 (VITAMIN B-12) 1,000 mcg tablet, Take 1 tablet (1,000 mcg total) by mouth daily, Disp: 90 tablet, Rfl: 3      Social History:  Social History     Social History     Substance and Sexual Activity   Alcohol Use Yes    Comment: rare     Social History     Substance and Sexual Activity   Drug Use No     Social History     Tobacco Use   Smoking Status Former Smokeless Tobacco Never         Family History:    Family History   Problem Relation Age of Onset   • Breast cancer Mother 36        cause of death   • Heart attack Father         MI cause of death   • No Known Problems Sister    • No Known Problems Daughter    • No Known Problems Maternal Grandmother    • No Known Problems Maternal Grandfather    • No Known Problems Paternal Grandmother    • No Known Problems Paternal Grandfather    • No Known Problems Sister    • No Known Problems Sister    • Colon cancer Neg Hx    • Ovarian cancer Neg Hx          Review of Systems:    As per the HPI  Additionally she has chronic left shoulder pain receiving physical therapy for this     Occasional dyspnea on exertion  Some memory loss  She denies weight loss weight gain fever chills night sweats nausea vomiting diarrhea constipation etc   Vitals:  Vitals:    05/10/23 1202   BP: 120/62   Pulse: 74   Temp: 98 °F (36 7 °C)   SpO2: 97%       Physical Exam:  Middle-aged  female 5 foot 351 pounds  She is awake alert no distress  Vital signs as above    Abdomen she has a protuberant abdomen  She has a midline incision from mid epigastric to just above the pubis  In the epigastric area just above into the right is an extremely firm area that is tender to deep palpation  No obvious incisional hernia is noted  Lab Results: I have personally reviewed pertinent reports  See below  Imaging: I have personally reviewed pertinent reports  EKG, Pathology, and Other Studies: I have personally reviewed pertinent reports  No visits with results within 1 Day(s) from this visit     Latest known visit with results is:   Appointment on 04/11/2023   Component Date Value   • Color, UA 04/13/2023 Yellow    • Clarity, UA 04/13/2023 Clear    • Specific Gravity, UA 04/13/2023 1 015    • pH, UA 04/13/2023 5 0    • Leukocytes, UA 04/13/2023 25 0 (A)    • Nitrite, UA 04/13/2023 Negative    • Protein, UA 04/13/2023 Negative    • Glucose, UA 04/13/2023 Negative    • Ketones, UA 04/13/2023 Negative    • Bilirubin, UA 04/13/2023 Negative    • Occult Blood, UA 04/13/2023 Negative    • UROBILINOGEN UA 04/13/2023 Negative    • Sodium 04/13/2023 143    • Potassium 04/13/2023 4 2    • Chloride 04/13/2023 103    • CO2 04/13/2023 30    • ANION GAP 04/13/2023 10    • BUN 04/13/2023 14    • Creatinine 04/13/2023 0 61    • Glucose, Fasting 04/13/2023 171 (H)    • Calcium 04/13/2023 10 6 (H)    • AST 04/13/2023 11 (L)    • ALT 04/13/2023 14    • Alkaline Phosphatase 04/13/2023 78    • Total Protein 04/13/2023 7 3    • Albumin 04/13/2023 4 3    • Total Bilirubin 04/13/2023 0 37    • eGFR 04/13/2023 100    • WBC 04/13/2023 10 69 (H)    • RBC 04/13/2023 4 95    • Hemoglobin 04/13/2023 12 9    • Hematocrit 04/13/2023 41 6    • MCV 04/13/2023 84    • MCH 04/13/2023 26 1 (L)    • MCHC 04/13/2023 31 0 (L)    • RDW 04/13/2023 13 4    • MPV 04/13/2023 9 5    • Platelets 81/78/3493 305    • nRBC 04/13/2023 0    • Neutrophils Relative 04/13/2023 50    • Immat GRANS % 04/13/2023 0    • Lymphocytes Relative 04/13/2023 42    • Monocytes Relative 04/13/2023 5    • Eosinophils Relative 04/13/2023 2    • Basophils Relative 04/13/2023 1    • Neutrophils Absolute 04/13/2023 5 26    • Immature Grans Absolute 04/13/2023 0 03    • Lymphocytes Absolute 04/13/2023 4 53 (H)    • Monocytes Absolute 04/13/2023 0 53    • Eosinophils Absolute 04/13/2023 0 24    • Basophils Absolute 04/13/2023 0 10    • Magnesium 04/13/2023 1 4 (L)    • Cholesterol 04/13/2023 137    • Triglycerides 04/13/2023 152 (H)    • HDL, Direct 04/13/2023 41 (L)    • LDL Calculated 04/13/2023 66    • Non-HDL-Chol (CHOL-HDL) 04/13/2023 96    • Uric Acid 04/13/2023 4 6    • TSH 3RD GENERATON 04/13/2023 1 924    • Free T4 04/13/2023 1 14    • Vitamin B-12 04/13/2023 282    • Vit D, 25-Hydroxy 04/13/2023 46 2    • Folate 04/13/2023 9 5    • Lyme Total Antibodies 04/13/2023 0 4    • SYPHILIS TOTAL ANTIBODY 04/13/2023 Reactive (A)    • RBC, UA 04/13/2023 None Seen    • WBC, UA 04/13/2023 0-1    • Epithelial Cells 04/13/2023 Occasional    • Bacteria, UA 04/13/2023 None Seen    • RPR 04/13/2023 Non-Reactive    • T pallidum Antibodies (F* 04/13/2023 Reactive (A)          Impression:  Abdominal wall mass  Rule out incisional hernia? Rule out abdominal mass? Old mesh? Plan:  The patient needs a CT scan of the abdomen and pelvis and we will proceed with this at the earliest possible date  We need more information to diagnose her problem and ultimately treated    She understands and we will proceed with a CT scan

## 2023-05-12 ENCOUNTER — OFFICE VISIT (OUTPATIENT)
Dept: PHYSICAL THERAPY | Facility: CLINIC | Age: 58
End: 2023-05-12

## 2023-05-12 DIAGNOSIS — M75.42 IMPINGEMENT SYNDROME OF LEFT SHOULDER: Primary | ICD-10-CM

## 2023-05-15 ENCOUNTER — OFFICE VISIT (OUTPATIENT)
Dept: PHYSICAL THERAPY | Facility: CLINIC | Age: 58
End: 2023-05-15

## 2023-05-15 DIAGNOSIS — M75.42 IMPINGEMENT SYNDROME OF LEFT SHOULDER: Primary | ICD-10-CM

## 2023-05-15 NOTE — PROGRESS NOTES
"Daily Note     Today's date: 5/15/2023  Patient name: Nubia Porter  : 1965  MRN: [id number]  Referring provider: Grupo Mas MD  Dx:   Encounter Diagnosis     ICD-10-CM    1  Impingement syndrome of left shoulder  M[age]           Start Time: 1132  Stop Time: 1220  Total time in clinic (min): 48 minutes    Subjective: Pt reports her shoulder is bothering her more  She reports she is not very compliant with exercises at home  Objective: See treatment diary below      Assessment: Tolerated treatment well  Patient was educated on the importance of performing HEP at home at least once every day  Pt progressed SL ER and SL abduction with increased weight with better tolerance  Due to increased pain, isometrics were emphasized this session  Continue to progress pt as tolerated next visit  Plan: Continue per plan of care        Precautions: DM, Cushing's syndrome, type 2 DM, COPD, HTN, OA, hyperlipidemia, pelvic pain, leukocytosis, morbid obesity, h/o hernia repair, h/o L knee replacement      Manuals 5/1 5/5 5/8 5/12 5/15        Cervical lateral glides (progress to central gliders median nerve bias) nv Gr III L to R; central gliders nv Gr III L to R; central gliders median nerve bias           Median nerve glides manual nv NS           GH mobilizations   nv into ER NS into ER         FOTO     nv        Neuro Re-Ed 5/1 5/5 5/8 5/12 5/15        Shoulder abd iso (HEP) 10x   10x 5\" hold 10x 5\" hold        Shoulder ER iso (HEP) 10x 10x rtb ER walkouts  ytb 10x walkouts 10x 5\" hold        Shoulder IR iso (HEP) 10x 10x rtb ER walkouts  ytb 10x walkouts 10x 5\" hold        Shoulder flex iso (HEP) 10x   10x 5\" hold 10x 5\" hold        Median nerve glides nv            Resisted ER/IR nv rtb 15x ea side  nv         No monies  nv otb 2x15 otb 2x15; HEP 2x15 otb        Prone I, T, Y  hold           SA press    3# 2x10 nv 4# 2x10 5\" hold        Middle trap pull aparts     otb 2x10        Rows/ext nv 15x 10# ea  " nv         Ther Ex 5/1 5/5 5/8 5/12 5/15        pulleys nv 5' 5' 6' 5'        Supine shoulder flex nv 2x10 light yellow stick 2x15 light yellow stick 2x10 light yellow stick         SL abd nv nv 2x10 no wt nv 2x10 1#        SL ER nv 2#/1# 2x15 1# 2x15 nv 2x15 2#        Wall slides    2x10 fwd, 2x10 lateral; HEP                                                Ther Activity 5/1 5/5 5/8 5/12 5/15                                  Gait Training 5/1 5/5 5/8 5/12 5/15                                  Modalities 5/1 5/5 5/8 5/12 5/15

## 2023-05-17 DIAGNOSIS — R21 RASH: ICD-10-CM

## 2023-05-17 RX ORDER — CALCIPOTRIENE 50 UG/G
CREAM TOPICAL
Qty: 120 G | Refills: 0 | Status: SHIPPED | OUTPATIENT
Start: 2023-05-17

## 2023-05-18 ENCOUNTER — HOSPITAL ENCOUNTER (OUTPATIENT)
Dept: CT IMAGING | Facility: HOSPITAL | Age: 58
End: 2023-05-18
Attending: SPECIALIST

## 2023-05-18 DIAGNOSIS — R10.84 GENERALIZED ABDOMINAL PAIN: ICD-10-CM

## 2023-05-18 RX ADMIN — IOHEXOL 100 ML: 350 INJECTION, SOLUTION INTRAVENOUS at 09:49

## 2023-05-19 ENCOUNTER — OFFICE VISIT (OUTPATIENT)
Dept: PHYSICAL THERAPY | Facility: CLINIC | Age: 58
End: 2023-05-19

## 2023-05-19 DIAGNOSIS — M75.42 IMPINGEMENT SYNDROME OF LEFT SHOULDER: Primary | ICD-10-CM

## 2023-05-19 NOTE — PROGRESS NOTES
"Daily Note     Today's date: 2023  Patient name: Bishop Bazzi  : 1965  MRN: [id number]  Referring provider: Cyndy Prado MD  Dx:   Encounter Diagnosis     ICD-10-CM    1  Impingement syndrome of left shoulder  M[age]                      Subjective: pt reports increased shoulder pain from a sustained overhead position for a CT scan  Medicated patch for pain management  Objective: See treatment diary below      Assessment: Tolerated treatment well  Limited session to avoid exacerbation of sx  EDU to differentiate between muscle fatigue/soreness, pain, stretching, as her chief compliant is a pulling/stretching sensation at the deltoid and biceps  Cuing for sub-max isometrics, avoiding compensation with body lean vs shoulder activation  Good carryover for postural corrections, avoiding flexed C/s  Fair eccentric control with s/l abd  Continued PT would be beneficial to improve function           Plan: Continue per plan of care         Precautions: DM, Cushing's syndrome, type 2 DM, COPD, HTN, OA, hyperlipidemia, pelvic pain, leukocytosis, morbid obesity, h/o hernia repair, h/o L knee replacement      Manuals 5/1 5/5 5/8 5/12 5/15 5/19       Cervical lateral glides (progress to central gliders median nerve bias) nv Gr III L to R; central gliders nv Gr III L to R; central gliders median nerve bias           Median nerve glides manual nv NS           GH mobilizations   nv into ER NS into ER         FOTO     nv nv       Neuro Re-Ed 5/1 5/5 5/8 5/12 5/15 5/19       Shoulder abd iso (HEP) 10x   10x 5\" hold 10x 5\" hold 10x 5\" hold       Shoulder ER iso (HEP) 10x 10x rtb ER walkouts  ytb 10x walkouts 10x 5\" hold 10x 5\" hold       Shoulder IR iso (HEP) 10x 10x rtb ER walkouts  ytb 10x walkouts 10x 5\" hold 10x 5\" hold       Shoulder flex iso (HEP) 10x   10x 5\" hold 10x 5\" hold 10x 5\" hold       Median nerve glides nv            Resisted ER/IR nv rtb 15x ea side  nv         No monies  nv otb 2x15 otb " "2x15; HEP 2x15 otb 2x15 otb       Prone I, T, Y  hold           SA press    3# 2x10 nv 4# 2x10 5\" hold 4# 2x10       Middle trap pull aparts     otb 2x10 otb 2x10       Rows/ext nv 15x 10# ea  nv         Ther Ex 5/1 5/5 5/8 5/12 5/15 5/19       pulleys nv 5' 5' 6' 5' 5'       Supine shoulder flex nv 2x10 light yellow stick 2x15 light yellow stick 2x10 light yellow stick         SL abd nv nv 2x10 no wt nv 2x10 1# 2x10       SL ER nv 2#/1# 2x15 1# 2x15 nv 2x15 2# nv       Wall slides    2x10 fwd, 2x10 lateral; HEP         Pendulums       10x A/P, M/L                                 Ther Activity 5/1 5/5 5/8 5/12 5/15 5/19                                 Gait Training 5/1 5/5 5/8 5/12 5/15 5/19                                 Modalities 5/1 5/5 5/8 5/12 5/15 5/19                                      "

## 2023-05-22 ENCOUNTER — OFFICE VISIT (OUTPATIENT)
Dept: PHYSICAL THERAPY | Facility: CLINIC | Age: 58
End: 2023-05-22

## 2023-05-22 DIAGNOSIS — M75.42 IMPINGEMENT SYNDROME OF LEFT SHOULDER: Primary | ICD-10-CM

## 2023-05-22 DIAGNOSIS — M77.8 LEFT SHOULDER TENDONITIS: ICD-10-CM

## 2023-05-22 NOTE — PROGRESS NOTES
"Daily Note     Today's date: 2023  Patient name: Sj Botello  : 1965  MRN: [id number]  Referring provider: Gurinder Graff MD  Dx:   Encounter Diagnosis     ICD-10-CM    1  Impingement syndrome of left shoulder  M[age]           Start Time: 1101  Stop Time: 1144  Total time in clinic (min): 43 minutes    Subjective: Pt reports she continues to have lateral/posterior L shoulder pain at high levels throughout the weekend  Objective: See treatment diary below      Assessment: Tolerated treatment well  Patient tolerated progression of no monies and middle trap pull aparts with increased resistance  Increased weight was used for SA press and SL ER, though pt complained of high irritability following  Pt displayed mild improvement in function as per FOTO outcome survey this session, though she continues to have high levels of pain  Pt to follow up with physician on Wednesday  Continue to progress pt as tolerated next visit  Plan: Continue per plan of care        Precautions: DM, Cushing's syndrome, type 2 DM, COPD, HTN, OA, hyperlipidemia, pelvic pain, leukocytosis, morbid obesity, h/o hernia repair, h/o L knee replacement      Manuals 5/1 5/5 5/8 5/12 5/15 5/19 5/22      Cervical lateral glides (progress to central gliders median nerve bias) nv Gr III L to R; central gliders nv Gr III L to R; central gliders median nerve bias           Median nerve glides manual nv NS           GH mobilizations   nv into ER NS into ER         FOTO     nv nv **      Neuro Re-Ed 5/1 5/5 5/8 5/12 5/15 5/19 5/22      Shoulder abd iso (HEP) 10x   10x 5\" hold 10x 5\" hold 10x 5\" hold 10x 5\" hold      Shoulder ER iso (HEP) 10x 10x rtb ER walkouts  ytb 10x walkouts 10x 5\" hold 10x 5\" hold 10x 5\" hold      Shoulder IR iso (HEP) 10x 10x rtb ER walkouts  ytb 10x walkouts 10x 5\" hold 10x 5\" hold 10x 5\" hold      Shoulder flex iso (HEP) 10x   10x 5\" hold 10x 5\" hold 10x 5\" hold       Median nerve glides nv          " "  Resisted ER/IR nv rtb 15x ea side  nv         No monies  nv otb 2x15 otb 2x15; HEP 2x15 otb 2x15 otb 3x10 pink tb; give band for home nv      Prone I, T, Y  hold           SA press    3# 2x10 nv 4# 2x10 5\" hold 4# 2x10 5# 2x10      Middle trap pull aparts     otb 2x10 otb 2x10 3x10 pink tb      Rows/ext nv 15x 10# ea  nv         Ther Ex 5/1 5/5 5/8 5/12 5/15 5/19 5/22      pulleys nv 5' 5' 6' 5' 5' 5'      Supine shoulder flex nv 2x10 light yellow stick 2x15 light yellow stick 2x10 light yellow stick         SL abd nv nv 2x10 no wt nv 2x10 1# 2x10       SL ER nv 2#/1# 2x15 1# 2x15 nv 2x15 2# nv 3# 3x10      Wall slides    2x10 fwd, 2x10 lateral; HEP         Pendulums       10x A/P, M/L                                 Ther Activity 5/1 5/5 5/8 5/12 5/15 5/19 5/22                                Gait Training 5/1 5/5 5/8 5/12 5/15 5/19 5/22                                Modalities 5/1 5/5 5/8 5/12 5/15 5/19 5/22                                     "

## 2023-05-23 ENCOUNTER — CONSULT (OUTPATIENT)
Dept: HEMATOLOGY ONCOLOGY | Facility: CLINIC | Age: 58
End: 2023-05-23

## 2023-05-23 VITALS
OXYGEN SATURATION: 97 % | RESPIRATION RATE: 16 BRPM | SYSTOLIC BLOOD PRESSURE: 120 MMHG | WEIGHT: 151 LBS | BODY MASS INDEX: 26.75 KG/M2 | HEART RATE: 76 BPM | HEIGHT: 63 IN | TEMPERATURE: 98.5 F | DIASTOLIC BLOOD PRESSURE: 74 MMHG

## 2023-05-23 DIAGNOSIS — D72.828 OTHER ELEVATED WHITE BLOOD CELL (WBC) COUNT: ICD-10-CM

## 2023-05-23 NOTE — PROGRESS NOTES
Hematology/Oncology Outpatient Consult  Jessica Hwang 60 y o  female 1965 [id number]    Date:  5/23/2023      Assessment and Plan:  1  Other elevated white blood cell (WBC) count  58-year-old female presents for consultation visit regarding elevated white blood cells  Her total WBC was 10 6 in April with a lymphocyte count of 4 53  I reviewed this is barely elevated  Remainder of CBC is normal   Reviewed that her WBC has fluctuated over the last 3 years  Lab values are attached  Likely this is of no clinical significance  She will repeat labs prior to PCP visit in July  She will call us to review the results  Follow-up as needed  - Ambulatory Referral to Hematology / Oncology  - CBC and differential; Future      HPI:  58-year-old female presents for consultation visit regarding elevated white blood cell count  Hemoglobin and platelets are normal     ROS: Review of Systems   Constitutional: Negative for activity change, appetite change and fatigue  Respiratory: Negative for cough and shortness of breath  Cardiovascular: Negative for leg swelling  Gastrointestinal: Positive for abdominal pain (Chronic related to her hernia) and diarrhea  Genitourinary: Negative for difficulty urinating, dysuria and hematuria  Musculoskeletal: Negative for arthralgias and myalgias  Neurological: Negative for dizziness, weakness, light-headedness and headaches  Hematological: Negative          Past Medical History:   Diagnosis Date   • Acute exacerbation of chronic obstructive pulmonary disease (COPD) ([facility] [address] ) 2/27/2020   • Asthma     exercise induced   • Diabetes ([facility] [address] )     type 2   • Diabetes mellitus ([facility] [address] )    • Hypertension    • Morbid obesity ([facility] [address] ) 4/7/2020   • Osteoarthritis 4/7/2020       Past Surgical History:   Procedure Laterality Date   • BREAST BIOPSY Left 2016    benign   • CHOLECYSTECTOMY     • HERNIA REPAIR     • LAPAROSCOPY FOR ECTOPIC PREGNANCY      x2, both tubes removed    • MAMMO STEREOTACTIC BREAST BIOPSY LEFT (ALL INC) Left    • REPLACEMENT TOTAL KNEE Left    • SMALL INTESTINE SURGERY      after hernia surgery        Social History     Socioeconomic History   • Marital status: Single     Spouse name: Not on file   • Number of children: Not on file   • Years of education: Not on file   • Highest education level: Not on file   Occupational History   • Not on file   Tobacco Use   • Smoking status: Former   • Smokeless tobacco: Never   Vaping Use   • Vaping Use: Never used   Substance and Sexual Activity   • Alcohol use: Yes     Comment: rare   • Drug use: No   • Sexual activity: Not Currently     Birth control/protection: Surgical   Other Topics Concern   • Not on file   Social History Narrative   • Not on file     Social Determinants of Health     Financial Resource Strain: Medium Risk   • Difficulty of Paying Living Expenses: Somewhat hard   Food Insecurity: Not on file   Transportation Needs: Unmet Transportation Needs   • Lack of Transportation (Medical):  Yes   • Lack of Transportation (Non-Medical): Yes   Physical Activity: Not on file   Stress: Not on file   Social Connections: Not on file   Intimate Partner Violence: Not on file   Housing Stability: Not on file       Family History   Problem Relation Age of Onset   • Breast cancer Mother 36        cause of death   • Heart attack Father         MI cause of death   • No Known Problems Sister    • No Known Problems Daughter    • No Known Problems Maternal Grandmother    • No Known Problems Maternal Grandfather    • No Known Problems Paternal Grandmother    • No Known Problems Paternal Grandfather    • No Known Problems Sister    • No Known Problems Sister    • Colon cancer Neg Hx    • Ovarian cancer Neg Hx        Allergies   Allergen Reactions   • No Active Allergies          Current Outpatient Medications:   •  albuterol (PROVENTIL HFA,VENTOLIN HFA) 90 mcg/act inhaler, Inhale 2 puffs every 6 (six) hours as needed for wheezing, Disp: 54 g, Rfl: 3  • Aspirin Low Dose 81 MG EC tablet, TAKE 1 TABLET (81 MG TOTAL) BY MOUTH DAILY, Disp: 90 tablet, Rfl: 3  •  atorvastatin (LIPITOR) 40 mg tablet, Take 1 tablet (40 mg total) by mouth daily, Disp: 90 tablet, Rfl: 3  •  Banophen 25 MG capsule, TAKE 1 CAPSULE (25 MG TOTAL) BY MOUTH DAILY AT BEDTIME AS NEEDED FOR ITCHING, Disp: 90 capsule, Rfl: 3  •  calcipotriene (DOVONEX) 0 005 % cream, APPLY TO AFFECTED AREA TWICE A DAY 30 [facility], Disp: 120 g, Rfl: 0  •  Calcium + Vitamin D3 600-10 MG-MCG TABS, TAKE 2 TABLETS BY MOUTH DAILY, Disp: 60 tablet, Rfl: 6  •  cetirizine (ZyrTEC) 10 mg tablet, TAKE 1 TABLET (10 MG TOTAL) BY MOUTH DAILY IN THE EVENING, Disp: 30 tablet, Rfl: 3  •  colchicine (COLCRYS) 0 6 mg tablet, Take 2 pills immediately and take 3rd pill an hour later, Disp: 3 tablet, Rfl: 0  •  Comfort EZ Pen Needles 33G X 5 MM MISC, INJECT 4 TIMES TO 5 TIMES A DAY SUBCUTANEOUS WITH INSULINE, Disp: 400 each, Rfl: 5  •  Diclofenac Sodium (VOLTAREN) 1 %, APPLY 2 G TOPICALLY 4 (FOUR) TIMES A DAY, Disp: 200 g, Rfl: 1  •  Easy Comfort Pen Needles 32G X 4 MM MISC, USE FOR INSULIN INJECTIONS ONCE DAILY AS DIRECTED, Disp: 100 each, Rfl: 3  •  Easy Comfort Pen Needles 33G X 4 MM MISC, WITH INSULINE PEN AS NEEDED UP TO 5 TIMES X90 DAYS, Disp: 400 each, Rfl: 5  •  escitalopram (LEXAPRO) 10 mg tablet, Take 1 tablet (10 mg total) by mouth daily with dinner, Disp: 30 tablet, Rfl: 5  •  fenofibrate (TRICOR) 145 mg tablet, Take 1 tablet (145 mg total) by mouth daily, Disp: 90 tablet, Rfl: 3  •  fluticasone-umeclidinium-vilanterol (Trelegy Ellipta) 100-62 5-25 mcg/actuation inhaler, Inhale 1 puff daily Rinse mouth after use , Disp: 60 each, Rfl: 5  •  glipiZIDE (GLUCOTROL) 5 mg tablet, Take 1 tablet (5 mg total) by mouth 2 (two) times a day with meals, Disp: 180 tablet, Rfl: 3  •  glucose blood test strip, Use as instructed to test three times daily  , Disp: 100 each, Rfl: 5  •  Insulin Glargine Solostar (Lantus SoloStar) "100 UNIT/ML SOPN, Inject 1 mL (100 Units total) under the skin in the morning, Disp: 15 mL, Rfl: 3  •  ketorolac (TORADOL) 10 mg tablet, Take 1 tablet (10 mg total) by mouth 2 (two) times a day with meals, Disp: 10 tablet, Rfl: 0  •  levocetirizine (XYZAL) 5 MG tablet, TAKE 1 TABLET (5 MG TOTAL) BY MOUTH EVERY EVENING, Disp: 30 tablet, Rfl: 3  •  lidocaine (LMX) 4 % cream, Apply topically as needed for mild pain, Disp: 30 g, Rfl: 0  •  magnesium Oxide (MAG-OX) 400 mg TABS, Take 1 tablet (400 mg total) by mouth 2 (two) times a day, Disp: 180 tablet, Rfl: 3  •  Melatonin 10 MG TABS, Take 1 tablet (10 mg total) by mouth daily at bedtime, Disp: 30 tablet, Rfl: 3  •  metFORMIN (GLUCOPHAGE) 1000 MG tablet, Take 1 tablet (1,000 mg total) by mouth 2 (two) times a day with meals, Disp: 180 tablet, Rfl: 3  •  metoprolol tartrate (LOPRESSOR) 25 mg tablet, Take 1 tablet (25 mg total) by mouth every 12 (twelve) hours, Disp: 180 tablet, Rfl: 3  •  montelukast (SINGULAIR) 10 mg tablet, Take 1 tablet (10 mg total) by mouth every 24 hours, Disp: 90 tablet, Rfl: 3  •  mupirocin (BACTROBAN) 2 % ointment, Apply topically 3 (three) times a day, Disp: 22 g, Rfl: 0  •  oxybutynin (DITROPAN XL) 15 MG 24 hr tablet, TAKE 1 TABLET (15 MG TOTAL) BY MOUTH DAILY, Disp: 90 tablet, Rfl: 3  •  Ozempic, 1 MG/DOSE, 4 MG/3ML injection pen, Inject 0 75 mL (1 mg total) under the skin once a week, Disp: 6 mL, Rfl: 3  •  pantoprazole (PROTONIX) 40 mg tablet, Take 1 tablet (40 mg total) by mouth daily, Disp: 90 tablet, Rfl: 3  •  vitamin B-12 (VITAMIN B-12) 1,000 mcg tablet, Take 1 tablet (1,000 mcg total) by mouth daily, Disp: 90 tablet, Rfl: 3      Physical Exam:  /74 (BP Location: Left arm, Patient Position: Sitting, Cuff Size: Large)   Pulse 76   Temp 98 5 °F (36 9 °C) (Temporal)   Resp 16   Ht 5' 3\" (1 6 m)   Wt 68 5 kg (151 lb)   LMP  (LMP Unknown)   SpO2 97%   BMI 26 75 kg/m²     Physical Exam  Vitals reviewed     Constitutional:       " General: She is not in acute distress  Appearance: She is well-developed  She is not ill-appearing  HENT:      Head: Normocephalic and atraumatic  Eyes:      General: No scleral icterus  Conjunctiva/sclera: Conjunctivae normal    Cardiovascular:      Rate and Rhythm: Normal rate and regular rhythm  Heart sounds: Normal heart sounds  No murmur heard  Pulmonary:      Effort: Pulmonary effort is normal  No respiratory distress  Breath sounds: Normal breath sounds  Abdominal:      Palpations: Abdomen is soft  Tenderness: There is no abdominal tenderness  Musculoskeletal:         General: No tenderness  Normal range of motion  Cervical back: Normal range of motion and neck supple  Right lower leg: No edema  Left lower leg: No edema  Lymphadenopathy:      Cervical: No cervical adenopathy  Upper Body:      Right upper body: No supraclavicular or axillary adenopathy  Left upper body: No supraclavicular or axillary adenopathy  Skin:     General: Skin is warm and dry  Neurological:      Mental Status: She is alert and oriented to person, place, and time  Cranial Nerves: No cranial nerve deficit  Psychiatric:         Mood and Affect: Mood normal          Behavior: Behavior normal            Patient voiced understanding and agreement in the above discussion  Aware to contact our office with questions/symptoms in the interim  This note has been generated by voice recognition software system  Therefore, there may be spelling, grammar, and or syntax errors  Please contact if questions arise

## 2023-05-24 ENCOUNTER — OFFICE VISIT (OUTPATIENT)
Dept: OBGYN CLINIC | Facility: MEDICAL CENTER | Age: 58
End: 2023-05-24

## 2023-05-24 VITALS
SYSTOLIC BLOOD PRESSURE: 100 MMHG | HEIGHT: 63 IN | BODY MASS INDEX: 27.21 KG/M2 | WEIGHT: 153.6 LBS | HEART RATE: 69 BPM | DIASTOLIC BLOOD PRESSURE: 65 MMHG

## 2023-05-24 DIAGNOSIS — M75.42 IMPINGEMENT SYNDROME OF LEFT SHOULDER: ICD-10-CM

## 2023-05-24 DIAGNOSIS — M75.02 ADHESIVE CAPSULITIS OF LEFT SHOULDER: Primary | ICD-10-CM

## 2023-05-24 NOTE — PROGRESS NOTES
Ortho Sports Medicine Shoulder Follow Up Visit     Assesment:   60 y o  female left shoulder impingement with humeral lesion and ahesive capsulitis    Plan:    Conservative treatment:    Ice to shoulder 1-2 times daily, for 20 minutes at a time  PT for ROM and strengthening to shoulder, rotator cuff, scapular stabilizers  MRI is already scheduled, recommended that she complete the MRI appointment  Referral for Ultrasound Guided injection of left shoulder to treat for adhesive capsulitis  Imaging:    No imaging was available for review today  Injection:    No Injection planned at this time  Referral sent for US guided injection  Surgery:     No surgery is recommended at this point, continue with conservative treatment plan as noted  Follow up:    Return for After MRI  Chief Complaint   Patient presents with   • Left Shoulder - Follow-up         History of Present Illness: The patient is returns for follow up of her left shoulder  Since the prior visit, She reports no improvement  The patient states that she has been compliant with physical therapy, however, her pain has progressed  Pain is improved by rest   Pain is aggravated by overhead activity  Symptoms include stiffness  The patient has weakness  The patient has tried rest and physical therapy          Shoulder Surgical History:  None    Past Medical, Social and Family History:  Past Medical History:   Diagnosis Date   • Acute exacerbation of chronic obstructive pulmonary disease (COPD) ([facility] [address] ) 2/27/2020   • Asthma     exercise induced   • Diabetes ([facility] [address] )     type 2   • Diabetes mellitus ([address] )    • Hypertension    • Morbid obesity ([address] ) 4/7/2020   • Osteoarthritis 4/7/2020     Past Surgical History:   Procedure Laterality Date   • BREAST BIOPSY Left 2016    benign   • CHOLECYSTECTOMY     • HERNIA REPAIR     • LAPAROSCOPY FOR ECTOPIC PREGNANCY      x2, both tubes removed    • MAMMO STEREOTACTIC BREAST BIOPSY LEFT (ALL INC) Left    • REPLACEMENT TOTAL KNEE Left    • SMALL INTESTINE SURGERY      after hernia surgery      Allergies   Allergen Reactions   • No Active Allergies      Current Outpatient Medications on File Prior to Visit   Medication Sig Dispense Refill   • albuterol (PROVENTIL HFA,VENTOLIN HFA) 90 mcg/act inhaler Inhale 2 puffs every 6 (six) hours as needed for wheezing 54 g 3   • Aspirin Low Dose 81 MG EC tablet TAKE 1 TABLET (81 MG TOTAL) BY MOUTH DAILY 90 tablet 3   • atorvastatin (LIPITOR) 40 mg tablet Take 1 tablet (40 mg total) by mouth daily 90 tablet 3   • Banophen 25 MG capsule TAKE 1 CAPSULE (25 MG TOTAL) BY MOUTH DAILY AT BEDTIME AS NEEDED FOR ITCHING 90 capsule 3   • calcipotriene (DOVONEX) 0 005 % cream APPLY TO AFFECTED AREA TWICE A DAY 30 DAYS [place] AREA HAND LEGS 120 g 0   • Calcium + Vitamin D3 600-10 MG-MCG TABS TAKE 2 TABLETS BY MOUTH DAILY 60 tablet 6   • cetirizine (ZyrTEC) 10 mg tablet TAKE 1 TABLET (10 MG TOTAL) BY MOUTH DAILY IN THE EVENING 30 tablet 3   • Diclofenac Sodium (VOLTAREN) 1 % APPLY 2 G TOPICALLY 4 (FOUR) TIMES A  g 1   • escitalopram (LEXAPRO) 10 mg tablet Take 1 tablet (10 mg total) by mouth daily with dinner 30 tablet 5   • fluticasone-umeclidinium-vilanterol (Trelegy Ellipta) 100-62 5-25 mcg/actuation inhaler Inhale 1 puff daily Rinse mouth after use   60 each 5   • glipiZIDE (GLUCOTROL) 5 mg tablet Take 1 tablet (5 mg total) by mouth 2 (two) times a day with meals 180 tablet 3   • Insulin Glargine Solostar (Lantus SoloStar) 100 UNIT/ML SOPN Inject 1 mL (100 Units total) under the skin in the morning 15 mL 3   • levocetirizine (XYZAL) 5 MG tablet TAKE 1 TABLET (5 MG TOTAL) BY MOUTH EVERY EVENING 30 tablet 3   • lidocaine (LMX) 4 % cream Apply topically as needed for mild pain 30 g 0   • magnesium Oxide (MAG-OX) 400 mg TABS Take 1 tablet (400 mg total) by mouth 2 (two) times a day 180 tablet 3   • Melatonin 10 MG TABS Take 1 tablet (10 mg total) by mouth daily at bedtime 30 tablet 3   • metFORMIN (GLUCOPHAGE) 1000 MG tablet Take 1 tablet (1,000 mg total) by mouth 2 (two) times a day with meals 180 tablet 3   • metoprolol tartrate (LOPRESSOR) 25 mg tablet Take 1 tablet (25 mg total) by mouth every 12 (twelve) hours 180 tablet 3   • montelukast (SINGULAIR) 10 mg tablet Take 1 tablet (10 mg total) by mouth every 24 hours 90 tablet 3   • oxybutynin (DITROPAN XL) 15 MG 24 hr tablet TAKE 1 TABLET (15 MG TOTAL) BY MOUTH DAILY 90 tablet 3   • Ozempic, 1 MG/DOSE, 4 MG/3ML injection pen Inject 0 75 mL (1 mg total) under the skin once a week 6 mL 3   • pantoprazole (PROTONIX) 40 mg tablet Take 1 tablet (40 mg total) by mouth daily 90 tablet 3   • vitamin B-12 (VITAMIN B-12) 1,000 mcg tablet Take 1 tablet (1,000 mcg total) by mouth daily 90 tablet 3   • colchicine (COLCRYS) 0 6 mg tablet Take 2 pills immediately and take 3rd pill an hour later 3 tablet 0   • Comfort EZ Pen Needles 33G X 5 MM MISC INJECT 4 TIMES TO 5 TIMES A DAY SUBCUTANEOUS WITH INSULINE (Patient not taking: Reported on 5/24/2023) 400 each 5   • Easy Comfort Pen Needles 32G X 4 MM MISC USE FOR INSULIN INJECTIONS ONCE DAILY AS DIRECTED (Patient not taking: Reported on 5/24/2023) 100 each 3   • Easy Comfort Pen Needles 33G X 4 MM MISC WITH INSULINE PEN AS NEEDED UP TO 5 TIMES X90 DAYS (Patient not taking: Reported on 5/24/2023) 400 each 5   • fenofibrate (TRICOR) 145 mg tablet Take 1 tablet (145 mg total) by mouth daily 90 tablet 3   • glucose blood test strip Use as instructed to test three times daily  (Patient not taking: Reported on 5/24/2023) 100 each 5   • ketorolac (TORADOL) 10 mg tablet Take 1 tablet (10 mg total) by mouth 2 (two) times a day with meals (Patient not taking: Reported on 5/24/2023) 10 tablet 0   • mupirocin (BACTROBAN) 2 % ointment Apply topically 3 (three) times a day (Patient not taking: Reported on 5/24/2023) 22 g 0     No current facility-administered medications on file prior to visit       Social "History     Socioeconomic History   • Marital status: Single     Spouse name: Not on file   • Number of children: Not on file   • Years of education: Not on file   • Highest education level: Not on file   Occupational History   • Not on file   Tobacco Use   • Smoking status: Former   • Smokeless tobacco: Never   Vaping Use   • Vaping Use: Never used   Substance and Sexual Activity   • Alcohol use: Yes     Comment: rare   • Drug use: No   • Sexual activity: Not Currently     Birth control/protection: Surgical   Other Topics Concern   • Not on file   Social History Narrative   • Not on file     Social Determinants of Health     Financial Resource Strain: Medium Risk (8/25/2022)    Overall Financial Resource Strain (CARDIA)    • Difficulty of Paying Living Expenses: Somewhat hard   Food Insecurity: Not on file   Transportation Needs: Unmet Transportation Needs (8/25/2022)    PRAPARE - Transportation    • Lack of Transportation (Medical): Yes    • Lack of Transportation (Non-Medical): Yes   Physical Activity: Not on file   Stress: Not on file   Social Connections: Not on file   Intimate Partner Violence: Not on file   Housing Stability: Not on file       I have reviewed the past medical, surgical, social and family history, medications and allergies as documented in the EMR  Review of systems: ROS is negative other than that noted in the HPI  Constitutional: Negative for fatigue and fever  Physical Exam:    Blood pressure 100/65, pulse 69, height 5' 3\" (1 6 m), weight 69 7 kg (153 lb 9 6 oz)      General/Constitutional: NAD, well developed, well nourished  HENT: Normocephalic, atraumatic  CV: Intact distal pulses, regular rate  Resp: No respiratory distress or labored breathing  GI: Soft and non-tender   Lymphatic: No lymphadenopathy palpated  Neuro: Alert and Oriented x 3, no focal deficits  Psych: Normal mood, normal affect, normal judgement, normal behavior  Skin: Warm, dry, no rashes, no " erythema      Shoulder focused exam:       RIGHT LEFT    Scapula Atrophy Negative Negative     Winging Negative Negative     Protraction Negative Negative    Rotator cuff SS 5/5 4/5     IS 5/5 4/5     SubS 5/5 5/5    ROM     0-150 Active and Passive     ER0 60 60     ER90 90    90     IR90 T6    T6     IRb T6    T6    TTP: AC Negative Positive     Biceps Negative Positive     Coracoid Negative Negative    Special Tests: O'Briens Negative Negative     Caputo-shear Negative Negative     Cross body Adduction Negative Positive     Speeds  Negative Negative     Kaity's Negative Negative     Whipple Negative Positive       Neer Negative Positive     Cobb Negative Negative    Instability: Apprehension & relocation not tested not tested     Load & shift not tested not tested    Other: Crank Negative Negative               UE NV Exam: +2 Radial pulses bilaterally  Sensation intact to light touch C5-T1 bilaterally, Radial/median/ulnar nerve motor intact    Cervical ROM is full without pain, numbness or tingling      Shoulder Imaging    No imaging was performed today      Scribe Attestation    I,:  Rodríguez Donovan am acting as a scribe while in the presence of the attending physician :       I,:  Vinicio Massey DO personally performed the services described in this documentation    as scribed in my presence :

## 2023-05-25 ENCOUNTER — HOSPITAL ENCOUNTER (OUTPATIENT)
Dept: MRI IMAGING | Facility: HOSPITAL | Age: 58
End: 2023-05-25

## 2023-05-25 DIAGNOSIS — G89.29 CHRONIC LEFT SHOULDER PAIN: ICD-10-CM

## 2023-05-25 DIAGNOSIS — M25.512 CHRONIC LEFT SHOULDER PAIN: ICD-10-CM

## 2023-05-26 ENCOUNTER — OFFICE VISIT (OUTPATIENT)
Dept: PHYSICAL THERAPY | Facility: CLINIC | Age: 58
End: 2023-05-26

## 2023-05-26 DIAGNOSIS — M75.42 IMPINGEMENT SYNDROME OF LEFT SHOULDER: Primary | ICD-10-CM

## 2023-05-26 NOTE — PROGRESS NOTES
"Daily Note     Today's date: 2023  Patient name: Naya Helton  : 1965  MRN: [id number]  Referring provider: Junior Tubbs MD  Dx:   Encounter Diagnosis     ICD-10-CM    1  Impingement syndrome of left shoulder  M75 [id number]                      Subjective: Pt presents to PT stating pain remains in posterior and lateral aspect of L shoulder  She reports fatigue post PT session  Objective: See treatment diary below      Assessment: Pt demonstrates fair tolerance to TE secondary to pain with TE although she appears to be properly challenged with TE performed today  Pt continues to work toward PT goals however goals may change after MRI is read  Patient demonstrated fatigue post treatment, exhibited good technique with therapeutic exercises and would benefit from continued PT to increase flexibility, strength and function  Plan: Continue per plan of care        Precautions: DM, Cushing's syndrome, type 2 DM, COPD, HTN, OA, hyperlipidemia, pelvic pain, leukocytosis, morbid obesity, h/o hernia repair, h/o L knee replacement      Manuals 5/1 5/5 5/8 5/12 5/15 5/19 5/22 5/26     Cervical lateral glides (progress to central gliders median nerve bias) nv Gr III L to R; central gliders nv Gr III L to R; central gliders median nerve bias           Median nerve glides manual nv NS           GH mobilizations   nv into ER NS into ER         FOTO     nv nv **      Neuro Re-Ed 5/1 5/5 5/8 5/12 5/15 5/19 5/22 5/26     Shoulder abd iso (HEP) 10x   10x 5\" hold 10x 5\" hold 10x 5\" hold 10x 5\" hold 10x 5\" hold     Shoulder ER iso (HEP) 10x 10x rtb ER walkouts  ytb 10x walkouts 10x 5\" hold 10x 5\" hold 10x 5\" hold 10x 5\" hold     Shoulder IR iso (HEP) 10x 10x rtb ER walkouts  ytb 10x walkouts 10x 5\" hold 10x 5\" hold 10x 5\" hold 10x 5\" hold     Shoulder flex iso (HEP) 10x   10x 5\" hold 10x 5\" hold 10x 5\" hold       Median nerve glides nv            Resisted ER/IR nv rtb 15x ea side  nv         No monies  nv otb " "2x15 otb 2x15; HEP 2x15 otb 2x15 otb 3x10 pink tb; give band for home nv 3x10 pink tb;     Prone I, T, Y  hold           SA press    3# 2x10 nv 4# 2x10 5\" hold 4# 2x10 5# 2x10 5# 3x10     Middle trap pull aparts     otb 2x10 otb 2x10 3x10 pink tb 3x10 pink tb     Rows/ext nv 15x 10# ea  nv         Ther Ex 5/1 5/5 5/8 5/12 5/15 5/19 5/22 5/26     pulleys nv 5' 5' 6' 5' 5' 5' 5'     Supine shoulder flex nv 2x10 light yellow stick 2x15 light yellow stick 2x10 light yellow stick         SL abd nv nv 2x10 no wt nv 2x10 1# 2x10       SL ER nv 2#/1# 2x15 1# 2x15 nv 2x15 2# nv 3# 3x10 3# 3x10     Wall slides    2x10 fwd, 2x10 lateral; HEP         Pendulums       10x A/P, M/L                                 Ther Activity 5/1 5/5 5/8 5/12 5/15 5/19 5/22 5/26                               Gait Training 5/1 5/5 5/8 5/12 5/15 5/19 5/22 5/26                               Modalities 5/1 5/5 5/8 5/12 5/15 5/19 5/22 5/26                                    "

## 2023-05-30 ENCOUNTER — OFFICE VISIT (OUTPATIENT)
Dept: PHYSICAL THERAPY | Facility: CLINIC | Age: 58
End: 2023-05-30

## 2023-05-30 DIAGNOSIS — M75.42 IMPINGEMENT SYNDROME OF LEFT SHOULDER: Primary | ICD-10-CM

## 2023-05-30 NOTE — PROGRESS NOTES
"Daily Note     Today's date: 2023  Patient name: Darren Spears  : 1965  MRN: [id number]  Referring provider: Dede Grubbs MD  Dx:   Encounter Diagnosis     ICD-10-CM    1  Impingement syndrome of left shoulder  M75 42           Start Time: 1102  Stop Time: 1145  Total time in clinic (min): 43 minutes    Subjective: Pt reports continued L shoulder pain  Objective: See treatment diary below      Assessment: Tolerated treatment well  Patient progressed resisted ER/IR with increased resistance with VC for form  Prone I's were performed with scapular stabilizer cuing for retraction  Pt tried prone T's with pain and decreased ROM in L shoulder  Try GH PROM next visit with mobilizations prior to exercise  Continue to progress pt as tolerated next visit  Plan: Continue per plan of care        Precautions: DM, Cushing's syndrome, type 2 DM, COPD, HTN, OA, hyperlipidemia, pelvic pain, leukocytosis, morbid obesity, h/o hernia repair, h/o L knee replacement      Manuals 5/1 5/5 5/8 5/12 5/15 5/19 5/22 5/26 5/30    Cervical lateral glides (progress to central gliders median nerve bias) nv Gr III L to R; central gliders nv Gr III L to R; central gliders median nerve bias           Median nerve glides manual nv NS           GH mobilizations   nv into ER NS into ER     nv with UE PROM    FOTO     nv nv **      Neuro Re-Ed 5/1 5/5 5/8 5/12 5/15 5/19 5/22 5/26 5/30    Shoulder abd iso (HEP) 10x   10x 5\" hold 10x 5\" hold 10x 5\" hold 10x 5\" hold 10x 5\" hold     Shoulder ER iso (HEP) 10x 10x rtb ER walkouts  ytb 10x walkouts 10x 5\" hold 10x 5\" hold 10x 5\" hold 10x 5\" hold     Shoulder IR iso (HEP) 10x 10x rtb ER walkouts  ytb 10x walkouts 10x 5\" hold 10x 5\" hold 10x 5\" hold 10x 5\" hold     Shoulder flex iso (HEP) 10x   10x 5\" hold 10x 5\" hold 10x 5\" hold       Median nerve glides nv            Resisted ER/IR nv rtb 15x ea side  nv     gtb 2x15 ea     No monies  nv otb 2x15 otb 2x15; HEP 2x15 otb 2x15 otb " "3x10 pink tb; give band for home nv 3x10 pink tb;     Prone I, T, Y  hold       Prone I 2x10, prone T pn!    SA press    3# 2x10 nv 4# 2x10 5\" hold 4# 2x10 5# 2x10 5# 3x10 5# 2x10    Middle trap pull aparts     otb 2x10 otb 2x10 3x10 pink tb 3x10 pink tb     Rows/ext nv 15x 10# ea  nv     11# ea 2x15    Ther Ex 5/1 5/5 5/8 5/12 5/15 5/19 5/22 5/26 5/30    pulleys nv 5' 5' 6' 5' 5' 5' 5' 5'    Supine shoulder flex nv 2x10 light yellow stick 2x15 light yellow stick 2x10 light yellow stick         SL abd nv nv 2x10 no wt nv 2x10 1# 2x10       SL ER nv 2#/1# 2x15 1# 2x15 nv 2x15 2# nv 3# 3x10 3# 3x10     Wall slides    2x10 fwd, 2x10 lateral; HEP         Pendulums       10x A/P, M/L       Pt edu         NS                 Ther Activity 5/1 5/5 5/8 5/12 5/15 5/19 5/22 5/26 5/30                              Gait Training 5/1 5/5 5/8 5/12 5/15 5/19 5/22 5/26 5/30                              Modalities 5/1 5/5 5/8 5/12 5/15 5/19 5/22 5/26 5/30                                   "

## 2023-06-02 ENCOUNTER — EVALUATION (OUTPATIENT)
Dept: PHYSICAL THERAPY | Facility: CLINIC | Age: 58
End: 2023-06-02

## 2023-06-02 DIAGNOSIS — M75.42 IMPINGEMENT SYNDROME OF LEFT SHOULDER: Primary | ICD-10-CM

## 2023-06-02 NOTE — PROGRESS NOTES
PT Re-evaluation     Today's date: 2023  Patient name: Gustavo Altman  : 1965  MRN: [id number]  Referring provider: Jason William MD  Dx:   Encounter Diagnosis     ICD-10-CM    1  Impingement syndrome of left shoulder  M75 42           Start Time: 1102  Stop Time: 1138  Total time in clinic (min): 36 minutes    Subjective: Pt reports to therapy for re-evaluation of L shoulder pain  Pt had recent MRI showing tendinosis of multiple RC muscles  She reports some improvement in function noted with improved AROM, though she continues to have high levels of pain  She wants to take a break from therapy until she follows up with her orthopedic doctor to determine what he suggests  Objective: See treatment diary below    Shoulder Comments      UE ROM  Shoulder flex: 159 R, 137 L  Shoulder Abd: 166 R, 135 L  Functional ER: T4 R, T3 L  Functional IR: T7 R, L L3     UE strength  UT Shru/5 bilat  Shoulder flex: 3+/5 L with pain, 4/5 R  Shoulder abd: 3/5 L with pain, 4/5 R  ER: 4/5 L, 4+/5 R  IR: 4/5 L with posterolateral L shoulder pain, 5/5 R  Elbow flex: 5/5 L, 5/5 R  Elbow ext: 5/5 bilat  Wrist flex: 4+/5 L, 5/5 R  Wrist ext: 5/5 bilat  Gripping: minor weakness LUE compared to RUE        Palpation: pain with palpation to supraspinatus, infraspinatus, teres major/minor LUE      Assessment: Tolerated treatment well  Patient reports to therapy for re-evaluation of L shoulder pain  Pt displays significant improvement in AROM L shoulder flexion, abduction, and internal rotation with minor improvement in R shoulder ER, though minor deficit was noted to begin with  Pt also displays improvement in overall strength levels, though she continues to display bilateral shoulder weakness, L>R, with pain against resistive testing  Pt has met all short term goals and is progressing well towards her long term goals  The only concern is the pt's continued reports of high levels of pain   Pt wants to f/u with physician "before making more appointments following MRI results to determine what he suggests moving forward  Pt will benefit from continued therapy twice a week for 3-4 more weeks to improve strength, function, and pain  Goals  Short term goals (3-4 weeks)  1  Pt will display independence with understanding and performance of HEP to allow for carryover of plan of care at home (met)  2  Pt will improve FOTO score from initial evaluation to show improvement in pain and function  (met)  3  Pt will improve shoulder flexion and abduction AROM by 10 degrees to improve dressing, bathing, lifting, and reaching  (met)  4  Pt will improve L shoulder ER/IR strength to 4/5 to improve shoulder stability strength with lifting and reaching  (met)    Long term goals (6-8 weeks)  1  Pt will score equal or better than projected score on FOTO to show improvement in pain and function  (progressing)  2  Pt will improve shoulder flexion and abduction AROM by 20 degrees to improve dressing, bathing, lifting, and reaching  (met)  3  Pt will improve L shoulder ER/IR strength to 4+/5 to improve shoulder stability strength with lifting and reaching  (progressing)  4  Pt will improve median nerve mobility to minimally limited to decrease tightness/pain of LUE  (progressing)      Plan: Continue per plan of care        Precautions: DM, Cushing's syndrome, type 2 DM, COPD, HTN, OA, hyperlipidemia, pelvic pain, leukocytosis, morbid obesity, h/o hernia repair, h/o L knee replacement      Manuals 5/1 5/5 5/8 5/12 5/15 5/19 5/22 5/26 5/30 6/2   Cervical lateral glides (progress to central gliders median nerve bias) nv Gr III L to R; central gliders nv Gr III L to R; central gliders median nerve bias           Median nerve glides manual nv NS           GH mobilizations   nv into ER NS into ER     nv with UE PROM    FOTO     nv nv **      Neuro Re-Ed 5/1 5/5 5/8 5/12 5/15 5/19 5/22 5/26 5/30 6/2   Shoulder abd iso (HEP) 10x   10x 5\" hold 10x 5\" hold 10x 5\" " "hold 10x 5\" hold 10x 5\" hold     Shoulder ER iso (HEP) 10x 10x rtb ER walkouts  ytb 10x walkouts 10x 5\" hold 10x 5\" hold 10x 5\" hold 10x 5\" hold     Shoulder IR iso (HEP) 10x 10x rtb ER walkouts  ytb 10x walkouts 10x 5\" hold 10x 5\" hold 10x 5\" hold 10x 5\" hold     Shoulder flex iso (HEP) 10x   10x 5\" hold 10x 5\" hold 10x 5\" hold       Median nerve glides nv            Resisted ER/IR nv rtb 15x ea side  nv     gtb 2x15 ea     No monies  nv otb 2x15 otb 2x15; HEP 2x15 otb 2x15 otb 3x10 pink tb; give band for home nv 3x10 pink tb;  2x15 ptb   Prone I, T, Y  hold       Prone I 2x10, prone T pn!    SA press    3# 2x10 nv 4# 2x10 5\" hold 4# 2x10 5# 2x10 5# 3x10 5# 2x10 5# 2x15   Middle trap pull aparts     otb 2x10 otb 2x10 3x10 pink tb 3x10 pink tb     Rows/ext nv 15x 10# ea  nv     11# ea 2x15    Ther Ex 5/1 5/5 5/8 5/12 5/15 5/19 5/22 5/26 5/30 6/2   pulleys nv 5' 5' 6' 5' 5' 5' 5' 5' 5'   Supine shoulder flex nv 2x10 light yellow stick 2x15 light yellow stick 2x10 light yellow stick      Red stick 2x10   SL abd nv nv 2x10 no wt nv 2x10 1# 2x10       SL ER nv 2#/1# 2x15 1# 2x15 nv 2x15 2# nv 3# 3x10 3# 3x10     Wall slides    2x10 fwd, 2x10 lateral; HEP         Pendulums       10x A/P, M/L       Pt edu         NS                 Ther Activity 5/1 5/5 5/8 5/12 5/15 5/19 5/22 5/26 5/30 6/2                             Gait Training 5/1 5/5 5/8 5/12 5/15 5/19 5/22 5/26 5/30 6/2                             Modalities 5/1 5/5 5/8 5/12 5/15 5/19 5/22 5/26 5/30 6/2                                  "

## 2023-06-08 ENCOUNTER — CONSULT (OUTPATIENT)
Dept: INFECTIOUS DISEASES | Facility: CLINIC | Age: 58
End: 2023-06-08
Payer: COMMERCIAL

## 2023-06-08 VITALS
SYSTOLIC BLOOD PRESSURE: 118 MMHG | DIASTOLIC BLOOD PRESSURE: 75 MMHG | RESPIRATION RATE: 18 BRPM | OXYGEN SATURATION: 93 % | HEART RATE: 73 BPM | TEMPERATURE: 97.2 F | WEIGHT: 155 LBS | HEIGHT: 63 IN | BODY MASS INDEX: 27.46 KG/M2

## 2023-06-08 DIAGNOSIS — R41.3 MEMORY LOSS: ICD-10-CM

## 2023-06-08 DIAGNOSIS — A53.9 SYPHILIS: Primary | ICD-10-CM

## 2023-06-08 PROCEDURE — 99204 OFFICE O/P NEW MOD 45 MIN: CPT | Performed by: INTERNAL MEDICINE

## 2023-06-08 NOTE — PROGRESS NOTES
Consultation - Infectious Disease   Dedra Hanley 60 y o  female MRN: [id number]  Unit/Bed#:  Encounter: [id number]      IMPRESSION & RECOMMENDATIONS:   Impression/Recommendations: This is a 60 y o  female, with a few stable medical problems including DM, referred from her PCP for evaluation of positive syphilis testing, which was done for memory loss  1   Syphilis, evidence by positive syphilis screening test, confirmed by FTA  RPR is negative  This is indicative of prior infection, probably old  Patient had multiple sexual contacts many years ago but has no recollection of syphilis or other STDs  Per CDC guideline, with above testing resolved and history, patient should be treated for syphilis  However, given that patient has undiagnosed memory loss, she should also get lumbar puncture to assess for neurosyphilis  Risk for neurosyphilis is probably low, given negative RPR but should be confirmed with lumbar puncture  Patient also needs to be screened for other STDs  Patient's ex- and daughter should also be tested for syphilis  Check HIV, chronic hepatitis, GC/chlamydia  Consult IR for lumbar puncture  Send CSF for routine and VDRL  Recommend the patient's ex- and daughter get RPR testing  Eventual treatment will either be benzathine penicillin IM weekly x3 or 14 days of IV PCN, depending on CSF results  2   Progressive memory loss  Although not likely for reasons above, patient should be assessed for neurosyphilis with a lumbar puncture  Patient has no neurological deficit  Lumbar puncture to assess for neurosyphilis, as in above  If this is negative, defer to PCP for rest of memory loss work-up  Outpatient records reviewed in detail  Discussed with patient and her daughter, who is present in office, in detail regarding the above plan  Follow-up with us after above results are available  Thank you for this consultation        HISTORY OF PRESENT ILLNESS:  Reason for Consult: Positive syphilis testing  HPI: Howie Olvera is a 60 y o  female, with underlying DM, mention to her PCP earlier this year regarding memory loss for the last few years  Syphilis testing was done  This came back positive  Therefore, patient was referred to us for evaluation  Patient is currently single  She is  from ex- approximately 11 years ago  She had a few sexual partners prior to her marriage  She has 1 daughter  Patient denies prior STD  She had routine HIV testing a few years ago back in [place]  This was negative by her account  At present, patient is in her usual state of health  Her biggest complaint is memory loss over the last few years  No focal weakness  No unsteadiness  REVIEW OF SYSTEMS:  A complete system-based review of systems was done  Except for what is noted in HPI above, ROS is otherwise negative  PAST MEDICAL HISTORY:  Past Medical History:   Diagnosis Date   • Acute exacerbation of chronic obstructive pulmonary disease (COPD) ([facility] [address] ) 2/27/2020   • Asthma     exercise induced   • Diabetes ([facility] [address] )     type 2   • Diabetes mellitus ([facility] [address] )    • Hypertension    • Morbid obesity ([facility] [address] ) 4/7/2020   • Osteoarthritis 4/7/2020     Past Surgical History:   Procedure Laterality Date   • BREAST BIOPSY Left 2016    benign   • CHOLECYSTECTOMY     • HERNIA REPAIR     • LAPAROSCOPY FOR ECTOPIC PREGNANCY      x2, both tubes removed    • MAMMO STEREOTACTIC BREAST BIOPSY LEFT (ALL INC) Left    • REPLACEMENT TOTAL KNEE Left    • SMALL INTESTINE SURGERY      after hernia surgery      Problem list reviewed      FAMILY HISTORY:  Non-contributory    SOCIAL HISTORY:  Social History     Substance and Sexual Activity   Alcohol Use Yes    Comment: rare     Social History     Substance and Sexual Activity   Drug Use No     Social History     Tobacco Use   Smoking Status Former   Smokeless Tobacco Never       ALLERGIES:  Allergies   Allergen Reactions   • No Active Allergies MEDICATIONS:  All current active medications have been reviewed  PHYSICAL EXAM:  Vitals:  Temperature: (!) 97 2 °F (36 2 °C)     Physical Exam:  General:  Well-nourished, well-developed, in no acute distress  Awake, alert and oriented x 3  Eyes:  Conjunctive clear with no hemorrhages or effusions  Oropharynx:  No ulcers, no lesions, pharynx benign, no tonsillitis  Neck:  Supple, no lymphadenopathy, no mass, nontender  Lungs:  Expansion symmetric, no rales, no wheezing, no accessory muscle use  Cardiac:  Regular rate and rhythm, normal S1, normal S2, no murmurs  Abdomen:  Soft, nondistended, non-tender, no HSM  Extremities:  No edema, no erythema, nontender  No ulcers  Skin:  No rashes, no ulcers  Neurological:  Moves all four extremities spontaneously, sensation grossly intact    LABS, IMAGING, & OTHER STUDIES:  Lab Results:  I have personally reviewed pertinent labs  Imaging Studies:   I have personally reviewed pertinent imaging study reports and images in PACS  EKG, Pathology, and Other Studies:   I have personally reviewed pertinent reports

## 2023-06-09 ENCOUNTER — APPOINTMENT (OUTPATIENT)
Dept: LAB | Facility: HOSPITAL | Age: 58
End: 2023-06-09
Payer: COMMERCIAL

## 2023-06-09 DIAGNOSIS — F17.210 CIGARETTE SMOKER: ICD-10-CM

## 2023-06-09 DIAGNOSIS — E11.8 TYPE II DIABETES MELLITUS WITH COMPLICATION (HCC): ICD-10-CM

## 2023-06-09 DIAGNOSIS — E78.5 HYPERLIPIDEMIA ASSOCIATED WITH TYPE 2 DIABETES MELLITUS (HCC): ICD-10-CM

## 2023-06-09 DIAGNOSIS — E11.69 HYPERLIPIDEMIA ASSOCIATED WITH TYPE 2 DIABETES MELLITUS (HCC): ICD-10-CM

## 2023-06-09 DIAGNOSIS — D72.828 OTHER ELEVATED WHITE BLOOD CELL (WBC) COUNT: ICD-10-CM

## 2023-06-09 DIAGNOSIS — M77.8 LEFT SHOULDER TENDONITIS: ICD-10-CM

## 2023-06-09 LAB
ALBUMIN SERPL BCP-MCNC: 4.4 G/DL (ref 3.5–5)
ALP SERPL-CCNC: 80 U/L (ref 34–104)
ALT SERPL W P-5'-P-CCNC: 17 U/L (ref 7–52)
ANION GAP SERPL CALCULATED.3IONS-SCNC: 11 MMOL/L (ref 4–13)
AST SERPL W P-5'-P-CCNC: 13 U/L (ref 13–39)
BASOPHILS # BLD AUTO: 0.09 THOUSANDS/ÂΜL (ref 0–0.1)
BASOPHILS NFR BLD AUTO: 1 % (ref 0–1)
BILIRUB SERPL-MCNC: 0.5 MG/DL (ref 0.2–1)
BUN SERPL-MCNC: 17 MG/DL (ref 5–25)
CALCIUM SERPL-MCNC: 10.8 MG/DL (ref 8.4–10.2)
CHLORIDE SERPL-SCNC: 101 MMOL/L (ref 96–108)
CHOLEST SERPL-MCNC: 141 MG/DL
CO2 SERPL-SCNC: 30 MMOL/L (ref 21–32)
CREAT SERPL-MCNC: 0.59 MG/DL (ref 0.6–1.3)
EOSINOPHIL # BLD AUTO: 0.2 THOUSAND/ÂΜL (ref 0–0.61)
EOSINOPHIL NFR BLD AUTO: 2 % (ref 0–6)
ERYTHROCYTE [DISTWIDTH] IN BLOOD BY AUTOMATED COUNT: 13.2 % (ref 11.6–15.1)
GFR SERPL CREATININE-BSD FRML MDRD: 100 ML/MIN/1.73SQ M
GLUCOSE P FAST SERPL-MCNC: 201 MG/DL (ref 65–99)
HBV CORE AB SER QL: NORMAL
HBV CORE IGM SER QL: NORMAL
HBV SURFACE AG SER QL: NORMAL
HCT VFR BLD AUTO: 41.6 % (ref 34.8–46.1)
HCV AB SER QL: NORMAL
HDLC SERPL-MCNC: 46 MG/DL
HGB BLD-MCNC: 13.3 G/DL (ref 11.5–15.4)
HIV 1+2 AB+HIV1 P24 AG SERPL QL IA: NORMAL
HIV 2 AB SERPL QL IA: NORMAL
HIV1 AB SERPL QL IA: NORMAL
HIV1 P24 AG SERPL QL IA: NORMAL
IMM GRANULOCYTES # BLD AUTO: 0.04 THOUSAND/UL (ref 0–0.2)
IMM GRANULOCYTES NFR BLD AUTO: 0 % (ref 0–2)
LDLC SERPL CALC-MCNC: 62 MG/DL (ref 0–100)
LYMPHOCYTES # BLD AUTO: 4.3 THOUSANDS/ÂΜL (ref 0.6–4.47)
LYMPHOCYTES NFR BLD AUTO: 36 % (ref 14–44)
MAGNESIUM SERPL-MCNC: 1.5 MG/DL (ref 1.9–2.7)
MCH RBC QN AUTO: 26.5 PG (ref 26.8–34.3)
MCHC RBC AUTO-ENTMCNC: 32 G/DL (ref 31.4–37.4)
MCV RBC AUTO: 83 FL (ref 82–98)
MONOCYTES # BLD AUTO: 0.51 THOUSAND/ÂΜL (ref 0.17–1.22)
MONOCYTES NFR BLD AUTO: 4 % (ref 4–12)
NEUTROPHILS # BLD AUTO: 6.85 THOUSANDS/ÂΜL (ref 1.85–7.62)
NEUTS SEG NFR BLD AUTO: 57 % (ref 43–75)
NONHDLC SERPL-MCNC: 95 MG/DL
NRBC BLD AUTO-RTO: 0 /100 WBCS
PLATELET # BLD AUTO: 324 THOUSANDS/UL (ref 149–390)
PMV BLD AUTO: 9.7 FL (ref 8.9–12.7)
POTASSIUM SERPL-SCNC: 4.5 MMOL/L (ref 3.5–5.3)
PROT SERPL-MCNC: 7.3 G/DL (ref 6.4–8.4)
RBC # BLD AUTO: 5.02 MILLION/UL (ref 3.81–5.12)
SODIUM SERPL-SCNC: 142 MMOL/L (ref 135–147)
T4 FREE SERPL-MCNC: 1.11 NG/DL (ref 0.61–1.12)
TRIGL SERPL-MCNC: 167 MG/DL
TSH SERPL DL<=0.05 MIU/L-ACNC: 2.56 UIU/ML (ref 0.45–4.5)
WBC # BLD AUTO: 11.99 THOUSAND/UL (ref 4.31–10.16)

## 2023-06-09 PROCEDURE — 87340 HEPATITIS B SURFACE AG IA: CPT | Performed by: INTERNAL MEDICINE

## 2023-06-09 PROCEDURE — 86704 HEP B CORE ANTIBODY TOTAL: CPT | Performed by: INTERNAL MEDICINE

## 2023-06-09 PROCEDURE — 87591 N.GONORRHOEAE DNA AMP PROB: CPT | Performed by: INTERNAL MEDICINE

## 2023-06-09 PROCEDURE — 83735 ASSAY OF MAGNESIUM: CPT

## 2023-06-09 PROCEDURE — 86803 HEPATITIS C AB TEST: CPT | Performed by: INTERNAL MEDICINE

## 2023-06-09 PROCEDURE — 84443 ASSAY THYROID STIM HORMONE: CPT

## 2023-06-09 PROCEDURE — 36415 COLL VENOUS BLD VENIPUNCTURE: CPT | Performed by: INTERNAL MEDICINE

## 2023-06-09 PROCEDURE — 80053 COMPREHEN METABOLIC PANEL: CPT

## 2023-06-09 PROCEDURE — 84439 ASSAY OF FREE THYROXINE: CPT

## 2023-06-09 PROCEDURE — 86705 HEP B CORE ANTIBODY IGM: CPT | Performed by: INTERNAL MEDICINE

## 2023-06-09 PROCEDURE — 87389 HIV-1 AG W/HIV-1&-2 AB AG IA: CPT | Performed by: INTERNAL MEDICINE

## 2023-06-09 PROCEDURE — 80061 LIPID PANEL: CPT

## 2023-06-09 PROCEDURE — 85025 COMPLETE CBC W/AUTO DIFF WBC: CPT

## 2023-06-09 PROCEDURE — 87491 CHLMYD TRACH DNA AMP PROBE: CPT | Performed by: INTERNAL MEDICINE

## 2023-06-09 RX ORDER — LANOLIN ALCOHOL/MO/W.PET/CERES
400 CREAM (GRAM) TOPICAL 2 TIMES DAILY
Qty: 180 TABLET | Refills: 3 | Status: SHIPPED | OUTPATIENT
Start: 2023-06-09

## 2023-06-12 LAB
C TRACH DNA SPEC QL NAA+PROBE: NEGATIVE
N GONORRHOEA DNA SPEC QL NAA+PROBE: NEGATIVE

## 2023-06-13 ENCOUNTER — TELEPHONE (OUTPATIENT)
Dept: FAMILY MEDICINE CLINIC | Facility: CLINIC | Age: 58
End: 2023-06-13

## 2023-06-13 NOTE — TELEPHONE ENCOUNTER
----- Message from Risa London MD sent at 6/9/2023 12:21 PM EDT -----  Magnesium bid and f/u w[facility] hEMATOLOGY  ----- Message -----  From: Lab, Background User  Sent: 6/9/2023  10:56 AM EDT  To: Risa London MD

## 2023-06-16 DIAGNOSIS — R21 RASH: ICD-10-CM

## 2023-06-16 DIAGNOSIS — I10 ESSENTIAL HYPERTENSION: ICD-10-CM

## 2023-06-16 DIAGNOSIS — J01.90 ACUTE SINUSITIS, RECURRENCE NOT SPECIFIED, UNSPECIFIED LOCATION: ICD-10-CM

## 2023-06-16 RX ORDER — CALCIPOTRIENE 50 UG/G
CREAM TOPICAL
Qty: 120 G | Refills: 0 | Status: SHIPPED | OUTPATIENT
Start: 2023-06-16

## 2023-06-16 RX ORDER — LEVOCETIRIZINE DIHYDROCHLORIDE 5 MG/1
TABLET, FILM COATED ORAL
Qty: 30 TABLET | Refills: 3 | Status: SHIPPED | OUTPATIENT
Start: 2023-06-16

## 2023-06-16 RX ORDER — ASPIRIN 81 MG/1
TABLET, COATED ORAL
Qty: 90 TABLET | Refills: 3 | Status: SHIPPED | OUTPATIENT
Start: 2023-06-16

## 2023-06-26 ENCOUNTER — HOSPITAL ENCOUNTER (OUTPATIENT)
Dept: NON INVASIVE DIAGNOSTICS | Facility: HOSPITAL | Age: 58
Discharge: HOME | End: 2023-06-26
Payer: COMMERCIAL

## 2023-06-26 VITALS — WEIGHT: 155 LBS | HEIGHT: 63 IN | BODY MASS INDEX: 27.46 KG/M2

## 2023-06-26 DIAGNOSIS — R07.89 OTHER CHEST PAIN: ICD-10-CM

## 2023-06-26 LAB
CHEST PAIN STATEMENT: NORMAL
MAX DIASTOLIC BP: 50 MMHG
MAX HEART RATE: 146 BPM
MAX HR PERCENT: 90 %
MAX HR: 146 BPM
MAX PREDICTED HEART RATE: 161 BPM
MAX. SYSTOLIC BP: 170 MMHG
PROTOCOL NAME: NORMAL
RATE PRESSURE PRODUCT: NORMAL
REASON FOR TERMINATION: NORMAL
SL CV STRESS RECOVERY BP: NORMAL MMHG
SL CV STRESS RECOVERY HR: 96 BPM
SL CV STRESS RECOVERY O2 SAT: 97 %
SL CV STRESS STAGE REACHED: 2
STRESS ANGINA INDEX: 0
STRESS BASELINE BP: NORMAL MMHG
STRESS BASELINE HR: 98 BPM
STRESS O2 SAT REST: 97 %
STRESS PEAK HR: 146 BPM
STRESS POST ESTIMATED WORKLOAD: 7.1 METS
STRESS POST EXERCISE DUR MIN: 6 MIN
STRESS POST EXERCISE DUR SEC: 11 SEC
STRESS POST O2 SAT PEAK: 97 %
STRESS POST PEAK BP: 170 MMHG
TARGET HR FORMULA: NORMAL
TEST INDICATION: NORMAL
TIME IN EXERCISE PHASE: NORMAL

## 2023-06-26 PROCEDURE — 93017 CV STRESS TEST TRACING ONLY: CPT

## 2023-06-26 PROCEDURE — 93016 CV STRESS TEST SUPVJ ONLY: CPT | Performed by: STUDENT IN AN ORGANIZED HEALTH CARE EDUCATION/TRAINING PROGRAM

## 2023-06-26 PROCEDURE — 93018 CV STRESS TEST I&R ONLY: CPT | Performed by: STUDENT IN AN ORGANIZED HEALTH CARE EDUCATION/TRAINING PROGRAM

## 2023-06-30 RX ORDER — INSULIN GLARGINE 100 [IU]/ML
INJECTION, SOLUTION SUBCUTANEOUS
Qty: 15 ML | Refills: 3 | Status: SHIPPED | OUTPATIENT
Start: 2023-06-30

## 2023-07-05 NOTE — NURSING NOTE
Called patient to complete consult and go over instructions, patient is scheduled on 7/19/23   for diagnostic lumbar puncture. Verified allergies and current anticoagulant medication of ASA 81 mg present but not required to stop per periprocedural management of coagulation status and hemostasis risk in percutaneous image guided procedure guidelines. Diet, medication instructions (taking own meds prior to test), also went over with patient that as of today with hold any ASA higher than the 81 mg or ASA products till the date of the procedure and need for  was explained. Also went over instructions of location, time and date of procedure, of location and time ambulatory procedure unit. Also reviewed the procedure itself and answered any questions. Explained also post recovery instructions. Patient made aware that she may call if any other questions that may arise to the myself, gave them my contact information. Information was also sent via e-mail [email or web address], see message below. Good Afternoon Ms. Bolaños,     You are scheduled on 7/19/23 @1 pm for diagnostic lumbar puncture. You are to come @ 11: 30 am to [facility] at [address] [facility], [address] floor and present  to [facility]. They will get you changed for your procedure, update medical information, and draw blood work. A platelet and clotting time are needed the day of the procedure to assure your safety and healing post procedure. You may eat a light breakfast, drink fluids and take all your medications that are prescribed to you. You may continue to take your prescribed 81 mg Aspirin but please do not take any aspirin higher than that 81 mg. Also be cautious of any over the counter medication please check if Aspirin is one of the ingredients (Tylenol, Motrin and Aleve are fine to take). If not on fluid restrictions, please increase your fluid intake 3 days prior to the procedure.     For the procedure you will be on your stomach, we will use an Xray to assist in accessing your cerebral spinal fluid once the area in your lower back is cleaned and you get a local anesthetic. Once the fluid has been collected, we will send them for testing per your ordering provider instructions. A Band-aid will be applied to the site and you will be assisted back onto your stretcher so you may go back to surgical services to be monitored for the allotted time usually 1 hour. Upon discharge you will need a ride home so please bring a  for this study. The night of or the day following you may experience soreness at your lower back and a headache, these are normal and expected. Your discharge instructions will be to rest, hydrate with fluids (caffeine also helps with the headache if present) and take over the counter medication such as Tylenol, Motrin or Aleve. Please feel free to call if any other questions or concerns should arise. [address] [facility] [place]  [facility], [address] [place] [address] Road  [phone] (Office)  [phone] (Fax)  [email or web address]

## 2023-07-13 DIAGNOSIS — R21 RASH: ICD-10-CM

## 2023-07-13 RX ORDER — CALCIPOTRIENE 50 UG/G
CREAM TOPICAL
Qty: 120 G | Refills: 0 | Status: SHIPPED | OUTPATIENT
Start: 2023-07-13

## 2023-07-18 ENCOUNTER — APPOINTMENT (OUTPATIENT)
Dept: LAB | Facility: HOSPITAL | Age: 58
End: 2023-07-18
Payer: COMMERCIAL

## 2023-07-18 LAB
BASOPHILS # BLD AUTO: 0.07 THOUSANDS/ÂΜL (ref 0–0.1)
BASOPHILS NFR BLD AUTO: 1 % (ref 0–1)
EOSINOPHIL # BLD AUTO: 0.21 THOUSAND/ÂΜL (ref 0–0.61)
EOSINOPHIL NFR BLD AUTO: 2 % (ref 0–6)
ERYTHROCYTE [DISTWIDTH] IN BLOOD BY AUTOMATED COUNT: 13.6 % (ref 11.6–15.1)
HCT VFR BLD AUTO: 41.9 % (ref 34.8–46.1)
HGB BLD-MCNC: 13.2 G/DL (ref 11.5–15.4)
IMM GRANULOCYTES # BLD AUTO: 0.04 THOUSAND/UL (ref 0–0.2)
IMM GRANULOCYTES NFR BLD AUTO: 0 % (ref 0–2)
LYMPHOCYTES # BLD AUTO: 3.96 THOUSANDS/ÂΜL (ref 0.6–4.47)
LYMPHOCYTES NFR BLD AUTO: 33 % (ref 14–44)
MCH RBC QN AUTO: 26 PG (ref 26.8–34.3)
MCHC RBC AUTO-ENTMCNC: 31.5 G/DL (ref 31.4–37.4)
MCV RBC AUTO: 83 FL (ref 82–98)
MONOCYTES # BLD AUTO: 0.6 THOUSAND/ÂΜL (ref 0.17–1.22)
MONOCYTES NFR BLD AUTO: 5 % (ref 4–12)
NEUTROPHILS # BLD AUTO: 7.02 THOUSANDS/ÂΜL (ref 1.85–7.62)
NEUTS SEG NFR BLD AUTO: 59 % (ref 43–75)
NRBC BLD AUTO-RTO: 0 /100 WBCS
PLATELET # BLD AUTO: 304 THOUSANDS/UL (ref 149–390)
PMV BLD AUTO: 9.9 FL (ref 8.9–12.7)
RBC # BLD AUTO: 5.07 MILLION/UL (ref 3.81–5.12)
WBC # BLD AUTO: 11.9 THOUSAND/UL (ref 4.31–10.16)

## 2023-07-18 PROCEDURE — 85025 COMPLETE CBC W/AUTO DIFF WBC: CPT

## 2023-07-19 ENCOUNTER — HOSPITAL ENCOUNTER (OUTPATIENT)
Dept: RADIOLOGY | Facility: HOSPITAL | Age: 58
Discharge: HOME | End: 2023-07-19
Attending: INTERNAL MEDICINE
Payer: COMMERCIAL

## 2023-07-19 VITALS
DIASTOLIC BLOOD PRESSURE: 51 MMHG | WEIGHT: 152 LBS | SYSTOLIC BLOOD PRESSURE: 104 MMHG | BODY MASS INDEX: 26.93 KG/M2 | TEMPERATURE: 97.7 F | OXYGEN SATURATION: 96 % | RESPIRATION RATE: 18 BRPM | HEIGHT: 63 IN | HEART RATE: 77 BPM

## 2023-07-19 DIAGNOSIS — A53.9 SYPHILIS: ICD-10-CM

## 2023-07-19 DIAGNOSIS — R41.3 MEMORY LOSS: ICD-10-CM

## 2023-07-19 LAB
APPEARANCE CSF: CLEAR
GLUCOSE CSF-MCNC: 83 MG/DL (ref 50–80)
GLUCOSE SERPL-MCNC: 75 MG/DL (ref 65–140)
INR PPP: 0.88 (ref 0.84–1.19)
LYMPHOCYTES NFR CSF MANUAL: 91 %
MONOS+MACROS CSF MANUAL: 9 %
PLATELET # BLD AUTO: 323 THOUSANDS/UL (ref 149–390)
PMV BLD AUTO: 9.2 FL (ref 8.9–12.7)
PROT CSF-MCNC: 64 MG/DL (ref 15–45)
PROTHROMBIN TIME: 12.2 SECONDS (ref 11.6–14.5)
RBC # CSF MANUAL: 2 UL (ref 0–10)
TOTAL CELLS COUNTED BLD: NO
TOTAL CELLS COUNTED SPEC: 44
TUBE # CSF: 4
WBC # CSF AUTO: 1 /UL (ref 0–5)

## 2023-07-19 PROCEDURE — 62328 DX LMBR SPI PNXR W/FLUOR/CT: CPT

## 2023-07-19 PROCEDURE — 82948 REAGENT STRIP/BLOOD GLUCOSE: CPT

## 2023-07-19 PROCEDURE — 86592 SYPHILIS TEST NON-TREP QUAL: CPT | Performed by: INTERNAL MEDICINE

## 2023-07-19 PROCEDURE — 82945 GLUCOSE OTHER FLUID: CPT | Performed by: INTERNAL MEDICINE

## 2023-07-19 PROCEDURE — 85610 PROTHROMBIN TIME: CPT | Performed by: INTERNAL MEDICINE

## 2023-07-19 PROCEDURE — 89050 BODY FLUID CELL COUNT: CPT | Performed by: INTERNAL MEDICINE

## 2023-07-19 PROCEDURE — 89051 BODY FLUID CELL COUNT: CPT | Performed by: INTERNAL MEDICINE

## 2023-07-19 PROCEDURE — 84157 ASSAY OF PROTEIN OTHER: CPT | Performed by: INTERNAL MEDICINE

## 2023-07-19 PROCEDURE — 85049 AUTOMATED PLATELET COUNT: CPT | Performed by: INTERNAL MEDICINE

## 2023-07-19 RX ORDER — ACETAMINOPHEN 325 MG/1
650 TABLET ORAL EVERY 6 HOURS PRN
Status: DISCONTINUED | OUTPATIENT
Start: 2023-07-19 | End: 2023-07-20 | Stop reason: HOSPADM

## 2023-07-21 ENCOUNTER — TELEPHONE (OUTPATIENT)
Dept: NEUROLOGY | Facility: CLINIC | Age: 58
End: 2023-07-21

## 2023-07-21 NOTE — TELEPHONE ENCOUNTER
Called and left a voicemail for patient - Please call back to confirm upcoming appointment with [facility]. Provided patient with apt date, time and location. Informed patient that check in is at least 15 minutes prior to apt time.

## 2023-07-22 LAB — REAGIN AB CSF QL: NON REACTIVE

## 2023-07-25 ENCOUNTER — TELEPHONE (OUTPATIENT)
Dept: RADIOLOGY | Facility: HOSPITAL | Age: 58
End: 2023-07-25

## 2023-07-25 NOTE — NURSING NOTE
Call placed to pt to discuss upcoming appointment at [facility] Radiology Department and consultation completed. Pt is having a L Shoulder Non-Arthrogram completed on 7/28/2023. Allergies reviewed and verified only anticoagulant medication pt taking is ASA which pt may continue taking. Pre procedure instructions including diet and taking own medications discussed with pt. Pt instructed that she may eat normally and take medications as usual before the procedure. Instructed pt to keep an eye on her BSs as this steroid injection can cause elevated BSs. Pt verbalized understanding of instructions given. Reminded pt of the location, date and time of procedure. My number was given to call if any questions or concerns arise pre or post procedure.

## 2023-07-26 RX ORDER — INSULIN GLARGINE 100 [IU]/ML
INJECTION, SOLUTION SUBCUTANEOUS
Qty: 15 ML | Refills: 3 | Status: SHIPPED | OUTPATIENT
Start: 2023-07-26 | End: 2023-07-26

## 2023-07-26 RX ORDER — INSULIN GLARGINE 100 [IU]/ML
INJECTION, SOLUTION SUBCUTANEOUS
Qty: 30 ML | Refills: 3 | Status: SHIPPED | OUTPATIENT
Start: 2023-07-26 | End: 2023-07-27 | Stop reason: SDUPTHER

## 2023-07-27 ENCOUNTER — TELEPHONE (OUTPATIENT)
Age: 58
End: 2023-07-27

## 2023-07-27 ENCOUNTER — CONSULT (OUTPATIENT)
Dept: NEUROLOGY | Facility: CLINIC | Age: 58
End: 2023-07-27
Payer: COMMERCIAL

## 2023-07-27 ENCOUNTER — PREP FOR PROCEDURE (OUTPATIENT)
Age: 58
End: 2023-07-27

## 2023-07-27 VITALS
TEMPERATURE: 97.7 F | HEIGHT: 63 IN | RESPIRATION RATE: 18 BRPM | OXYGEN SATURATION: 98 % | HEART RATE: 73 BPM | WEIGHT: 152.6 LBS | DIASTOLIC BLOOD PRESSURE: 54 MMHG | BODY MASS INDEX: 27.04 KG/M2 | SYSTOLIC BLOOD PRESSURE: 93 MMHG

## 2023-07-27 DIAGNOSIS — R41.3 MEMORY LOSS: ICD-10-CM

## 2023-07-27 DIAGNOSIS — Z12.11 SCREENING FOR COLON CANCER: Primary | ICD-10-CM

## 2023-07-27 DIAGNOSIS — E53.8 B12 DEFICIENCY: ICD-10-CM

## 2023-07-27 DIAGNOSIS — R06.83 SNORING: Primary | ICD-10-CM

## 2023-07-27 DIAGNOSIS — G47.9 SLEEPING DIFFICULTY: ICD-10-CM

## 2023-07-27 PROCEDURE — 99203 OFFICE O/P NEW LOW 30 MIN: CPT | Performed by: NURSE PRACTITIONER

## 2023-07-27 RX ORDER — INSULIN GLARGINE 100 [IU]/ML
100 INJECTION, SOLUTION SUBCUTANEOUS EVERY MORNING
Qty: 90 ML | Refills: 3 | Status: CANCELLED | OUTPATIENT
Start: 2023-07-27

## 2023-07-27 RX ORDER — INSULIN GLARGINE 100 [IU]/ML
100 INJECTION, SOLUTION SUBCUTANEOUS DAILY
Qty: 90 ML | Refills: 3 | Status: SHIPPED | OUTPATIENT
Start: 2023-07-27

## 2023-07-27 NOTE — TELEPHONE ENCOUNTER
Scheduled date of colonoscopy (as of today): 8/31/23  Physician performing colonoscopy: Tamar Bustos  Location of colonoscopy: [facility] GI [facility]  Bowel prep instructions sent via mail       NOTE: DIABETIC

## 2023-07-27 NOTE — PATIENT INSTRUCTIONS
MRI brain for further evaluation  B12 level before next appointment  Continue care with primary care and infectious disease  I do suggest sleep evaluation as you previously had sleep apnea and continue to have sleep difficulties  Let me know if headaches worsen  Make sure to keep with reading, games, puzzles etc for memory  Follow up in 4 months

## 2023-07-27 NOTE — PROGRESS NOTES
Patient ID: Marianela Lamar is a 60 y.o. female. Assessment/Plan:  Patient Instructions:  MRI brain for further evaluation  B12 level before next appointment  Continue care with primary care and infectious disease  I do suggest sleep evaluation as you previously had sleep apnea and continue to have sleep difficulties  Let me know if headaches worsen  Make sure to keep with reading, games, puzzles etc for memory  Follow up in 4 months       Diagnoses and all orders for this visit:    Snoring  -     Ambulatory Referral to Sleep Medicine; Future    Memory loss  -     Ambulatory Referral to Neurology  -     MRI brain NeuroQuant wo and w contrast; Future  -     Ambulatory Referral to Sleep Medicine; Future  -     Vitamin B12; Future    Sleeping difficulty  -     Ambulatory Referral to Sleep Medicine; Future    B12 deficiency  -     Vitamin B12; Future         Subjective:    HPI  Marianela Lamar is a 60year old female with past medical history of DM, COPD, OA, hyperlipidemia, syphilis (recent testing showing old infection-follows with ID- recent LP does not show neurosyphilis), b 12 deficiency (recent level 282) who presents for new patient evaluation for memory loss, referred by primary care. She states for the past 1 year she has been more forgetful; she states if she relaxes what she forgot will come back to her. She gives an example-she forgot one time the seasonings to use in yellow rice she cooked. She uses post it notes to help her remember. She denies home safety issues. She denies any relevant family history of memory loss. She lives alone (sometimes her grandson will stay with her-she helps get him to school). She does not drive-has not driven in some time; she will walk or use the bus. She manages her own medications-uses a pill box. She states she has trouble sleeping at times, she will lay down at 8 pm and not fall asleep until 3am; she states previously diagnosed with DENYS before moving here.  She states occasional frontal headache, resolved with tylenol use. Lab Results   Component Value Date    WBC 11.90 (H) 07/18/2023    HGB 13.2 07/18/2023    HCT 41.9 07/18/2023    MCV 83 07/18/2023     07/19/2023     Lab Results   Component Value Date    SODIUM 142 06/09/2023    K 4.5 06/09/2023     06/09/2023    CO2 30 06/09/2023    BUN 17 06/09/2023    CREATININE 0.59 (L) 06/09/2023    GLUC 315 (H) 01/21/2022    CALCIUM 10.8 (H) 06/09/2023     Lab Results   Component Value Date    YCLBNCSU54 282 04/13/2023     Lab Results   Component Value Date    FOLATE 9.5 04/13/2023     Lab Results   Component Value Date    MYF2YVOYDMDQ 2.561 06/09/2023     Lab Results   Component Value Date    LDLCALC 62 06/09/2023     Lyme neg, RPR neg, T pallidum antibodies reactive, csf vdrl nonreactive    CSF wbc 1, protein 64, rbc 2, glucose 83    The following portions of the patient's history were reviewed and updated as appropriate: allergies, current medications, past family history, past medical history, past social history, past surgical history and problem list.         Objective:    Blood pressure 93/54, pulse 73, temperature 97.7 °F (36.5 °C), temperature source Tympanic, resp. rate 18, height 5' 3" (1.6 m), weight 69.2 kg (152 lb 9.6 oz), SpO2 98 %. Physical Exam  Vitals reviewed. Constitutional:       General: She is not in acute distress. HENT:      Head: Normocephalic and atraumatic. Right Ear: External ear normal.      Left Ear: External ear normal.      Nose: Nose normal.      Mouth/Throat:      Mouth: Mucous membranes are moist.      Pharynx: Oropharynx is clear. Eyes:      General: Lids are normal.      Extraocular Movements: Extraocular movements intact. Pupils: Pupils are equal, round, and reactive to light. Cardiovascular:      Rate and Rhythm: Normal rate and regular rhythm. Pulses: Normal pulses. Heart sounds: Normal heart sounds.    Pulmonary:      Effort: Pulmonary effort is normal. Breath sounds: Normal breath sounds. Abdominal:      General: There is no distension. Musculoskeletal:      Cervical back: Normal range of motion. Right lower leg: No edema. Left lower leg: No edema. Skin:     General: Skin is warm and dry. Capillary Refill: Capillary refill takes less than 2 seconds. Findings: No rash. Neurological:      General: No focal deficit present. Mental Status: She is alert. Motor: Motor strength is normal.     Coordination: Romberg sign negative. Deep Tendon Reflexes:      Reflex Scores:       Brachioradialis reflexes are 1+ on the right side and 1+ on the left side. Patellar reflexes are 1+ on the right side and 1+ on the left side. Psychiatric:         Mood and Affect: Mood normal.         Speech: Speech normal.          Neurological Exam  Mental Status  Alert. Speech is normal. Language is fluent with no aphasia. Mc[employer]s 17/30 see scanned in copy-9th grade education level. Cranial Nerves  CN II: Visual acuity is normal. Visual fields full to confrontation. CN III, IV, VI: Extraocular movements intact bilaterally. Normal lids and orbits bilaterally. Pupils equal round and reactive to light bilaterally. CN V: Facial sensation is normal.  CN VII: Full and symmetric facial movement. CN VIII: Hearing is normal.  CN IX, X: Palate elevates symmetrically. Normal gag reflex. CN XI: Shoulder shrug strength is normal.  CN XII: Tongue midline without atrophy or fasciculations. Motor  Normal muscle bulk throughout. Strength is 5/5 throughout all four extremities. Sensory  Light touch is normal in upper and lower extremities.      Reflexes                                            Right                      Left  Brachioradialis                    1+                         1+  Patellar                                1+                         1+    Coordination  Right: Finger-to-nose normal.Left: Finger-to-nose normal.    Gait  Casual gait is normal including stance, stride, and arm swing. Romberg is absent. Able to rise from chair without using arms. ROS:    Review of Systems   Constitutional: Positive for appetite change (decreased). Negative for fatigue and fever. HENT: Negative. Negative for hearing loss, tinnitus, trouble swallowing and voice change. Eyes: Negative. Negative for photophobia, pain and visual disturbance. Respiratory: Negative. Negative for shortness of breath. Cardiovascular: Negative. Negative for palpitations. Gastrointestinal: Negative. Negative for nausea and vomiting. Endocrine: Negative. Negative for cold intolerance. Genitourinary: Negative. Negative for dysuria, frequency and urgency. Musculoskeletal: Positive for back pain (mild low back pain). Negative for gait problem, myalgias and neck pain. Skin: Negative. Negative for rash. Allergic/Immunologic: Negative. Neurological: Positive for light-headedness. Negative for dizziness, tremors, seizures, syncope, facial asymmetry, speech difficulty, weakness, numbness and headaches. Fingers tingle on both hands- all fingers   Hematological: Negative. Does not bruise/bleed easily. Psychiatric/Behavioral: Positive for sleep disturbance. Negative for confusion and hallucinations. The patient is nervous/anxious.          Depression- mild memory loss- forgetful a times   ROS was reviewed and updated as appropriate

## 2023-07-28 ENCOUNTER — HOSPITAL ENCOUNTER (OUTPATIENT)
Dept: RADIOLOGY | Facility: HOSPITAL | Age: 58
End: 2023-07-28
Attending: ORTHOPAEDIC SURGERY
Payer: COMMERCIAL

## 2023-07-28 DIAGNOSIS — M75.02 ADHESIVE CAPSULITIS OF LEFT SHOULDER: ICD-10-CM

## 2023-07-28 DIAGNOSIS — M75.42 IMPINGEMENT SYNDROME OF LEFT SHOULDER: ICD-10-CM

## 2023-07-28 PROCEDURE — 77002 NEEDLE LOCALIZATION BY XRAY: CPT

## 2023-07-28 PROCEDURE — 20610 DRAIN/INJ JOINT/BURSA W/O US: CPT

## 2023-07-28 RX ORDER — LIDOCAINE HYDROCHLORIDE 10 MG/ML
10 INJECTION, SOLUTION EPIDURAL; INFILTRATION; INTRACAUDAL; PERINEURAL ONCE
Status: COMPLETED | OUTPATIENT
Start: 2023-07-28 | End: 2023-07-28

## 2023-07-28 RX ORDER — METHYLPREDNISOLONE ACETATE 40 MG/ML
20 INJECTION, SUSPENSION INTRA-ARTICULAR; INTRALESIONAL; INTRAMUSCULAR; SOFT TISSUE ONCE
Status: COMPLETED | OUTPATIENT
Start: 2023-07-28 | End: 2023-07-28

## 2023-07-28 RX ORDER — BUPIVACAINE HYDROCHLORIDE 2.5 MG/ML
30 INJECTION, SOLUTION EPIDURAL; INFILTRATION; INTRACAUDAL ONCE
Status: COMPLETED | OUTPATIENT
Start: 2023-07-28 | End: 2023-07-28

## 2023-07-28 RX ADMIN — BUPIVACAINE HYDROCHLORIDE 30 ML: 2.5 INJECTION, SOLUTION EPIDURAL; INFILTRATION; INTRACAUDAL; PERINEURAL at 10:47

## 2023-07-28 RX ADMIN — METHYLPREDNISOLONE ACETATE 1 MG: 40 INJECTION, SUSPENSION INTRA-ARTICULAR; INTRALESIONAL; INTRAMUSCULAR; SOFT TISSUE at 10:49

## 2023-07-28 RX ADMIN — IOHEXOL 10 ML: 300 INJECTION, SOLUTION INTRAVENOUS at 10:45

## 2023-07-28 RX ADMIN — LIDOCAINE HYDROCHLORIDE 10 ML: 10 INJECTION, SOLUTION EPIDURAL; INFILTRATION; INTRACAUDAL; PERINEURAL at 10:49

## 2023-08-04 ENCOUNTER — OFFICE VISIT (OUTPATIENT)
Dept: OBGYN CLINIC | Facility: MEDICAL CENTER | Age: 58
End: 2023-08-04
Payer: COMMERCIAL

## 2023-08-04 VITALS
BODY MASS INDEX: 26.75 KG/M2 | DIASTOLIC BLOOD PRESSURE: 67 MMHG | HEIGHT: 63 IN | SYSTOLIC BLOOD PRESSURE: 106 MMHG | WEIGHT: 151 LBS | HEART RATE: 61 BPM

## 2023-08-04 DIAGNOSIS — M75.02 ADHESIVE CAPSULITIS OF LEFT SHOULDER: Primary | ICD-10-CM

## 2023-08-04 PROCEDURE — 99213 OFFICE O/P EST LOW 20 MIN: CPT | Performed by: ORTHOPAEDIC SURGERY

## 2023-08-04 NOTE — PROGRESS NOTES
I contacted the pt and scheduled her for a vv ER follow up.        Closed enc Ortho Sports Medicine Shoulder Follow Up Visit     Assesment:   60 y.o. female left shoulder adhesive capsulitis, improving with physical therapy. Plan:    Conservative treatment:    · Ice to shoulder 1-2 times daily, for 20 minutes at a time. · Continue formal physical therapy to obtain final degrees of ROM. May progress to HEP when ready. · Let pain guide return to activities. · Tylenol for pain PRN. Imaging:    MRI from 05/25/2023 was reviewed by myself and explained to the patient. Injection:    No Injection planned at this time. Surgery:     No surgery is recommended at this point, continue with conservative treatment plan as noted. Follow up:    Return in about 2 months (around 10/4/2023). Chief Complaint   Patient presents with   • Left Shoulder - Follow-up         History of Present Illness: The patient is returns for follow up of left shoulder adhesive capsulitis. Since the prior visit, She reports significant improvement in her pain and ROM with physical therapy. She reports PT wanted her to check with orthopedics due to pain prior to continuing formal PT. She reports she has provided her with a HEP she is continuing to do daily on her own. She states mild pain in the front of her shoulder and pain and tightness in her posterior shoulder that goes down her arm that worsens with shoulder abduction and external rotation but rates this a 1/10. She does note improvement with her shoulder pain since the FL injection to her left shoulder on 07/28/2023. She demonstrates improved ROM in shoulder forward elevation and abduction but still is missing the final degrees of ROM. She reports she is able to reach behind her back to shower and get dressed without issue. She denies any radiating numbness nor tingling. Overall, she is happy with her outcomes.     Shoulder Surgical History:  None    Past Medical, Social and Family History:  Past Medical History:   Diagnosis Date   • Acute exacerbation of chronic obstructive pulmonary disease (COPD) ([address]) 2/27/2020   • Asthma     exercise induced   • Diabetes ([address])     type 2   • Diabetes mellitus ([address])    • Hypertension    • Morbid obesity ([address]) 4/7/2020   • Osteoarthritis 4/7/2020     Past Surgical History:   Procedure Laterality Date   • BREAST BIOPSY Left 2016    benign   • CHOLECYSTECTOMY     • FL INJECTION LEFT SHOULDER (NON ARTHROGRAM)  7/28/2023   • FL LUMBAR PUNCTURE DIAGNOSTIC  7/19/2023   • HERNIA REPAIR     • LAPAROSCOPY FOR ECTOPIC PREGNANCY      x2, both tubes removed    • MAMMO STEREOTACTIC BREAST BIOPSY LEFT (ALL INC) Left    • REPLACEMENT TOTAL KNEE Left    • SMALL INTESTINE SURGERY      after hernia surgery      Allergies   Allergen Reactions   • No Active Allergies      Current Outpatient Medications on File Prior to Visit   Medication Sig Dispense Refill   • Acetaminophen (TYLENOL PO) Take by mouth As needed for pain     • Aspirin Low Dose 81 MG EC tablet TAKE 1 TABLET (81 MG TOTAL) BY MOUTH DAILY 90 tablet 3   • atorvastatin (LIPITOR) 40 mg tablet Take 1 tablet (40 mg total) by mouth daily 90 tablet 3   • Banophen 25 MG capsule TAKE 1 CAPSULE (25 MG TOTAL) BY MOUTH DAILY AT BEDTIME AS NEEDED FOR ITCHING 90 capsule 3   • Calcium + Vitamin D3 600-10 MG-MCG TABS TAKE 2 TABLETS BY MOUTH DAILY 60 tablet 6   • Diclofenac Sodium (VOLTAREN) 1 % APPLY 2 G TOPICALLY 4 (FOUR) TIMES A  g 1   • escitalopram (LEXAPRO) 10 mg tablet Take 1 tablet (10 mg total) by mouth daily with dinner 30 tablet 5   • fluticasone-umeclidinium-vilanterol (Trelegy Ellipta) 100-62.5-25 mcg/actuation inhaler Inhale 1 puff daily Rinse mouth after use.  60 each 5   • glipiZIDE (GLUCOTROL) 5 mg tablet Take 1 tablet (5 mg total) by mouth 2 (two) times a day with meals 180 tablet 3   • Insulin Glargine Solostar (Lantus SoloStar) 100 UNIT/ML SOPN Inject 1 mL (100 Units total) under the skin in the morning 90 mL 3   • levocetirizine (XYZAL) 5 MG tablet TAKE 1 TABLET (5 MG TOTAL) BY MOUTH EVERY EVENING 30 tablet 3   • magnesium Oxide (MAG-OX) 400 mg TABS Take 1 tablet (400 mg total) by mouth 2 (two) times a day 180 tablet 3   • Melatonin 10 MG TABS Take 1 tablet (10 mg total) by mouth daily at bedtime 30 tablet 3   • metFORMIN (GLUCOPHAGE) 1000 MG tablet Take 1 tablet (1,000 mg total) by mouth 2 (two) times a day with meals 180 tablet 3   • metoprolol tartrate (LOPRESSOR) 25 mg tablet Take 1 tablet (25 mg total) by mouth every 12 (twelve) hours 180 tablet 3   • oxybutynin (DITROPAN XL) 15 MG 24 hr tablet TAKE 1 TABLET (15 MG TOTAL) BY MOUTH DAILY 90 tablet 3   • Ozempic, 1 MG/DOSE, 4 MG/3ML injection pen Inject 0.75 mL (1 mg total) under the skin once a week 6 mL 3   • pantoprazole (PROTONIX) 40 mg tablet Take 1 tablet (40 mg total) by mouth daily 90 tablet 3   • vitamin B-12 (VITAMIN B-12) 1,000 mcg tablet Take 1 tablet (1,000 mcg total) by mouth daily 90 tablet 3   • albuterol (PROVENTIL HFA,VENTOLIN HFA) 90 mcg/act inhaler Inhale 2 puffs every 6 (six) hours as needed for wheezing (Patient not taking: Reported on 8/4/2023) 54 g 3   • calcipotriene (DOVONEX) 0.005 % cream APPLY TO AFFECTED AREA TWICE A DAY 30 DAYS LARRGER AREA HAND LEGS (Patient not taking: Reported on 8/4/2023) 120 g 0   • cetirizine (ZyrTEC) 10 mg tablet TAKE 1 TABLET (10 MG TOTAL) BY MOUTH DAILY IN THE EVENING 30 tablet 3   • colchicine (COLCRYS) 0.6 mg tablet Take 2 pills immediately and take 3rd pill an hour later (Patient not taking: Reported on 7/27/2023) 3 tablet 0   • Comfort EZ Pen Needles 33G X 5 MM MISC INJECT 4 TIMES TO 5 TIMES A DAY SUBCUTANEOUS WITH INSULINE (Patient not taking: Reported on 5/24/2023) 400 each 5   • Easy Comfort Pen Needles 32G X 4 MM MISC USE FOR INSULIN INJECTIONS ONCE DAILY AS DIRECTED (Patient not taking: Reported on 5/24/2023) 100 each 3   • Easy Comfort Pen Needles 33G X 4 MM MISC WITH INSULINE PEN AS NEEDED UP TO 5 TIMES X90 DAYS (Patient not taking: Reported on 5/24/2023) 400 each 5   • fenofibrate (TRICOR) 145 mg tablet Take 1 tablet (145 mg total) by mouth daily 90 tablet 3   • glucose blood test strip Use as instructed to test three times daily. (Patient not taking: Reported on 5/24/2023) 100 each 5   • ketorolac (TORADOL) 10 mg tablet Take 1 tablet (10 mg total) by mouth 2 (two) times a day with meals (Patient not taking: Reported on 5/24/2023) 10 tablet 0   • lidocaine (LMX) 4 % cream Apply topically as needed for mild pain (Patient not taking: Reported on 8/4/2023) 30 g 0   • montelukast (SINGULAIR) 10 mg tablet Take 1 tablet (10 mg total) by mouth every 24 hours 90 tablet 3   • mupirocin (BACTROBAN) 2 % ointment Apply topically 3 (three) times a day (Patient not taking: Reported on 5/24/2023) 22 g 0   • [START ON 8/30/2023] polyethylene glycol (GOLYTELY) 4000 mL solution Take 4,000 mL by mouth once for 1 dose Do not start before August 30, 2023. (Patient not taking: Reported on 8/4/2023 Do not start before August 30, 2023.) 4000 mL 0     No current facility-administered medications on file prior to visit.      Social History     Socioeconomic History   • Marital status: Single     Spouse name: Not on file   • Number of children: Not on file   • Years of education: Not on file   • Highest education level: Not on file   Occupational History   • Not on file   Tobacco Use   • Smoking status: Former   • Smokeless tobacco: Never   Vaping Use   • Vaping Use: Never used   Substance and Sexual Activity   • Alcohol use: Yes     Comment: rare   • Drug use: No   • Sexual activity: Not Currently     Birth control/protection: Surgical   Other Topics Concern   • Not on file   Social History Narrative   • Not on file     Social Determinants of Health     Financial Resource Strain: Medium Risk (8/25/2022)    Overall Financial Resource Strain (CARDIA)    • Difficulty of Paying Living Expenses: Somewhat hard   Food Insecurity: No Food Insecurity (4/15/2021)    Hunger Vital Sign    • Worried About Running Out of Food in the Last Year: Never true    • Ran Out of Food in the Last Year: Never true   Transportation Needs: Unmet Transportation Needs (8/25/2022)    PRAPARE - Transportation    • Lack of Transportation (Medical): Yes    • Lack of Transportation (Non-Medical): Yes   Physical Activity: Unknown (9/3/2020)    Exercise Vital Sign    • Days of Exercise per Week: Patient refused    • Minutes of Exercise per Session: Patient refused   Stress: No Stress Concern Present (9/3/2020)    [facility]    • Feeling of Stress : Not at all   Social Connections: Unknown (9/3/2020)    Social Connection and Isolation Panel [NHANES]    • Frequency of Communication with Friends and Family: Patient refused    • Frequency of Social Gatherings with Friends and Family: Patient refused    • Attends [employer] Services: Patient refused    • Active Member of Clubs or Organizations: Patient refused    • Attends Club or Organization Meetings: Patient refused    • Marital Status: Patient refused   Intimate Partner Violence: Not At Risk (9/3/2020)    Humiliation, Afraid, Rape, and Kick questionnaire    • Fear of Current or Ex-Partner: No    • Emotionally Abused: No    • Physically Abused: No    • Sexually Abused: No   Housing Stability: Not on file       I have reviewed the past medical, surgical, social and family history, medications and allergies as documented in the EMR. Review of systems: ROS is negative other than that noted in the HPI. Constitutional: Negative for fatigue and fever. Physical Exam:    Blood pressure 106/67, pulse 61, height 5' 3" (1.6 m), weight 68.5 kg (151 lb).     General/Constitutional: NAD, well developed, well nourished  HENT: Normocephalic, atraumatic  CV: Intact distal pulses, regular rate  Resp: No respiratory distress or labored breathing  GI: Soft and non-tender   Lymphatic: No lymphadenopathy palpated  Neuro: Alert and Oriented x 3, no focal deficits  Psych: Normal mood, normal affect, normal judgement, normal behavior  Skin: Warm, dry, no rashes, no erythema      Shoulder focused exam:       RIGHT LEFT    Scapula Atrophy Negative Negative     Winging Negative Negative     Protraction Negative Negative    Rotator cuff SS 5/5 4/5     IS 5/5 5/5     SubS 5/5 5/5    ROM     Passive 150  Active 150     ER0 60 30     ER90 90    90     IR90 T6    T6     IRb T6    T6    TTP: AC Negative Positive     Biceps Negative Positive     Coracoid Negative Negative    Special Tests: O'Briens Negative Negative     Caputo-shear Negative Negative     Cross body Adduction Negative Positive     Speeds  Negative Negative     Kaity's Negative Negative     Whipple Negative Positive       Neer Negative Positive     Cobb Negative Negative    Instability: Apprehension & relocation not tested not tested     Load & shift not tested not tested    Other: Crank Negative Negative               UE NV Exam: +2 Radial pulses bilaterally  Sensation intact to light touch C5-T1 bilaterally, Radial/median/ulnar nerve motor intact    Cervical ROM is full without pain, numbness or tingling      Shoulder Imaging    MRI of the LEFT shoulder from 05/25/2023 that showed mild supraspinatus and subscapularis tendinosis, no full thickness rotator cuff tear, mild AC osteoarthritis wasreviewed with the patient today. I have reviewed the images and radiology report and agree with their impression.        Scribe Attestation    I,:  Constanza Gibbs am acting as a scribe while in the presence of the attending physician.:       I,:  Miranda Romano,  personally performed the services described in this documentation    as scribed in my presence.:

## 2023-08-11 ENCOUNTER — HOSPITAL ENCOUNTER (OUTPATIENT)
Dept: MAMMOGRAPHY | Facility: CLINIC | Age: 58
End: 2023-08-11
Payer: COMMERCIAL

## 2023-08-11 VITALS — BODY MASS INDEX: 26.75 KG/M2 | WEIGHT: 151 LBS | HEIGHT: 63 IN

## 2023-08-11 DIAGNOSIS — Z12.31 ENCOUNTER FOR SCREENING MAMMOGRAM FOR MALIGNANT NEOPLASM OF BREAST: ICD-10-CM

## 2023-08-11 PROCEDURE — 77063 BREAST TOMOSYNTHESIS BI: CPT

## 2023-08-11 PROCEDURE — 77067 SCR MAMMO BI INCL CAD: CPT

## 2023-08-21 ENCOUNTER — TELEPHONE (OUTPATIENT)
Dept: GASTROENTEROLOGY | Facility: CLINIC | Age: 58
End: 2023-08-21

## 2023-08-21 DIAGNOSIS — R21 RASH: ICD-10-CM

## 2023-08-21 DIAGNOSIS — J45.909 UNCOMPLICATED ASTHMA, UNSPECIFIED ASTHMA SEVERITY, UNSPECIFIED WHETHER PERSISTENT: ICD-10-CM

## 2023-08-21 RX ORDER — PANTOPRAZOLE SODIUM 40 MG/1
40 TABLET, DELAYED RELEASE ORAL DAILY
Qty: 90 TABLET | Refills: 3 | OUTPATIENT
Start: 2023-08-21

## 2023-08-21 RX ORDER — CALCIPOTRIENE 50 UG/G
CREAM TOPICAL
Qty: 120 G | Refills: 0 | OUTPATIENT
Start: 2023-08-21

## 2023-08-21 NOTE — TELEPHONE ENCOUNTER
Confirming Upcoming Procedure: colon on 8/31  Physician performing: Dr. Celia Killian  Location of procedure:    Prep: [facility]  Diabetic:   Lantus - Take full regular dose day before procedure and day of procedure take full dose if regularly take in the am  Metformin - take regular dose with dinner evening before procedure  Glipizide - take half regular dose eveing before procedure  ozempic weekly - do not take within 7 days prior to procedure

## 2023-08-21 NOTE — TELEPHONE ENCOUNTER
Patients GI provider:  Dr. Fernanda Alvarez    Number to return call: [phone]    Reason for call: Pt called with questions about prep. I went over prep questions and pt stated she has no one to pick her up from the procedure. Pt states she will call back to reschedule if her daughter can't pick her up.   Scheduled procedure/appointment date if applicable: Apt/procedure 8/31/2023

## 2023-08-22 ENCOUNTER — RA CDI HCC (OUTPATIENT)
Dept: OTHER | Facility: HOSPITAL | Age: 58
End: 2023-08-22

## 2023-08-23 ENCOUNTER — OFFICE VISIT (OUTPATIENT)
Dept: FAMILY MEDICINE CLINIC | Facility: CLINIC | Age: 58
End: 2023-08-23
Payer: COMMERCIAL

## 2023-08-23 ENCOUNTER — TELEPHONE (OUTPATIENT)
Dept: HEMATOLOGY ONCOLOGY | Facility: CLINIC | Age: 58
End: 2023-08-23

## 2023-08-23 VITALS
WEIGHT: 151.4 LBS | RESPIRATION RATE: 14 BRPM | TEMPERATURE: 97.8 F | SYSTOLIC BLOOD PRESSURE: 118 MMHG | HEIGHT: 63 IN | HEART RATE: 77 BPM | BODY MASS INDEX: 26.82 KG/M2 | DIASTOLIC BLOOD PRESSURE: 60 MMHG | OXYGEN SATURATION: 97 %

## 2023-08-23 DIAGNOSIS — E53.8 LOW VITAMIN B12 LEVEL: ICD-10-CM

## 2023-08-23 DIAGNOSIS — E11.69 HYPERLIPIDEMIA ASSOCIATED WITH TYPE 2 DIABETES MELLITUS (HCC): ICD-10-CM

## 2023-08-23 DIAGNOSIS — E78.5 HYPERLIPIDEMIA ASSOCIATED WITH TYPE 2 DIABETES MELLITUS (HCC): ICD-10-CM

## 2023-08-23 DIAGNOSIS — R41.3 MEMORY LOSS: ICD-10-CM

## 2023-08-23 DIAGNOSIS — E11.8 TYPE II DIABETES MELLITUS WITH COMPLICATION (HCC): Primary | ICD-10-CM

## 2023-08-23 PROBLEM — E24.9 CUSHING'S SYNDROME (HCC): Status: RESOLVED | Noted: 2020-04-07 | Resolved: 2023-08-23

## 2023-08-23 PROBLEM — J44.1 ACUTE EXACERBATION OF CHRONIC OBSTRUCTIVE PULMONARY DISEASE (COPD) (HCC): Status: RESOLVED | Noted: 2020-02-27 | Resolved: 2023-08-23

## 2023-08-23 LAB — SL AMB POCT HEMOGLOBIN AIC: 6.8 (ref ?–6.5)

## 2023-08-23 PROCEDURE — 96372 THER/PROPH/DIAG INJ SC/IM: CPT | Performed by: INTERNAL MEDICINE

## 2023-08-23 PROCEDURE — 99214 OFFICE O/P EST MOD 30 MIN: CPT | Performed by: INTERNAL MEDICINE

## 2023-08-23 PROCEDURE — 83036 HEMOGLOBIN GLYCOSYLATED A1C: CPT | Performed by: INTERNAL MEDICINE

## 2023-08-23 RX ORDER — CYANOCOBALAMIN 1000 UG/ML
1000 INJECTION, SOLUTION INTRAMUSCULAR; SUBCUTANEOUS ONCE
Status: COMPLETED | OUTPATIENT
Start: 2023-08-23 | End: 2023-08-23

## 2023-08-23 RX ORDER — LANOLIN ALCOHOL/MO/W.PET/CERES
1000 CREAM (GRAM) TOPICAL DAILY
Qty: 90 TABLET | Refills: 3 | Status: SHIPPED | OUTPATIENT
Start: 2023-08-23

## 2023-08-23 RX ADMIN — CYANOCOBALAMIN 1000 MCG: 1000 INJECTION, SOLUTION INTRAMUSCULAR; SUBCUTANEOUS at 10:36

## 2023-08-23 NOTE — PROGRESS NOTES
Name: Shayy Tam      : 1965      MRN: [id number]  Encounter Provider: Jim Solomon MD  Encounter Date: 2023   Encounter department: [address] [place] [address]     1. Type II diabetes mellitus with complication (HCC)  -     POCT hemoglobin A1c  -     Comprehensive metabolic panel; Future; Expected date: 2023  -     CBC and differential; Future; Expected date: 2023  -     Lipid Panel with Direct LDL reflex; Future; Expected date: 2023  -     TSH, 3rd generation with Free T4 reflex; Future; Expected date: 2023  -     Microalbumin, Random Urine (W/Creatinine) ([facility] ONLY); Future; Expected date: 2023    2. Memory loss  -     vitamin B-12 (VITAMIN B-12) 1,000 mcg tablet; Take 1 tablet (1,000 mcg total) by mouth daily    3. Low vitamin B12 level  -     vitamin B-12 (VITAMIN B-12) 1,000 mcg tablet; Take 1 tablet (1,000 mcg total) by mouth daily  -     cyanocobalamin injection 1,000 mcg    4. BMI 26.0-26.9,adult    5. Hyperlipidemia associated with type 2 diabetes mellitus ([address])  -     Lipid Panel with Direct LDL reflex; Future; Expected date: 2023    continue same  Life style mod  RTC in 3 mos w Blood work       Subjective      [address] is here for Regular check up, she feels OK, recent Blood work and med list reviewed w Pt in Detail. .. Review of Systems   Constitutional: Negative for chills, fatigue and fever. HENT: Positive for postnasal drip. Negative for congestion, facial swelling, sore throat, trouble swallowing and voice change. Eyes: Negative for pain, discharge and visual disturbance. Respiratory: Negative for cough, shortness of breath and wheezing. Cardiovascular: Negative for chest pain, palpitations and leg swelling. Gastrointestinal: Negative for abdominal pain, blood in stool, constipation, diarrhea and nausea. Endocrine: Negative for polydipsia, polyphagia and polyuria.    Genitourinary: Negative for difficulty urinating, hematuria and urgency. Musculoskeletal: Negative for arthralgias and myalgias. Skin: Negative for rash. Neurological: Negative for dizziness, tremors, weakness and headaches. Hematological: Negative for adenopathy. Does not bruise/bleed easily. Psychiatric/Behavioral: Negative for dysphoric mood, sleep disturbance and suicidal ideas. Current Outpatient Medications on File Prior to Visit   Medication Sig   • Acetaminophen (TYLENOL PO) Take by mouth As needed for pain   • Aspirin Low Dose 81 MG EC tablet TAKE 1 TABLET (81 MG TOTAL) BY MOUTH DAILY   • atorvastatin (LIPITOR) 40 mg tablet Take 1 tablet (40 mg total) by mouth daily   • Banophen 25 MG capsule TAKE 1 CAPSULE (25 MG TOTAL) BY MOUTH DAILY AT BEDTIME AS NEEDED FOR ITCHING   • Calcium + Vitamin D3 600-10 MG-MCG TABS TAKE 2 TABLETS BY MOUTH DAILY   • cetirizine (ZyrTEC) 10 mg tablet TAKE 1 TABLET (10 MG TOTAL) BY MOUTH DAILY IN THE EVENING   • Diclofenac Sodium (VOLTAREN) 1 % APPLY 2 G TOPICALLY 4 (FOUR) TIMES A DAY   • escitalopram (LEXAPRO) 10 mg tablet Take 1 tablet (10 mg total) by mouth daily with dinner   • fenofibrate (TRICOR) 145 mg tablet Take 1 tablet (145 mg total) by mouth daily   • fluticasone-umeclidinium-vilanterol (Trelegy Ellipta) 100-62.5-25 mcg/actuation inhaler Inhale 1 puff daily Rinse mouth after use.    • glipiZIDE (GLUCOTROL) 5 mg tablet Take 1 tablet (5 mg total) by mouth 2 (two) times a day with meals   • levocetirizine (XYZAL) 5 MG tablet TAKE 1 TABLET (5 MG TOTAL) BY MOUTH EVERY EVENING   • magnesium Oxide (MAG-OX) 400 mg TABS Take 1 tablet (400 mg total) by mouth 2 (two) times a day   • Melatonin 10 MG TABS Take 1 tablet (10 mg total) by mouth daily at bedtime   • metFORMIN (GLUCOPHAGE) 1000 MG tablet Take 1 tablet (1,000 mg total) by mouth 2 (two) times a day with meals   • metoprolol tartrate (LOPRESSOR) 25 mg tablet Take 1 tablet (25 mg total) by mouth every 12 (twelve) hours   • montelukast (SINGULAIR) 10 mg tablet Take 1 tablet (10 mg total) by mouth every 24 hours   • oxybutynin (DITROPAN XL) 15 MG 24 hr tablet TAKE 1 TABLET (15 MG TOTAL) BY MOUTH DAILY   • Ozempic, 1 MG/DOSE, 4 MG/3ML injection pen Inject 0.75 mL (1 mg total) under the skin once a week   • pantoprazole (PROTONIX) 40 mg tablet Take 1 tablet (40 mg total) by mouth daily   • [DISCONTINUED] Insulin Glargine Solostar (Lantus SoloStar) 100 UNIT/ML SOPN Inject 1 mL (100 Units total) under the skin in the morning   • [DISCONTINUED] vitamin B-12 (VITAMIN B-12) 1,000 mcg tablet Take 1 tablet (1,000 mcg total) by mouth daily   • albuterol (PROVENTIL HFA,VENTOLIN HFA) 90 mcg/act inhaler Inhale 2 puffs every 6 (six) hours as needed for wheezing (Patient not taking: Reported on 8/4/2023)   • calcipotriene (DOVONEX) 0.005 % cream APPLY TO AFFECTED AREA TWICE A DAY 30 DAYS LARRGER AREA HAND LEGS (Patient not taking: Reported on 8/4/2023)   • glucose blood test strip Use as instructed to test three times daily. (Patient not taking: Reported on 5/24/2023)   • mupirocin (BACTROBAN) 2 % ointment Apply topically 3 (three) times a day (Patient not taking: Reported on 5/24/2023)   • [START ON 8/30/2023] polyethylene glycol (GOLYTELY) 4000 mL solution Take 4,000 mL by mouth once for 1 dose Do not start before August 30, 2023.  (Patient not taking: Reported on 8/4/2023 Do not start before August 30, 2023.)   • [DISCONTINUED] colchicine (COLCRYS) 0.6 mg tablet Take 2 pills immediately and take 3rd pill an hour later (Patient not taking: Reported on 7/27/2023)   • [DISCONTINUED] Comfort EZ Pen Needles 33G X 5 MM MISC INJECT 4 TIMES TO 5 TIMES A DAY SUBCUTANEOUS WITH INSULINE (Patient not taking: Reported on 5/24/2023)   • [DISCONTINUED] Easy Comfort Pen Needles 32G X 4 MM MISC USE FOR INSULIN INJECTIONS ONCE DAILY AS DIRECTED (Patient not taking: Reported on 5/24/2023)   • [DISCONTINUED] Easy Comfort Pen Needles 33G X 4 MM MISC WITH INSULINE PEN AS NEEDED UP TO 5 TIMES X90 DAYS (Patient not taking: Reported on 5/24/2023)   • [DISCONTINUED] ketorolac (TORADOL) 10 mg tablet Take 1 tablet (10 mg total) by mouth 2 (two) times a day with meals (Patient not taking: Reported on 5/24/2023)   • [DISCONTINUED] lidocaine (LMX) 4 % cream Apply topically as needed for mild pain (Patient not taking: Reported on 8/4/2023)       Objective     /60 (BP Location: Left arm, Patient Position: Sitting, Cuff Size: Standard)   Pulse 77   Temp 97.8 °F (36.6 °C) (Tympanic)   Resp 14   Ht 5' 3" (1.6 m)   Wt 68.7 kg (151 lb 6.4 oz)   LMP  (LMP Unknown)   SpO2 97%   BMI 26.82 kg/m²     Physical Exam  Constitutional:       General: She is not in acute distress. HENT:      Head: Normocephalic. Mouth/Throat:      Pharynx: No oropharyngeal exudate. Eyes:      General: No scleral icterus. Conjunctiva/sclera: Conjunctivae normal.      Pupils: Pupils are equal, round, and reactive to light. Neck:      Thyroid: No thyromegaly. Cardiovascular:      Rate and Rhythm: Normal rate and regular rhythm. Heart sounds: Murmur heard. Pulmonary:      Effort: Pulmonary effort is normal. No respiratory distress. Breath sounds: Normal breath sounds. No wheezing or rales. Abdominal:      General: Bowel sounds are normal. There is no distension. Palpations: Abdomen is soft. Tenderness: There is no abdominal tenderness. There is no guarding or rebound. Musculoskeletal:         General: No tenderness. Cervical back: Neck supple. Lymphadenopathy:      Cervical: No cervical adenopathy. Skin:     Coloration: Skin is not pale. Findings: No rash. Neurological:      Mental Status: She is alert and oriented to person, place, and time. Sensory: No sensory deficit. Motor: No weakness.        Estuardo Hernandez MD

## 2023-08-23 NOTE — PROGRESS NOTES
[facility] [address] [facility] coding opportunities     E11.65     Chart Reviewed number of suggestions sent to Provider: 1     Patients Insurance     Medicare Insurance: [employer]

## 2023-08-23 NOTE — TELEPHONE ENCOUNTER
Appointment Schedule   Who are you speaking with? Physician Office   If it is not the patient, are they listed on an active communication consent form? N/A   Which provider is the appointment scheduled with? Mary Silva PA-C   At which location is the appointment scheduled for? [facility]   When is the appointment scheduled? Please list date and time 9/26/23 3pm   What is the reason for this appointment? f/u   Did patient voice understanding of the details of this appointment? N/A   Was the no show policy reviewed with patient?  N/A

## 2023-08-23 NOTE — TELEPHONE ENCOUNTER
Left voicemail and requested call back     Went over the diabetic medicine instructions incase that was not included during the call 2 days ago

## 2023-08-31 ENCOUNTER — ANESTHESIA (OUTPATIENT)
Dept: GASTROENTEROLOGY | Facility: HOSPITAL | Age: 58
End: 2023-08-31

## 2023-08-31 ENCOUNTER — ANESTHESIA (OUTPATIENT)
Dept: ANESTHESIOLOGY | Facility: HOSPITAL | Age: 58
End: 2023-08-31

## 2023-08-31 ENCOUNTER — ANESTHESIA EVENT (OUTPATIENT)
Dept: ANESTHESIOLOGY | Facility: HOSPITAL | Age: 58
End: 2023-08-31

## 2023-08-31 ENCOUNTER — ANESTHESIA EVENT (OUTPATIENT)
Dept: GASTROENTEROLOGY | Facility: HOSPITAL | Age: 58
End: 2023-08-31

## 2023-08-31 ENCOUNTER — HOSPITAL ENCOUNTER (OUTPATIENT)
Dept: GASTROENTEROLOGY | Facility: HOSPITAL | Age: 58
Setting detail: OUTPATIENT SURGERY
End: 2023-08-31
Attending: STUDENT IN AN ORGANIZED HEALTH CARE EDUCATION/TRAINING PROGRAM | Admitting: INTERNAL MEDICINE
Payer: COMMERCIAL

## 2023-08-31 VITALS
WEIGHT: 151 LBS | DIASTOLIC BLOOD PRESSURE: 64 MMHG | TEMPERATURE: 99.4 F | BODY MASS INDEX: 26.75 KG/M2 | RESPIRATION RATE: 16 BRPM | HEIGHT: 63 IN | OXYGEN SATURATION: 98 % | HEART RATE: 60 BPM | SYSTOLIC BLOOD PRESSURE: 109 MMHG

## 2023-08-31 DIAGNOSIS — Z12.11 SCREENING FOR COLON CANCER: ICD-10-CM

## 2023-08-31 LAB — GLUCOSE SERPL-MCNC: 136 MG/DL (ref 65–140)

## 2023-08-31 PROCEDURE — 88305 TISSUE EXAM BY PATHOLOGIST: CPT | Performed by: PATHOLOGY

## 2023-08-31 PROCEDURE — 45385 COLONOSCOPY W/LESION REMOVAL: CPT | Performed by: INTERNAL MEDICINE

## 2023-08-31 PROCEDURE — 82948 REAGENT STRIP/BLOOD GLUCOSE: CPT

## 2023-08-31 RX ORDER — SODIUM CHLORIDE, SODIUM LACTATE, POTASSIUM CHLORIDE, CALCIUM CHLORIDE 600; 310; 30; 20 MG/100ML; MG/100ML; MG/100ML; MG/100ML
125 INJECTION, SOLUTION INTRAVENOUS CONTINUOUS
Status: DISCONTINUED | OUTPATIENT
Start: 2023-08-31 | End: 2023-09-04 | Stop reason: HOSPADM

## 2023-08-31 RX ORDER — SODIUM CHLORIDE, SODIUM LACTATE, POTASSIUM CHLORIDE, CALCIUM CHLORIDE 600; 310; 30; 20 MG/100ML; MG/100ML; MG/100ML; MG/100ML
125 INJECTION, SOLUTION INTRAVENOUS CONTINUOUS
Status: CANCELLED | OUTPATIENT
Start: 2023-08-31

## 2023-08-31 RX ORDER — SODIUM CHLORIDE, SODIUM LACTATE, POTASSIUM CHLORIDE, CALCIUM CHLORIDE 600; 310; 30; 20 MG/100ML; MG/100ML; MG/100ML; MG/100ML
INJECTION, SOLUTION INTRAVENOUS CONTINUOUS PRN
Status: DISCONTINUED | OUTPATIENT
Start: 2023-08-31 | End: 2023-08-31

## 2023-08-31 RX ORDER — PROPOFOL 10 MG/ML
INJECTION, EMULSION INTRAVENOUS AS NEEDED
Status: DISCONTINUED | OUTPATIENT
Start: 2023-08-31 | End: 2023-08-31

## 2023-08-31 RX ORDER — PROPOFOL 10 MG/ML
INJECTION, EMULSION INTRAVENOUS CONTINUOUS PRN
Status: DISCONTINUED | OUTPATIENT
Start: 2023-08-31 | End: 2023-08-31

## 2023-08-31 RX ORDER — LIDOCAINE HYDROCHLORIDE 20 MG/ML
INJECTION, SOLUTION EPIDURAL; INFILTRATION; INTRACAUDAL; PERINEURAL AS NEEDED
Status: DISCONTINUED | OUTPATIENT
Start: 2023-08-31 | End: 2023-08-31

## 2023-08-31 RX ADMIN — SODIUM CHLORIDE, SODIUM LACTATE, POTASSIUM CHLORIDE, AND CALCIUM CHLORIDE: .6; .31; .03; .02 INJECTION, SOLUTION INTRAVENOUS at 09:58

## 2023-08-31 RX ADMIN — SODIUM CHLORIDE, SODIUM LACTATE, POTASSIUM CHLORIDE, AND CALCIUM CHLORIDE 125 ML/HR: .6; .31; .03; .02 INJECTION, SOLUTION INTRAVENOUS at 09:57

## 2023-08-31 RX ADMIN — LIDOCAINE HYDROCHLORIDE 80 MG: 20 INJECTION, SOLUTION EPIDURAL; INFILTRATION; INTRACAUDAL; PERINEURAL at 10:07

## 2023-08-31 RX ADMIN — PROPOFOL 200 MCG/KG/MIN: 10 INJECTION, EMULSION INTRAVENOUS at 10:08

## 2023-08-31 RX ADMIN — PROPOFOL 150 MG: 10 INJECTION, EMULSION INTRAVENOUS at 10:07

## 2023-08-31 NOTE — H&P
History and Physical - [facility]  Jennifer Neal 60 y.o. female MRN: [id number]                  HPI: Jennifer Neal is a 60y.o. year old female who presents for colon cancer screening. REVIEW OF SYSTEMS: Per the HPI, and otherwise unremarkable.     Historical Information   Past Medical History:   Diagnosis Date   • Acute exacerbation of chronic obstructive pulmonary disease (COPD) ([address]) 2/27/2020   • Asthma     exercise induced   • Diabetes ([address])     type 2   • Diabetes mellitus ([address])    • Hypertension    • Morbid obesity ([address]) 4/7/2020   • Osteoarthritis 4/7/2020     Past Surgical History:   Procedure Laterality Date   • BREAST BIOPSY Left 2016    benign   • CHOLECYSTECTOMY     • FL INJECTION LEFT SHOULDER (NON ARTHROGRAM)  7/28/2023   • FL LUMBAR PUNCTURE DIAGNOSTIC  7/19/2023   • HERNIA REPAIR     • LAPAROSCOPY FOR ECTOPIC PREGNANCY      x2, both tubes removed    • MAMMO STEREOTACTIC BREAST BIOPSY LEFT (ALL INC) Left    • REPLACEMENT TOTAL KNEE Left    • SMALL INTESTINE SURGERY      after hernia surgery      Social History   Social History     Substance and Sexual Activity   Alcohol Use Yes    Comment: rare     Social History     Substance and Sexual Activity   Drug Use No     Social History     Tobacco Use   Smoking Status Former   Smokeless Tobacco Never     Family History   Problem Relation Age of Onset   • Breast cancer Mother 36        cause of death   • Heart attack Father         MI cause of death   • No Known Problems Sister    • No Known Problems Daughter    • No Known Problems Maternal Grandmother    • No Known Problems Maternal Grandfather    • No Known Problems Paternal Grandmother    • No Known Problems Paternal Grandfather    • No Known Problems Sister    • No Known Problems Sister    • Colon cancer Neg Hx    • Ovarian cancer Neg Hx        Meds/Allergies     (Not in a hospital admission)      Allergies   Allergen Reactions   • No Active Allergies        Objective     There were no vitals taken for this visit. PHYSICAL EXAMINATION:    General Appearance:   Alert, cooperative, no distress   HEENT:  Normocephalic, atraumatic, anicteric. Neck supple, symmetrical, trachea midline. Lungs:   Equal chest rise and unlabored breathing, normal effort, no coughing. Cardiovascular:   No visualized JVD. Abdomen:   No abdominal distension. Skin:   No jaundice, rashes, or lesions. Musculoskeletal:   Normal range of motion visualized. Psych:  Normal affect and normal insight. Neuro:  Alert and appropriate. ASSESSMENT/PLAN:  This is a 60y.o. year old female here for colonoscopy, and she is stable and optimized for her procedure.

## 2023-08-31 NOTE — ANESTHESIA PREPROCEDURE EVALUATION
Procedure:  COLONOSCOPY    Relevant Problems   CARDIO   (+) Essential hypertension   (+) Hyperlipidemia      ENDO   (+) Type 2 diabetes mellitus without complication (HCC)      MUSCULOSKELETAL   (+) Osteoarthritis      Patient uses albuterol inhaler PRN for SOB with exertion. Denies any chest pain or SOB currently. Denies n/v. She says her abdomen feels distended and is still having active bowel movements. Physical Exam    Airway    Mallampati score: II  TM Distance: <3 FB  Neck ROM: full     Dental   No notable dental hx     Cardiovascular      Pulmonary      Other Findings        Anesthesia Plan  ASA Score- 2     Anesthesia Type- IV sedation with anesthesia with ASA Monitors. Additional Monitors:   Airway Plan:           Plan Factors-Exercise tolerance (METS): >4 METS. Chart reviewed. Patient summary reviewed. Patient is not a current smoker. Obstructive sleep apnea risk education given perioperatively. Induction- intravenous. Postoperative Plan-     Informed Consent- Anesthetic plan and risks discussed with patient. I personally reviewed this patient with the CRNA. Discussed and agreed on the Anesthesia Plan with the CRNA. Bharath Adams

## 2023-08-31 NOTE — ANESTHESIA POSTPROCEDURE EVALUATION
Post-Op Assessment Note    CV Status:  Stable  Pain Score: 0    Pain management: adequate     Mental Status:  Alert and awake   Hydration Status:  Euvolemic   PONV Controlled:  Controlled   Airway Patency:  Patent      Post Op Vitals Reviewed: Yes      Staff: CRNA         No notable events documented.     BP   100/59   Temp  97   Pulse 87   Resp 16   SpO2 99

## 2023-09-05 PROCEDURE — 88305 TISSUE EXAM BY PATHOLOGIST: CPT | Performed by: PATHOLOGY

## 2023-09-06 ENCOUNTER — CONSULT (OUTPATIENT)
Dept: SURGERY | Facility: CLINIC | Age: 58
End: 2023-09-06
Payer: COMMERCIAL

## 2023-09-06 ENCOUNTER — TELEPHONE (OUTPATIENT)
Dept: GASTROENTEROLOGY | Facility: CLINIC | Age: 58
End: 2023-09-06

## 2023-09-06 VITALS
BODY MASS INDEX: 26.58 KG/M2 | HEART RATE: 67 BPM | DIASTOLIC BLOOD PRESSURE: 66 MMHG | OXYGEN SATURATION: 97 % | SYSTOLIC BLOOD PRESSURE: 112 MMHG | WEIGHT: 150 LBS | HEIGHT: 63 IN | TEMPERATURE: 97 F

## 2023-09-06 DIAGNOSIS — K43.2 RECURRENT INCISIONAL HERNIA: Primary | ICD-10-CM

## 2023-09-06 PROCEDURE — 99214 OFFICE O/P EST MOD 30 MIN: CPT | Performed by: SPECIALIST

## 2023-09-06 NOTE — TELEPHONE ENCOUNTER
Spoke with the pt relayed the provider messages regarding the results and pt verbalizes understanding.

## 2023-09-06 NOTE — LETTER
September 6, 2023     [place], [address] [facility] [address] [facility]    Patient: Michael Okeefe   YOB: 1965   Date of Visit: 9/6/2023       Dear Bertin Byrd,    Thank you for referring Michael Okeefe to me for evaluation. Below are my notes for this consultation. If you have questions, please do not hesitate to call me. I look forward to following your patient along with you. Sincerely,    Bria Lopes MD        CC: No Recipients    Millicent Godwin MD  9/6/2023  4:54 PM  Sign when Signing Visit    Chief Complaint: Possible abdominal wall hernia. History of Present Illness: Rowan Posey is a 62-year-old  female originally from [place]. She presents the office today with a possible incisional hernia. Rowan Posey' history consists of a laparoscopic cholecystectomy done many years ago in [place]. She subsequently developed a small bowel obstruction necessitating exploratory laparotomy and a bowel resection. She subsequent developed an incisional hernia and apparently underwent repair of the hernia. After that she underwent another laparoscopic cholecystectomy since they apparently left a large portion of the gallbladder and at that time. She was seen in May of this year and a CT scan of the abdomen pelvis was obtained. This demonstrated what appeared to be a small recurrent incisional hernia. The mesh appeared to have pulled away from the left side of the abdominal wall. No incarcerated bowel is present. The patient is here for follow-up visit in regards to this.           Past Medical History:   Medical History        Past Medical History:   Diagnosis Date   • A  Cute exacerbation of chronic obstructive pulmonary disease (COPD) ([address]) 2/27/2020   • Asthma       exercise induced   • Diabetes ([address])       type 2   • Diabetes mellitus ([address])     • Hypertension     • Morbid obesity ([address]) 4/7/2020   • Osteoarthritis 4/7/2020               Past Surgical History:    Surgical History Past Surgical History:   Procedure Laterality Date   • BREAST BIOPSY Left 2016     benign   • CHOLECYSTECTOMY       • HERNIA REPAIR       • LAPAROSCOPY FOR ECTOPIC PREGNANCY         x2, both tubes removed    • MAMMO STEREOTACTIC BREAST BIOPSY LEFT (ALL INC) Left     • REPLACEMENT TOTAL KNEE Left     • SMALL INTESTINE SURGERY         after hernia surgery                Allergies:          Allergies   Allergen Reactions   • No Active Allergies              Medications:    Current Outpatient Medications:   •  albuterol (PROVENTIL HFA,VENTOLIN HFA) 90 mcg/act inhaler, Inhale 2 puffs every 6 (six) hours as needed for wheezing, Disp: 54 g, Rfl: 3  •  Aspirin Low Dose 81 MG EC tablet, TAKE 1 TABLET (81 MG TOTAL) BY MOUTH DAILY, Disp: 90 tablet, Rfl: 3  •  atorvastatin (LIPITOR) 40 mg tablet, Take 1 tablet (40 mg total) by mouth daily, Disp: 90 tablet, Rfl: 3  •  Banophen 25 MG capsule, TAKE 1 CAPSULE (25 MG TOTAL) BY MOUTH DAILY AT BEDTIME AS NEEDED FOR ITCHING, Disp: 90 capsule, Rfl: 3  •  calcipotriene (DOVONEX) 0.005 % cream, APPLY TO AFFECTED AREA TWICE A DAY 30 [facility], Disp: 120 g, Rfl: 0  •  Calcium + Vitamin D3 600-10 MG-MCG TABS, TAKE 2 TABLETS BY MOUTH DAILY, Disp: 60 tablet, Rfl: 6  •  cetirizine (ZyrTEC) 10 mg tablet, TAKE 1 TABLET (10 MG TOTAL) BY MOUTH DAILY IN THE EVENING, Disp: 30 tablet, Rfl: 3  •  colchicine (COLCRYS) 0.6 mg tablet, Take 2 pills immediately and take 3rd pill an hour later, Disp: 3 tablet, Rfl: 0  •  Comfort EZ Pen Needles 33G X 5 MM MISC, INJECT 4 TIMES TO 5 TIMES A DAY SUBCUTANEOUS WITH INSULINE, Disp: 400 each, Rfl: 5  •  Diclofenac Sodium (VOLTAREN) 1 %, Apply 2 g topically 4 (four) times a day, Disp: 200 g, Rfl: 1  •  Easy Comfort Pen Needles 32G X 4 MM MISC, USE FOR INSULIN INJECTIONS ONCE DAILY AS DIRECTED, Disp: 100 each, Rfl: 3  •  Easy Comfort Pen Needles 33G X 4 MM MISC, WITH INSULINE PEN AS NEEDED UP TO 5 TIMES X90 DAYS, Disp: 400 each, Rfl: 5  • escitalopram (LEXAPRO) 10 mg tablet, Take 1 tablet (10 mg total) by mouth daily with dinner, Disp: 30 tablet, Rfl: 5  •  fenofibrate (TRICOR) 145 mg tablet, Take 1 tablet (145 mg total) by mouth daily, Disp: 90 tablet, Rfl: 3  •  fluticasone-umeclidinium-vilanterol (Trelegy Ellipta) 100-62.5-25 mcg/actuation inhaler, Inhale 1 puff daily Rinse mouth after use., Disp: 60 each, Rfl: 5  •  glipiZIDE (GLUCOTROL) 5 mg tablet, Take 1 tablet (5 mg total) by mouth 2 (two) times a day with meals, Disp: 180 tablet, Rfl: 3  •  glucose blood test strip, Use as instructed to test three times daily. , Disp: 100 each, Rfl: 5  •  Insulin Glargine Solostar (Lantus SoloStar) 100 UNIT/ML SOPN, Inject 1 mL (100 Units total) under the skin in the morning, Disp: 15 mL, Rfl: 3  •  ketorolac (TORADOL) 10 mg tablet, Take 1 tablet (10 mg total) by mouth 2 (two) times a day with meals, Disp: 10 tablet, Rfl: 0  •  levocetirizine (XYZAL) 5 MG tablet, TAKE 1 TABLET (5 MG TOTAL) BY MOUTH EVERY EVENING, Disp: 30 tablet, Rfl: 3  •  lidocaine (LMX) 4 % cream, Apply topically as needed for mild pain, Disp: 30 g, Rfl: 0  •  magnesium Oxide (MAG-OX) 400 mg TABS, Take 1 tablet (400 mg total) by mouth 2 (two) times a day, Disp: 180 tablet, Rfl: 3  •  Melatonin 10 MG TABS, Take 1 tablet (10 mg total) by mouth daily at bedtime, Disp: 30 tablet, Rfl: 3  •  metFORMIN (GLUCOPHAGE) 1000 MG tablet, Take 1 tablet (1,000 mg total) by mouth 2 (two) times a day with meals, Disp: 180 tablet, Rfl: 3  •  metoprolol tartrate (LOPRESSOR) 25 mg tablet, Take 1 tablet (25 mg total) by mouth every 12 (twelve) hours, Disp: 180 tablet, Rfl: 3  •  montelukast (SINGULAIR) 10 mg tablet, Take 1 tablet (10 mg total) by mouth every 24 hours, Disp: 90 tablet, Rfl: 3  •  mupirocin (BACTROBAN) 2 % ointment, Apply topically 3 (three) times a day, Disp: 22 g, Rfl: 0  •  oxybutynin (DITROPAN XL) 15 MG 24 hr tablet, TAKE 1 TABLET (15 MG TOTAL) BY MOUTH DAILY, Disp: 90 tablet, Rfl: 3  • Ozempic, 1 MG/DOSE, 4 MG/3ML injection pen, Inject 0.75 mL (1 mg total) under the skin once a week, Disp: 6 mL, Rfl: 3  •  pantoprazole (PROTONIX) 40 mg tablet, Take 1 tablet (40 mg total) by mouth daily, Disp: 90 tablet, Rfl: 3  •  vitamin B-12 (VITAMIN B-12) 1,000 mcg tablet, Take 1 tablet (1,000 mcg total) by mouth daily, Disp: 90 tablet, Rfl: 3        Social History:    Social History            Social History           Substance and Sexual Activity   Alcohol Use Yes     Comment: rare      Social History          Substance and Sexual Activity   Drug Use No      Social History          Tobacco Use   Smoking Status Former   Smokeless Tobacco Never            Family History:    Family History         Family History   Problem Relation Age of Onset   • Breast cancer Mother 36         cause of death   • Heart attack Father           MI cause of death   • No Known Problems Sister     • No Known Problems Daughter     • No Known Problems Maternal Grandmother     • No Known Problems Maternal Grandfather     • No Known Problems Paternal Grandmother     • No Known Problems Paternal Grandfather     • No Known Problems Sister     • No Known Problems Sister     • Colon cancer Neg Hx     • Ovarian cancer Neg Hx                 Review of Systems:    As per the HPI. Additionally she has chronic left shoulder pain receiving physical therapy for this. .  Occasional dyspnea on exertion. Some memory loss. She denies weight loss weight gain fever chills night sweats nausea vomiting diarrhea constipation etc.      Vitals:      Vitals:     05/10/23 1202   BP: 120/62   Pulse: 74   Temp: 98 °F (36.7 °C)   SpO2: 97%         Physical Exam:  Middle-aged  female 5 foot 351 pounds. She is awake alert no distress. Vital signs as above     Abdomen she has a protuberant abdomen. She has a midline incision from mid epigastric to just above the pubis.   In the epigastric area just above into the right is an extremely firm area that is slightly tender to deep palpation. No obvious incisional hernia is noted. Lab Results: I have personally reviewed pertinent reports. See below. Imaging: I have personally reviewed pertinent imaging studies primarily the CT scan of the abdomen and pelvis. EKG, Pathology, and Other Studies: I have personally reviewed pertinent reports. No visits with results within 1 Day(s) from this visit.    Latest known visit with results is:   Appointment on 04/11/2023   Component Date Value   • Color, UA 04/13/2023 Yellow    • Clarity, UA 04/13/2023 Clear    • Specific Gravity, UA 04/13/2023 1.015    • pH, UA 04/13/2023 5.0    • Leukocytes, UA 04/13/2023 25.0 (A)    • Nitrite, UA 04/13/2023 Negative    • Protein, UA 04/13/2023 Negative    • Glucose, UA 04/13/2023 Negative    • Ketones, UA 04/13/2023 Negative    • Bilirubin, UA 04/13/2023 Negative    • Occult Blood, UA 04/13/2023 Negative    • UROBILINOGEN UA 04/13/2023 Negative    • Sodium 04/13/2023 143    • Potassium 04/13/2023 4.2    • Chloride 04/13/2023 103    • CO2 04/13/2023 30    • ANION GAP 04/13/2023 10    • BUN 04/13/2023 14    • Creatinine 04/13/2023 0.61    • Glucose, Fasting 04/13/2023 171 (H)    • Calcium 04/13/2023 10.6 (H)    • AST 04/13/2023 11 (L)    • ALT 04/13/2023 14    • Alkaline Phosphatase 04/13/2023 78    • Total Protein 04/13/2023 7.3    • Albumin 04/13/2023 4.3    • Total Bilirubin 04/13/2023 0.37    • eGFR 04/13/2023 100    • WBC 04/13/2023 10.69 (H)    • RBC 04/13/2023 4.95    • Hemoglobin 04/13/2023 12.9    • Hematocrit 04/13/2023 41.6    • MCV 04/13/2023 84    • MCH 04/13/2023 26.1 (L)    • MCHC 04/13/2023 31.0 (L)    • RDW 04/13/2023 13.4    • MPV 04/13/2023 9.5    • Platelets 83/92/6263 305    • nRBC 04/13/2023 0    • Neutrophils Relative 04/13/2023 50    • Immat GRANS % 04/13/2023 0    • Lymphocytes Relative 04/13/2023 42    • Monocytes Relative 04/13/2023 5    • Eosinophils Relative 04/13/2023 2    • Basophils Relative 04/13/2023 1    • Neutrophils Absolute 04/13/2023 5.26    • Immature Grans Absolute 04/13/2023 0.03    • Lymphocytes Absolute 04/13/2023 4.53 (H)    • Monocytes Absolute 04/13/2023 0.53    • Eosinophils Absolute 04/13/2023 0.24    • Basophils Absolute 04/13/2023 0.10    • Magnesium 04/13/2023 1.4 (L)    • Cholesterol 04/13/2023 137    • Triglycerides 04/13/2023 152 (H)    • HDL, Direct 04/13/2023 41 (L)    • LDL Calculated 04/13/2023 66    • Non-HDL-Chol (CHOL-HDL) 04/13/2023 96    • Uric Acid 04/13/2023 4.6    • TSH 3RD GENERATON 04/13/2023 1.924    • Free T4 04/13/2023 1.14    • Vitamin B-12 04/13/2023 282    • Vit D, 25-Hydroxy 04/13/2023 46.2    • Folate 04/13/2023 9.5    • Lyme Total Antibodies 04/13/2023 0.4    • SYPHILIS TOTAL ANTIBODY 04/13/2023 Reactive (A)    • RBC, UA 04/13/2023 None Seen    • WBC, UA 04/13/2023 0-1    • Epithelial Cells 04/13/2023 Occasional    • Bacteria, UA 04/13/2023 None Seen    • RPR 04/13/2023 Non-Reactive    • T pallidum Antibodies (F* 04/13/2023 Reactive (A)             Impression:  Small recurrent incisional hernia. This appears to be asymptomatic at this time. It does not cause any significant discomfort. There is no bowel incarcerated. Plan: At this point the patient is discharged from the office. She would prefer to not undergo surgery unless it is absolutely necessary. She is instructed that if she develops increasing pain in the area or if it begins to get larger she should notify the office and of course we would proceed to repair it at that time.

## 2023-09-06 NOTE — PROGRESS NOTES
Chief Complaint: Possible abdominal wall hernia.        History of Present Illness: Roger Aguilar is a 79-year-old  female originally from [place]. She presents the office today with a possible incisional hernia. Roger Aguilar' history consists of a laparoscopic cholecystectomy done many years ago in [place]. She subsequently developed a small bowel obstruction necessitating exploratory laparotomy and a bowel resection. She subsequent developed an incisional hernia and apparently underwent repair of the hernia. After that she underwent another laparoscopic cholecystectomy since they apparently left a large portion of the gallbladder and at that time. She was seen in May of this year and a CT scan of the abdomen pelvis was obtained. This demonstrated what appeared to be a small recurrent incisional hernia. The mesh appeared to have pulled away from the left side of the abdominal wall. No incarcerated bowel is present. The patient is here for follow-up visit in regards to this.          Past Medical History:   Medical History        Past Medical History:   Diagnosis Date   • A  Cute exacerbation of chronic obstructive pulmonary disease (COPD) ([address]) 2/27/2020   • Asthma       exercise induced   • Diabetes ([address])       type 2   • Diabetes mellitus ([address])     • Hypertension     • Morbid obesity ([address]) 4/7/2020   • Osteoarthritis 4/7/2020               Past Surgical History:    Surgical History         Past Surgical History:   Procedure Laterality Date   • BREAST BIOPSY Left 2016     benign   • CHOLECYSTECTOMY       • HERNIA REPAIR       • LAPAROSCOPY FOR ECTOPIC PREGNANCY         x2, both tubes removed    • MAMMO STEREOTACTIC BREAST BIOPSY LEFT (ALL INC) Left     • REPLACEMENT TOTAL KNEE Left     • SMALL INTESTINE SURGERY         after hernia surgery                Allergies:          Allergies   Allergen Reactions   • No Active Allergies              Medications:    Current Outpatient Medications:   •  albuterol (PROVENTIL HFA,VENTOLIN HFA) 90 mcg/act inhaler, Inhale 2 puffs every 6 (six) hours as needed for wheezing, Disp: 54 g, Rfl: 3  •  Aspirin Low Dose 81 MG EC tablet, TAKE 1 TABLET (81 MG TOTAL) BY MOUTH DAILY, Disp: 90 tablet, Rfl: 3  •  atorvastatin (LIPITOR) 40 mg tablet, Take 1 tablet (40 mg total) by mouth daily, Disp: 90 tablet, Rfl: 3  •  Banophen 25 MG capsule, TAKE 1 CAPSULE (25 MG TOTAL) BY MOUTH DAILY AT BEDTIME AS NEEDED FOR ITCHING, Disp: 90 capsule, Rfl: 3  •  calcipotriene (DOVONEX) 0.005 % cream, APPLY TO AFFECTED AREA TWICE A DAY [facility], Disp: 120 g, Rfl: 0  •  Calcium + Vitamin D3 600-10 MG-MCG TABS, TAKE 2 TABLETS BY MOUTH DAILY, Disp: 60 tablet, Rfl: 6  •  cetirizine (ZyrTEC) 10 mg tablet, TAKE 1 TABLET (10 MG TOTAL) BY MOUTH DAILY IN THE EVENING, Disp: 30 tablet, Rfl: 3  •  colchicine (COLCRYS) 0.6 mg tablet, Take 2 pills immediately and take 3rd pill an hour later, Disp: 3 tablet, Rfl: 0  •  Comfort EZ Pen Needles 33G X 5 MM MISC, INJECT 4 TIMES TO 5 TIMES A DAY SUBCUTANEOUS WITH INSULINE, Disp: 400 each, Rfl: 5  •  Diclofenac Sodium (VOLTAREN) 1 %, Apply 2 g topically 4 (four) times a day, Disp: 200 g, Rfl: 1  •  Easy Comfort Pen Needles 32G X 4 MM MISC, USE FOR INSULIN INJECTIONS ONCE DAILY AS DIRECTED, Disp: 100 each, Rfl: 3  •  Easy Comfort Pen Needles 33G X 4 MM MISC, WITH INSULINE PEN AS NEEDED UP TO 5 TIMES X90 DAYS, Disp: 400 each, Rfl: 5  •  escitalopram (LEXAPRO) 10 mg tablet, Take 1 tablet (10 mg total) by mouth daily with dinner, Disp: 30 tablet, Rfl: 5  •  fenofibrate (TRICOR) 145 mg tablet, Take 1 tablet (145 mg total) by mouth daily, Disp: 90 tablet, Rfl: 3  •  fluticasone-umeclidinium-vilanterol (Trelegy Ellipta) 100-62.5-25 mcg/actuation inhaler, Inhale 1 puff daily Rinse mouth after use., Disp: 60 each, Rfl: 5  •  glipiZIDE (GLUCOTROL) 5 mg tablet, Take 1 tablet (5 mg total) by mouth 2 (two) times a day with meals, Disp: 180 tablet, Rfl: 3  •  glucose blood test strip, Use as instructed to test three times daily. , Disp: 100 each, Rfl: 5  •  Insulin Glargine Solostar (Lantus SoloStar) 100 UNIT/ML SOPN, Inject 1 mL (100 Units total) under the skin in the morning, Disp: 15 mL, Rfl: 3  •  ketorolac (TORADOL) 10 mg tablet, Take 1 tablet (10 mg total) by mouth 2 (two) times a day with meals, Disp: 10 tablet, Rfl: 0  •  levocetirizine (XYZAL) 5 MG tablet, TAKE 1 TABLET (5 MG TOTAL) BY MOUTH EVERY EVENING, Disp: 30 tablet, Rfl: 3  •  lidocaine (LMX) 4 % cream, Apply topically as needed for mild pain, Disp: 30 g, Rfl: 0  •  magnesium Oxide (MAG-OX) 400 mg TABS, Take 1 tablet (400 mg total) by mouth 2 (two) times a day, Disp: 180 tablet, Rfl: 3  •  Melatonin 10 MG TABS, Take 1 tablet (10 mg total) by mouth daily at bedtime, Disp: 30 tablet, Rfl: 3  •  metFORMIN (GLUCOPHAGE) 1000 MG tablet, Take 1 tablet (1,000 mg total) by mouth 2 (two) times a day with meals, Disp: 180 tablet, Rfl: 3  •  metoprolol tartrate (LOPRESSOR) 25 mg tablet, Take 1 tablet (25 mg total) by mouth every 12 (twelve) hours, Disp: 180 tablet, Rfl: 3  •  montelukast (SINGULAIR) 10 mg tablet, Take 1 tablet (10 mg total) by mouth every 24 hours, Disp: 90 tablet, Rfl: 3  •  mupirocin (BACTROBAN) 2 % ointment, Apply topically 3 (three) times a day, Disp: 22 g, Rfl: 0  •  oxybutynin (DITROPAN XL) 15 MG 24 hr tablet, TAKE 1 TABLET (15 MG TOTAL) BY MOUTH DAILY, Disp: 90 tablet, Rfl: 3  •  Ozempic, 1 MG/DOSE, 4 MG/3ML injection pen, Inject 0.75 mL (1 mg total) under the skin once a week, Disp: 6 mL, Rfl: 3  •  pantoprazole (PROTONIX) 40 mg tablet, Take 1 tablet (40 mg total) by mouth daily, Disp: 90 tablet, Rfl: 3  •  vitamin B-12 (VITAMIN B-12) 1,000 mcg tablet, Take 1 tablet (1,000 mcg total) by mouth daily, Disp: 90 tablet, Rfl: 3        Social History:    Social History            Social History           Substance and Sexual Activity   Alcohol Use Yes     Comment: rare      Social History          Substance and Sexual Activity   Drug Use No      Social History      Tobacco Use   Smoking Status Former   Smokeless Tobacco Never            Family History:    Family History         Family History   Problem Relation Age of Onset   • Breast cancer Mother 36         cause of death   • Heart attack Father           MI cause of death   • No Known Problems Sister     • No Known Problems Daughter     • No Known Problems Maternal Grandmother     • No Known Problems Maternal Grandfather     • No Known Problems Paternal Grandmother     • No Known Problems Paternal Grandfather     • No Known Problems Sister     • No Known Problems Sister     • Colon cancer Neg Hx     • Ovarian cancer Neg Hx                 Review of Systems:    As per the HPI. Additionally she has chronic left shoulder pain receiving physical therapy for this. .  Occasional dyspnea on exertion. Some memory loss. She denies weight loss weight gain fever chills night sweats nausea vomiting diarrhea constipation etc.      Vitals:      Vitals:     05/10/23 1202   BP: 120/62   Pulse: 74   Temp: 98 °F (36.7 °C)   SpO2: 97%         Physical Exam:  Middle-aged  female 5 foot 351 pounds. She is awake alert no distress.     Vital signs as above     Abdomen she has a protuberant abdomen. She has a midline incision from mid epigastric to just above the pubis. In the epigastric area just above into the right is an extremely firm area that is slightly tender to deep palpation. No obvious incisional hernia is noted.        Lab Results: I have personally reviewed pertinent reports. See below. Imaging: I have personally reviewed pertinent imaging studies primarily the CT scan of the abdomen and pelvis. EKG, Pathology, and Other Studies: I have personally reviewed pertinent reports.            No visits with results within 1 Day(s) from this visit.    Latest known visit with results is:   Appointment on 04/11/2023   Component Date Value   • Color, UA 04/13/2023 Yellow • Clarity, UA 04/13/2023 Clear    • Specific Gravity, UA 04/13/2023 1.015    • pH, UA 04/13/2023 5.0    • Leukocytes, UA 04/13/2023 25.0 (A)    • Nitrite, UA 04/13/2023 Negative    • Protein, UA 04/13/2023 Negative    • Glucose, UA 04/13/2023 Negative    • Ketones, UA 04/13/2023 Negative    • Bilirubin, UA 04/13/2023 Negative    • Occult Blood, UA 04/13/2023 Negative    • UROBILINOGEN UA 04/13/2023 Negative    • Sodium 04/13/2023 143    • Potassium 04/13/2023 4.2    • Chloride 04/13/2023 103    • CO2 04/13/2023 30    • ANION GAP 04/13/2023 10    • BUN 04/13/2023 14    • Creatinine 04/13/2023 0.61    • Glucose, Fasting 04/13/2023 171 (H)    • Calcium 04/13/2023 10.6 (H)    • AST 04/13/2023 11 (L)    • ALT 04/13/2023 14    • Alkaline Phosphatase 04/13/2023 78    • Total Protein 04/13/2023 7.3    • Albumin 04/13/2023 4.3    • Total Bilirubin 04/13/2023 0.37    • eGFR 04/13/2023 100    • WBC 04/13/2023 10.69 (H)    • RBC 04/13/2023 4.95    • Hemoglobin 04/13/2023 12.9    • Hematocrit 04/13/2023 41.6    • MCV 04/13/2023 84    • MCH 04/13/2023 26.1 (L)    • MCHC 04/13/2023 31.0 (L)    • RDW 04/13/2023 13.4    • MPV 04/13/2023 9.5    • Platelets 10/96/1827 305    • nRBC 04/13/2023 0    • Neutrophils Relative 04/13/2023 50    • Immat GRANS % 04/13/2023 0    • Lymphocytes Relative 04/13/2023 42    • Monocytes Relative 04/13/2023 5    • Eosinophils Relative 04/13/2023 2    • Basophils Relative 04/13/2023 1    • Neutrophils Absolute 04/13/2023 5.26    • Immature Grans Absolute 04/13/2023 0.03    • Lymphocytes Absolute 04/13/2023 4.53 (H)    • Monocytes Absolute 04/13/2023 0.53    • Eosinophils Absolute 04/13/2023 0.24    • Basophils Absolute 04/13/2023 0.10    • Magnesium 04/13/2023 1.4 (L)    • Cholesterol 04/13/2023 137    • Triglycerides 04/13/2023 152 (H)    • HDL, Direct 04/13/2023 41 (L)    • LDL Calculated 04/13/2023 66    • Non-HDL-Chol (CHOL-HDL) 04/13/2023 96    • Uric Acid 04/13/2023 4.6    • TSH 3RD GENERATON 04/13/2023 1.924    • Free T4 04/13/2023 1.14    • Vitamin B-12 04/13/2023 282    • Vit D, 25-Hydroxy 04/13/2023 46.2    • Folate 04/13/2023 9.5    • Lyme Total Antibodies 04/13/2023 0.4    • SYPHILIS TOTAL ANTIBODY 04/13/2023 Reactive (A)    • RBC, UA 04/13/2023 None Seen    • WBC, UA 04/13/2023 0-1    • Epithelial Cells 04/13/2023 Occasional    • Bacteria, UA 04/13/2023 None Seen    • RPR 04/13/2023 Non-Reactive    • T pallidum Antibodies (F* 04/13/2023 Reactive (A)             Impression:  Small recurrent incisional hernia. This appears to be asymptomatic at this time. It does not cause any significant discomfort. There is no bowel incarcerated.     Plan: At this point the patient is discharged from the office. She would prefer to not undergo surgery unless it is absolutely necessary.   She is instructed that if she develops increasing pain in the area or if it begins to get larger she should notify the office and of course we would proceed to repair it at that time.

## 2023-09-22 ENCOUNTER — TELEPHONE (OUTPATIENT)
Dept: HEMATOLOGY ONCOLOGY | Facility: MEDICAL CENTER | Age: 58
End: 2023-09-22

## 2023-09-22 DIAGNOSIS — D72.828 OTHER ELEVATED WHITE BLOOD CELL (WBC) COUNT: Primary | ICD-10-CM

## 2023-09-22 NOTE — TELEPHONE ENCOUNTER
Spoke with patient reminding to have labs drawn prior to appointment, patient stated she had forgotten about the appointment, patient expressed that she has a lot on her mind right now due to her landlord selling the apartment building, she needs to move. Patient also stated that she is not sure if she will be able to make it the appointment, I offered the patient to be seen virtually by Shellie Friend as long as the patient is able to have her labs drawn. Patient stated that she is able to have her labs drawn, and that she is okay with Shellie Friend calling her for her appointment on Tuesday. I provided the patient with my direct teams number to call me if she has any problems. Patient verbalized understanding. [Patient] : the patient [___ Week(s)] : in [unfilled] week(s) [FreeTextEntry1] : 1. Chronic systolic HFrEF with  LVEF 39% from  % from prior 40%\par - continue metoprolol 25 mg daily, will uptitrate as tolerated to goal 100 mg daily\par - continue losartan 25 mg daily, check labs today and if K and renal function are stable, will start Entresto 24-26 mg bid.Pt was given a coupon card for free 30 days\par - continue bumex 1 mg daily as needed  with additional as needed for weight gain of 2-3 lbs in 2-3 days\par - continue Xarrelto 20 mg daily for oral a/c and stroke prevention in the setting of PAF, now in NS[facility]\par - limit salt- less than 2000 mg per day\par -limit fluid to less than 2 liters per day\par - heart healthy diet\par - smoking and alcohol cessation. Will start Chantix daily\par - HF literature given to patient\par \par 2. Nonobstructive CAD on [facility] 1/2020 with prior elevated filling pressures\par - no active chest pain\par - will discuss starting ASA and statin, check labs first\par - metoprolol as above\par \par 3. HIV\par - continue meds\par \par Follow up in 3 weeks for further medication up titration to GDMT

## 2023-09-25 ENCOUNTER — APPOINTMENT (OUTPATIENT)
Dept: LAB | Facility: HOSPITAL | Age: 58
End: 2023-09-25
Payer: COMMERCIAL

## 2023-09-25 DIAGNOSIS — D72.828 OTHER ELEVATED WHITE BLOOD CELL (WBC) COUNT: ICD-10-CM

## 2023-09-25 LAB
BASOPHILS # BLD AUTO: 0.08 THOUSANDS/ÂΜL (ref 0–0.1)
BASOPHILS NFR BLD AUTO: 1 % (ref 0–1)
EOSINOPHIL # BLD AUTO: 0.19 THOUSAND/ÂΜL (ref 0–0.61)
EOSINOPHIL NFR BLD AUTO: 2 % (ref 0–6)
ERYTHROCYTE [DISTWIDTH] IN BLOOD BY AUTOMATED COUNT: 13.9 % (ref 11.6–15.1)
HCT VFR BLD AUTO: 41.4 % (ref 34.8–46.1)
HGB BLD-MCNC: 13.2 G/DL (ref 11.5–15.4)
IMM GRANULOCYTES # BLD AUTO: 0.03 THOUSAND/UL (ref 0–0.2)
IMM GRANULOCYTES NFR BLD AUTO: 0 % (ref 0–2)
LYMPHOCYTES # BLD AUTO: 3.4 THOUSANDS/ÂΜL (ref 0.6–4.47)
LYMPHOCYTES NFR BLD AUTO: 30 % (ref 14–44)
MCH RBC QN AUTO: 26.4 PG (ref 26.8–34.3)
MCHC RBC AUTO-ENTMCNC: 31.9 G/DL (ref 31.4–37.4)
MCV RBC AUTO: 83 FL (ref 82–98)
MONOCYTES # BLD AUTO: 0.56 THOUSAND/ÂΜL (ref 0.17–1.22)
MONOCYTES NFR BLD AUTO: 5 % (ref 4–12)
NEUTROPHILS # BLD AUTO: 7.08 THOUSANDS/ÂΜL (ref 1.85–7.62)
NEUTS SEG NFR BLD AUTO: 62 % (ref 43–75)
NRBC BLD AUTO-RTO: 0 /100 WBCS
PLATELET # BLD AUTO: 275 THOUSANDS/UL (ref 149–390)
PMV BLD AUTO: 10.5 FL (ref 8.9–12.7)
RBC # BLD AUTO: 5 MILLION/UL (ref 3.81–5.12)
WBC # BLD AUTO: 11.34 THOUSAND/UL (ref 4.31–10.16)

## 2023-09-25 PROCEDURE — 36415 COLL VENOUS BLD VENIPUNCTURE: CPT

## 2023-09-25 PROCEDURE — 85025 COMPLETE CBC W/AUTO DIFF WBC: CPT

## 2023-09-26 ENCOUNTER — TELEMEDICINE (OUTPATIENT)
Dept: HEMATOLOGY ONCOLOGY | Facility: CLINIC | Age: 58
End: 2023-09-26

## 2023-09-26 DIAGNOSIS — D72.828 OTHER ELEVATED WHITE BLOOD CELL (WBC) COUNT: Primary | ICD-10-CM

## 2023-09-26 NOTE — PROGRESS NOTES
Virtual Regular Visit    Verification of patient location:    Patient is located at Home in the following state in which I hold an active license [place]      Assessment/Plan:    Problem List Items Addressed This Visit    None           Reason for visit is No chief complaint on file. Encounter provider Tawanna Milan PA-C    Provider located at [address] [place] [id number]      Recent Visits  No visits were found meeting these conditions. Showing recent visits within past 7 days and meeting all other requirements  Future Appointments  No visits were found meeting these conditions. Showing future appointments within next 150 days and meeting all other requirements     The patient was identified by name and date of birth. Marcus Lozoya was informed that this is a telemedicine visit and that the visit is being conducted through the [email or web address]. She agrees to proceed. .  My office door was closed. No one else was in the room. She acknowledged consent and understanding of privacy and security of the video platform. The patient has agreed to participate and understands they can discontinue the visit at any time. Patient is aware this is a billable service. Subjective  Marcus Lozoya is a 60 y.o. female presents for follow up for leukocytosis.     Past Medical History:   Diagnosis Date   • Acute exacerbation of chronic obstructive pulmonary disease (COPD) ([address]) 2/27/2020   • Asthma     exercise induced   • Diabetes ([address])     type 2   • Diabetes mellitus ([address])    • Hypertension    • Morbid obesity ([address]) 4/7/2020   • Osteoarthritis 4/7/2020       Past Surgical History:   Procedure Laterality Date   • BREAST BIOPSY Left 2016    benign   • CHOLECYSTECTOMY     • COLONOSCOPY     • FL INJECTION LEFT SHOULDER (NON ARTHROGRAM)  07/28/2023   • FL LUMBAR PUNCTURE DIAGNOSTIC  07/19/2023   • HERNIA REPAIR     • LAPAROSCOPY FOR ECTOPIC PREGNANCY      x2, both tubes removed    • MAMMO STEREOTACTIC BREAST BIOPSY LEFT (ALL INC) Left    • REPLACEMENT TOTAL KNEE Left    • SMALL INTESTINE SURGERY      after hernia surgery        Current Outpatient Medications   Medication Sig Dispense Refill   • Acetaminophen (TYLENOL PO) Take by mouth As needed for pain     • albuterol (PROVENTIL HFA,VENTOLIN HFA) 90 mcg/act inhaler Inhale 2 puffs every 6 (six) hours as needed for wheezing (Patient not taking: Reported on 8/4/2023) 54 g 3   • Aspirin Low Dose 81 MG EC tablet TAKE 1 TABLET (81 MG TOTAL) BY MOUTH DAILY 90 tablet 3   • atorvastatin (LIPITOR) 40 mg tablet Take 1 tablet (40 mg total) by mouth daily 90 tablet 3   • Banophen 25 MG capsule TAKE 1 CAPSULE (25 MG TOTAL) BY MOUTH DAILY AT BEDTIME AS NEEDED FOR ITCHING 90 capsule 3   • calcipotriene (DOVONEX) 0.005 % cream APPLY TO AFFECTED AREA TWICE A DAY 30 DAYS LARRGER AREA HAND LEGS (Patient not taking: Reported on 8/4/2023) 120 g 0   • Calcium + Vitamin D3 600-10 MG-MCG TABS TAKE 2 TABLETS BY MOUTH DAILY 60 tablet 6   • cetirizine (ZyrTEC) 10 mg tablet TAKE 1 TABLET (10 MG TOTAL) BY MOUTH DAILY IN THE EVENING 30 tablet 3   • Diclofenac Sodium (VOLTAREN) 1 % APPLY 2 G TOPICALLY 4 (FOUR) TIMES A  g 1   • escitalopram (LEXAPRO) 10 mg tablet Take 1 tablet (10 mg total) by mouth daily with dinner 30 tablet 5   • fenofibrate (TRICOR) 145 mg tablet Take 1 tablet (145 mg total) by mouth daily 90 tablet 3   • fluticasone-umeclidinium-vilanterol (Trelegy Ellipta) 100-62.5-25 mcg/actuation inhaler Inhale 1 puff daily Rinse mouth after use. 60 each 5   • glipiZIDE (GLUCOTROL) 5 mg tablet Take 1 tablet (5 mg total) by mouth 2 (two) times a day with meals 180 tablet 3   • glucose blood test strip Use as instructed to test three times daily.  (Patient not taking: Reported on 5/24/2023) 100 each 5   • levocetirizine (XYZAL) 5 MG tablet TAKE 1 TABLET (5 MG TOTAL) BY MOUTH EVERY EVENING 30 tablet 3   • magnesium Oxide (MAG-OX) 400 mg TABS Take 1 tablet (400 mg total) by mouth 2 (two) times a day 180 tablet 3   • Melatonin 10 MG TABS Take 1 tablet (10 mg total) by mouth daily at bedtime 30 tablet 3   • metFORMIN (GLUCOPHAGE) 1000 MG tablet Take 1 tablet (1,000 mg total) by mouth 2 (two) times a day with meals 180 tablet 3   • metoprolol tartrate (LOPRESSOR) 25 mg tablet Take 1 tablet (25 mg total) by mouth every 12 (twelve) hours 180 tablet 3   • montelukast (SINGULAIR) 10 mg tablet Take 1 tablet (10 mg total) by mouth every 24 hours 90 tablet 3   • mupirocin (BACTROBAN) 2 % ointment Apply topically 3 (three) times a day (Patient not taking: Reported on 5/24/2023) 22 g 0   • oxybutynin (DITROPAN XL) 15 MG 24 hr tablet TAKE 1 TABLET (15 MG TOTAL) BY MOUTH DAILY 90 tablet 3   • Ozempic, 1 MG/DOSE, 4 MG/3ML injection pen Inject 0.75 mL (1 mg total) under the skin once a week 6 mL 3   • pantoprazole (PROTONIX) 40 mg tablet Take 1 tablet (40 mg total) by mouth daily 90 tablet 3   • polyethylene glycol (GOLYTELY) 4000 mL solution Take 4,000 mL by mouth once for 1 dose Do not start before August 30, 2023. (Patient not taking: Reported on 8/4/2023 Do not start before August 30, 2023.) 4000 mL 0   • vitamin B-12 (VITAMIN B-12) 1,000 mcg tablet Take 1 tablet (1,000 mcg total) by mouth daily 90 tablet 3     No current facility-administered medications for this visit. Allergies   Allergen Reactions   • No Active Allergies      Review of Systems   Constitutional: Negative for activity change, appetite change, chills, fatigue, fever and unexpected weight change. Respiratory: Negative for cough and shortness of breath. Cardiovascular: Negative for chest pain, palpitations and leg swelling. Gastrointestinal: Negative for abdominal pain, constipation, diarrhea, nausea and vomiting. Genitourinary: Negative for difficulty urinating, dysuria and hematuria. Musculoskeletal: Negative for arthralgias. Skin: Negative. Neurological: Negative for dizziness, weakness, light-headedness, numbness and headaches. Hematological: Negative. Psychiatric/Behavioral: Negative. 1. Other elevated white blood cell (WBC) count  48-year-old female presents for follow-up as regarding history of leukocytosis. This is very mild. Differential is normal x3. Hemoglobin and platelets are normal.  She is feeling well. She follow-up as needed.       Visit Time  Total Visit Duration: 5 min

## 2023-09-27 DIAGNOSIS — M77.8 LEFT SHOULDER TENDONITIS: ICD-10-CM

## 2023-09-27 DIAGNOSIS — F32.A DEPRESSION, UNSPECIFIED DEPRESSION TYPE: ICD-10-CM

## 2023-09-27 DIAGNOSIS — J43.9 PULMONARY EMPHYSEMA, UNSPECIFIED EMPHYSEMA TYPE (HCC): ICD-10-CM

## 2023-09-27 DIAGNOSIS — J01.90 ACUTE SINUSITIS, RECURRENCE NOT SPECIFIED, UNSPECIFIED LOCATION: ICD-10-CM

## 2023-09-27 DIAGNOSIS — R21 RASH: ICD-10-CM

## 2023-09-27 RX ORDER — FLUTICASONE FUROATE, UMECLIDINIUM BROMIDE AND VILANTEROL TRIFENATATE 100; 62.5; 25 UG/1; UG/1; UG/1
1 POWDER RESPIRATORY (INHALATION) DAILY
Qty: 60 EACH | Refills: 5 | Status: SHIPPED | OUTPATIENT
Start: 2023-09-27

## 2023-09-27 RX ORDER — LEVOCETIRIZINE DIHYDROCHLORIDE 5 MG/1
TABLET, FILM COATED ORAL
Qty: 30 TABLET | Refills: 3 | Status: SHIPPED | OUTPATIENT
Start: 2023-09-27

## 2023-09-27 RX ORDER — ESCITALOPRAM OXALATE 10 MG/1
10 TABLET ORAL
Qty: 30 TABLET | Refills: 5 | Status: SHIPPED | OUTPATIENT
Start: 2023-09-27

## 2023-09-27 RX ORDER — CALCIPOTRIENE 50 UG/G
CREAM TOPICAL
Qty: 120 G | Refills: 0 | Status: SHIPPED | OUTPATIENT
Start: 2023-09-27

## 2023-10-10 DIAGNOSIS — R41.3 MEMORY LOSS: Primary | ICD-10-CM

## 2023-10-24 DIAGNOSIS — M77.8 LEFT SHOULDER TENDONITIS: ICD-10-CM

## 2023-10-24 DIAGNOSIS — R21 RASH: ICD-10-CM

## 2023-10-24 RX ORDER — CALCIPOTRIENE 50 UG/G
CREAM TOPICAL
Qty: 120 G | Refills: 0 | Status: SHIPPED | OUTPATIENT
Start: 2023-10-24

## 2023-11-08 ENCOUNTER — TELEPHONE (OUTPATIENT)
Dept: NEUROLOGY | Facility: CLINIC | Age: 58
End: 2023-11-08

## 2023-11-08 NOTE — TELEPHONE ENCOUNTER
MSW attempted to reach patient at [phone]. No answer. MSW left a message with contact # for [phone] ([phone]) where she can call to obtain shelter/housing resources. MSW also requested a callback. Awaiting same.

## 2023-11-08 NOTE — TELEPHONE ENCOUNTER
I have reached out to patient to remind of MRI needed to get done before coming to neurology appointment . First thing patient expressed was not feeling good due to being evicted and she doesn't have a place to go. I told her I can forward her chart to our social work team and see if the had any resources that may be able to help her with her concern. I also provided central schedule phone number to have her MRI scheduled. Appointment for neuro was r/s to January. Social work team Please assist if appropriate.

## 2023-11-09 NOTE — TELEPHONE ENCOUNTER
MSW phoned patient again this date at [phone] and was able to reach her. Patient stated that she is not currently homeless but stated that she is having ongoing issues with her landlord and that she has to move by the 30th. Patient stated that the landlord is "breaking laws" as he is doing work on the property while she is still living there. Patient stated that the landlord moved all of her things from the bedroom to the living room so that he could remove asbestos from the bedroom. Patient stated that there is dust everywhere and she is breathing it in which is "bad" for her. Patient stated that the landlord is working on the property as he plans to sell it. Patient stated that she has looked into other rental properties, but that since she only gets SSI there is nothing available in her price range. Patient stated that she currently pays $750 and that the minimum she can find is $900 a month. Patient stated that the landlord is not taking her [place] rent so that she will not get her security deposit back. Patient states that she does not feel that she is being treated fairly by the landlord. MSW provided contact information and hours for [employer] as they have a tenants rights department. Patient will contact [employer] to see if they can offer insight/guidance into her situation. Patient reported that she is aware of her MRI appt for 12/11/23 and the neurology follow-up on 1/2/24. Patient stated that she is familiar with taking the [employer] to the [employer] area so she will take the bus to get to the MRI appt and neurology follow-up appointment.

## 2023-11-14 ENCOUNTER — HOSPITAL ENCOUNTER (EMERGENCY)
Facility: HOSPITAL | Age: 58
Discharge: HOME | End: 2023-11-14
Attending: EMERGENCY MEDICINE
Payer: COMMERCIAL

## 2023-11-14 VITALS
TEMPERATURE: 98.6 F | OXYGEN SATURATION: 96 % | SYSTOLIC BLOOD PRESSURE: 128 MMHG | HEART RATE: 75 BPM | DIASTOLIC BLOOD PRESSURE: 72 MMHG

## 2023-11-14 DIAGNOSIS — B02.9 SHINGLES RASH: Primary | ICD-10-CM

## 2023-11-14 PROCEDURE — 99284 EMERGENCY DEPT VISIT MOD MDM: CPT | Performed by: EMERGENCY MEDICINE

## 2023-11-14 PROCEDURE — 96372 THER/PROPH/DIAG INJ SC/IM: CPT

## 2023-11-14 PROCEDURE — 99284 EMERGENCY DEPT VISIT MOD MDM: CPT

## 2023-11-14 RX ORDER — ACETAMINOPHEN 325 MG/1
650 TABLET ORAL ONCE
Status: COMPLETED | OUTPATIENT
Start: 2023-11-14 | End: 2023-11-14

## 2023-11-14 RX ORDER — VALACYCLOVIR HYDROCHLORIDE 1 G/1
1000 TABLET, FILM COATED ORAL 3 TIMES DAILY
Qty: 21 TABLET | Refills: 0 | Status: SHIPPED | OUTPATIENT
Start: 2023-11-14 | End: 2023-11-21

## 2023-11-14 RX ORDER — KETOROLAC TROMETHAMINE 30 MG/ML
15 INJECTION, SOLUTION INTRAMUSCULAR; INTRAVENOUS ONCE
Status: COMPLETED | OUTPATIENT
Start: 2023-11-14 | End: 2023-11-14

## 2023-11-14 RX ADMIN — KETOROLAC TROMETHAMINE 15 MG: 30 INJECTION, SOLUTION INTRAMUSCULAR; INTRAVENOUS at 12:18

## 2023-11-14 RX ADMIN — ACETAMINOPHEN 650 MG: 325 TABLET ORAL at 12:18

## 2023-11-14 NOTE — ED PROVIDER NOTES
History  Chief Complaint   Patient presents with    Rash     Painful red rash to abd. And back x1 week     HPI  Patient is a 48-year-old female presenting with right abdominal and right flank rash that is painful. States that the pain initially started about a week ago and slowly she developed some vesicular rash. Patient does not recall whether she had chickenpox as a child. Also states that she never got her shingles vaccine. Reports no fever, chills, nausea, vomiting, diarrhea. Other than the painful rash patient has no other complaints. Prior to Admission Medications   Prescriptions Last Dose Informant Patient Reported? Taking?    Acetaminophen (TYLENOL PO)  Self Yes No   Sig: Take by mouth As needed for pain   Aspirin Low Dose 81 MG EC tablet   No No   Sig: TAKE 1 TABLET (81 MG TOTAL) BY MOUTH DAILY   Banophen 25 MG capsule  Self No No   Sig: TAKE 1 CAPSULE (25 MG TOTAL) BY MOUTH DAILY AT BEDTIME AS NEEDED FOR ITCHING   Calcium + Vitamin D3 600-10 MG-MCG TABS   No No   Sig: TAKE 2 TABLETS BY MOUTH DAILY   Diclofenac Sodium (VOLTAREN) 1 %   No No   Sig: APPLY 2 G TOPICALLY 4 (FOUR) TIMES A DAY   Melatonin 10 MG TABS   No No   Sig: Take 1 tablet (10 mg total) by mouth daily at bedtime   Ozempic, 1 MG/DOSE, 4 MG/3ML injection pen   No No   Sig: Inject 0.75 mL (1 mg total) under the skin once a week   Trelegy Ellipta 100-62.5-25 MCG/ACT inhaler   No No   Sig: INHALE 1 PUFF DAILY RINSE MOUTH AFTER USE.   albuterol (PROVENTIL HFA,VENTOLIN HFA) 90 mcg/act inhaler  Self No No   Sig: Inhale 2 puffs every 6 (six) hours as needed for wheezing   Patient not taking: Reported on 8/4/2023   atorvastatin (LIPITOR) 40 mg tablet   No No   Sig: Take 1 tablet (40 mg total) by mouth daily   calcipotriene (DOVONEX) 0.005 % cream   No No   Sig: APPLY TO AFFECTED AREA TWICE A DAY 30 DAYS LARRGER AREA HAND LEGS   cetirizine (ZyrTEC) 10 mg tablet   No No   Sig: TAKE 1 TABLET (10 MG TOTAL) BY MOUTH DAILY IN THE EVENING escitalopram (LEXAPRO) 10 mg tablet   No No   Sig: TAKE 1 TABLET (10 MG TOTAL) BY MOUTH DAILY WITH DINNER   fenofibrate (TRICOR) 145 mg tablet   No No   Sig: Take 1 tablet (145 mg total) by mouth daily   glipiZIDE (GLUCOTROL) 5 mg tablet   No No   Sig: Take 1 tablet (5 mg total) by mouth 2 (two) times a day with meals   glucose blood test strip  Self No No   Sig: Use as instructed to test three times daily. Patient not taking: Reported on 5/24/2023   levocetirizine (XYZAL) 5 MG tablet   No No   Sig: TAKE 1 TABLET (5 MG TOTAL) BY MOUTH EVERY EVENING   magnesium Oxide (MAG-OX) 400 mg TABS   No No   Sig: Take 1 tablet (400 mg total) by mouth 2 (two) times a day   metFORMIN (GLUCOPHAGE) 1000 MG tablet   No No   Sig: Take 1 tablet (1,000 mg total) by mouth 2 (two) times a day with meals   metoprolol tartrate (LOPRESSOR) 25 mg tablet   No No   Sig: Take 1 tablet (25 mg total) by mouth every 12 (twelve) hours   montelukast (SINGULAIR) 10 mg tablet   No No   Sig: Take 1 tablet (10 mg total) by mouth every 24 hours   mupirocin (BACTROBAN) 2 % ointment   No No   Sig: Apply topically 3 (three) times a day   Patient not taking: Reported on 5/24/2023   oxybutynin (DITROPAN XL) 15 MG 24 hr tablet   No No   Sig: TAKE 1 TABLET (15 MG TOTAL) BY MOUTH DAILY   pantoprazole (PROTONIX) 40 mg tablet   No No   Sig: Take 1 tablet (40 mg total) by mouth daily   polyethylene glycol (GOLYTELY) 4000 mL solution   No No   Sig: Take 4,000 mL by mouth once for 1 dose Do not start before August 30, 2023. Patient not taking: Reported on 8/4/2023 Do not start before August 30, 2023.    vitamin B-12 (VITAMIN B-12) 1,000 mcg tablet   No No   Sig: Take 1 tablet (1,000 mcg total) by mouth daily      Facility-Administered Medications: None       Past Medical History:   Diagnosis Date    Acute exacerbation of chronic obstructive pulmonary disease (COPD) ([address] [facility]) 2/27/2020    Asthma     exercise induced    Diabetes ([address] [facility])     type 2    Diabetes mellitus [facility])     Hypertension     Morbid obesity ([address] ) 4/7/2020    Osteoarthritis 4/7/2020       Past Surgical History:   Procedure Laterality Date    BREAST BIOPSY Left 2016    benign    CHOLECYSTECTOMY      COLONOSCOPY      FL INJECTION LEFT SHOULDER (NON ARTHROGRAM)  07/28/2023    FL LUMBAR PUNCTURE DIAGNOSTIC  07/19/2023    HERNIA REPAIR      LAPAROSCOPY FOR ECTOPIC PREGNANCY      x2, both tubes removed     MAMMO STEREOTACTIC BREAST BIOPSY LEFT (ALL INC) Left     REPLACEMENT TOTAL KNEE Left     SMALL INTESTINE SURGERY      after hernia surgery        Family History   Problem Relation Age of Onset    Breast cancer Mother 36        cause of death    Heart attack Father         MI cause of death    No Known Problems Sister     No Known Problems Daughter     No Known Problems Maternal Grandmother     No Known Problems Maternal Grandfather     No Known Problems Paternal Grandmother     No Known Problems Paternal Grandfather     No Known Problems Sister     No Known Problems Sister     Colon cancer Neg Hx     Ovarian cancer Neg Hx      I have reviewed and agree with the history as documented. E-Cigarette/Vaping    E-Cigarette Use Never User      E-Cigarette/Vaping Substances    Nicotine No     THC No     CBD No     Flavoring No      Social History     Tobacco Use    Smoking status: Former    Smokeless tobacco: Never   Vaping Use    Vaping Use: Never used   Substance Use Topics    Alcohol use: Not Currently     Comment: rare    Drug use: No       Review of Systems   Constitutional:  Negative for chills, diaphoresis, fever and unexpected weight change. HENT:  Negative for ear pain and sore throat. Eyes:  Negative for visual disturbance. Respiratory:  Negative for cough, chest tightness and shortness of breath. Cardiovascular:  Negative for chest pain and leg swelling. Gastrointestinal:  Negative for abdominal distention, abdominal pain, constipation, diarrhea, nausea and vomiting. Endocrine: Negative. Genitourinary:  Negative for difficulty urinating and dysuria. Musculoskeletal: Negative. Skin:  Positive for rash. Allergic/Immunologic: Negative. Neurological: Negative. Hematological: Negative. Psychiatric/Behavioral: Negative. All other systems reviewed and are negative. Physical Exam  Physical Exam  Vitals and nursing note reviewed. Constitutional:       General: She is not in acute distress. Appearance: Normal appearance. She is not ill-appearing. HENT:      Head: Normocephalic and atraumatic. Right Ear: External ear normal.      Left Ear: External ear normal.      Nose: Nose normal.      Mouth/Throat:      Mouth: Mucous membranes are moist.      Pharynx: Oropharynx is clear. Eyes:      General: No scleral icterus. Right eye: No discharge. Left eye: No discharge. Extraocular Movements: Extraocular movements intact. Conjunctiva/sclera: Conjunctivae normal.      Pupils: Pupils are equal, round, and reactive to light. Cardiovascular:      Rate and Rhythm: Normal rate and regular rhythm. Pulses: Normal pulses. Heart sounds: Normal heart sounds. Pulmonary:      Effort: Pulmonary effort is normal.      Breath sounds: Normal breath sounds. Abdominal:      General: Abdomen is flat. Bowel sounds are normal. There is no distension. Palpations: Abdomen is soft. Tenderness: There is no abdominal tenderness. There is no guarding or rebound. Musculoskeletal:         General: Normal range of motion. Cervical back: Normal range of motion and neck supple. Skin:     General: Skin is warm and dry. Capillary Refill: Capillary refill takes less than 2 seconds. Findings: Rash (right torso dermatomal distribution rash that is painful to touch) present. Neurological:      General: No focal deficit present. Mental Status: She is alert and oriented to person, place, and time. Mental status is at baseline. Psychiatric:         Mood and Affect: Mood normal.         Behavior: Behavior normal.         Thought Content: Thought content normal.         Judgment: Judgment normal.         Vital Signs  ED Triage Vitals   Temperature Pulse Resp Blood Pressure SpO2   11/14/23 1151 11/14/23 1151 -- 11/14/23 1151 11/14/23 1151   98.6 °F (37 °C) 75  128/72 96 %      Temp Source Heart Rate Source Patient Position - Orthostatic VS BP Location FiO2 (%)   11/14/23 1151 11/14/23 1151 11/14/23 1151 11/14/23 1151 --   Tympanic Monitor Sitting Right arm       Pain Score       11/14/23 1218       7           Vitals:    11/14/23 1151   BP: 128/72   Pulse: 75   Patient Position - Orthostatic VS: Sitting         Visual Acuity      ED Medications  Medications   ketorolac (TORADOL) injection 15 mg (15 mg Intramuscular Given 11/14/23 1218)   acetaminophen (TYLENOL) tablet 650 mg (650 mg Oral Given 11/14/23 1218)       Diagnostic Studies  Results Reviewed       None                   No orders to display              Procedures  Procedures         ED Course                                             Medical Decision Making  58-year-old female presenting with right-sided abdominal rash that is painful  On examination patient appears to have shingles  Does not appear to be cellulitis, necrotizing fasciitis, abscess  Patient given analgesia with significant improvement of his symptoms  Patient discharged with valacyclovir  Return precautions provided    Problems Addressed:  Shingles rash: acute illness or injury    Risk  OTC drugs. Prescription drug management.              Disposition  Final diagnoses:   Shingles rash     Time reflects when diagnosis was documented in both MDM as applicable and the Disposition within this note       Time User Action Codes Description Comment    11/14/2023 12:54 PM [place] Add [B02.9] Shingles rash           ED Disposition       ED Disposition   Discharge    Condition   Stable    Date/Time   Tue Nov 14, 2023 12:52 PM    Comment   Breonnaashli Srivastava discharge to home/self care.                    Follow-up Information       Follow up With Specialties Details Why Contact Info    Romelia Noel MD Internal Medicine   [id number] [place] [address]  [phone]              Discharge Medication List as of 11/14/2023 12:56 PM        START taking these medications    Details   valACYclovir (VALTREX) 1,000 mg tablet Take 1 tablet (1,000 mg total) by mouth 3 (three) times a day for 7 days, Starting Tue 11/14/2023, Until Tue 11/21/2023, Normal           CONTINUE these medications which have NOT CHANGED    Details   escitalopram (LEXAPRO) 10 mg tablet TAKE 1 TABLET (10 MG TOTAL) BY MOUTH DAILY WITH DINNER, Starting Wed 9/27/2023, Normal      levocetirizine (XYZAL) 5 MG tablet TAKE 1 TABLET (5 MG TOTAL) BY MOUTH EVERY EVENING, Normal      Trelegy Ellipta 100-62.5-25 MCG/ACT inhaler INHALE 1 PUFF DAILY RINSE MOUTH AFTER USE., Starting Wed 9/27/2023, Normal      Acetaminophen (TYLENOL PO) Take by mouth As needed for pain, Historical Med      albuterol (PROVENTIL HFA,VENTOLIN HFA) 90 mcg/act inhaler Inhale 2 puffs every 6 (six) hours as needed for wheezing, Starting Tue 4/5/2022, Normal      Aspirin Low Dose 81 MG EC tablet TAKE 1 TABLET (81 MG TOTAL) BY MOUTH DAILY, Normal      atorvastatin (LIPITOR) 40 mg tablet Take 1 tablet (40 mg total) by mouth daily, Starting Thu 4/20/2023, Normal      Banophen 25 MG capsule TAKE 1 CAPSULE (25 MG TOTAL) BY MOUTH DAILY AT BEDTIME AS NEEDED FOR ITCHING, Normal      calcipotriene (DOVONEX) 0.005 % cream APPLY TO AFFECTED AREA TWICE A DAY 30 DAYS LARRGER AREA HAND LEGS, Normal      Calcium + Vitamin D3 600-10 MG-MCG TABS TAKE 2 TABLETS BY MOUTH DAILY, Normal      cetirizine (ZyrTEC) 10 mg tablet TAKE 1 TABLET (10 MG TOTAL) BY MOUTH DAILY IN THE EVENING, Starting Thu 4/20/2023, Normal      Diclofenac Sodium (VOLTAREN) 1 % APPLY 2 G TOPICALLY 4 (FOUR) TIMES A DAY, Starting Tue 10/24/2023, Normal fenofibrate (TRICOR) 145 mg tablet Take 1 tablet (145 mg total) by mouth daily, Starting Thu 4/20/2023, Normal      glipiZIDE (GLUCOTROL) 5 mg tablet Take 1 tablet (5 mg total) by mouth 2 (two) times a day with meals, Starting Thu 4/20/2023, Normal      glucose blood test strip Use as instructed to test three times daily. , Normal      magnesium Oxide (MAG-OX) 400 mg TABS Take 1 tablet (400 mg total) by mouth 2 (two) times a day, Starting Fri 6/9/2023, Normal      Melatonin 10 MG TABS Take 1 tablet (10 mg total) by mouth daily at bedtime, Starting Thu 4/20/2023, Normal      metFORMIN (GLUCOPHAGE) 1000 MG tablet Take 1 tablet (1,000 mg total) by mouth 2 (two) times a day with meals, Starting Thu 4/20/2023, Normal      metoprolol tartrate (LOPRESSOR) 25 mg tablet Take 1 tablet (25 mg total) by mouth every 12 (twelve) hours, Starting Thu 4/20/2023, Normal      montelukast (SINGULAIR) 10 mg tablet Take 1 tablet (10 mg total) by mouth every 24 hours, Starting Thu 4/20/2023, Normal      mupirocin (BACTROBAN) 2 % ointment Apply topically 3 (three) times a day, Starting Tue 4/11/2023, Normal      oxybutynin (DITROPAN XL) 15 MG 24 hr tablet TAKE 1 TABLET (15 MG TOTAL) BY MOUTH DAILY, Starting Thu 4/20/2023, Normal      Ozempic, 1 MG/DOSE, 4 MG/3ML injection pen Inject 0.75 mL (1 mg total) under the skin once a week, Starting Thu 4/20/2023, Normal      pantoprazole (PROTONIX) 40 mg tablet Take 1 tablet (40 mg total) by mouth daily, Starting Thu 9/29/2022, Normal      polyethylene glycol (GOLYTELY) 4000 mL solution Take 4,000 mL by mouth once for 1 dose Do not start before August 30, 2023., Starting Wed 8/30/2023, Normal      vitamin B-12 (VITAMIN B-12) 1,000 mcg tablet Take 1 tablet (1,000 mcg total) by mouth daily, Starting Wed 8/23/2023, Normal             No discharge procedures on file.     PDMP Review       None            ED Provider  Electronically Signed by             Ronnie Mansfield MD  11/14/23 7512

## 2023-11-14 NOTE — TELEPHONE ENCOUNTER
MSW attempted to reach patient at [phone] to follow-up. No answer. MSW left a message requesting callback. Awaiting same.

## 2023-11-15 NOTE — TELEPHONE ENCOUNTER
MSW phoned patient this date at [phone] to see if she was able to connect with [facility] about the issues she is experiencing with her landlord. Patient stated that she has not gotten a chance to call as she has not been feeling well/was diagnosed with shingles. Patient stated that she is waiting delivery of medications to help alleviate the pain that she is experiencing from the shingles. MSW will follow-up with patient next week to see if she was able to contact [address] [place] [address]. Patient was in agreement with this plan.

## 2023-11-22 DIAGNOSIS — R21 RASH: ICD-10-CM

## 2023-11-22 DIAGNOSIS — M85.80 OSTEOPENIA, UNSPECIFIED LOCATION: ICD-10-CM

## 2023-11-22 RX ORDER — CALCIPOTRIENE 50 UG/G
CREAM TOPICAL
Qty: 120 G | Refills: 0 | Status: SHIPPED | OUTPATIENT
Start: 2023-11-22

## 2023-11-22 RX ORDER — CALCIUM CARBONATE/VITAMIN D3 600 MG-10
TABLET ORAL
Qty: 60 TABLET | Refills: 6 | Status: SHIPPED | OUTPATIENT
Start: 2023-11-22

## 2023-12-05 ENCOUNTER — RA CDI HCC (OUTPATIENT)
Dept: OTHER | Facility: HOSPITAL | Age: 58
End: 2023-12-05

## 2023-12-05 ENCOUNTER — OFFICE VISIT (OUTPATIENT)
Dept: FAMILY MEDICINE CLINIC | Facility: CLINIC | Age: 58
End: 2023-12-05
Payer: COMMERCIAL

## 2023-12-05 VITALS
HEART RATE: 72 BPM | WEIGHT: 143.2 LBS | SYSTOLIC BLOOD PRESSURE: 128 MMHG | DIASTOLIC BLOOD PRESSURE: 64 MMHG | OXYGEN SATURATION: 97 % | BODY MASS INDEX: 25.37 KG/M2 | TEMPERATURE: 98.2 F | HEIGHT: 63 IN

## 2023-12-05 DIAGNOSIS — G62.9 NEUROPATHY: ICD-10-CM

## 2023-12-05 DIAGNOSIS — B02.9 SHINGLES RASH: ICD-10-CM

## 2023-12-05 DIAGNOSIS — B02.8 HERPES ZOSTER WITH OTHER COMPLICATION: ICD-10-CM

## 2023-12-05 DIAGNOSIS — Z00.01 ENCOUNTER FOR GENERAL ADULT MEDICAL EXAMINATION WITH ABNORMAL FINDINGS: Primary | ICD-10-CM

## 2023-12-05 PROCEDURE — 99214 OFFICE O/P EST MOD 30 MIN: CPT | Performed by: INTERNAL MEDICINE

## 2023-12-05 PROCEDURE — G0439 PPPS, SUBSEQ VISIT: HCPCS | Performed by: INTERNAL MEDICINE

## 2023-12-05 PROCEDURE — 96372 THER/PROPH/DIAG INJ SC/IM: CPT | Performed by: INTERNAL MEDICINE

## 2023-12-05 RX ORDER — KETOROLAC TROMETHAMINE 30 MG/ML
60 INJECTION, SOLUTION INTRAMUSCULAR; INTRAVENOUS ONCE
Status: COMPLETED | OUTPATIENT
Start: 2023-12-05 | End: 2023-12-05

## 2023-12-05 RX ORDER — LIDOCAINE 50 MG/G
OINTMENT TOPICAL 3 TIMES DAILY PRN
Qty: 50 G | Refills: 1 | Status: SHIPPED | OUTPATIENT
Start: 2023-12-05

## 2023-12-05 RX ORDER — GABAPENTIN 300 MG/1
300 CAPSULE ORAL 2 TIMES DAILY
Qty: 60 CAPSULE | Refills: 0 | Status: SHIPPED | OUTPATIENT
Start: 2023-12-05

## 2023-12-05 RX ORDER — VALACYCLOVIR HYDROCHLORIDE 1 G/1
1000 TABLET, FILM COATED ORAL 2 TIMES DAILY
Qty: 20 TABLET | Refills: 0 | Status: SHIPPED | OUTPATIENT
Start: 2023-12-05 | End: 2023-12-15

## 2023-12-05 RX ORDER — CELECOXIB 200 MG/1
200 CAPSULE ORAL DAILY
Qty: 30 CAPSULE | Refills: 0 | Status: SHIPPED | OUTPATIENT
Start: 2023-12-05

## 2023-12-05 RX ADMIN — KETOROLAC TROMETHAMINE 60 MG: 30 INJECTION, SOLUTION INTRAMUSCULAR; INTRAVENOUS at 11:31

## 2023-12-05 NOTE — PROGRESS NOTES
Assessment and Plan:     Problem List Items Addressed This Visit    None       Preventive health issues were discussed with patient, and age appropriate screening tests were ordered as noted in patient's After Visit Summary. Personalized health advice and appropriate referrals for health education or preventive services given if needed, as noted in patient's After Visit Summary.      History of Present Illness:     Patient presents for a Medicare Wellness Visit    HPI   Patient Care Team:  Monik Ojeda MD as PCP - General (Internal Medicine)  Monik Ojeda MD as PCP - [address] [place] [address] ([facility])     Review of Systems:     Review of Systems     Problem List:     Patient Active Problem List   Diagnosis    Essential hypertension    Hyperlipidemia    Pelvic pain    Leukocytosis    Osteoarthritis    Type 2 diabetes mellitus without complication ([address])    Vaginal atrophy    Encounter for annual routine gynecological examination      Past Medical and Surgical History:     Past Medical History:   Diagnosis Date    Acute exacerbation of chronic obstructive pulmonary disease (COPD) ([address]) 2/27/2020    Asthma     exercise induced    Diabetes ([address])     type 2    Diabetes mellitus ([address])     Hypertension     Morbid obesity ([address]) 4/7/2020    Osteoarthritis 4/7/2020     Past Surgical History:   Procedure Laterality Date    BREAST BIOPSY Left 2016    benign    CHOLECYSTECTOMY      COLONOSCOPY      FL INJECTION LEFT SHOULDER (NON ARTHROGRAM)  07/28/2023    FL LUMBAR PUNCTURE DIAGNOSTIC  07/19/2023    HERNIA REPAIR      LAPAROSCOPY FOR ECTOPIC PREGNANCY      x2, both tubes removed     MAMMO STEREOTACTIC BREAST BIOPSY LEFT (ALL INC) Left     REPLACEMENT TOTAL KNEE Left     SMALL INTESTINE SURGERY      after hernia surgery       Family History:     Family History   Problem Relation Age of Onset    Breast cancer Mother 36        cause of death    Heart attack Father         MI cause of death    No Known Problems Sister     No Known Problems Daughter     No Known Problems Maternal Grandmother     No Known Problems Maternal Grandfather     No Known Problems Paternal Grandmother     No Known Problems Paternal Grandfather     No Known Problems Sister     No Known Problems Sister     Colon cancer Neg Hx     Ovarian cancer Neg Hx       Social History:     Social History     Socioeconomic History    Marital status: Single     Spouse name: None    Number of children: None    Years of education: None    Highest education level: None   Occupational History    None   Tobacco Use    Smoking status: Former    Smokeless tobacco: Never   Vaping Use    Vaping Use: Never used   Substance and Sexual Activity    Alcohol use: Not Currently     Comment: rare    Drug use: No    Sexual activity: Not Currently     Birth control/protection: Surgical   Other Topics Concern    None   Social History Narrative    None     Social Determinants of Health     Financial Resource Strain: Medium Risk (8/25/2022)    Overall Financial Resource Strain (CARDIA)     Difficulty of Paying Living Expenses: Somewhat hard   Food Insecurity: No Food Insecurity (4/15/2021)    Hunger Vital Sign     Worried About Running Out of Food in the Last Year: Never true     Ran Out of Food in the Last Year: Never true   Transportation Needs: Unmet Transportation Needs (8/25/2022)    PRAPARE - Transportation     Lack of Transportation (Medical): Yes     Lack of Transportation (Non-Medical): Yes   Physical Activity: Unknown (9/3/2020)    Exercise Vital Sign     Days of Exercise per Week: Patient refused     Minutes of Exercise per Session: Patient refused   Stress: No Stress Concern Present (9/3/2020)    [place] [address]     Feeling of Stress : Not at all   Social Connections: Unknown (9/3/2020)    Social Connection and Isolation Panel [NHANES]     Frequency of Communication with Friends and Family: Patient refused     Frequency of Social Gatherings with Friends and Family: Patient refused     Attends [employer] Services: Patient refused     Active Member of Clubs or Organizations: Patient refused     Attends Club or Organization Meetings: Patient refused     Marital Status: Patient refused   Intimate Partner Violence: Not At Risk (9/3/2020)    Humiliation, Afraid, Rape, and Kick questionnaire     Fear of Current or Ex-Partner: No     Emotionally Abused: No     Physically Abused: No     Sexually Abused: No   Housing Stability: Not on file      Medications and Allergies:     Current Outpatient Medications   Medication Sig Dispense Refill    Acetaminophen (TYLENOL PO) Take by mouth As needed for pain      Aspirin Low Dose 81 MG EC tablet TAKE 1 TABLET (81 MG TOTAL) BY MOUTH DAILY 90 tablet 3    atorvastatin (LIPITOR) 40 mg tablet Take 1 tablet (40 mg total) by mouth daily 90 tablet 3    Banophen 25 MG capsule TAKE 1 CAPSULE (25 MG TOTAL) BY MOUTH DAILY AT BEDTIME AS NEEDED FOR ITCHING 90 capsule 3    calcipotriene (DOVONEX) 0.005 % cream APPLY TO AFFECTED AREA TWICE A DAY 30 DAYS [place] AREA HAND LEGS 120 g 0    Calcium + Vitamin D3 600-10 MG-MCG TABS TAKE 2 TABLETS BY MOUTH DAILY 60 tablet 6    cetirizine (ZyrTEC) 10 mg tablet TAKE 1 TABLET (10 MG TOTAL) BY MOUTH DAILY IN THE EVENING 30 tablet 3    Diclofenac Sodium (VOLTAREN) 1 % APPLY 2 G TOPICALLY 4 (FOUR) TIMES A  g 1    escitalopram (LEXAPRO) 10 mg tablet TAKE 1 TABLET (10 MG TOTAL) BY MOUTH DAILY WITH DINNER 30 tablet 5    fenofibrate (TRICOR) 145 mg tablet Take 1 tablet (145 mg total) by mouth daily 90 tablet 3    glipiZIDE (GLUCOTROL) 5 mg tablet Take 1 tablet (5 mg total) by mouth 2 (two) times a day with meals 180 tablet 3    levocetirizine (XYZAL) 5 MG tablet TAKE 1 TABLET (5 MG TOTAL) BY MOUTH EVERY EVENING 30 tablet 3    magnesium Oxide (MAG-OX) 400 mg TABS Take 1 tablet (400 mg total) by mouth 2 (two) times a day 180 tablet 3    Melatonin 10 MG TABS Take 1 tablet (10 mg total) by mouth daily at bedtime 30 tablet 3    metFORMIN (GLUCOPHAGE) 1000 MG tablet Take 1 tablet (1,000 mg total) by mouth 2 (two) times a day with meals 180 tablet 3    metoprolol tartrate (LOPRESSOR) 25 mg tablet Take 1 tablet (25 mg total) by mouth every 12 (twelve) hours 180 tablet 3    montelukast (SINGULAIR) 10 mg tablet Take 1 tablet (10 mg total) by mouth every 24 hours 90 tablet 3    oxybutynin (DITROPAN XL) 15 MG 24 hr tablet TAKE 1 TABLET (15 MG TOTAL) BY MOUTH DAILY 90 tablet 3    Ozempic, 1 MG/DOSE, 4 MG/3ML injection pen Inject 0.75 mL (1 mg total) under the skin once a week 6 mL 3    pantoprazole (PROTONIX) 40 mg tablet Take 1 tablet (40 mg total) by mouth daily 90 tablet 3    Trelegy Ellipta 100-62.5-25 MCG/ACT inhaler INHALE 1 PUFF DAILY RINSE MOUTH AFTER USE. 60 each 5    vitamin B-12 (VITAMIN B-12) 1,000 mcg tablet Take 1 tablet (1,000 mcg total) by mouth daily 90 tablet 3    albuterol (PROVENTIL HFA,VENTOLIN HFA) 90 mcg/act inhaler Inhale 2 puffs every 6 (six) hours as needed for wheezing (Patient not taking: Reported on 8/4/2023) 54 g 3    glucose blood test strip Use as instructed to test three times daily. (Patient not taking: Reported on 5/24/2023) 100 each 5    mupirocin (BACTROBAN) 2 % ointment Apply topically 3 (three) times a day (Patient not taking: Reported on 5/24/2023) 22 g 0    polyethylene glycol (GOLYTELY) 4000 mL solution Take 4,000 mL by mouth once for 1 dose Do not start before August 30, 2023. (Patient not taking: Reported on 8/4/2023 Do not start before August 30, 2023.) 4000 mL 0    valACYclovir (VALTREX) 1,000 mg tablet Take 1 tablet (1,000 mg total) by mouth 3 (three) times a day for 7 days 21 tablet 0     No current facility-administered medications for this visit.      Allergies   Allergen Reactions    No Active Allergies       Immunizations:     Immunization History   Administered Date(s) Administered    Fluzone Split Quad 0.25 mL 09/28/2016    Fluzone Split Quad 0.5 mL 10/13/2014, 11/15/2017    INFLUENZA 01/08/2016, 09/28/2016, 11/15/2017    Influenza, injectable, quadrivalent, preservative free 0.5 mL 11/13/2019    Influenza, recombinant, quadrivalent,injectable, preservative free 09/19/2018, 12/03/2020, 12/15/2021, 09/29/2022    Pneumococcal Conjugate 13-Valent 12/19/2018    Pneumococcal Polysaccharide PPV23 01/08/2016    Tdap 01/05/2015      Health Maintenance:         Topic Date Due    Breast Cancer Screening: Mammogram  08/11/2024    Cervical Cancer Screening  04/22/2025    Colorectal Cancer Screening  08/29/2030    HIV Screening  Completed    Hepatitis C Screening  Completed         Topic Date Due    COVID-19 Vaccine (1) Never done    Influenza Vaccine (1) 09/01/2023      Medicare Screening Tests and Risk Assessments:         Health Risk Assessment:   Patient rates overall health as good. Patient feels that their physical health rating is same. Patient is satisfied with their life. Eyesight was rated as same. Hearing was rated as same. Patient feels that their emotional and mental health rating is slightly better. Patients states they are never, rarely angry. Patient states they are sometimes unusually tired/fatigued. Pain experienced in the last 7 days has been none. Patient states that she has experienced no weight loss or gain in last 6 months. Fall Risk Screening: In the past year, patient has experienced: no history of falling in past year      Urinary Incontinence Screening:   Patient has not leaked urine accidently in the last six months. Home Safety:  Patient does not have trouble with stairs inside or outside of their home. Patient has working smoke alarms and has working carbon monoxide detector. Home safety hazards include: none. Nutrition:   Current diet is Regular. Medications:   Patient is currently taking over-the-counter supplements. OTC medications include: see medication list. Patient is able to manage medications.      Activities of Daily Living (ADLs)/Instrumental Activities of Daily Living (IADLs):   Walk and transfer into and out of bed and chair?: Yes  Dress and groom yourself?: Yes    Bathe or shower yourself?: Yes    Feed yourself?  Yes  Do your laundry/housekeeping?: Yes  Manage your money, pay your bills and track your expenses?: Yes  Make your own meals?: Yes    Do your own shopping?: Yes    Previous Hospitalizations:   Any hospitalizations or ED visits within the last 12 months?: No      Advance Care Planning:   Living will: No    Durable POA for healthcare: No    Advanced directive: No      PREVENTIVE SCREENINGS      Cardiovascular Screening:    General: Screening Not Indicated, History Lipid Disorder and Risks and Benefits Discussed      Diabetes Screening:     General: Screening Not Indicated, History Diabetes, Risks and Benefits Discussed and Screening Current      Colorectal Cancer Screening:     General: Screening Current and Risks and Benefits Discussed      Breast Cancer Screening:     General: Screening Current and Risks and Benefits Discussed      Cervical Cancer Screening:    General: Screening Current and Risks and Benefits Discussed      Osteoporosis Screening:    General: Risks and Benefits Discussed      Abdominal Aortic Aneurysm (AAA) Screening:        General: Risks and Benefits Discussed      Lung Cancer Screening:     General: Screening Not Indicated and Risks and Benefits Discussed      Hepatitis C Screening:    General: Screening Current and Risks and Benefits Discussed    Screening, Brief Intervention, and Referral to Treatment (SBIRT)    Screening      AUDIT-C Screenin) How often did you have a drink containing alcohol in the past year? never  2) How many drinks did you have on a typical day when you were drinking in the past year? 0  3) How often did you have 6 or more drinks on one occasion in the past year? never    AUDIT-C Score: 0  Interpretation: Score 0-2 (female): Negative screen for alcohol misuse    Single Item Drug Screening:  How often have you used an illegal drug (including marijuana) or a prescription medication for non-medical reasons in the past year? never    Single Item Drug Screen Score: 0  Interpretation: Negative screen for possible drug use disorder    Other Counseling Topics:   Car/seat belt/driving safety, skin self-exam, sunscreen and calcium and vitamin D intake and regular weightbearing exercise. No results found.      Physical Exam:     LMP  (LMP Unknown)     Physical Exam     Bradly Restrepo MD

## 2023-12-05 NOTE — PROGRESS NOTES
Name: Laura Tavarez      : 1965      MRN: [id number]  Encounter Provider: Yaa Amato MD  Encounter Date: 2023   Encounter department: [address] [place] [address]     1. Encounter for general adult medical examination with abnormal findings  Comments:  done in detail  rtc in 3-4 weeks w blood work    2. Herpes zoster with other complication  -     ketorolac (TORADOL) 60 mg/2 mL IM injection 60 mg  -     gabapentin (Neurontin) 300 mg capsule; Take 1 capsule (300 mg total) by mouth 2 (two) times a day With Meals  -     celecoxib (CeleBREX) 200 mg capsule; Take 1 capsule (200 mg total) by mouth daily With Lunch/daily  -     lidocaine (XYLOCAINE) 5 % ointment; Apply topically 3 (three) times a day as needed for moderate pain    3. Neuropathy  -     ketorolac (TORADOL) 60 mg/2 mL IM injection 60 mg  -     gabapentin (Neurontin) 300 mg capsule; Take 1 capsule (300 mg total) by mouth 2 (two) times a day With Meals  -     celecoxib (CeleBREX) 200 mg capsule; Take 1 capsule (200 mg total) by mouth daily With Lunch/daily  -     lidocaine (XYLOCAINE) 5 % ointment; Apply topically 3 (three) times a day as needed for moderate pain    4. Shingles rash  -     valACYclovir (VALTREX) 1,000 mg tablet; Take 1 tablet (1,000 mg total) by mouth 2 (two) times a day for 10 days  -     gabapentin (Neurontin) 300 mg capsule; Take 1 capsule (300 mg total) by mouth 2 (two) times a day With Meals  -     celecoxib (CeleBREX) 200 mg capsule; Take 1 capsule (200 mg total) by mouth daily With Lunch/daily  -     lidocaine (XYLOCAINE) 5 % ointment; Apply topically 3 (three) times a day as needed for moderate pain    Life style mod  AWV; Done in detail  Rtc in 3-4 weeks w blood work and flu vaccine       Subjective      60 y o lady is here AWV And regular check up, she has mod to severe neuropathy pain since her shingle rash on abdominal wall, radiating to lower back on right side,...       Review of Systems   Constitutional:  Negative for chills, fatigue and fever. HENT:  Positive for postnasal drip. Negative for congestion, facial swelling, sore throat, trouble swallowing and voice change. Eyes:  Negative for pain, discharge and visual disturbance. Respiratory:  Negative for cough, shortness of breath and wheezing. Cardiovascular:  Negative for chest pain, palpitations and leg swelling. Gastrointestinal:  Negative for abdominal pain, blood in stool, constipation, diarrhea and nausea. Endocrine: Negative for polydipsia, polyphagia and polyuria. Genitourinary:  Negative for difficulty urinating, hematuria and urgency. Musculoskeletal:  Negative for arthralgias and myalgias. Skin:  Positive for rash. Neurological:  Positive for numbness. Negative for dizziness, tremors, weakness and headaches. Hematological:  Negative for adenopathy. Does not bruise/bleed easily. Psychiatric/Behavioral:  Negative for dysphoric mood, sleep disturbance and suicidal ideas.         Current Outpatient Medications on File Prior to Visit   Medication Sig    Acetaminophen (TYLENOL PO) Take by mouth As needed for pain    Aspirin Low Dose 81 MG EC tablet TAKE 1 TABLET (81 MG TOTAL) BY MOUTH DAILY    atorvastatin (LIPITOR) 40 mg tablet Take 1 tablet (40 mg total) by mouth daily    Banophen 25 MG capsule TAKE 1 CAPSULE (25 MG TOTAL) BY MOUTH DAILY AT BEDTIME AS NEEDED FOR ITCHING    calcipotriene (DOVONEX) 0.005 % cream APPLY TO AFFECTED AREA TWICE A DAY 30 DAYS [facility] AREA HAND LEGS    Calcium + Vitamin D3 600-10 MG-MCG TABS TAKE 2 TABLETS BY MOUTH DAILY    cetirizine (ZyrTEC) 10 mg tablet TAKE 1 TABLET (10 MG TOTAL) BY MOUTH DAILY IN THE EVENING    Diclofenac Sodium (VOLTAREN) 1 % APPLY 2 G TOPICALLY 4 (FOUR) TIMES A DAY    escitalopram (LEXAPRO) 10 mg tablet TAKE 1 TABLET (10 MG TOTAL) BY MOUTH DAILY WITH DINNER    fenofibrate (TRICOR) 145 mg tablet Take 1 tablet (145 mg total) by mouth daily    glipiZIDE (GLUCOTROL) 5 mg tablet Take 1 tablet (5 mg total) by mouth 2 (two) times a day with meals    levocetirizine (XYZAL) 5 MG tablet TAKE 1 TABLET (5 MG TOTAL) BY MOUTH EVERY EVENING    magnesium Oxide (MAG-OX) 400 mg TABS Take 1 tablet (400 mg total) by mouth 2 (two) times a day    Melatonin 10 MG TABS Take 1 tablet (10 mg total) by mouth daily at bedtime    metFORMIN (GLUCOPHAGE) 1000 MG tablet Take 1 tablet (1,000 mg total) by mouth 2 (two) times a day with meals    metoprolol tartrate (LOPRESSOR) 25 mg tablet Take 1 tablet (25 mg total) by mouth every 12 (twelve) hours    montelukast (SINGULAIR) 10 mg tablet Take 1 tablet (10 mg total) by mouth every 24 hours    oxybutynin (DITROPAN XL) 15 MG 24 hr tablet TAKE 1 TABLET (15 MG TOTAL) BY MOUTH DAILY    Ozempic, 1 MG/DOSE, 4 MG/3ML injection pen Inject 0.75 mL (1 mg total) under the skin once a week    pantoprazole (PROTONIX) 40 mg tablet Take 1 tablet (40 mg total) by mouth daily    vitamin B-12 (VITAMIN B-12) 1,000 mcg tablet Take 1 tablet (1,000 mcg total) by mouth daily    [DISCONTINUED] Trelegy Ellipta 100-62.5-25 MCG/ACT inhaler INHALE 1 PUFF DAILY RINSE MOUTH AFTER USE.    albuterol (PROVENTIL HFA,VENTOLIN HFA) 90 mcg/act inhaler Inhale 2 puffs every 6 (six) hours as needed for wheezing (Patient not taking: Reported on 8/4/2023)    glucose blood test strip Use as instructed to test three times daily. (Patient not taking: Reported on 5/24/2023)    mupirocin (BACTROBAN) 2 % ointment Apply topically 3 (three) times a day (Patient not taking: Reported on 5/24/2023)    [DISCONTINUED] polyethylene glycol (GOLYTELY) 4000 mL solution Take 4,000 mL by mouth once for 1 dose Do not start before August 30, 2023.  (Patient not taking: Reported on 8/4/2023 Do not start before August 30, 2023.)    [DISCONTINUED] valACYclovir (VALTREX) 1,000 mg tablet Take 1 tablet (1,000 mg total) by mouth 3 (three) times a day for 7 days       Objective     /64   Pulse 72   Temp 98.2 °F (36.8 °C) (Tympanic)   Ht 5' 3" (1.6 m)   Wt 65 kg (143 lb 3.2 oz)   LMP  (LMP Unknown)   SpO2 97%   BMI 25.37 kg/m²     Physical Exam  Constitutional:       General: She is not in acute distress. HENT:      Head: Normocephalic. Mouth/Throat:      Pharynx: No oropharyngeal exudate. Eyes:      General: No scleral icterus. Conjunctiva/sclera: Conjunctivae normal.      Pupils: Pupils are equal, round, and reactive to light. Neck:      Thyroid: No thyromegaly. Cardiovascular:      Rate and Rhythm: Normal rate and regular rhythm. Heart sounds: Normal heart sounds. No murmur heard. Pulmonary:      Effort: Pulmonary effort is normal. No respiratory distress. Breath sounds: Normal breath sounds. No wheezing or rales. Abdominal:      General: Bowel sounds are normal. There is no distension. Palpations: Abdomen is soft. Tenderness: There is no abdominal tenderness. There is no guarding or rebound. Musculoskeletal:         General: Tenderness present. Cervical back: Neck supple. Lymphadenopathy:      Cervical: No cervical adenopathy. Skin:     Coloration: Skin is not pale. Findings: Lesion and rash present. Neurological:      Mental Status: She is alert and oriented to person, place, and time. Sensory: Sensory deficit present. Motor: No weakness.        Yayo Torres MD

## 2023-12-05 NOTE — PROGRESS NOTES
[address] [facility] coding opportunities       Chart reviewed, no opportunity found: CHART REVIEWED, NO OPPORTUNITY FOUND        Patients Insurance     Medicare Insurance: [employer]

## 2023-12-05 NOTE — PATIENT INSTRUCTIONS
Medicare Preventive Visit Patient Instructions  Thank you for completing your Welcome to Medicare Visit or Medicare Annual Wellness Visit today. Your next wellness visit will be due in one year (12/5/2024). The screening/preventive services that you may require over the next 5-10 years are detailed below. Some tests may not apply to you based off risk factors and/or age. Screening tests ordered at today's visit but not completed yet may show as past due. Also, please note that scanned in results may not display below. Preventive Screenings:  Service Recommendations Previous Testing/Comments   Colorectal Cancer Screening  * Colonoscopy    * Fecal Occult Blood Test (FOBT)/Fecal Immunochemical Test (FIT)  * Fecal DNA/Cologuard Test  * Flexible Sigmoidoscopy Age: 43-73 years old   Colonoscopy: every 10 years (may be performed more frequently if at higher risk)  OR  FOBT/FIT: every 1 year  OR  Cologuard: every 3 years  OR  Sigmoidoscopy: every 5 years  Screening may be recommended earlier than age 39 if at higher risk for colorectal cancer. Also, an individualized decision between you and your healthcare provider will decide whether screening between the ages of 77-80 would be appropriate. Colonoscopy: 08/31/2023  FOBT/FIT: Not on file  Cologuard: Not on file  Sigmoidoscopy: Not on file    Screening Current     Breast Cancer Screening Age: 36 years old  Frequency: every 1-2 years  Not required if history of left and right mastectomy Mammogram: 08/11/2023    Screening Current   Cervical Cancer Screening Between the ages of 21-29, pap smear recommended once every 3 years. Between the ages of 32-69, can perform pap smear with HPV co-testing every 5 years.    Recommendations may differ for women with a history of total hysterectomy, cervical cancer, or abnormal pap smears in past. Pap Smear: 04/22/2022    Screening Current   Hepatitis C Screening Once for adults born between 1945 and 1965  More frequently in patients at high risk for Hepatitis C Hep C Antibody: 03/25/2019    Screening Current   Diabetes Screening 1-2 times per year if you're at risk for diabetes or have pre-diabetes Fasting glucose: 201 mg/dL (6/9/2023)  A1C: 6.8 (8/23/2023)  Screening Not Indicated  History Diabetes   Cholesterol Screening Once every 5 years if you don't have a lipid disorder. May order more often based on risk factors. Lipid panel: 06/09/2023    Screening Not Indicated  History Lipid Disorder     Other Preventive Screenings Covered by Medicare:  Abdominal Aortic Aneurysm (AAA) Screening: covered once if your at risk. You're considered to be at risk if you have a family history of AAA. Lung Cancer Screening: covers low dose CT scan once per year if you meet all of the following conditions: (1) Age 48-67; (2) No signs or symptoms of lung cancer; (3) Current smoker or have quit smoking within the last 15 years; (4) You have a tobacco smoking history of at least 20 pack years (packs per day multiplied by number of years you smoked); (5) You get a written order from a healthcare provider. Glaucoma Screening: covered annually if you're considered high risk: (1) You have diabetes OR (2) Family history of glaucoma OR (3)  aged 48 and older OR (3)  American aged 72 and older  Osteoporosis Screening: covered every 2 years if you meet one of the following conditions: (1) You're estrogen deficient and at risk for osteoporosis based off medical history and other findings; (2) Have a vertebral abnormality; (3) On glucocorticoid therapy for more than 3 months; (4) Have primary hyperparathyroidism; (5) On osteoporosis medications and need to assess response to drug therapy. Last bone density test (DXA Scan): 04/17/2019. HIV Screening: covered annually if you're between the age of 14-79. Also covered annually if you are younger than 13 and older than 72 with risk factors for HIV infection.  For pregnant patients, it is covered up to 3 times per pregnancy. Immunizations:  Immunization Recommendations   Influenza Vaccine Annual influenza vaccination during flu season is recommended for all persons aged >= 6 months who do not have contraindications   Pneumococcal Vaccine   * Pneumococcal conjugate vaccine = PCV13 (Prevnar 13), PCV15 (Vaxneuvance), PCV20 (Prevnar 20)  * Pneumococcal polysaccharide vaccine = PPSV23 (Pneumovax) Adults 71-10 yo with certain risk factors or if 69+ yo  If never received any pneumonia vaccine: recommend Prevnar 20 (PCV20)  Give PCV20 if previously received 1 dose of PCV13 or PPSV23   Hepatitis B Vaccine 3 dose series if at intermediate or high risk (ex: diabetes, end stage renal disease, liver disease)   Respiratory syncytial virus (RSV) Vaccine - COVERED BY MEDICARE PART D  * RSVPreF3 (Arexvy) CDC recommends that adults 61years of age and older may receive a single dose of RSV vaccine using shared clinical decision-making (SCDM)   Tetanus (Td) Vaccine - COST NOT COVERED BY MEDICARE PART B Following completion of primary series, a booster dose should be given every 10 years to maintain immunity against tetanus. Td may also be given as tetanus wound prophylaxis. Tdap Vaccine - COST NOT COVERED BY MEDICARE PART B Recommended at least once for all adults. For pregnant patients, recommended with each pregnancy. Shingles Vaccine (Shingrix) - COST NOT COVERED BY MEDICARE PART B  2 shot series recommended in those 19 years and older who have or will have weakened immune systems or those 50 years and older     Health Maintenance Due:      Topic Date Due   • Breast Cancer Screening: Mammogram  08/11/2024   • Cervical Cancer Screening  04/22/2025   • Colorectal Cancer Screening  08/29/2030   • HIV Screening  Completed   • Hepatitis C Screening  Completed     Immunizations Due:      Topic Date Due   • COVID-19 Vaccine (1) Never done   • Influenza Vaccine (1) 09/01/2023     Advance Directives   What are advance directives? Advance directives are legal documents that state your wishes and plans for medical care. These plans are made ahead of time in case you lose your ability to make decisions for yourself. Advance directives can apply to any medical decision, such as the treatments you want, and if you want to donate organs. What are the types of advance directives? There are many types of advance directives, and each state has rules about how to use them. You may choose a combination of any of the following:  Living will: This is a written record of the treatment you want. You can also choose which treatments you do not want, which to limit, and which to stop at a certain time. This includes surgery, medicine, IV fluid, and tube feedings. Durable power of  for [facility]): This is a written record that states who you want to make healthcare choices for you when you are unable to make them for yourself. This person, called a proxy, is usually a family member or a friend. You may choose more than 1 proxy. Do not resuscitate (DNR) order:  A DNR order is used in case your heart stops beating or you stop breathing. It is a request not to have certain forms of treatment, such as CPR. A DNR order may be included in other types of advance directives. Medical directive: This covers the care that you want if you are in a coma, near death, or unable to make decisions for yourself. You can list the treatments you want for each condition. Treatment may include pain medicine, surgery, blood transfusions, dialysis, IV or tube feedings, and a ventilator (breathing machine). Values history: This document has questions about your views, beliefs, and how you feel and think about life. This information can help others choose the care that you would choose. Why are advance directives important? An advance directive helps you control your care. Although spoken wishes may be used, it is better to have your wishes written down. Spoken wishes can be misunderstood, or not followed. Treatments may be given even if you do not want them. An advance directive may make it easier for your family to make difficult choices about your care. Weight Management   Why it is important to manage your weight:  Being overweight increases your risk of health conditions such as heart disease, high blood pressure, type 2 diabetes, and certain types of cancer. It can also increase your risk for osteoarthritis, sleep apnea, and other respiratory problems. Aim for a slow, steady weight loss. Even a small amount of weight loss can lower your risk of health problems. How to lose weight safely:  A safe and healthy way to lose weight is to eat fewer calories and get regular exercise. You can lose up about 1 pound a week by decreasing the number of calories you eat by 500 calories each day. Healthy meal plan for weight management:  A healthy meal plan includes a variety of foods, contains fewer calories, and helps you stay healthy. A healthy meal plan includes the following:  Eat whole-grain foods more often. A healthy meal plan should contain fiber. Fiber is the part of grains, fruits, and vegetables that is not broken down by your body. Whole-grain foods are healthy and provide extra fiber in your diet. Some examples of whole-grain foods are whole-wheat breads and pastas, oatmeal, brown rice, and bulgur. Eat a variety of vegetables every day. Include dark, leafy greens such as spinach, kale, arsh greens, and mustard greens. Eat yellow and orange vegetables such as carrots, sweet potatoes, and winter squash. Eat a variety of fruits every day. Choose fresh or canned fruit (canned in its own juice or light syrup) instead of juice. Fruit juice has very little or no fiber. Eat low-fat dairy foods. Drink fat-free (skim) milk or 1% milk. Eat fat-free yogurt and low-fat cottage cheese.  Try low-fat cheeses such as mozzarella and other reduced-fat cheeses. Choose meat and other protein foods that are low in fat. Choose beans or other legumes such as split peas or lentils. Choose fish, skinless poultry (chicken or turkey), or lean cuts of red meat (beef or pork). Before you cook meat or poultry, cut off any visible fat. Use less fat and oil. Try baking foods instead of frying them. Add less fat, such as margarine, sour cream, regular salad dressing and mayonnaise to foods. Eat fewer high-fat foods. Some examples of high-fat foods include french fries, doughnuts, ice cream, and cakes. Eat fewer sweets. Limit foods and drinks that are high in sugar. This includes candy, cookies, regular soda, and sweetened drinks. Exercise:  Exercise at least 30 minutes per day on most days of the week. Some examples of exercise include walking, biking, dancing, and swimming. You can also fit in more physical activity by taking the stairs instead of the elevator or parking farther away from stores. Ask your healthcare provider about the best exercise plan for you. © Copyright [employer] 2018 Information is for End User's use only and may not be sold, redistributed or otherwise used for commercial purposes.  All illustrations and images included in CareNotes® are the copyrighted property of [employer].A[employer] Inc. or [address] [place] [address]

## 2023-12-19 ENCOUNTER — OFFICE VISIT (OUTPATIENT)
Dept: FAMILY MEDICINE CLINIC | Facility: CLINIC | Age: 58
End: 2023-12-19
Payer: COMMERCIAL

## 2023-12-19 VITALS
HEIGHT: 63 IN | DIASTOLIC BLOOD PRESSURE: 80 MMHG | OXYGEN SATURATION: 97 % | HEART RATE: 69 BPM | TEMPERATURE: 97.4 F | RESPIRATION RATE: 16 BRPM | WEIGHT: 146 LBS | BODY MASS INDEX: 25.87 KG/M2 | SYSTOLIC BLOOD PRESSURE: 116 MMHG

## 2023-12-19 DIAGNOSIS — Z23 ENCOUNTER FOR IMMUNIZATION: ICD-10-CM

## 2023-12-19 DIAGNOSIS — R07.89 OTHER CHEST PAIN: Primary | ICD-10-CM

## 2023-12-19 DIAGNOSIS — M77.8 LEFT SHOULDER TENDONITIS: ICD-10-CM

## 2023-12-19 DIAGNOSIS — G62.9 NEUROPATHY: ICD-10-CM

## 2023-12-19 DIAGNOSIS — B02.9 SHINGLES RASH: ICD-10-CM

## 2023-12-19 DIAGNOSIS — E11.8 TYPE II DIABETES MELLITUS WITH COMPLICATION (HCC): ICD-10-CM

## 2023-12-19 DIAGNOSIS — F32.A DEPRESSION, UNSPECIFIED DEPRESSION TYPE: ICD-10-CM

## 2023-12-19 DIAGNOSIS — B02.8 HERPES ZOSTER WITH OTHER COMPLICATION: ICD-10-CM

## 2023-12-19 PROCEDURE — G0008 ADMIN INFLUENZA VIRUS VAC: HCPCS

## 2023-12-19 PROCEDURE — 90686 IIV4 VACC NO PRSV 0.5 ML IM: CPT

## 2023-12-19 PROCEDURE — 99214 OFFICE O/P EST MOD 30 MIN: CPT | Performed by: INTERNAL MEDICINE

## 2023-12-19 RX ORDER — CELECOXIB 200 MG/1
200 CAPSULE ORAL DAILY
Qty: 30 CAPSULE | Refills: 0 | Status: SHIPPED | OUTPATIENT
Start: 2023-12-19

## 2023-12-19 RX ORDER — INSULIN GLARGINE 100 [IU]/ML
INJECTION, SOLUTION SUBCUTANEOUS
COMMUNITY
Start: 2023-11-22

## 2023-12-19 RX ORDER — LIDOCAINE 50 MG/G
OINTMENT TOPICAL 3 TIMES DAILY PRN
Qty: 50 G | Refills: 1 | Status: SHIPPED | OUTPATIENT
Start: 2023-12-19

## 2023-12-19 RX ORDER — GABAPENTIN 300 MG/1
300 CAPSULE ORAL 2 TIMES DAILY
Qty: 60 CAPSULE | Refills: 0 | OUTPATIENT
Start: 2023-12-19 | End: 2023-12-27

## 2023-12-19 RX ORDER — ESCITALOPRAM OXALATE 10 MG/1
10 TABLET ORAL
Qty: 30 TABLET | Refills: 5 | Status: SHIPPED | OUTPATIENT
Start: 2023-12-19

## 2023-12-19 NOTE — PROGRESS NOTES
Name: Hayley Bolaños      : 1965      MRN: [id number]  Encounter Provider: Jose Chase MD  Encounter Date: 2023   Encounter department: [facility] CARE [facility]    Assessment & Plan     1. Other chest pain  -     XR ribs right w pa chest min 3 views; Future; Expected date: 2023    2. Herpes zoster with other complication  Comments:  improving slowly, gradually  Continue gabapentin...  Orders:  -     gabapentin (Neurontin) 300 mg capsule; Take 1 capsule (300 mg total) by mouth 2 (two) times a day With Meals  -     lidocaine (XYLOCAINE) 5 % ointment; Apply topically 3 (three) times a day as needed for moderate pain  -     celecoxib (CeleBREX) 200 mg capsule; Take 1 capsule (200 mg total) by mouth daily With Lunch/daily    3. Neuropathy  Comments:  as above....  Orders:  -     gabapentin (Neurontin) 300 mg capsule; Take 1 capsule (300 mg total) by mouth 2 (two) times a day With Meals  -     lidocaine (XYLOCAINE) 5 % ointment; Apply topically 3 (three) times a day as needed for moderate pain  -     celecoxib (CeleBREX) 200 mg capsule; Take 1 capsule (200 mg total) by mouth daily With Lunch/daily    4. Shingles rash  -     gabapentin (Neurontin) 300 mg capsule; Take 1 capsule (300 mg total) by mouth 2 (two) times a day With Meals  -     lidocaine (XYLOCAINE) 5 % ointment; Apply topically 3 (three) times a day as needed for moderate pain  -     celecoxib (CeleBREX) 200 mg capsule; Take 1 capsule (200 mg total) by mouth daily With Lunch/daily    5. Depression, unspecified depression type  Comments:  stable  continue same  Orders:  -     escitalopram (LEXAPRO) 10 mg tablet; Take 1 tablet (10 mg total) by mouth daily with dinner    6. Type II diabetes mellitus with complication (HCC)  Comments:  stable  increase Ozempic to 2 mg weekly  life style Mod  RTC in 2 mos w Blood work  Orders:  -     Comprehensive metabolic panel; Future; Expected date: 2024  -     CBC and  differential; Future; Expected date: 03/19/2024  -     Lipid Panel with Direct LDL reflex; Future; Expected date: 03/19/2024  -     TSH, 3rd generation with Free T4 reflex; Future; Expected date: 03/19/2024  -     Microalbumin, Random Urine (W/Creatinine) ([facility] ONLY); Future; Expected date: 03/19/2024  -     Microalbumin, Random Urine (W/Creatinine) ([facility] ONLY)  -     semaglutide, 2 mg/dose, (Ozempic) 8 mg/ mL injection pen; Inject 0.75 mL (2 mg total) under the skin every 7 days  -     UA (URINE) with reflex to Scope; Future; Expected date: 12/26/2023  -     Magnesium; Future    7. Encounter for immunization  -     influenza vaccine, quadrivalent, 0.5 mL, preservative-free, for adult and pediatric patients 6 mos+ (AFLURIA, FLUARIX, FLULAVAL, FLUZONE)    Life style Mod  RTC in 2-3 mos w  Blood work       Subjective      58 Y O lady is here for Regular check Up, she still has Neuropathy where the shingle rash on right side of chest wall,...      Review of Systems   Constitutional:  Negative for chills, fatigue and fever.   HENT:  Negative for congestion, facial swelling, sore throat, trouble swallowing and voice change.    Eyes:  Negative for pain, discharge and visual disturbance.   Respiratory:  Negative for cough, shortness of breath and wheezing.    Cardiovascular:  Negative for chest pain, palpitations and leg swelling.   Gastrointestinal:  Negative for abdominal pain, blood in stool, constipation, diarrhea and nausea.   Endocrine: Negative for polydipsia, polyphagia and polyuria.   Genitourinary:  Negative for difficulty urinating, hematuria and urgency.   Musculoskeletal:  Negative for arthralgias and myalgias.   Skin:  Negative for rash.   Neurological:  Positive for numbness. Negative for dizziness, tremors, weakness and headaches.   Hematological:  Negative for adenopathy. Does not bruise/bleed easily.   Psychiatric/Behavioral:  Negative for dysphoric mood, sleep disturbance and suicidal ideas.         Current Outpatient Medications on File Prior to Visit   Medication Sig    Acetaminophen (TYLENOL PO) Take by mouth As needed for pain    Aspirin Low Dose 81 MG EC tablet TAKE 1 TABLET (81 MG TOTAL) BY MOUTH DAILY    atorvastatin (LIPITOR) 40 mg tablet Take 1 tablet (40 mg total) by mouth daily    Banophen 25 MG capsule TAKE 1 CAPSULE (25 MG TOTAL) BY MOUTH DAILY AT BEDTIME AS NEEDED FOR ITCHING    calcipotriene (DOVONEX) 0.005 % cream APPLY TO AFFECTED AREA TWICE A DAY 30 DAYS [facility] AREA HAND LEGS    Calcium + Vitamin D3 600-10 MG-MCG TABS TAKE 2 TABLETS BY MOUTH DAILY    cetirizine (ZyrTEC) 10 mg tablet TAKE 1 TABLET (10 MG TOTAL) BY MOUTH DAILY IN THE EVENING    fenofibrate (TRICOR) 145 mg tablet Take 1 tablet (145 mg total) by mouth daily    glipiZIDE (GLUCOTROL) 5 mg tablet Take 1 tablet (5 mg total) by mouth 2 (two) times a day with meals    Lantus SoloStar 100 units/mL SOPN INJECT 1 ML (100 UNITS TOTAL) UNDER THE SKIN IN THE MORNING    levocetirizine (XYZAL) 5 MG tablet TAKE 1 TABLET (5 MG TOTAL) BY MOUTH EVERY EVENING    magnesium Oxide (MAG-OX) 400 mg TABS Take 1 tablet (400 mg total) by mouth 2 (two) times a day    Melatonin 10 MG TABS Take 1 tablet (10 mg total) by mouth daily at bedtime    metFORMIN (GLUCOPHAGE) 1000 MG tablet Take 1 tablet (1,000 mg total) by mouth 2 (two) times a day with meals    metoprolol tartrate (LOPRESSOR) 25 mg tablet Take 1 tablet (25 mg total) by mouth every 12 (twelve) hours    montelukast (SINGULAIR) 10 mg tablet Take 1 tablet (10 mg total) by mouth every 24 hours    oxybutynin (DITROPAN XL) 15 MG 24 hr tablet TAKE 1 TABLET (15 MG TOTAL) BY MOUTH DAILY    pantoprazole (PROTONIX) 40 mg tablet Take 1 tablet (40 mg total) by mouth daily    vitamin B-12 (VITAMIN B-12) 1,000 mcg tablet Take 1 tablet (1,000 mcg total) by mouth daily    [DISCONTINUED] celecoxib (CeleBREX) 200 mg capsule Take 1 capsule (200 mg total) by mouth daily With Lunch/daily    [DISCONTINUED]  "Diclofenac Sodium (VOLTAREN) 1 % APPLY 2 G TOPICALLY 4 (FOUR) TIMES A DAY    [DISCONTINUED] escitalopram (LEXAPRO) 10 mg tablet TAKE 1 TABLET (10 MG TOTAL) BY MOUTH DAILY WITH DINNER    [DISCONTINUED] gabapentin (Neurontin) 300 mg capsule Take 1 capsule (300 mg total) by mouth 2 (two) times a day With Meals    [DISCONTINUED] lidocaine (XYLOCAINE) 5 % ointment Apply topically 3 (three) times a day as needed for moderate pain    [DISCONTINUED] Ozempic, 1 MG/DOSE, 4 MG/3ML injection pen Inject 0.75 mL (1 mg total) under the skin once a week    albuterol (PROVENTIL HFA,VENTOLIN HFA) 90 mcg/act inhaler Inhale 2 puffs every 6 (six) hours as needed for wheezing (Patient not taking: Reported on 8/4/2023)    glucose blood test strip Use as instructed to test three times daily. (Patient not taking: Reported on 5/24/2023)    mupirocin (BACTROBAN) 2 % ointment Apply topically 3 (three) times a day (Patient not taking: Reported on 5/24/2023)    valACYclovir (VALTREX) 1,000 mg tablet Take 1 tablet (1,000 mg total) by mouth 2 (two) times a day for 10 days       Objective     /80   Pulse 69   Temp (!) 97.4 °F (36.3 °C) (Temporal)   Resp 16   Ht 5' 3\" (1.6 m)   Wt 66.2 kg (146 lb)   LMP  (LMP Unknown)   SpO2 97%   BMI 25.86 kg/m²     Physical Exam  Constitutional:       General: She is not in acute distress.  HENT:      Head: Normocephalic.      Mouth/Throat:      Pharynx: No oropharyngeal exudate.   Eyes:      General: No scleral icterus.     Conjunctiva/sclera: Conjunctivae normal.      Pupils: Pupils are equal, round, and reactive to light.   Neck:      Thyroid: No thyromegaly.   Cardiovascular:      Rate and Rhythm: Normal rate and regular rhythm.      Heart sounds: Normal heart sounds. No murmur heard.  Pulmonary:      Effort: Pulmonary effort is normal. No respiratory distress.      Breath sounds: Normal breath sounds. No wheezing or rales.   Abdominal:      General: Bowel sounds are normal. There is no " distension.      Palpations: Abdomen is soft.      Tenderness: There is no abdominal tenderness. There is no guarding or rebound.   Musculoskeletal:         General: No tenderness.      Cervical back: Neck supple.   Lymphadenopathy:      Cervical: No cervical adenopathy.   Skin:     Coloration: Skin is not pale.      Findings: Lesion and rash present.   Neurological:      Mental Status: She is alert and oriented to person, place, and time.      Sensory: Sensory deficit present.      Motor: No weakness.       Jose Chase MD

## 2023-12-20 ENCOUNTER — HOSPITAL ENCOUNTER (OUTPATIENT)
Dept: RADIOLOGY | Facility: HOSPITAL | Age: 58
Discharge: HOME | End: 2023-12-20
Payer: COMMERCIAL

## 2023-12-20 DIAGNOSIS — R07.89 OTHER CHEST PAIN: ICD-10-CM

## 2023-12-20 PROCEDURE — 71101 X-RAY EXAM UNILAT RIBS/CHEST: CPT

## 2023-12-26 ENCOUNTER — TELEPHONE (OUTPATIENT)
Dept: NEUROLOGY | Facility: CLINIC | Age: 58
End: 2023-12-26

## 2023-12-26 NOTE — TELEPHONE ENCOUNTER
Called and spoke to patient - confirmed upcoming appointment with Pete on 01/02/23 11:00 am at the [facility]. Provided patient with apt date, time and location. Informed patient that check in is at least 15 minutes prior to apt time.The patient is not  having any issues or concerns at this time.

## 2023-12-27 ENCOUNTER — HOSPITAL ENCOUNTER (EMERGENCY)
Facility: HOSPITAL | Age: 58
Discharge: HOME | End: 2023-12-27
Attending: EMERGENCY MEDICINE | Admitting: EMERGENCY MEDICINE
Payer: COMMERCIAL

## 2023-12-27 VITALS
HEART RATE: 77 BPM | BODY MASS INDEX: 25.33 KG/M2 | DIASTOLIC BLOOD PRESSURE: 68 MMHG | SYSTOLIC BLOOD PRESSURE: 125 MMHG | TEMPERATURE: 97.5 F | RESPIRATION RATE: 21 BRPM | WEIGHT: 143 LBS | OXYGEN SATURATION: 97 %

## 2023-12-27 DIAGNOSIS — S39.012A STRAIN OF LUMBAR REGION, INITIAL ENCOUNTER: ICD-10-CM

## 2023-12-27 DIAGNOSIS — B02.29 POSTHERPETIC NEURALGIA: Primary | ICD-10-CM

## 2023-12-27 LAB
ATRIAL RATE: 63 BPM
P AXIS: 35 DEGREES
PR INTERVAL: 122 MS
QRS AXIS: 44 DEGREES
QRSD INTERVAL: 62 MS
QT INTERVAL: 366 MS
QTC INTERVAL: 374 MS
T WAVE AXIS: 65 DEGREES
VENTRICULAR RATE: 63 BPM

## 2023-12-27 PROCEDURE — 93005 ELECTROCARDIOGRAM TRACING: CPT

## 2023-12-27 PROCEDURE — 99284 EMERGENCY DEPT VISIT MOD MDM: CPT | Performed by: EMERGENCY MEDICINE

## 2023-12-27 RX ORDER — GABAPENTIN 300 MG/1
300 CAPSULE ORAL 3 TIMES DAILY
Qty: 90 CAPSULE | Refills: 0 | Status: SHIPPED | OUTPATIENT
Start: 2023-12-27

## 2023-12-27 NOTE — ED PROVIDER NOTES
History  Chief Complaint   Patient presents with    Back Pain     Recent diagnosis of shingles, she called pcp and got meds for pain. Reports shingles were on the right side and healed, complains of pain and recently pulling a heavy shopping cart and having pain there.     Patient is a 58-year-old female.  She does have a history of diabetes.  She has hypertension.  She came down with shingles in November.  She was treated with an antiviral.  The rashes still present but resolving.  However, she continues to have pain along the rash.  Her primary MD did order rib x-rays.  These were normal.  She is status postcholecystectomy.  In addition patient is complaining of low back pain.  She believes she pulled it about a week ago.  No motor or sensory complaints in the lower extremities.  No incontinence.  No fever.  No history of cancer or IV drug use.        Prior to Admission Medications   Prescriptions Last Dose Informant Patient Reported? Taking?   Acetaminophen (TYLENOL PO)  Self Yes No   Sig: Take by mouth As needed for pain   Aspirin Low Dose 81 MG EC tablet   No No   Sig: TAKE 1 TABLET (81 MG TOTAL) BY MOUTH DAILY   Banophen 25 MG capsule  Self No No   Sig: TAKE 1 CAPSULE (25 MG TOTAL) BY MOUTH DAILY AT BEDTIME AS NEEDED FOR ITCHING   Calcium + Vitamin D3 600-10 MG-MCG TABS   No No   Sig: TAKE 2 TABLETS BY MOUTH DAILY   Diclofenac Sodium (VOLTAREN) 1 %   No No   Sig: APPLY 2 G TOPICALLY 4 (FOUR) TIMES A DAY   Lantus SoloStar 100 units/mL SOPN   Yes No   Sig: INJECT 1 ML (100 UNITS TOTAL) UNDER THE SKIN IN THE MORNING   Melatonin 10 MG TABS   No No   Sig: Take 1 tablet (10 mg total) by mouth daily at bedtime   albuterol (PROVENTIL HFA,VENTOLIN HFA) 90 mcg/act inhaler  Self No No   Sig: Inhale 2 puffs every 6 (six) hours as needed for wheezing   Patient not taking: Reported on 8/4/2023   atorvastatin (LIPITOR) 40 mg tablet   No No   Sig: Take 1 tablet (40 mg total) by mouth daily   calcipotriene (DOVONEX) 0.005 %  cream   No No   Sig: APPLY TO AFFECTED AREA TWICE A DAY 30 DAYS LARRGER AREA HAND LEGS   celecoxib (CeleBREX) 200 mg capsule   No No   Sig: Take 1 capsule (200 mg total) by mouth daily With Lunch/daily   cetirizine (ZyrTEC) 10 mg tablet   No No   Sig: TAKE 1 TABLET (10 MG TOTAL) BY MOUTH DAILY IN THE EVENING   escitalopram (LEXAPRO) 10 mg tablet   No No   Sig: Take 1 tablet (10 mg total) by mouth daily with dinner   fenofibrate (TRICOR) 145 mg tablet   No No   Sig: Take 1 tablet (145 mg total) by mouth daily   gabapentin (Neurontin) 300 mg capsule   No No   Sig: Take 1 capsule (300 mg total) by mouth 2 (two) times a day With Meals   glipiZIDE (GLUCOTROL) 5 mg tablet   No No   Sig: Take 1 tablet (5 mg total) by mouth 2 (two) times a day with meals   glucose blood test strip  Self No No   Sig: Use as instructed to test three times daily.   Patient not taking: Reported on 5/24/2023   levocetirizine (XYZAL) 5 MG tablet   No No   Sig: TAKE 1 TABLET (5 MG TOTAL) BY MOUTH EVERY EVENING   lidocaine (XYLOCAINE) 5 % ointment   No No   Sig: Apply topically 3 (three) times a day as needed for moderate pain   magnesium Oxide (MAG-OX) 400 mg TABS   No No   Sig: Take 1 tablet (400 mg total) by mouth 2 (two) times a day   metFORMIN (GLUCOPHAGE) 1000 MG tablet   No No   Sig: Take 1 tablet (1,000 mg total) by mouth 2 (two) times a day with meals   metoprolol tartrate (LOPRESSOR) 25 mg tablet   No No   Sig: Take 1 tablet (25 mg total) by mouth every 12 (twelve) hours   montelukast (SINGULAIR) 10 mg tablet   No No   Sig: Take 1 tablet (10 mg total) by mouth every 24 hours   oxybutynin (DITROPAN XL) 15 MG 24 hr tablet   No No   Sig: TAKE 1 TABLET (15 MG TOTAL) BY MOUTH DAILY   pantoprazole (PROTONIX) 40 mg tablet   No No   Sig: Take 1 tablet (40 mg total) by mouth daily   semaglutide, 2 mg/dose, (Ozempic) 8 mg/ mL injection pen   No No   Sig: Inject 0.75 mL (2 mg total) under the skin every 7 days   vitamin B-12 (VITAMIN B-12) 1,000 mcg  tablet   No No   Sig: Take 1 tablet (1,000 mcg total) by mouth daily      Facility-Administered Medications: None       Past Medical History:   Diagnosis Date    Acute exacerbation of chronic obstructive pulmonary disease (COPD) ([facility]) 2/27/2020    Asthma     exercise induced    Diabetes (HCC)     type 2    Diabetes mellitus (HCC)     Hypertension     Morbid obesity (HCC) 4/7/2020    Osteoarthritis 4/7/2020       Past Surgical History:   Procedure Laterality Date    BREAST BIOPSY Left 2016    benign    CHOLECYSTECTOMY      COLONOSCOPY      FL INJECTION LEFT SHOULDER (NON ARTHROGRAM)  07/28/2023    FL LUMBAR PUNCTURE DIAGNOSTIC  07/19/2023    HERNIA REPAIR      LAPAROSCOPY FOR ECTOPIC PREGNANCY      x2, both tubes removed     MAMMO STEREOTACTIC BREAST BIOPSY LEFT (ALL INC) Left     REPLACEMENT TOTAL KNEE Left     SMALL INTESTINE SURGERY      after hernia surgery        Family History   Problem Relation Age of Onset    Breast cancer Mother 40        cause of death    Heart attack Father         MI cause of death    No Known Problems Sister     No Known Problems Daughter     No Known Problems Maternal Grandmother     No Known Problems Maternal Grandfather     No Known Problems Paternal Grandmother     No Known Problems Paternal Grandfather     No Known Problems Sister     No Known Problems Sister     Colon cancer Neg Hx     Ovarian cancer Neg Hx      I have reviewed and agree with the history as documented.    E-Cigarette/Vaping    E-Cigarette Use Never User      E-Cigarette/Vaping Substances    Nicotine No     THC No     CBD No     Flavoring No      Social History     Tobacco Use    Smoking status: Former    Smokeless tobacco: Never   Vaping Use    Vaping status: Never Used   Substance Use Topics    Alcohol use: Not Currently     Comment: rare    Drug use: No       Review of Systems   Constitutional:  Negative for chills and fever.   HENT:  Negative for rhinorrhea and sore throat.    Eyes:  Negative for pain, redness  and visual disturbance.   Respiratory:  Negative for cough and shortness of breath.    Cardiovascular:  Positive for chest pain. Negative for leg swelling.   Gastrointestinal:  Negative for abdominal pain, diarrhea and vomiting.   Endocrine: Negative for polydipsia and polyuria.   Genitourinary:  Negative for dysuria, frequency, hematuria, vaginal bleeding and vaginal discharge.   Musculoskeletal:  Positive for back pain. Negative for neck pain.   Skin:  Positive for rash. Negative for wound.   Allergic/Immunologic: Negative for immunocompromised state.   Neurological:  Negative for weakness, numbness and headaches.   Hematological:  Does not bruise/bleed easily.   Psychiatric/Behavioral:  Negative for hallucinations and suicidal ideas.    All other systems reviewed and are negative.      Physical Exam  Physical Exam  Vitals reviewed.   Constitutional:       General: She is not in acute distress.  HENT:      Head: Normocephalic and atraumatic.      Nose: Nose normal.      Mouth/Throat:      Mouth: Mucous membranes are moist.   Eyes:      General:         Right eye: No discharge.         Left eye: No discharge.      Conjunctiva/sclera: Conjunctivae normal.   Cardiovascular:      Rate and Rhythm: Normal rate and regular rhythm.      Pulses: Normal pulses.      Heart sounds: Normal heart sounds. No murmur heard.     No friction rub. No gallop.   Pulmonary:      Effort: Pulmonary effort is normal. No respiratory distress.      Breath sounds: Normal breath sounds. No stridor. No wheezing, rhonchi or rales.   Abdominal:      General: Bowel sounds are normal. There is no distension.      Palpations: Abdomen is soft.      Tenderness: There is no abdominal tenderness. There is no right CVA tenderness, left CVA tenderness, guarding or rebound.   Musculoskeletal:         General: No swelling, tenderness, deformity or signs of injury. Normal range of motion.      Cervical back: Normal range of motion and neck supple. No  rigidity.      Right lower leg: No edema.      Left lower leg: No edema.      Comments: No calf tenderness or unilateral leg swelling.   Skin:     General: Skin is warm and dry.      Coloration: Skin is not jaundiced.      Findings: Rash present.      Comments: Rash to the right side of the body along 1 dermatome under the right breast extending around to the back.  It is mostly scabbed and scarred at this point.   Neurological:      General: No focal deficit present.      Mental Status: She is alert and oriented to person, place, and time.      Sensory: No sensory deficit.      Motor: Motor function is intact.   Psychiatric:         Mood and Affect: Mood normal.         Behavior: Behavior normal.         Vital Signs  ED Triage Vitals [12/27/23 1232]   Temperature Pulse Respirations Blood Pressure SpO2   97.5 °F (36.4 °C) 77 21 125/68 97 %      Temp Source Heart Rate Source Patient Position - Orthostatic VS BP Location FiO2 (%)   Tympanic Monitor Lying Left arm --      Pain Score       --           Vitals:    12/27/23 1232   BP: 125/68   Pulse: 77   Patient Position - Orthostatic VS: Lying         Visual Acuity      ED Medications  Medications - No data to display    Diagnostic Studies  Results Reviewed       None                   No orders to display              Procedures  ECG 12 Lead Documentation Only    Date/Time: 12/27/2023 2:18 PM    Performed by: Roel Dominguez MD  Authorized by: Roel Dominguez MD    ECG reviewed by me, the ED Provider: yes    Patient location:  ED  Interpretation:     Interpretation: normal    Rate:     ECG rate assessment: normal    Rhythm:     Rhythm: sinus rhythm    Ectopy:     Ectopy: none    QRS:     QRS axis:  Normal  Conduction:     Conduction: normal    ST segments:     ST segments:  Normal  T waves:     T waves: normal             ED Course                               SBIRT 22yo+      Flowsheet Row Most Recent Value   Initial Alcohol Screen: US AUDIT-C     1. How often do  you have a drink containing alcohol? 0 Filed at: 12/27/2023 1241   2. How many drinks containing alcohol do you have on a typical day you are drinking?  0 Filed at: 12/27/2023 1241   3a. Male UNDER 65: How often do you have five or more drinks on one occasion? 0 Filed at: 12/27/2023 1241   3b. FEMALE Any Age, or MALE 65+: How often do you have 4 or more drinks on one occassion? 0 Filed at: 12/27/2023 1241   Audit-C Score 0 Filed at: 12/27/2023 1241   ALIE: How many times in the past year have you...    Used an illegal drug or used a prescription medication for non-medical reasons? Never Filed at: 12/27/2023 1241                      Medical Decision Making  Benign abdominal examination.  Patient is post cholecystectomy.  This is not appendicitis.  This is not acute coronary syndrome.  Pain has clearly secondary to postherpetic neuralgia.  Patient does appear to have a second pain which is most consistent with a lumbar strain.  No signs or symptoms of cauda equina syndrome or cord compression.  No red flag signs or symptoms.  Appropriate for discharge and outpatient management.    Amount and/or Complexity of Data Reviewed  ECG/medicine tests: ordered and independent interpretation performed. Decision-making details documented in ED Course.    Risk  OTC drugs.  Prescription drug management.  Decision regarding hospitalization.             Disposition  Final diagnoses:   Postherpetic neuralgia   Strain of lumbar region, initial encounter     Time reflects when diagnosis was documented in both MDM as applicable and the Disposition within this note       Time User Action Codes Description Comment    12/27/2023  2:19 PM Roel Dominguez Add [B02.29] Postherpetic neuralgia     12/27/2023  2:20 PM Roel Dominguez Add [S39.012A] Strain of lumbar region, initial encounter           ED Disposition       ED Disposition   Discharge    Condition   Stable    Date/Time   Wed Dec 27, 2023 1418    Comment   Hayley Bolaños discharge  to home/self care.                   Follow-up Information       Follow up With Specialties Details Why Contact Info    Jose Chase MD Internal Medicine In 1 week  [address] [place] [id number] [phone]              Patient's Medications   Discharge Prescriptions    GABAPENTIN (NEURONTIN) 300 MG CAPSULE    Take 1 capsule (300 mg total) by mouth 3 (three) times a day       Start Date: 12/27/2023End Date: --       Order Dose: 300 mg       Quantity: 90 capsule    Refills: 0       No discharge procedures on file.    PDMP Review       None            ED Provider  Electronically Signed by             Roel Dominguez MD  12/27/23 6077

## 2024-01-02 ENCOUNTER — OFFICE VISIT (OUTPATIENT)
Dept: NEUROLOGY | Facility: CLINIC | Age: 59
End: 2024-01-02
Payer: COMMERCIAL

## 2024-01-02 VITALS
SYSTOLIC BLOOD PRESSURE: 115 MMHG | TEMPERATURE: 97 F | RESPIRATION RATE: 16 BRPM | OXYGEN SATURATION: 99 % | WEIGHT: 144 LBS | BODY MASS INDEX: 25.52 KG/M2 | HEART RATE: 65 BPM | HEIGHT: 63 IN | DIASTOLIC BLOOD PRESSURE: 58 MMHG

## 2024-01-02 DIAGNOSIS — M54.50 LOW BACK PAIN: ICD-10-CM

## 2024-01-02 DIAGNOSIS — B02.29 POST HERPETIC NEURALGIA: Primary | ICD-10-CM

## 2024-01-02 DIAGNOSIS — E53.8 B12 DEFICIENCY: ICD-10-CM

## 2024-01-02 DIAGNOSIS — A53.0 POSITIVE SEROLOGY FOR SYPHILIS: ICD-10-CM

## 2024-01-02 DIAGNOSIS — R41.3 MEMORY LOSS: ICD-10-CM

## 2024-01-02 PROCEDURE — 99213 OFFICE O/P EST LOW 20 MIN: CPT | Performed by: NURSE PRACTITIONER

## 2024-01-02 RX ORDER — LIDOCAINE 50 MG/G
1 PATCH TOPICAL DAILY
Qty: 30 PATCH | Refills: 2 | Status: SHIPPED | OUTPATIENT
Start: 2024-01-02

## 2024-01-02 NOTE — PATIENT INSTRUCTIONS
Try the lidoderm patch instead of the cream- 12 hours on and then 12 hours off   the gabapentin and take as prescribed-let the family doctor know if you need refills  I will message infectious disease to follow up on treatment of syphilis  Memory testing was much better today  Please complete previously ordered MRI brain and b12 testing-continue supplement  Follow up in 3 months time after MRI completed     Lower Back Exercises   AMBULATORY CARE:   Lower back exercises  help heal and strengthen your back muscles to prevent another injury. Ask your healthcare provider if you need to see a physical therapist for more advanced exercises.  Seek care immediately if:   You have severe pain that prevents you from moving.       Call your doctor if:   Your pain becomes worse.    You have new pain.    You have questions or concerns about your condition or care.    Do lower back exercises safely:   Do the exercises on a mat or firm surface (not on a bed).  A firm surface will support your spine and prevent low back pain.    Move slowly and smoothly.  Avoid fast or jerky motions.    Breathe normally.  Do not hold your breath.    Stop if you feel pain.  It is normal to feel some discomfort at first, but you should not feel pain. Regular exercise will help decrease your discomfort over time.    Lower back exercises:  Your healthcare provider may recommend that you do back exercises 10 to 30 minutes each day. He or she may also recommend that you do exercises 1 to 3 times each day. Ask your provider which exercises are best for you and how often to do them.  Ankle pumps:  Lie on your back. Move your foot up (with your toes pointing toward your head). Then, move your foot down (with your toes pointing away from you). Repeat this exercise 10 times on each side.         Heel slides:  Lie on your back. Slowly bend one leg and then straighten it. Next, bend the other leg and then straighten it. Repeat 10 times on each side.          Pelvic tilt:  Lie on your back with your knees bent and feet flat on the floor. Place your arms in a relaxed position beside your body. Tighten the muscles of your abdomen and flatten your back against the floor. Hold for 5 seconds. Repeat 5 times.         Back stretch:  Lie on your back with your hands behind your head. Bend your knees and turn the lower half of your body to one side. Hold this position for 10 seconds. Repeat 3 times on each side.         Straight leg raises:  Lie on your back with one leg straight. Bend the other knee. Tighten your abdomen and then slowly lift the straight leg up about 6 to 12 inches off the floor. Hold for 1 to 5 seconds. Lower your leg slowly. Repeat 10 times on each leg.         Knee-to-chest:  Lie on your back with your knees bent and feet flat on the floor. Pull one of your knees toward your chest and hold it there for 5 seconds. Return your leg to the starting position. Lift the other knee toward your chest and hold for 5 seconds. Do this 5 times on each side.         Cat and camel:  Place your hands and knees on the floor. Arch your back upward toward the ceiling and lower your head. Round out your spine as much as you can. Hold for 5 seconds. Lift your head upward and push your chest downward toward the floor. Hold for 5 seconds. Do 3 sets or as directed.         Wall squats:  Stand with your back against a wall. Tighten the muscles of your abdomen. Slowly lower your body until your knees are bent at a 45 degree angle. Hold this position for 5 seconds. Slowly move back up to a standing position. Repeat 10 times.         Curl up:  Lie on your back with your knees bent and feet flat on the floor. Place your hands, palms down, underneath the curve in your lower back. Next, with your elbows on the floor, lift your shoulders and chest 2 to 3 inches. Keep your head in line with your shoulders. Hold this position for 5 seconds. When you can do this exercise without pain for  10 to 15 seconds, you may add a rotation. While your shoulders and chest are lifted off the ground, turn slightly to the left and hold. Repeat on the other side.         Bird dog:  Place your hands and knees on the floor. Keep your wrists directly below your shoulders and your knees directly below your hips. Pull your belly button in toward your spine. Do not flatten or arch your back. Tighten your abdominal muscles. Raise one arm straight out so that it is aligned with your head. Next, raise the leg opposite your arm. Hold this position for 15 seconds. Lower your arm and leg slowly and change sides. Do 5 sets.       Follow up with your doctor as directed:  Write down your questions so you remember to ask them during your visits.  © Copyright [employer] 2023 Information is for End User's use only and may not be sold, redistributed or otherwise used for commercial purposes.  The above information is an  only. It is not intended as medical advice for individual conditions or treatments. Talk to your doctor, nurse or pharmacist before following any medical regimen to see if it is safe and effective for you.

## 2024-01-02 NOTE — PROGRESS NOTES
Patient ID: Hayley Bolaños is a 58 y.o. female.    Assessment/Plan:  Patient Instructions:  Try the lidoderm patch instead of the cream- 12 hours on and then 12 hours off   the gabapentin and take as prescribed-let the family doctor know if you need refills  I will message infectious disease to follow up on treatment of syphilis  Memory testing was much better today  Please complete previously ordered MRI brain and b12 testing-continue supplement  Follow up in 3 months time after MRI completed     Could consider low dose nortriptyline if topical/gabapentin not effective.     Diagnoses and all orders for this visit:    Post herpetic neuralgia  -     lidocaine (Lidoderm) 5 %; Apply 1 patch topically over 12 hours daily Remove & Discard patch within 12 hours or as directed by MD    Memory loss    Positive serology for syphilis    B12 deficiency    Low back pain      Subjective:    HPI  Hayley Bolaños presents alone (took the bus) for follow up regarding memory loss- initial consult 7/27/2023. She has not yet completed repeat blood work, sleep evaluation or MRI. She did have shingles to the right flank/chest wall  (t7/8 dermatome); the lesions have healed -scarring present but she continues with pain to especially the anterior chest wall. She states she strained her back and has musculoskeletal low back pain-no red flag symptoms. She has not yet picked up the increased dose of gabapentin ordered by the ED physician and she states the topical lidocaine does not work long. She does have trouble sleeping more because of pain-she sleeps on a sofa; she is currently living with her niece. She has not followed up recently with ID. Repeat MOCA today 25/30.    Lab Results   Component Value Date    HGBA1C 6.8 (A) 08/23/2023     Lab Results   Component Value Date    WBC 11.34 (H) 09/25/2023    HGB 13.2 09/25/2023    HCT 41.4 09/25/2023    MCV 83 09/25/2023     09/25/2023     Lab Results   Component Value Date    SODIUM  142 06/09/2023    K 4.5 06/09/2023     06/09/2023    CO2 30 06/09/2023    BUN 17 06/09/2023    CREATININE 0.59 (L) 06/09/2023    GLUC 315 (H) 01/21/2022    CALCIUM 10.8 (H) 06/09/2023         Current Outpatient Medications on File Prior to Visit   Medication Sig Dispense Refill    Acetaminophen (TYLENOL PO) Take by mouth As needed for pain      Aspirin Low Dose 81 MG EC tablet TAKE 1 TABLET (81 MG TOTAL) BY MOUTH DAILY 90 tablet 3    atorvastatin (LIPITOR) 40 mg tablet Take 1 tablet (40 mg total) by mouth daily 90 tablet 3    Banophen 25 MG capsule TAKE 1 CAPSULE (25 MG TOTAL) BY MOUTH DAILY AT BEDTIME AS NEEDED FOR ITCHING 90 capsule 3    calcipotriene (DOVONEX) 0.005 % cream APPLY TO AFFECTED AREA TWICE A DAY 30 DAYS [place] AREA HAND LEGS 120 g 0    Calcium + Vitamin D3 600-10 MG-MCG TABS TAKE 2 TABLETS BY MOUTH DAILY 60 tablet 6    celecoxib (CeleBREX) 200 mg capsule Take 1 capsule (200 mg total) by mouth daily With Lunch/daily 30 capsule 0    cetirizine (ZyrTEC) 10 mg tablet TAKE 1 TABLET (10 MG TOTAL) BY MOUTH DAILY IN THE EVENING 30 tablet 3    Diclofenac Sodium (VOLTAREN) 1 % APPLY 2 G TOPICALLY 4 (FOUR) TIMES A  g 1    escitalopram (LEXAPRO) 10 mg tablet Take 1 tablet (10 mg total) by mouth daily with dinner 30 tablet 5    fenofibrate (TRICOR) 145 mg tablet Take 1 tablet (145 mg total) by mouth daily 90 tablet 3    gabapentin (Neurontin) 300 mg capsule Take 1 capsule (300 mg total) by mouth 3 (three) times a day 90 capsule 0    glipiZIDE (GLUCOTROL) 5 mg tablet Take 1 tablet (5 mg total) by mouth 2 (two) times a day with meals 180 tablet 3    Lantus SoloStar 100 units/mL SOPN INJECT 1 ML (100 UNITS TOTAL) UNDER THE SKIN IN THE MORNING      levocetirizine (XYZAL) 5 MG tablet TAKE 1 TABLET (5 MG TOTAL) BY MOUTH EVERY EVENING 30 tablet 3    magnesium Oxide (MAG-OX) 400 mg TABS Take 1 tablet (400 mg total) by mouth 2 (two) times a day 180 tablet 3    Melatonin 10 MG TABS Take 1 tablet (10 mg total)  by mouth daily at bedtime 30 tablet 3    metFORMIN (GLUCOPHAGE) 1000 MG tablet Take 1 tablet (1,000 mg total) by mouth 2 (two) times a day with meals 180 tablet 3    metoprolol tartrate (LOPRESSOR) 25 mg tablet Take 1 tablet (25 mg total) by mouth every 12 (twelve) hours 180 tablet 3    montelukast (SINGULAIR) 10 mg tablet Take 1 tablet (10 mg total) by mouth every 24 hours 90 tablet 3    oxybutynin (DITROPAN XL) 15 MG 24 hr tablet TAKE 1 TABLET (15 MG TOTAL) BY MOUTH DAILY 90 tablet 3    pantoprazole (PROTONIX) 40 mg tablet Take 1 tablet (40 mg total) by mouth daily 90 tablet 3    semaglutide, 2 mg/dose, (Ozempic) 8 mg/ mL injection pen Inject 0.75 mL (2 mg total) under the skin every 7 days 9 mL 1    vitamin B-12 (VITAMIN B-12) 1,000 mcg tablet Take 1 tablet (1,000 mcg total) by mouth daily 90 tablet 3    albuterol (PROVENTIL HFA,VENTOLIN HFA) 90 mcg/act inhaler Inhale 2 puffs every 6 (six) hours as needed for wheezing (Patient not taking: Reported on 8/4/2023) 54 g 3    glucose blood test strip Use as instructed to test three times daily. (Patient not taking: Reported on 5/24/2023) 100 each 5     No current facility-administered medications on file prior to visit.         Previous history:  58 year old female with past medical history of DM, COPD, OA, hyperlipidemia, syphilis (recent testing showing old infection-follows with ID- recent LP does not show neurosyphilis), b 12 deficiency (recent level 282) who presents for new patient evaluation for memory loss, referred by primary care. She states for the past 1 year she has been more forgetful; she states if she relaxes what she forgot will come back to her. She gives an example-she forgot one time the seasonings to use in yellow rice she cooked. She uses post it notes to help her remember. She denies home safety issues. She denies any relevant family history of memory loss. She lives alone (sometimes her grandson will stay with her-she helps get him to school).  "She does not drive-has not driven in some time; she will walk or use the bus. She manages her own medications-uses a pill box. She states she has trouble sleeping at times, she will lay down at 8 pm and not fall asleep until 3am; she states previously diagnosed with DENYS before moving here. She states occasional frontal headache, resolved with tylenol use.     Folate 9.5, b12 282, tsh 2.5  Lyme neg, RPR neg, T pallidum antibodies reactive, csf vdrl nonreactive  CSF wbc 1, protein 64, rbc 2, glucose 83    The following portions of the patient's history were reviewed and updated as appropriate: allergies, current medications, past family history, past medical history, past social history, past surgical history, and problem list.         Objective:    Blood pressure 115/58, pulse 65, temperature (!) 97 °F (36.1 °C), temperature source Temporal, resp. rate 16, height 5' 3\" (1.6 m), weight 65.3 kg (144 lb), SpO2 99%.    Physical Exam  Vitals reviewed.   Constitutional:       General: She is not in acute distress.  HENT:      Head: Normocephalic.      Right Ear: External ear normal.      Left Ear: External ear normal.      Nose: Nose normal.      Mouth/Throat:      Mouth: Mucous membranes are moist.      Pharynx: Oropharynx is clear.   Eyes:      General: Lids are normal.         Right eye: No discharge.         Left eye: No discharge.      Extraocular Movements: Extraocular movements intact.      Pupils: Pupils are equal, round, and reactive to light.   Cardiovascular:      Rate and Rhythm: Normal rate.      Pulses: Normal pulses.      Heart sounds: Normal heart sounds.   Pulmonary:      Effort: Pulmonary effort is normal.      Breath sounds: Normal breath sounds.   Abdominal:      General: There is no distension.   Musculoskeletal:      Lumbar back: Tenderness present. No bony tenderness. Negative right straight leg raise test and negative left straight leg raise test.      Right lower leg: No edema.      Left lower leg: " No edema.   Skin:     Capillary Refill: Capillary refill takes less than 2 seconds.      Findings: No rash.      Comments: Healed herpes zoster lesions.   Neurological:      General: No focal deficit present.      Mental Status: She is alert. Mental status is at baseline.      Coordination: Romberg sign negative.      Deep Tendon Reflexes:      Reflex Scores:       Brachioradialis reflexes are 1+ on the right side and 1+ on the left side.       Patellar reflexes are 1+ on the right side and 1+ on the left side.  Psychiatric:         Mood and Affect: Mood normal.         Speech: Speech normal.         Behavior: Behavior normal.         Neurological Exam  Mental Status  Alert. Oriented to person, place and time. Speech is normal. Language is fluent with no aphasia.  See scanned in moca 25/30.    Cranial Nerves  CN II: Visual acuity is normal. Visual fields full to confrontation.  CN III, IV, VI: Extraocular movements intact bilaterally. Normal lids and orbits bilaterally. Pupils equal round and reactive to light bilaterally.  CN V: Facial sensation is normal.  CN VII: Full and symmetric facial movement.  CN VIII: Hearing is normal.  CN IX, X: Palate elevates symmetrically. Normal gag reflex.  CN XI: Shoulder shrug strength is normal.  CN XII: Tongue midline without atrophy or fasciculations.    Sensory  Light touch is normal in upper and lower extremities.     Reflexes                                            Right                      Left  Brachioradialis                    1+                         1+  Patellar                                1+                         1+    Coordination  Right: Finger-to-nose normal.Left: Finger-to-nose normal.    Gait  Casual gait: Antalgic gait. Romberg is absent. Able to rise from chair without using arms.  No assistive device.        ROS:    Review of Systems   Constitutional:  Negative for appetite change, fatigue and fever.   HENT: Negative.  Negative for hearing loss,  tinnitus, trouble swallowing and voice change.    Eyes: Negative.  Negative for photophobia, pain and visual disturbance.   Respiratory: Negative.  Negative for shortness of breath.    Cardiovascular: Negative.  Negative for palpitations.   Gastrointestinal: Negative.  Negative for nausea and vomiting.   Endocrine: Negative.  Negative for cold intolerance.   Genitourinary: Negative.  Negative for dysuria, frequency and urgency.   Musculoskeletal:  Positive for back pain. Negative for gait problem, myalgias, neck pain and neck stiffness.   Skin: Negative.  Negative for rash.   Allergic/Immunologic: Negative.    Neurological:  Positive for dizziness, weakness, light-headedness and numbness (Fingers, toes sometimes). Negative for tremors, seizures, syncope, facial asymmetry, speech difficulty and headaches.        Forgetful, shingles pain.   Hematological: Negative.  Does not bruise/bleed easily.   Psychiatric/Behavioral: Negative.  Negative for confusion, hallucinations and sleep disturbance.    ROS was reviewed and updated as appropriate

## 2024-01-03 ENCOUNTER — TELEPHONE (OUTPATIENT)
Dept: INFECTIOUS DISEASES | Facility: CLINIC | Age: 59
End: 2024-01-03

## 2024-01-03 NOTE — TELEPHONE ENCOUNTER
FABIEN for pt to CB.  Would like to provide f/u with Dr. Leyva at [place] or Dr. Escobar in [place].    Patient calls back to follow up.  She has MRI 1/19 so would like to follow up after MRI.

## 2024-01-15 DIAGNOSIS — J45.909 UNCOMPLICATED ASTHMA, UNSPECIFIED ASTHMA SEVERITY, UNSPECIFIED WHETHER PERSISTENT: ICD-10-CM

## 2024-01-15 DIAGNOSIS — E11.69 HYPERLIPIDEMIA ASSOCIATED WITH TYPE 2 DIABETES MELLITUS: ICD-10-CM

## 2024-01-15 DIAGNOSIS — E11.8 TYPE II DIABETES MELLITUS WITH COMPLICATION (HCC): ICD-10-CM

## 2024-01-15 DIAGNOSIS — J01.90 ACUTE SINUSITIS, RECURRENCE NOT SPECIFIED, UNSPECIFIED LOCATION: ICD-10-CM

## 2024-01-15 DIAGNOSIS — I10 ESSENTIAL HYPERTENSION: ICD-10-CM

## 2024-01-15 DIAGNOSIS — E78.5 HYPERLIPIDEMIA ASSOCIATED WITH TYPE 2 DIABETES MELLITUS: ICD-10-CM

## 2024-01-16 RX ORDER — GLIPIZIDE 5 MG/1
5 TABLET ORAL 2 TIMES DAILY WITH MEALS
Qty: 180 TABLET | Refills: 1 | Status: SHIPPED | OUTPATIENT
Start: 2024-01-16

## 2024-01-16 RX ORDER — LEVOCETIRIZINE DIHYDROCHLORIDE 5 MG/1
TABLET, FILM COATED ORAL
Qty: 30 TABLET | Refills: 1 | Status: SHIPPED | OUTPATIENT
Start: 2024-01-16

## 2024-01-16 RX ORDER — MONTELUKAST SODIUM 10 MG/1
10 TABLET ORAL EVERY 24 HOURS
Qty: 90 TABLET | Refills: 1 | Status: SHIPPED | OUTPATIENT
Start: 2024-01-16

## 2024-01-16 RX ORDER — FENOFIBRATE 145 MG/1
145 TABLET, COATED ORAL DAILY
Qty: 90 TABLET | Refills: 1 | Status: SHIPPED | OUTPATIENT
Start: 2024-01-16

## 2024-01-19 ENCOUNTER — HOSPITAL ENCOUNTER (OUTPATIENT)
Facility: MEDICAL CENTER | Age: 59
Discharge: HOME | End: 2024-01-19
Payer: COMMERCIAL

## 2024-01-19 DIAGNOSIS — R41.3 MEMORY LOSS: ICD-10-CM

## 2024-01-19 PROCEDURE — 70553 MRI BRAIN STEM W/O & W/DYE: CPT

## 2024-01-19 PROCEDURE — A9585 GADOBUTROL INJECTION: HCPCS | Performed by: NURSE PRACTITIONER

## 2024-01-19 PROCEDURE — G1004 CDSM NDSC: HCPCS

## 2024-01-19 RX ORDER — GADOBUTROL 604.72 MG/ML
6.5 INJECTION INTRAVENOUS
Status: COMPLETED | OUTPATIENT
Start: 2024-01-19 | End: 2024-01-19

## 2024-01-19 RX ADMIN — GADOBUTROL 6.5 ML: 604.72 INJECTION INTRAVENOUS at 15:23

## 2024-01-22 NOTE — PROGRESS NOTES
Progress Note - Infectious Disease   Hayley Bolaños 58 y.o. female MRN: [id number]  Unit/Bed#:  Encounter: [id number]      Impression/Plan:    57 yo F with DM and memory loss who was referred and first seen by ID in June 2023 for evaluation of positive syphilis testing.    Syphilis  -Syphilis testing done 4/13/23 noted reactive total antibody with reactive confirmatory FTA-antibody, though RPR non-reactive. Patient did not recall any prior treatment or diagnosis before this. Suspect this was a past infection that was never treated and RPR titer naturally declined over time.  LP done 7/19/23 did have an elevated protein, though only 1 WBC and negative CSF VDRL which is inconsistent with neurosyphilis. Furthermore per Neurology visit on 1/02/24 there has been improvement in memory testing which would not be expected if symptoms were due to untreated Neurosyphilis. Given patient has not had documented treatment of this likely old infection, will treat as late latent infection. Additional STI screening negative in June (HIV, urine GC/chlamydia).  -Start IM Penicillin G 2.4 million units once weekly X 3 weeks; ordered via infusion center at [place] with first dose to be given on 1/31  -Can repeat RPR titer at 6 months post treatment to ensure remains non-reactive  -No need for formal ID follow up at this time but can return to clinic PRN    Memory loss  -Following with Neurology.    Antibiotics:  None    Subjective:  Patient reports her memory seems ok to her. Denies any current fever or chills. No new skin rashes or difficulty with ambulation. She reiterates that she does not recall being diagnosed with syphilis prior to this nor had any prior treatment.     Objective:  Vitals:  Vitals:    01/25/24 1548   BP: 100/68   Pulse: 96   Resp: 18   Temp: (!) 96 °F (35.6 °C)   SpO2: 99%         Physical Exam:   General Appearance:  Alert, interactive, nontoxic, no acute distress.   Throat: Oropharynx moist without lesions.     Lungs:   Clear to auscultation bilaterally; no wheezes, rhonchi or rales; respirations unlabored   Heart:  RRR; no murmur, rub or gallop   Abdomen:   Soft, non-tender   Extremities: No clubbing, cyanosis or edema   Skin: No new rashes or lesions. No draining wounds noted.       Labs:   All pertinent labs and imaging studies were personally reviewed      Micro:        Imaging:          Emil Leyva MD  [facility]

## 2024-01-24 DIAGNOSIS — R21 RASH: ICD-10-CM

## 2024-01-24 RX ORDER — CALCIPOTRIENE 50 UG/G
CREAM TOPICAL
Qty: 120 G | Refills: 5 | Status: SHIPPED | OUTPATIENT
Start: 2024-01-24

## 2024-01-25 ENCOUNTER — TELEPHONE (OUTPATIENT)
Age: 59
End: 2024-01-25

## 2024-01-25 ENCOUNTER — OFFICE VISIT (OUTPATIENT)
Dept: INFECTIOUS DISEASES | Facility: CLINIC | Age: 59
End: 2024-01-25
Payer: COMMERCIAL

## 2024-01-25 VITALS
BODY MASS INDEX: 25.52 KG/M2 | OXYGEN SATURATION: 99 % | RESPIRATION RATE: 18 BRPM | SYSTOLIC BLOOD PRESSURE: 100 MMHG | DIASTOLIC BLOOD PRESSURE: 68 MMHG | WEIGHT: 144 LBS | HEART RATE: 96 BPM | TEMPERATURE: 96 F | HEIGHT: 63 IN

## 2024-01-25 DIAGNOSIS — A53.9 SYPHILIS: Primary | ICD-10-CM

## 2024-01-25 PROCEDURE — 99214 OFFICE O/P EST MOD 30 MIN: CPT | Performed by: INTERNAL MEDICINE

## 2024-01-25 NOTE — TELEPHONE ENCOUNTER
Pt running late for pau Damon dropped her off at [facility].  Pt is on her way to the office now.

## 2024-01-25 NOTE — PATIENT INSTRUCTIONS
We will schedule your treatment. Your first session will be on 1/31 at the [facility]. We will do a total of three treatment sessions and then you will be done.

## 2024-01-31 ENCOUNTER — HOSPITAL ENCOUNTER (OUTPATIENT)
Dept: INFUSION CENTER | Facility: HOSPITAL | Age: 59
Discharge: HOME | End: 2024-01-31
Attending: INTERNAL MEDICINE
Payer: COMMERCIAL

## 2024-01-31 DIAGNOSIS — A53.9 SYPHILIS: Primary | ICD-10-CM

## 2024-01-31 PROCEDURE — 96372 THER/PROPH/DIAG INJ SC/IM: CPT

## 2024-01-31 RX ADMIN — PENICILLIN G BENZATHINE 2.4 MILLION UNITS: 1200000 INJECTION, SUSPENSION INTRAMUSCULAR at 13:42

## 2024-01-31 NOTE — PROGRESS NOTES
Pt tolerated Penicillin G IM in the right and left ventrogluteal area with no complications. Therapy complete. Declined AVS. Left unit ambulatory with a steady gait.

## 2024-02-07 ENCOUNTER — HOSPITAL ENCOUNTER (OUTPATIENT)
Dept: INFUSION CENTER | Facility: HOSPITAL | Age: 59
Discharge: HOME | End: 2024-02-07
Attending: INTERNAL MEDICINE
Payer: COMMERCIAL

## 2024-02-07 VITALS — SYSTOLIC BLOOD PRESSURE: 96 MMHG | DIASTOLIC BLOOD PRESSURE: 69 MMHG | TEMPERATURE: 97.7 F | HEART RATE: 71 BPM

## 2024-02-07 DIAGNOSIS — A53.9 SYPHILIS: Primary | ICD-10-CM

## 2024-02-07 PROCEDURE — 96372 THER/PROPH/DIAG INJ SC/IM: CPT

## 2024-02-07 RX ADMIN — PENICILLIN G BENZATHINE 2.4 MILLION UNITS: 1200000 INJECTION, SUSPENSION INTRAMUSCULAR at 12:47

## 2024-02-07 NOTE — PROGRESS NOTES
Hayley Bolaños  tolerated injection treatment well with no complications.      Hayley Bolaños is aware of future appt on 2/14 at 1300-[facility] infusion.     AVS printed and given to Hayley Bolaños: Yes

## 2024-02-14 ENCOUNTER — HOSPITAL ENCOUNTER (OUTPATIENT)
Dept: INFUSION CENTER | Facility: HOSPITAL | Age: 59
Discharge: HOME | End: 2024-02-14
Attending: INTERNAL MEDICINE
Payer: COMMERCIAL

## 2024-02-14 VITALS
SYSTOLIC BLOOD PRESSURE: 106 MMHG | RESPIRATION RATE: 18 BRPM | HEART RATE: 74 BPM | DIASTOLIC BLOOD PRESSURE: 67 MMHG | OXYGEN SATURATION: 98 % | TEMPERATURE: 97.7 F

## 2024-02-14 DIAGNOSIS — A53.9 SYPHILIS: Primary | ICD-10-CM

## 2024-02-14 PROCEDURE — 96372 THER/PROPH/DIAG INJ SC/IM: CPT

## 2024-02-14 RX ADMIN — PENICILLIN G BENZATHINE 2.4 MILLION UNITS: 1200000 INJECTION, SUSPENSION INTRAMUSCULAR at 13:06

## 2024-02-14 NOTE — PROGRESS NOTES
Patient tolerated bilateral buttocks penicillin injections without complications. Therapy complete. AVS provided.

## 2024-02-19 DIAGNOSIS — B02.8 HERPES ZOSTER WITH OTHER COMPLICATION: ICD-10-CM

## 2024-02-19 DIAGNOSIS — B02.9 SHINGLES RASH: ICD-10-CM

## 2024-02-19 DIAGNOSIS — G62.9 NEUROPATHY: ICD-10-CM

## 2024-02-20 RX ORDER — CELECOXIB 200 MG/1
200 CAPSULE ORAL DAILY
Qty: 30 CAPSULE | Refills: 0 | Status: SHIPPED | OUTPATIENT
Start: 2024-02-20

## 2024-02-21 PROBLEM — Z01.419 ENCOUNTER FOR ANNUAL ROUTINE GYNECOLOGICAL EXAMINATION: Status: RESOLVED | Noted: 2022-04-22 | Resolved: 2024-02-21

## 2024-03-12 DIAGNOSIS — B02.9 SHINGLES RASH: ICD-10-CM

## 2024-03-12 DIAGNOSIS — G62.9 NEUROPATHY: ICD-10-CM

## 2024-03-12 DIAGNOSIS — J01.90 ACUTE SINUSITIS, RECURRENCE NOT SPECIFIED, UNSPECIFIED LOCATION: ICD-10-CM

## 2024-03-12 DIAGNOSIS — M77.8 LEFT SHOULDER TENDONITIS: ICD-10-CM

## 2024-03-12 DIAGNOSIS — B02.8 HERPES ZOSTER WITH OTHER COMPLICATION: ICD-10-CM

## 2024-03-12 RX ORDER — CELECOXIB 200 MG/1
200 CAPSULE ORAL DAILY
Qty: 90 CAPSULE | Refills: 1 | Status: SHIPPED | OUTPATIENT
Start: 2024-03-12

## 2024-03-12 RX ORDER — LEVOCETIRIZINE DIHYDROCHLORIDE 5 MG/1
TABLET, FILM COATED ORAL
Qty: 90 TABLET | Refills: 1 | Status: SHIPPED | OUTPATIENT
Start: 2024-03-12

## 2024-03-21 ENCOUNTER — APPOINTMENT (OUTPATIENT)
Dept: LAB | Facility: HOSPITAL | Age: 59
End: 2024-03-21
Payer: COMMERCIAL

## 2024-03-21 DIAGNOSIS — E11.8 TYPE II DIABETES MELLITUS WITH COMPLICATION (HCC): ICD-10-CM

## 2024-03-21 DIAGNOSIS — R41.3 MEMORY LOSS: ICD-10-CM

## 2024-03-21 LAB
ALBUMIN SERPL BCP-MCNC: 4.6 G/DL (ref 3.5–5)
ALP SERPL-CCNC: 75 U/L (ref 34–104)
ALT SERPL W P-5'-P-CCNC: 9 U/L (ref 7–52)
ANION GAP SERPL CALCULATED.3IONS-SCNC: 10 MMOL/L (ref 4–13)
AST SERPL W P-5'-P-CCNC: 12 U/L (ref 13–39)
BASOPHILS # BLD AUTO: 0.14 THOUSANDS/ÂΜL (ref 0–0.1)
BASOPHILS NFR BLD AUTO: 1 % (ref 0–1)
BILIRUB SERPL-MCNC: 0.57 MG/DL (ref 0.2–1)
BUN SERPL-MCNC: 13 MG/DL (ref 5–25)
CALCIUM SERPL-MCNC: 10.3 MG/DL (ref 8.4–10.2)
CHLORIDE SERPL-SCNC: 101 MMOL/L (ref 96–108)
CHOLEST SERPL-MCNC: 98 MG/DL
CO2 SERPL-SCNC: 29 MMOL/L (ref 21–32)
CREAT SERPL-MCNC: 0.65 MG/DL (ref 0.6–1.3)
EOSINOPHIL # BLD AUTO: 0.22 THOUSAND/ÂΜL (ref 0–0.61)
EOSINOPHIL NFR BLD AUTO: 2 % (ref 0–6)
ERYTHROCYTE [DISTWIDTH] IN BLOOD BY AUTOMATED COUNT: 12.9 % (ref 11.6–15.1)
GFR SERPL CREATININE-BSD FRML MDRD: 97 ML/MIN/1.73SQ M
GLUCOSE P FAST SERPL-MCNC: 134 MG/DL (ref 65–99)
HCT VFR BLD AUTO: 44.2 % (ref 34.8–46.1)
HDLC SERPL-MCNC: 42 MG/DL
HGB BLD-MCNC: 14.4 G/DL (ref 11.5–15.4)
IMM GRANULOCYTES # BLD AUTO: 0.05 THOUSAND/UL (ref 0–0.2)
IMM GRANULOCYTES NFR BLD AUTO: 0 % (ref 0–2)
LDLC SERPL CALC-MCNC: 31 MG/DL (ref 0–100)
LYMPHOCYTES # BLD AUTO: 3.88 THOUSANDS/ÂΜL (ref 0.6–4.47)
LYMPHOCYTES NFR BLD AUTO: 28 % (ref 14–44)
MAGNESIUM SERPL-MCNC: 1.7 MG/DL (ref 1.9–2.7)
MCH RBC QN AUTO: 27.6 PG (ref 26.8–34.3)
MCHC RBC AUTO-ENTMCNC: 32.6 G/DL (ref 31.4–37.4)
MCV RBC AUTO: 85 FL (ref 82–98)
MONOCYTES # BLD AUTO: 0.64 THOUSAND/ÂΜL (ref 0.17–1.22)
MONOCYTES NFR BLD AUTO: 5 % (ref 4–12)
NEUTROPHILS # BLD AUTO: 8.94 THOUSANDS/ÂΜL (ref 1.85–7.62)
NEUTS SEG NFR BLD AUTO: 64 % (ref 43–75)
NRBC BLD AUTO-RTO: 0 /100 WBCS
PLATELET # BLD AUTO: 327 THOUSANDS/UL (ref 149–390)
PMV BLD AUTO: 9.6 FL (ref 8.9–12.7)
POTASSIUM SERPL-SCNC: 4.2 MMOL/L (ref 3.5–5.3)
PROT SERPL-MCNC: 7.4 G/DL (ref 6.4–8.4)
RBC # BLD AUTO: 5.22 MILLION/UL (ref 3.81–5.12)
SODIUM SERPL-SCNC: 140 MMOL/L (ref 135–147)
TRIGL SERPL-MCNC: 124 MG/DL
TSH SERPL DL<=0.05 MIU/L-ACNC: 2.42 UIU/ML (ref 0.45–4.5)
WBC # BLD AUTO: 13.87 THOUSAND/UL (ref 4.31–10.16)

## 2024-03-21 PROCEDURE — 83735 ASSAY OF MAGNESIUM: CPT

## 2024-03-21 PROCEDURE — 36415 COLL VENOUS BLD VENIPUNCTURE: CPT

## 2024-03-21 PROCEDURE — 80061 LIPID PANEL: CPT

## 2024-03-21 PROCEDURE — 84443 ASSAY THYROID STIM HORMONE: CPT

## 2024-03-21 PROCEDURE — 85025 COMPLETE CBC W/AUTO DIFF WBC: CPT

## 2024-03-21 PROCEDURE — 80053 COMPREHEN METABOLIC PANEL: CPT

## 2024-03-22 ENCOUNTER — TELEPHONE (OUTPATIENT)
Dept: NEUROLOGY | Facility: CLINIC | Age: 59
End: 2024-03-22

## 2024-03-22 NOTE — TELEPHONE ENCOUNTER
Patient is scheduled for an office visit with Pete on 4/5. Called patient to confirm appt, confirmed appt and appt details.

## 2024-03-25 ENCOUNTER — RA CDI HCC (OUTPATIENT)
Dept: OTHER | Facility: HOSPITAL | Age: 59
End: 2024-03-25

## 2024-03-25 PROBLEM — E78.5 HYPERLIPIDEMIA ASSOCIATED WITH TYPE 2 DIABETES MELLITUS  (HCC): Status: ACTIVE | Noted: 2020-04-07

## 2024-03-25 PROBLEM — E11.69 HYPERLIPIDEMIA ASSOCIATED WITH TYPE 2 DIABETES MELLITUS  (HCC): Status: ACTIVE | Noted: 2020-04-07

## 2024-03-25 PROBLEM — E78.5 HYPERLIPIDEMIA ASSOCIATED WITH TYPE 2 DIABETES MELLITUS  (HCC): Status: ACTIVE | Noted: 2024-03-25

## 2024-03-25 PROBLEM — E11.69 HYPERLIPIDEMIA ASSOCIATED WITH TYPE 2 DIABETES MELLITUS  (HCC): Status: ACTIVE | Noted: 2024-03-25

## 2024-03-27 ENCOUNTER — OFFICE VISIT (OUTPATIENT)
Dept: FAMILY MEDICINE CLINIC | Facility: CLINIC | Age: 59
End: 2024-03-27
Payer: COMMERCIAL

## 2024-03-27 VITALS
RESPIRATION RATE: 14 BRPM | OXYGEN SATURATION: 99 % | HEART RATE: 68 BPM | HEIGHT: 63 IN | BODY MASS INDEX: 25.51 KG/M2 | DIASTOLIC BLOOD PRESSURE: 84 MMHG | SYSTOLIC BLOOD PRESSURE: 128 MMHG | TEMPERATURE: 97.8 F

## 2024-03-27 DIAGNOSIS — E11.69 HYPERLIPIDEMIA ASSOCIATED WITH TYPE 2 DIABETES MELLITUS  (HCC): ICD-10-CM

## 2024-03-27 DIAGNOSIS — I10 ESSENTIAL HYPERTENSION: Primary | ICD-10-CM

## 2024-03-27 DIAGNOSIS — E78.5 HYPERLIPIDEMIA ASSOCIATED WITH TYPE 2 DIABETES MELLITUS  (HCC): ICD-10-CM

## 2024-03-27 DIAGNOSIS — E11.8 TYPE II DIABETES MELLITUS WITH COMPLICATION (HCC): ICD-10-CM

## 2024-03-27 LAB — SL AMB POCT HEMOGLOBIN AIC: 5.4 (ref ?–6.5)

## 2024-03-27 PROCEDURE — 83036 HEMOGLOBIN GLYCOSYLATED A1C: CPT | Performed by: INTERNAL MEDICINE

## 2024-03-27 PROCEDURE — 99214 OFFICE O/P EST MOD 30 MIN: CPT | Performed by: INTERNAL MEDICINE

## 2024-03-27 NOTE — PROGRESS NOTES
Name: Hayley Bolaños      : 1965      MRN: [id number]  Encounter Provider: Jose Chase MD  Encounter Date: 3/27/2024   Encounter department: [facility]    Assessment & Plan     1. Essential hypertension  -     UA (URINE) with reflex to Scope; Future  -     Magnesium; Future  -     Vitamin B12; Future  -     Vitamin D 25 hydroxy; Future; Expected date: 2024    2. BMI 25.0-25.9,adult  -     POCT hemoglobin A1c    3. Type II diabetes mellitus with complication (HCC)  Comments:  improving nicely  increase Ozempic to 2 mg every 10 days  life style Mod  RTC in 2 mos w Blood work  Orders:  -     semaglutide, 2 mg/dose, (Ozempic) 8 mg/ mL injection pen; Inject 0.75 mL (2 mg total) under the skin every 7 days  -     Comprehensive metabolic panel; Future; Expected date: 2024  -     CBC and differential; Future; Expected date: 2024  -     Lipid Panel with Direct LDL reflex; Future; Expected date: 2024  -     TSH, 3rd generation with Free T4 reflex; Future; Expected date: 2024  -     UA (URINE) with reflex to Scope; Future  -     Magnesium; Future  -     Vitamin B12; Future  -     Vitamin D 25 hydroxy; Future; Expected date: 2024    4. Hyperlipidemia associated with type 2 diabetes mellitus   -     Comprehensive metabolic panel; Future; Expected date: 2024  -     CBC and differential; Future; Expected date: 2024  -     Lipid Panel with Direct LDL reflex; Future; Expected date: 2024  -     TSH, 3rd generation with Free T4 reflex; Future; Expected date: 2024  -     UA (URINE) with reflex to Scope; Future  -     Magnesium; Future  -     Vitamin B12; Future  -     Vitamin D 25 hydroxy; Future; Expected date: 2024    Reduce ozempic to 2 mg every 10 days  RTC in 3 mos w Blood work       Subjective      58 Y O lady is here for Regular check up, she feels OK, recent Blood work and med list reviewed w  Pt,...      Review of  Systems   Constitutional:  Negative for chills, fatigue and fever.   HENT:  Negative for congestion, facial swelling, sore throat, trouble swallowing and voice change.    Eyes:  Negative for pain, discharge and visual disturbance.   Respiratory:  Negative for cough, shortness of breath and wheezing.    Cardiovascular:  Negative for chest pain, palpitations and leg swelling.   Gastrointestinal:  Negative for abdominal pain, blood in stool, constipation, diarrhea and nausea.   Endocrine: Negative for polydipsia, polyphagia and polyuria.   Genitourinary:  Negative for difficulty urinating, hematuria and urgency.   Musculoskeletal:  Negative for arthralgias and myalgias.   Skin:  Negative for rash.   Neurological:  Negative for dizziness, tremors, weakness and headaches.   Hematological:  Negative for adenopathy. Does not bruise/bleed easily.   Psychiatric/Behavioral:  Negative for dysphoric mood, sleep disturbance and suicidal ideas.        Current Outpatient Medications on File Prior to Visit   Medication Sig    Acetaminophen (TYLENOL PO) Take by mouth As needed for pain    Aspirin Low Dose 81 MG EC tablet TAKE 1 TABLET (81 MG TOTAL) BY MOUTH DAILY    atorvastatin (LIPITOR) 40 mg tablet Take 1 tablet (40 mg total) by mouth daily    Banophen 25 MG capsule TAKE 1 CAPSULE (25 MG TOTAL) BY MOUTH DAILY AT BEDTIME AS NEEDED FOR ITCHING    calcipotriene (DOVONEX) 0.005 % cream APPLY TO AFFECTED AREA TWICE A DAY 30 DAYS [facility] AREA HAND LEGS    Calcium + Vitamin D3 600-10 MG-MCG TABS TAKE 2 TABLETS BY MOUTH DAILY    celecoxib (CeleBREX) 200 mg capsule TAKE 1 CAPSULE (200 MG TOTAL) BY MOUTH DAILY WITH LUNCH/DAILY    cetirizine (ZyrTEC) 10 mg tablet TAKE 1 TABLET (10 MG TOTAL) BY MOUTH DAILY IN THE EVENING    Diclofenac Sodium (VOLTAREN) 1 % APPLY 2 G TOPICALLY 4 (FOUR) TIMES A DAY    escitalopram (LEXAPRO) 10 mg tablet Take 1 tablet (10 mg total) by mouth daily with dinner    fenofibrate (TRICOR) 145 mg tablet TAKE 1 TABLET  "(145 MG TOTAL) BY MOUTH DAILY    gabapentin (Neurontin) 300 mg capsule Take 1 capsule (300 mg total) by mouth 3 (three) times a day    glipiZIDE (GLUCOTROL) 5 mg tablet TAKE 1 TABLET (5 MG TOTAL) BY MOUTH 2 (TWO) TIMES A DAY WITH MEALS    Lantus SoloStar 100 units/mL SOPN INJECT 1 ML (100 UNITS TOTAL) UNDER THE SKIN IN THE MORNING    levocetirizine (XYZAL) 5 MG tablet TAKE 1 TABLET (5 MG TOTAL) BY MOUTH EVERY EVENING    lidocaine (Lidoderm) 5 % Apply 1 patch topically over 12 hours daily Remove & Discard patch within 12 hours or as directed by MD    magnesium Oxide (MAG-OX) 400 mg TABS Take 1 tablet (400 mg total) by mouth 2 (two) times a day    Melatonin 10 MG TABS Take 1 tablet (10 mg total) by mouth daily at bedtime    metFORMIN (GLUCOPHAGE) 1000 MG tablet TAKE 1 TABLET (1,000 MG TOTAL) BY MOUTH 2 (TWO) TIMES A DAY WITH MEALS    metoprolol tartrate (LOPRESSOR) 25 mg tablet TAKE 1 TABLET (25 MG TOTAL) BY MOUTH EVERY 12 (TWELVE) HOURS    montelukast (SINGULAIR) 10 mg tablet TAKE 1 TABLET (10 MG TOTAL) BY MOUTH EVERY 24 HOURS    oxybutynin (DITROPAN XL) 15 MG 24 hr tablet TAKE 1 TABLET (15 MG TOTAL) BY MOUTH DAILY    pantoprazole (PROTONIX) 40 mg tablet Take 1 tablet (40 mg total) by mouth daily    vitamin B-12 (VITAMIN B-12) 1,000 mcg tablet Take 1 tablet (1,000 mcg total) by mouth daily    [DISCONTINUED] semaglutide, 2 mg/dose, (Ozempic) 8 mg/ mL injection pen Inject 0.75 mL (2 mg total) under the skin every 7 days    albuterol (PROVENTIL HFA,VENTOLIN HFA) 90 mcg/act inhaler Inhale 2 puffs every 6 (six) hours as needed for wheezing (Patient not taking: Reported on 8/4/2023)    glucose blood test strip Use as instructed to test three times daily. (Patient not taking: Reported on 5/24/2023)       Objective     /84 (BP Location: Right arm, Patient Position: Sitting, Cuff Size: Standard)   Pulse 68   Temp 97.8 °F (36.6 °C) (Tympanic)   Resp 14   Ht 5' 3\" (1.6 m)   LMP  (LMP Unknown)   SpO2 99%   BMI 25.51 " kg/m²     Physical Exam  Constitutional:       General: She is not in acute distress.  HENT:      Head: Normocephalic.      Mouth/Throat:      Pharynx: No oropharyngeal exudate.   Eyes:      General: No scleral icterus.     Conjunctiva/sclera: Conjunctivae normal.      Pupils: Pupils are equal, round, and reactive to light.   Neck:      Thyroid: No thyromegaly.   Cardiovascular:      Rate and Rhythm: Normal rate and regular rhythm.      Heart sounds: Normal heart sounds. No murmur heard.  Pulmonary:      Effort: Pulmonary effort is normal. No respiratory distress.      Breath sounds: Normal breath sounds. No wheezing or rales.   Abdominal:      General: Bowel sounds are normal. There is no distension.      Palpations: Abdomen is soft.      Tenderness: There is no abdominal tenderness. There is no guarding or rebound.   Musculoskeletal:         General: No tenderness.      Cervical back: Neck supple.   Lymphadenopathy:      Cervical: No cervical adenopathy.   Skin:     Coloration: Skin is not pale.      Findings: No rash.   Neurological:      Mental Status: She is alert and oriented to person, place, and time.      Sensory: No sensory deficit.      Motor: No weakness.       Jose Chase MD

## 2024-04-05 ENCOUNTER — OFFICE VISIT (OUTPATIENT)
Dept: NEUROLOGY | Facility: CLINIC | Age: 59
End: 2024-04-05
Payer: COMMERCIAL

## 2024-04-05 VITALS
WEIGHT: 134.2 LBS | HEART RATE: 78 BPM | SYSTOLIC BLOOD PRESSURE: 112 MMHG | OXYGEN SATURATION: 98 % | BODY MASS INDEX: 23.77 KG/M2 | DIASTOLIC BLOOD PRESSURE: 76 MMHG | TEMPERATURE: 98.2 F

## 2024-04-05 DIAGNOSIS — B02.29 POST HERPETIC NEURALGIA: ICD-10-CM

## 2024-04-05 DIAGNOSIS — A53.9 SYPHILIS: ICD-10-CM

## 2024-04-05 DIAGNOSIS — D36.11 SCHWANNOMA OF NERVE OF NECK: ICD-10-CM

## 2024-04-05 DIAGNOSIS — R41.3 MEMORY LOSS: Primary | ICD-10-CM

## 2024-04-05 DIAGNOSIS — M75.00 ADHESIVE CAPSULITIS: ICD-10-CM

## 2024-04-05 PROCEDURE — 99213 OFFICE O/P EST LOW 20 MIN: CPT | Performed by: NURSE PRACTITIONER

## 2024-04-05 NOTE — PROGRESS NOTES
Patient ID: Hayley Bolaños is a 58 y.o. female.    Assessment/Plan:  Patient Instructions:  Call the orthopedic doctor for continued left shoulder pain  Good to hear overall doing well; no concerning signs on the brain mri  Since there is no neck symptoms we will not repeat imaging at this time but report any new symptoms to this office or Dr. Chase and we can suggest repeat MRI imaging of the neck or ENT evaluation.  Follow up in 6 months       Diagnoses and all orders for this visit:    Memory loss  Comments:  stable/no change    Syphilis  Comments:  treated    Post herpetic neuralgia  Comments:  improved    Schwannoma of nerve of neck  Comments:  stable on imaging; per discussion holding off on further imaging at this time as asymptomatic; could repeat MRI neck soft tissue in future    Adhesive capsulitis      Subjective:    HPI  Hayley Bolaños presents for 3 month follow up/MRI review.   Since last visit she denies change in memory. She had covid but recovered.  She denies neck pain or swallowing difficulty.  She has been having left shoulder pain- previously had PT/injection for this.  She states mild headaches come and go.  She has been treated for syphilis by infectious disease.    Lab Results   Component Value Date    WBC 13.87 (H) 03/21/2024    HGB 14.4 03/21/2024    HCT 44.2 03/21/2024    MCV 85 03/21/2024     03/21/2024     Lab Results   Component Value Date     06/11/2018    SODIUM 140 03/21/2024    K 4.2 03/21/2024     03/21/2024    CO2 29 03/21/2024    ANIONGAP 11 06/11/2018    AGAP 10 03/21/2024    BUN 13 03/21/2024    CREATININE 0.65 03/21/2024    GLUC 315 (H) 01/21/2022    GLUF 134 (H) 03/21/2024    CALCIUM 10.3 (H) 03/21/2024    AST 12 (L) 03/21/2024    ALT 9 03/21/2024    ALKPHOS 75 03/21/2024    PROT 7.4 06/11/2018    TP 7.4 03/21/2024    BILITOT 0.4 06/11/2018    TBILI 0.57 03/21/2024    EGFR 97 03/21/2024     Lab Results   Component Value Date    IFZ1AKLASICT 2.419 03/21/2024      Lab Results   Component Value Date    LDLCALC 31 03/21/2024         MRI brain neuroquant 1/19/2024;  IMPRESSION:  Moderate altered signal involving white matter discussed above is nonspecific regarding etiology; findings most often relating to chronic small vessel white matter ischemic disease.  NeuroQuant analysis was performed: No neuro Quant findings to support mesial temporal neurodegeneration.  Partially included and incompletely evaluated is a well-circumscribed ovoid area of altered signal within the right prevertebral soft tissues to the right of midline and to the left of the right internal carotid artery where included having a short and   long axis diameter of 6.5 and 11.1 mm (series 5 annotated axial image 1). A similar structure with maximal short and long axis diameter of 6.5 and 10.4 mm is noted on series 3 axial images 33 through 36 on the 7/12/2019 neck CT and could relate to a   schwannoma. If further imaging characterization is desired, MRI of the neck soft tissues with and without gadolinium could be obtained.    past medical history of DM, COPD, OA, hyperlipidemia, syphilis (recent testing showing old infection-follows with ID- recent LP does not show neurosyphilis), b 12 deficiency (recent level 282) who presents for new patient evaluation for memory loss, referred by primary care. She states for the past 1 year she has been more forgetful; she states if she relaxes what she forgot will come back to her. She gives an example-she forgot one time the seasonings to use in yellow rice she cooked. She uses post it notes to help her remember. She denies home safety issues. She denies any relevant family history of memory loss. She lives alone (sometimes her grandson will stay with her-she helps get him to school). She does not drive-has not driven in some time; she will walk or use the bus. She manages her own medications-uses a pill box. She states she has trouble sleeping at times, she will lay  down at 8 pm and not fall asleep until 3am; she states previously diagnosed with DENYS before moving here. She states occasional frontal headache, resolved with tylenol use.      The following portions of the patient's history were reviewed and updated as appropriate: allergies, current medications, past family history, past medical history, past social history, past surgical history, and problem list.         Objective:    Blood pressure 112/76, pulse 78, temperature 98.2 °F (36.8 °C), temperature source Temporal, weight 60.9 kg (134 lb 3.2 oz), SpO2 98%.    Physical Exam  Vitals reviewed.   Constitutional:       General: She is not in acute distress.  HENT:      Head: Normocephalic.      Right Ear: External ear normal.      Left Ear: External ear normal.      Nose: Nose normal.      Mouth/Throat:      Pharynx: Oropharynx is clear.   Eyes:      General: Lids are normal.      Extraocular Movements: Extraocular movements intact.      Pupils: Pupils are equal, round, and reactive to light.   Cardiovascular:      Rate and Rhythm: Normal rate.      Pulses: Normal pulses.      Heart sounds: Normal heart sounds.   Pulmonary:      Effort: Pulmonary effort is normal.      Breath sounds: Normal breath sounds.   Abdominal:      General: There is no distension.   Musculoskeletal:         General: Tenderness (left shoulder) present.      Cervical back: Normal range of motion.   Skin:     Findings: No rash.   Neurological:      General: No focal deficit present.      Mental Status: She is alert. Mental status is at baseline.      Motor: Motor strength is normal.     Coordination: Romberg sign negative.      Deep Tendon Reflexes:      Reflex Scores:       Brachioradialis reflexes are 1+ on the right side and 1+ on the left side.       Patellar reflexes are 1+ on the right side and 1+ on the left side.  Psychiatric:         Mood and Affect: Mood normal.         Speech: Speech normal.         Behavior: Behavior normal.          Neurological Exam  Mental Status  Alert. Oriented to person, place and time. Speech is normal. Language is fluent with no aphasia.    Cranial Nerves  CN II: Visual acuity is normal. Visual fields full to confrontation.  CN III, IV, VI: Extraocular movements intact bilaterally. Normal lids and orbits bilaterally. Pupils equal round and reactive to light bilaterally.  CN V: Facial sensation is normal.  CN VII: Full and symmetric facial movement.  CN VIII: Hearing is normal.  CN IX, X: Palate elevates symmetrically. Normal gag reflex.  CN XI: Shoulder shrug strength is normal.  CN XII: Tongue midline without atrophy or fasciculations.    Motor  Normal muscle bulk throughout. Strength is 5/5 throughout all four extremities.    Sensory  Light touch is normal in upper and lower extremities.     Reflexes                                            Right                      Left  Brachioradialis                    1+                         1+  Patellar                                1+                         1+    Coordination  Right: Finger-to-nose normal.Left: Finger-to-nose normal.    Gait  Casual gait is normal including stance, stride, and arm swing. Romberg is absent. Able to rise from chair without using arms.        ROS:    Review of Systems   Constitutional:  Negative for appetite change, fatigue and fever.   HENT: Negative.  Negative for hearing loss, tinnitus, trouble swallowing and voice change.    Eyes: Negative.  Negative for photophobia, pain and visual disturbance.   Respiratory: Negative.  Negative for shortness of breath.    Cardiovascular: Negative.  Negative for palpitations.   Gastrointestinal: Negative.  Negative for nausea and vomiting.   Endocrine: Negative.  Negative for cold intolerance.   Genitourinary: Negative.  Negative for dysuria, frequency and urgency.   Musculoskeletal:  Positive for arthralgias (L arm pain from shoulder to elbow-mri done january). Negative for back pain, gait  problem, myalgias, neck pain and neck stiffness.   Skin: Negative.  Negative for rash.   Allergic/Immunologic: Negative.    Neurological:  Positive for tremors (L hand). Negative for dizziness, seizures, syncope, facial asymmetry, speech difficulty, weakness, light-headedness, numbness and headaches.   Hematological: Negative.  Does not bruise/bleed easily.   Psychiatric/Behavioral: Negative.  Negative for confusion, hallucinations and sleep disturbance.         States memory loss better   ROS was reviewed and updated as appropriate

## 2024-04-05 NOTE — PATIENT INSTRUCTIONS
Call the orthopedic doctor for continued left shoulder pain  Good to hear overall doing well; no concerning signs on the brain mri  Since there is no neck symptoms we will not repeat imaging at this time but report any new symptoms to this office or Dr. Chase and we can suggest repeat MRI imaging of the neck or ENT evaluation.  Follow up in 6 months

## 2024-04-09 DIAGNOSIS — E78.5 HYPERLIPIDEMIA ASSOCIATED WITH TYPE 2 DIABETES MELLITUS  (HCC): ICD-10-CM

## 2024-04-09 DIAGNOSIS — E11.69 HYPERLIPIDEMIA ASSOCIATED WITH TYPE 2 DIABETES MELLITUS  (HCC): ICD-10-CM

## 2024-04-09 RX ORDER — ATORVASTATIN CALCIUM 40 MG/1
40 TABLET, FILM COATED ORAL DAILY
Qty: 90 TABLET | Refills: 1 | Status: SHIPPED | OUTPATIENT
Start: 2024-04-09

## 2024-04-11 DIAGNOSIS — F32.A DEPRESSION, UNSPECIFIED DEPRESSION TYPE: ICD-10-CM

## 2024-04-11 RX ORDER — OXYBUTYNIN CHLORIDE 15 MG/1
15 TABLET, EXTENDED RELEASE ORAL DAILY
Qty: 90 TABLET | Refills: 1 | Status: SHIPPED | OUTPATIENT
Start: 2024-04-11

## 2024-04-23 DIAGNOSIS — M77.8 LEFT SHOULDER TENDONITIS: ICD-10-CM

## 2024-05-29 DIAGNOSIS — F32.A DEPRESSION, UNSPECIFIED DEPRESSION TYPE: ICD-10-CM

## 2024-05-29 DIAGNOSIS — I10 ESSENTIAL HYPERTENSION: ICD-10-CM

## 2024-05-31 RX ORDER — LANOLIN ALCOHOL/MO/W.PET/CERES
400 CREAM (GRAM) TOPICAL 2 TIMES DAILY
Qty: 180 TABLET | Refills: 1 | Status: SHIPPED | OUTPATIENT
Start: 2024-05-31

## 2024-05-31 RX ORDER — ESCITALOPRAM OXALATE 10 MG/1
10 TABLET ORAL
Qty: 30 TABLET | Refills: 5 | Status: SHIPPED | OUTPATIENT
Start: 2024-05-31

## 2024-05-31 RX ORDER — ASPIRIN 81 MG/1
TABLET, COATED ORAL
Qty: 90 TABLET | Refills: 1 | Status: SHIPPED | OUTPATIENT
Start: 2024-05-31

## 2024-07-03 ENCOUNTER — RA CDI HCC (OUTPATIENT)
Dept: OTHER | Facility: HOSPITAL | Age: 59
End: 2024-07-03

## 2024-07-09 DIAGNOSIS — M77.8 LEFT SHOULDER TENDONITIS: ICD-10-CM

## 2024-07-10 DIAGNOSIS — M85.80 OSTEOPENIA, UNSPECIFIED LOCATION: ICD-10-CM

## 2024-07-10 RX ORDER — CALCIUM CARBONATE/VITAMIN D3 600 MG-10
TABLET ORAL
Qty: 60 TABLET | Refills: 1 | Status: SHIPPED | OUTPATIENT
Start: 2024-07-10

## 2024-07-12 DIAGNOSIS — E78.5 HYPERLIPIDEMIA ASSOCIATED WITH TYPE 2 DIABETES MELLITUS  (HCC): ICD-10-CM

## 2024-07-12 DIAGNOSIS — J45.909 UNCOMPLICATED ASTHMA, UNSPECIFIED ASTHMA SEVERITY, UNSPECIFIED WHETHER PERSISTENT: ICD-10-CM

## 2024-07-12 DIAGNOSIS — I10 ESSENTIAL HYPERTENSION: ICD-10-CM

## 2024-07-12 DIAGNOSIS — E11.8 TYPE II DIABETES MELLITUS WITH COMPLICATION (HCC): ICD-10-CM

## 2024-07-12 DIAGNOSIS — E11.69 HYPERLIPIDEMIA ASSOCIATED WITH TYPE 2 DIABETES MELLITUS  (HCC): ICD-10-CM

## 2024-07-12 RX ORDER — GLIPIZIDE 5 MG/1
5 TABLET ORAL 2 TIMES DAILY WITH MEALS
Qty: 180 TABLET | Refills: 1 | Status: SHIPPED | OUTPATIENT
Start: 2024-07-12

## 2024-07-12 RX ORDER — FENOFIBRATE 145 MG/1
145 TABLET, COATED ORAL DAILY
Qty: 90 TABLET | Refills: 1 | Status: SHIPPED | OUTPATIENT
Start: 2024-07-12

## 2024-07-12 RX ORDER — MONTELUKAST SODIUM 10 MG/1
10 TABLET ORAL EVERY 24 HOURS
Qty: 90 TABLET | Refills: 1 | Status: SHIPPED | OUTPATIENT
Start: 2024-07-12

## 2024-07-18 ENCOUNTER — APPOINTMENT (OUTPATIENT)
Dept: LAB | Facility: HOSPITAL | Age: 59
End: 2024-07-18
Payer: COMMERCIAL

## 2024-07-18 DIAGNOSIS — E11.69 HYPERLIPIDEMIA ASSOCIATED WITH TYPE 2 DIABETES MELLITUS  (HCC): ICD-10-CM

## 2024-07-18 DIAGNOSIS — I10 ESSENTIAL HYPERTENSION: ICD-10-CM

## 2024-07-18 DIAGNOSIS — E11.8 TYPE II DIABETES MELLITUS WITH COMPLICATION (HCC): ICD-10-CM

## 2024-07-18 DIAGNOSIS — E78.5 HYPERLIPIDEMIA ASSOCIATED WITH TYPE 2 DIABETES MELLITUS  (HCC): ICD-10-CM

## 2024-07-18 LAB
25(OH)D3 SERPL-MCNC: 40.5 NG/ML (ref 30–100)
BASOPHILS # BLD AUTO: 0.13 THOUSANDS/ÂΜL (ref 0–0.1)
BASOPHILS NFR BLD AUTO: 1 % (ref 0–1)
CHOLEST SERPL-MCNC: 126 MG/DL
EOSINOPHIL # BLD AUTO: 0.37 THOUSAND/ÂΜL (ref 0–0.61)
EOSINOPHIL NFR BLD AUTO: 3 % (ref 0–6)
ERYTHROCYTE [DISTWIDTH] IN BLOOD BY AUTOMATED COUNT: 13 % (ref 11.6–15.1)
HCT VFR BLD AUTO: 40.9 % (ref 34.8–46.1)
HDLC SERPL-MCNC: 45 MG/DL
HGB BLD-MCNC: 13.3 G/DL (ref 11.5–15.4)
IMM GRANULOCYTES # BLD AUTO: 0.04 THOUSAND/UL (ref 0–0.2)
IMM GRANULOCYTES NFR BLD AUTO: 0 % (ref 0–2)
LDLC SERPL CALC-MCNC: 44 MG/DL (ref 0–100)
LYMPHOCYTES # BLD AUTO: 5.65 THOUSANDS/ÂΜL (ref 0.6–4.47)
LYMPHOCYTES NFR BLD AUTO: 44 % (ref 14–44)
MAGNESIUM SERPL-MCNC: 1.9 MG/DL (ref 1.9–2.7)
MCH RBC QN AUTO: 27.8 PG (ref 26.8–34.3)
MCHC RBC AUTO-ENTMCNC: 32.5 G/DL (ref 31.4–37.4)
MCV RBC AUTO: 86 FL (ref 82–98)
MONOCYTES # BLD AUTO: 0.64 THOUSAND/ÂΜL (ref 0.17–1.22)
MONOCYTES NFR BLD AUTO: 5 % (ref 4–12)
NEUTROPHILS # BLD AUTO: 5.9 THOUSANDS/ÂΜL (ref 1.85–7.62)
NEUTS SEG NFR BLD AUTO: 47 % (ref 43–75)
NRBC BLD AUTO-RTO: 0 /100 WBCS
PLATELET # BLD AUTO: 317 THOUSANDS/UL (ref 149–390)
PMV BLD AUTO: 9.9 FL (ref 8.9–12.7)
RBC # BLD AUTO: 4.78 MILLION/UL (ref 3.81–5.12)
T4 FREE SERPL-MCNC: 0.7 NG/DL (ref 0.61–1.12)
TRIGL SERPL-MCNC: 183 MG/DL
TSH SERPL DL<=0.05 MIU/L-ACNC: 4.5 UIU/ML (ref 0.45–4.5)
VIT B12 SERPL-MCNC: 1225 PG/ML (ref 180–914)
WBC # BLD AUTO: 12.73 THOUSAND/UL (ref 4.31–10.16)

## 2024-07-18 PROCEDURE — 84439 ASSAY OF FREE THYROXINE: CPT

## 2024-07-18 PROCEDURE — 82607 VITAMIN B-12: CPT

## 2024-07-18 PROCEDURE — 36415 COLL VENOUS BLD VENIPUNCTURE: CPT

## 2024-07-18 PROCEDURE — 80061 LIPID PANEL: CPT

## 2024-07-18 PROCEDURE — 82306 VITAMIN D 25 HYDROXY: CPT

## 2024-07-18 PROCEDURE — 83735 ASSAY OF MAGNESIUM: CPT

## 2024-07-18 PROCEDURE — 84443 ASSAY THYROID STIM HORMONE: CPT

## 2024-07-18 PROCEDURE — 85025 COMPLETE CBC W/AUTO DIFF WBC: CPT

## 2024-07-23 ENCOUNTER — TELEPHONE (OUTPATIENT)
Dept: FAMILY MEDICINE CLINIC | Facility: CLINIC | Age: 59
End: 2024-07-23

## 2024-07-23 NOTE — TELEPHONE ENCOUNTER
Tried contacting pt in regards to appointment for tomorrow phone is out of service. Unable to reach pt to let her know dr becerril is out of the office due to being ill she needs to call office back to either reschedule or change appointment to virtual visit.

## 2024-08-05 DIAGNOSIS — R21 RASH: ICD-10-CM

## 2024-08-05 DIAGNOSIS — J01.90 ACUTE SINUSITIS, RECURRENCE NOT SPECIFIED, UNSPECIFIED LOCATION: ICD-10-CM

## 2024-08-05 RX ORDER — CALCIPOTRIENE 50 UG/G
CREAM TOPICAL
Qty: 120 G | Refills: 5 | Status: SHIPPED | OUTPATIENT
Start: 2024-08-05

## 2024-08-05 RX ORDER — LEVOCETIRIZINE DIHYDROCHLORIDE 5 MG/1
TABLET, FILM COATED ORAL
Qty: 90 TABLET | Refills: 1 | Status: SHIPPED | OUTPATIENT
Start: 2024-08-05

## 2024-09-20 ENCOUNTER — OFFICE VISIT (OUTPATIENT)
Dept: FAMILY MEDICINE CLINIC | Facility: CLINIC | Age: 59
End: 2024-09-20
Payer: COMMERCIAL

## 2024-09-20 VITALS
SYSTOLIC BLOOD PRESSURE: 126 MMHG | HEIGHT: 63 IN | HEART RATE: 70 BPM | BODY MASS INDEX: 24.98 KG/M2 | TEMPERATURE: 97.8 F | OXYGEN SATURATION: 98 % | RESPIRATION RATE: 14 BRPM | DIASTOLIC BLOOD PRESSURE: 80 MMHG | WEIGHT: 141 LBS

## 2024-09-20 DIAGNOSIS — I10 ESSENTIAL HYPERTENSION: ICD-10-CM

## 2024-09-20 DIAGNOSIS — E78.5 HYPERLIPIDEMIA ASSOCIATED WITH TYPE 2 DIABETES MELLITUS  (HCC): Primary | ICD-10-CM

## 2024-09-20 DIAGNOSIS — R26.81 UNSTEADY GAIT: ICD-10-CM

## 2024-09-20 DIAGNOSIS — R29.6 RECURRENT FALLS WHILE WALKING: ICD-10-CM

## 2024-09-20 DIAGNOSIS — E11.8 TYPE II DIABETES MELLITUS WITH COMPLICATION (HCC): ICD-10-CM

## 2024-09-20 DIAGNOSIS — E11.69 HYPERLIPIDEMIA ASSOCIATED WITH TYPE 2 DIABETES MELLITUS  (HCC): Primary | ICD-10-CM

## 2024-09-20 DIAGNOSIS — M85.80 OSTEOPENIA, UNSPECIFIED LOCATION: ICD-10-CM

## 2024-09-20 DIAGNOSIS — Z12.31 ENCOUNTER FOR SCREENING MAMMOGRAM FOR BREAST CANCER: ICD-10-CM

## 2024-09-20 DIAGNOSIS — Z23 FLU VACCINE NEED: ICD-10-CM

## 2024-09-20 PROCEDURE — 90673 RIV3 VACCINE NO PRESERV IM: CPT | Performed by: INTERNAL MEDICINE

## 2024-09-20 PROCEDURE — G0008 ADMIN INFLUENZA VIRUS VAC: HCPCS | Performed by: INTERNAL MEDICINE

## 2024-09-20 PROCEDURE — 99214 OFFICE O/P EST MOD 30 MIN: CPT | Performed by: INTERNAL MEDICINE

## 2024-09-20 RX ORDER — CALCIUM CARBONATE/VITAMIN D3 600 MG-10
2 TABLET ORAL DAILY
Qty: 60 TABLET | Refills: 1 | Status: SHIPPED | OUTPATIENT
Start: 2024-09-20

## 2024-09-20 RX ORDER — METOPROLOL TARTRATE 25 MG/1
25 TABLET, FILM COATED ORAL EVERY 12 HOURS SCHEDULED
Qty: 180 TABLET | Refills: 1 | Status: SHIPPED | OUTPATIENT
Start: 2024-09-20

## 2024-09-20 RX ORDER — DAPAGLIFLOZIN 5 MG/1
5 TABLET, FILM COATED ORAL
Qty: 90 TABLET | Refills: 3 | Status: SHIPPED | OUTPATIENT
Start: 2024-09-20

## 2024-09-20 RX ORDER — FENOFIBRATE 145 MG/1
145 TABLET, COATED ORAL DAILY
Qty: 90 TABLET | Refills: 1 | Status: SHIPPED | OUTPATIENT
Start: 2024-09-20

## 2024-09-20 RX ORDER — GLIPIZIDE 5 MG/1
5 TABLET ORAL 2 TIMES DAILY WITH MEALS
Qty: 180 TABLET | Refills: 1 | Status: SHIPPED | OUTPATIENT
Start: 2024-09-20

## 2024-09-20 RX ORDER — LANOLIN ALCOHOL/MO/W.PET/CERES
400 CREAM (GRAM) TOPICAL 2 TIMES DAILY
Qty: 180 TABLET | Refills: 1 | Status: SHIPPED | OUTPATIENT
Start: 2024-09-20

## 2024-09-20 RX ORDER — ATORVASTATIN CALCIUM 40 MG/1
40 TABLET, FILM COATED ORAL DAILY
Qty: 90 TABLET | Refills: 1 | Status: SHIPPED | OUTPATIENT
Start: 2024-09-20

## 2024-09-20 NOTE — ASSESSMENT & PLAN NOTE
Orders:    metoprolol tartrate (LOPRESSOR) 25 mg tablet; Take 1 tablet (25 mg total) by mouth every 12 (twelve) hours    RTC in  1-2 mos w Blood work

## 2024-09-20 NOTE — PROGRESS NOTES
Ambulatory Visit  Name: Hayley Bolaños      : 1965      MRN: [id number]  Encounter Provider: Jose Chase MD  Encounter Date: 2024   Encounter department: [facility] C[facility]    Assessment & Plan  Type II diabetes mellitus with complication (HCC)    Lab Results   Component Value Date    HGBA1C 5.4 2024       Orders:    Ambulatory Referral to Ophthalmology; Future    glipiZIDE (GLUCOTROL) 5 mg tablet; Take 1 tablet (5 mg total) by mouth 2 (two) times a day with meals    dapagliflozin (Farxiga) 5 MG TABS; Take 1 tablet (5 mg total) by mouth daily with lunch Please STOP Ozempic    Encounter for screening mammogram for breast cancer    Orders:    Mammo screening bilateral w 3d and cad; Future    Hyperlipidemia associated with type 2 diabetes mellitus  (HCC)    Lab Results   Component Value Date    HGBA1C 5.4 2024       Orders:    Comprehensive metabolic panel; Future    CBC and differential; Future    TSH, 3rd generation with Free T4 reflex; Future    Lipid Panel with Direct LDL reflex; Future    atorvastatin (LIPITOR) 40 mg tablet; Take 1 tablet (40 mg total) by mouth daily    fenofibrate (TRICOR) 145 mg tablet; Take 1 tablet (145 mg total) by mouth daily    Recurrent falls while walking    Orders:    Lyme Total AB W Reflex to IGM/IGG; Future    RPR (DX) W/REFL TITER AND CONFIRM TESTING (REFL); Future    Vitamin B1 (Thiamine), Serum/Plasma, LC/MS/MS; Future    CT head wo contrast; Future    XR ribs right w pa chest min 3 views; Future    XR spine thoracic 2 vw; Future    XR spine lumbar minimum 4 views non injury; Future    Ambulatory Referral to Neurology; Future    Unsteady gait    Orders:    Lyme Total AB W Reflex to IGM/IGG; Future    RPR (DX) W/REFL TITER AND CONFIRM TESTING (REFL); Future    Vitamin B1 (Thiamine), Serum/Plasma, LC/MS/MS; Future    CT head wo contrast; Future    XR ribs right w pa chest min 3 views; Future    XR spine thoracic 2 vw; Future    XR  spine lumbar minimum 4 views non injury; Future    Ambulatory Referral to Neurology; Future    Flu vaccine need    Orders:    influenza vaccine, recombinant, PF, 0.5 mL IM (Flublok)    Osteopenia, unspecified location    Orders:    Calcium Carb-Cholecalciferol (Calcium + Vitamin D3) 600-10 MG-MCG TABS; Take 2 tablets by mouth daily    BMI 24.0-24.9, adult    Orders:    magnesium Oxide (MAG-OX) 400 mg TABS; Take 1 tablet (400 mg total) by mouth 2 (two) times a day    metFORMIN (GLUCOPHAGE) 1000 MG tablet; Take 1 tablet (1,000 mg total) by mouth 2 (two) times a day with meals    Essential hypertension    Orders:    metoprolol tartrate (LOPRESSOR) 25 mg tablet; Take 1 tablet (25 mg total) by mouth every 12 (twelve) hours    RTC in  1-2 mos w Blood work       History of Present Illness     59 Y O Lady is here for Regular check up, she has few symptoms, recent blood work and med list reviewed,...          Review of Systems   Constitutional:  Negative for chills, fatigue and fever.   HENT:  Negative for congestion, facial swelling, sore throat, trouble swallowing and voice change.    Eyes:  Negative for pain, discharge and visual disturbance.   Respiratory:  Negative for cough, shortness of breath and wheezing.    Cardiovascular:  Negative for chest pain, palpitations and leg swelling.   Gastrointestinal:  Negative for abdominal pain, blood in stool, constipation, diarrhea and nausea.   Endocrine: Negative for polydipsia, polyphagia and polyuria.   Genitourinary:  Negative for difficulty urinating, hematuria and urgency.   Musculoskeletal:  Negative for arthralgias and myalgias.   Skin:  Negative for rash.   Neurological:  Positive for dizziness and tremors. Negative for weakness and headaches.   Hematological:  Negative for adenopathy. Does not bruise/bleed easily.   Psychiatric/Behavioral:  Negative for dysphoric mood, sleep disturbance and suicidal ideas.            Objective     /80 (BP Location: Left arm,  "Patient Position: Sitting, Cuff Size: Standard)   Pulse 70   Temp 97.8 °F (36.6 °C) (Tympanic)   Resp 14   Ht 5' 3\" (1.6 m)   Wt 64 kg (141 lb)   LMP  (LMP Unknown)   SpO2 98%   BMI 24.98 kg/m²     Physical Exam  Constitutional:       General: She is not in acute distress.     Appearance: She is well-developed. She is not diaphoretic.   HENT:      Head: Normocephalic.      Right Ear: External ear normal.      Left Ear: External ear normal.      Nose: Nose normal.   Eyes:      General:         Right eye: No discharge.         Left eye: No discharge.      Extraocular Movements: EOM normal.      Conjunctiva/sclera: Conjunctivae normal.      Pupils: Pupils are equal, round, and reactive to light.   Neck:      Thyroid: No thyromegaly.      Trachea: No tracheal deviation.   Cardiovascular:      Rate and Rhythm: Normal rate and regular rhythm.      Heart sounds: Normal heart sounds. No murmur heard.     No friction rub.   Pulmonary:      Effort: Pulmonary effort is normal. No respiratory distress.      Breath sounds: Normal breath sounds. No stridor. No wheezing or rales.   Abdominal:      General: Bowel sounds are normal. There is no distension.      Palpations: Abdomen is soft.      Tenderness: There is no abdominal tenderness. There is no guarding.   Musculoskeletal:         General: No tenderness, deformity or edema. Normal range of motion.      Cervical back: Normal range of motion and neck supple.   Lymphadenopathy:      Cervical: No cervical adenopathy.   Skin:     General: Skin is warm.      Coloration: Skin is not pale.      Findings: No erythema or rash.   Neurological:      Mental Status: She is alert and oriented to person, place, and time.      Cranial Nerves: No cranial nerve deficit.      Coordination: Coordination normal.      Gait: Gait abnormal.   Psychiatric:         Mood and Affect: Mood and affect normal.         Behavior: Behavior normal.         "

## 2024-09-20 NOTE — ASSESSMENT & PLAN NOTE
Lab Results   Component Value Date    HGBA1C 5.4 03/27/2024       Orders:    Comprehensive metabolic panel; Future    CBC and differential; Future    TSH, 3rd generation with Free T4 reflex; Future    Lipid Panel with Direct LDL reflex; Future    atorvastatin (LIPITOR) 40 mg tablet; Take 1 tablet (40 mg total) by mouth daily    fenofibrate (TRICOR) 145 mg tablet; Take 1 tablet (145 mg total) by mouth daily

## 2024-09-20 NOTE — PROGRESS NOTES
Diabetic Foot Exam    Patient's shoes and socks removed.    Right Foot/Ankle   Right Foot Inspection  Skin Exam: skin normal and skin intact. No dry skin, no warmth, no callus, no erythema, no maceration, no abnormal color, no pre-ulcer, no ulcer and no callus.     Toe Exam: ROM and strength within normal limits.     Sensory   Vibration: intact  Proprioception: diminished  Monofilament testing: diminished    Vascular  Capillary refills: < 3 seconds  The right DP pulse is 1+. The right PT pulse is 1+.     Left Foot/Ankle  Left Foot Inspection  Skin Exam: skin normal and skin intact. No dry skin, no warmth, no erythema, no maceration, normal color, no pre-ulcer, no ulcer and no callus.     Toe Exam: ROM and strength within normal limits.     Sensory   Vibration: intact  Proprioception: diminished  Monofilament testing: diminished    Vascular  Capillary refills: < 3 seconds  The left DP pulse is 1+. The left PT pulse is 1+.     Assign Risk Category  No deformity present  No loss of protective sensation  No weak pulses  Risk: 0

## 2024-10-07 ENCOUNTER — TELEPHONE (OUTPATIENT)
Age: 59
End: 2024-10-07

## 2024-10-07 ENCOUNTER — OFFICE VISIT (OUTPATIENT)
Dept: NEUROLOGY | Facility: CLINIC | Age: 59
End: 2024-10-07
Payer: COMMERCIAL

## 2024-10-07 VITALS
HEIGHT: 63 IN | TEMPERATURE: 97.2 F | BODY MASS INDEX: 25.09 KG/M2 | OXYGEN SATURATION: 100 % | WEIGHT: 141.6 LBS | DIASTOLIC BLOOD PRESSURE: 58 MMHG | HEART RATE: 74 BPM | SYSTOLIC BLOOD PRESSURE: 112 MMHG

## 2024-10-07 DIAGNOSIS — R41.3 MEMORY LOSS: ICD-10-CM

## 2024-10-07 DIAGNOSIS — D36.11 SCHWANNOMA OF NERVE OF NECK: ICD-10-CM

## 2024-10-07 DIAGNOSIS — Z86.19 HISTORY OF SYPHILIS: ICD-10-CM

## 2024-10-07 DIAGNOSIS — F32.A DEPRESSION: ICD-10-CM

## 2024-10-07 DIAGNOSIS — G47.33 OSA (OBSTRUCTIVE SLEEP APNEA): Primary | ICD-10-CM

## 2024-10-07 PROCEDURE — 99214 OFFICE O/P EST MOD 30 MIN: CPT | Performed by: NURSE PRACTITIONER

## 2024-10-07 NOTE — PATIENT INSTRUCTIONS
I will reach out to family medicine to see if we can get you a new machine for blood sugar monitoring  I would suggest completing sleep study and seeing a therapist again  Continue with plans to see the eye doctor and repeat labs for Dr. Chase  Repeat MRI neck as discussed  Prevent falls-stand up slowly, increase water to 3 bottles/day and make sure to eat regularly  Follow up in 4 months

## 2024-10-07 NOTE — TELEPHONE ENCOUNTER
Contacted patient in regards to Routine Referral in attempts to verify patient's needs of services and add patient to proper wait list. LVM for patient to contact intake dept  in regards to routine referral at [phone]. 1st attempt.

## 2024-10-07 NOTE — PROGRESS NOTES
Neurology Ambulatory Visit  Name: Hayley Bolaños       : 1965       MRN: [id number]   Encounter Provider: Rj Pack MA   Encounter Date: 10/7/2024  Encounter department: [facility] [place]    Assessment and Plan  1. DENYS (obstructive sleep apnea)  -     Ambulatory Referral to Sleep Medicine; Future  2. Depression  -     Ambulatory referral to Psych Services; Future  3. History of syphilis  4. Memory loss  5. Schwannoma of nerve of neck  -     MRI soft tissue neck wo and w contrast; Future; Expected date: 10/07/2024    Patient Instructions/Plan:  I will reach out to family medicine to see if we can get you a new machine for blood sugar monitoring  I would suggest completing sleep study and seeing a therapist again  Continue with plans to see the eye doctor and repeat labs for Dr. Chase  Repeat MRI neck as discussed  Prevent falls-stand up slowly, increase water to 3 bottles/day and make sure to eat regularly  Follow up in 4 months    She will Return in about 4 months (around 2025).    History of Present Illness     HPI   Hayley Bolaños is a 59 y.o. female who presents for 6 month follow up regarding memory loss. Repeat MOCA today is . She does state more forgetfulness but is able to think back and then is able to remember. She states she has been more tired lately and doesn't feel rested when she wakes up; she previously had CPAP in [place] but has not had for some time. She states increased sadness and worries a lot. She does report living with her niece now which is a much better living situation. She has had 1 recent fall about 3 weeks ago where she slipped and now has bruise to her right hip. She states not drinking much water/day. She does have hand rails in her shower now. Repeat labs are ordered by primary care.     Lab Results   Component Value Date    ZFIJVKEE12 1,225 (H) 2024     Lab Results   Component Value Date    VUA4BJLBKVTS 4.502 (H) 2024   T4  0.70    Lab Results   Component Value Date    LDLCALC 44 07/18/2024         Previous History:  MRI brain neuroquant 1/19/2024;  IMPRESSION:  Moderate altered signal involving white matter discussed above is nonspecific regarding etiology; findings most often relating to chronic small vessel white matter ischemic disease.  NeuroQuant analysis was performed: No neuro Quant findings to support mesial temporal neurodegeneration.  Partially included and incompletely evaluated is a well-circumscribed ovoid area of altered signal within the right prevertebral soft tissues to the right of midline and to the left of the right internal carotid artery where included having a short and   long axis diameter of 6.5 and 11.1 mm (series 5 annotated axial image 1). A similar structure with maximal short and long axis diameter of 6.5 and 10.4 mm is noted on series 3 axial images 33 through 36 on the 7/12/2019 neck CT and could relate to a   schwannoma. If further imaging characterization is desired, MRI of the neck soft tissues with and without gadolinium could be obtained.     past medical history of DM, COPD, OA, hyperlipidemia, syphilis (recent testing showing old infection-follows with ID- recent LP does not show neurosyphilis), b 12 deficiency (recent level 282) who presents for new patient evaluation for memory loss, referred by primary care. She states for the past 1 year she has been more forgetful; she states if she relaxes what she forgot will come back to her. She gives an example-she forgot one time the seasonings to use in yellow rice she cooked. She uses post it notes to help her remember. She denies home safety issues. She denies any relevant family history of memory loss. She lives alone (sometimes her grandson will stay with her-she helps get him to school). She does not drive-has not driven in some time; she will walk or use the bus. She manages her own medications-uses a pill box. She states she has trouble  sleeping at times, she will lay down at 8 pm and not fall asleep until 3am; she states previously diagnosed with DENYS before moving here. She states occasional frontal headache, resolved with tylenol use.     Review of Systems   Constitutional:  Positive for fatigue.   HENT: Negative.     Eyes: Negative.    Respiratory: Negative.     Cardiovascular: Negative.    Gastrointestinal: Negative.    Genitourinary: Negative.    Musculoskeletal:  Positive for neck pain.   Skin: Negative.    Allergic/Immunologic: Negative.    Neurological:  Positive for dizziness, tremors, weakness (both legs), numbness (right hand) and headaches.   Hematological: Negative.    Psychiatric/Behavioral: Negative.     Vision - blurriness  Balance is off - fell approx 3 weeks ago  ROS was reviewed and updated as appropriate    Current Outpatient Medications on File Prior to Visit   Medication Sig Dispense Refill    Acetaminophen (TYLENOL PO) Take by mouth As needed for pain      aspirin (Aspirin Low Dose) 81 mg EC tablet TAKE 1 TABLET (81 MG TOTAL) BY MOUTH DAILY 90 tablet 1    atorvastatin (LIPITOR) 40 mg tablet Take 1 tablet (40 mg total) by mouth daily 90 tablet 1    Banophen 25 MG capsule TAKE 1 CAPSULE (25 MG TOTAL) BY MOUTH DAILY AT BEDTIME AS NEEDED FOR ITCHING 90 capsule 3    calcipotriene (DOVONEX) 0.005 % cream APPLY TO AFFECTED AREA TWICE A DAY 30 DAYS [facility] HAND LEGS 120 g 5    Calcium Carb-Cholecalciferol (Calcium + Vitamin D3) 600-10 MG-MCG TABS Take 2 tablets by mouth daily 60 tablet 1    celecoxib (CeleBREX) 200 mg capsule TAKE 1 CAPSULE (200 MG TOTAL) BY MOUTH DAILY WITH LUNCH/DAILY 90 capsule 1    cetirizine (ZyrTEC) 10 mg tablet TAKE 1 TABLET (10 MG TOTAL) BY MOUTH DAILY IN THE EVENING 30 tablet 3    dapagliflozin (Farxiga) 5 MG TABS Take 1 tablet (5 mg total) by mouth daily with lunch Please STOP Ozempic 90 tablet 3    Diclofenac Sodium (VOLTAREN) 1 % APPLY 2 G TOPICALLY 4 (FOUR) TIMES A  g 1    fenofibrate  (TRICOR) 145 mg tablet Take 1 tablet (145 mg total) by mouth daily 90 tablet 1    glipiZIDE (GLUCOTROL) 5 mg tablet Take 1 tablet (5 mg total) by mouth 2 (two) times a day with meals 180 tablet 1    Lantus SoloStar 100 units/mL SOPN INJECT 1 ML (100 UNITS TOTAL) UNDER THE SKIN IN THE MORNING      levocetirizine (XYZAL) 5 MG tablet TAKE 1 TABLET (5 MG TOTAL) BY MOUTH EVERY EVENING 90 tablet 1    lidocaine (Lidoderm) 5 % Apply 1 patch topically over 12 hours daily Remove & Discard patch within 12 hours or as directed by MD 30 patch 2    magnesium Oxide (MAG-OX) 400 mg TABS Take 1 tablet (400 mg total) by mouth 2 (two) times a day 180 tablet 1    Melatonin 10 MG TABS Take 1 tablet (10 mg total) by mouth daily at bedtime 30 tablet 3    metFORMIN (GLUCOPHAGE) 1000 MG tablet Take 1 tablet (1,000 mg total) by mouth 2 (two) times a day with meals 180 tablet 1    metoprolol tartrate (LOPRESSOR) 25 mg tablet Take 1 tablet (25 mg total) by mouth every 12 (twelve) hours 180 tablet 1    montelukast (SINGULAIR) 10 mg tablet TAKE 1 TABLET (10 MG TOTAL) BY MOUTH EVERY 24 HOURS 90 tablet 1    oxybutynin (DITROPAN XL) 15 MG 24 hr tablet TAKE 1 TABLET (15 MG TOTAL) BY MOUTH DAILY 90 tablet 1    pantoprazole (PROTONIX) 40 mg tablet Take 1 tablet (40 mg total) by mouth daily 90 tablet 3    vitamin B-12 (VITAMIN B-12) 1,000 mcg tablet Take 1 tablet (1,000 mcg total) by mouth daily 90 tablet 3    [DISCONTINUED] gabapentin (Neurontin) 300 mg capsule Take 1 capsule (300 mg total) by mouth 3 (three) times a day 90 capsule 0    albuterol (PROVENTIL HFA,VENTOLIN HFA) 90 mcg/act inhaler Inhale 2 puffs every 6 (six) hours as needed for wheezing (Patient not taking: Reported on 8/4/2023) 54 g 3    escitalopram (LEXAPRO) 10 mg tablet TAKE 1 TABLET (10 MG TOTAL) BY MOUTH DAILY WITH DINNER 30 tablet 5    glucose blood test strip Use as instructed to test three times daily. (Patient not taking: Reported on 5/24/2023) 100 each 5     No current  "facility-administered medications on file prior to visit.      Social History     Tobacco Use    Smoking status: Former    Smokeless tobacco: Never   Vaping Use    Vaping status: Never Used   Substance and Sexual Activity    Alcohol use: Not Currently     Comment: rare    Drug use: No    Sexual activity: Not Currently     Birth control/protection: Surgical       Objective     /58 (BP Location: Right arm, Patient Position: Sitting, Cuff Size: Standard)   Pulse 74   Temp (!) 97.2 °F (36.2 °C) (Temporal)   Ht 5' 3\" (1.6 m)   Wt 64.2 kg (141 lb 9.6 oz)   LMP  (LMP Unknown)   SpO2 100%   BMI 25.08 kg/m²    Physical Exam  Vitals reviewed.   Constitutional:       General: She is not in acute distress.  HENT:      Head: Normocephalic and atraumatic.      Right Ear: External ear normal.      Left Ear: External ear normal.      Nose: Nose normal.      Mouth/Throat:      Mouth: Mucous membranes are moist.      Pharynx: Oropharynx is clear.   Eyes:      General: Lids are normal.      Extraocular Movements: Extraocular movements intact.      Pupils: Pupils are equal, round, and reactive to light.   Cardiovascular:      Rate and Rhythm: Normal rate and regular rhythm.      Pulses: Normal pulses.      Heart sounds: Normal heart sounds.   Pulmonary:      Effort: Pulmonary effort is normal.   Abdominal:      General: There is no distension.   Musculoskeletal:      Cervical back: Normal range of motion.      Right lower leg: No edema.      Left lower leg: No edema.   Skin:     Findings: No rash.   Neurological:      Mental Status: She is alert. Mental status is at baseline.      Motor: Motor strength is normal.     Coordination: Romberg sign negative.      Deep Tendon Reflexes:      Reflex Scores:       Brachioradialis reflexes are 1+ on the right side and 1+ on the left side.       Patellar reflexes are 1+ on the right side and 1+ on the left side.  Psychiatric:         Mood and Affect: Mood normal.         Speech: " Speech normal.       Neurological Exam  Mental Status  Alert. Speech is normal.  See moca-similar score to first moca done 7/2023.    Cranial Nerves  CN II: Visual acuity is normal. Visual fields full to confrontation.  CN III, IV, VI: Extraocular movements intact bilaterally. Normal lids and orbits bilaterally. Pupils equal round and reactive to light bilaterally.  CN V: Facial sensation is normal.  CN VII: Full and symmetric facial movement.  CN VIII: Hearing is normal.  CN IX, X: Palate elevates symmetrically. Normal gag reflex.  CN XI: Shoulder shrug strength is normal.  CN XII: Tongue midline without atrophy or fasciculations.    Motor  Normal muscle bulk throughout. Strength is 5/5 throughout all four extremities.    Sensory  Light touch is normal in upper and lower extremities.     Reflexes                                            Right                      Left  Brachioradialis                    1+                         1+  Patellar                                1+                         1+    Coordination  Right: Rapid alternating movement normal.Left: Rapid alternating movement normal.    Gait  Casual gait is normal including stance, stride, and arm swing. Romberg is absent. Able to rise from chair without using arms.

## 2024-10-08 DIAGNOSIS — F32.A DEPRESSION, UNSPECIFIED DEPRESSION TYPE: ICD-10-CM

## 2024-10-09 ENCOUNTER — TRANSCRIBE ORDERS (OUTPATIENT)
Dept: SLEEP CENTER | Facility: CLINIC | Age: 59
End: 2024-10-09

## 2024-10-09 DIAGNOSIS — G47.33 OSA (OBSTRUCTIVE SLEEP APNEA): Primary | ICD-10-CM

## 2024-10-09 RX ORDER — OXYBUTYNIN CHLORIDE 15 MG/1
15 TABLET, EXTENDED RELEASE ORAL DAILY
Qty: 90 TABLET | Refills: 1 | Status: SHIPPED | OUTPATIENT
Start: 2024-10-09

## 2024-10-10 ENCOUNTER — TELEPHONE (OUTPATIENT)
Age: 59
End: 2024-10-10

## 2024-10-10 NOTE — TELEPHONE ENCOUNTER
Contacted Pt. in regards to ROUTINE Referral, Pt has been added to Talk Therapy. Pt is interested in Counts include 234 beds at the [facility]

## 2024-10-21 DIAGNOSIS — M77.8 LEFT SHOULDER TENDONITIS: ICD-10-CM

## 2024-11-04 ENCOUNTER — HOSPITAL ENCOUNTER (OUTPATIENT)
Facility: MEDICAL CENTER | Age: 59
Discharge: HOME | End: 2024-11-04
Payer: COMMERCIAL

## 2024-11-04 DIAGNOSIS — D36.11 SCHWANNOMA OF NERVE OF NECK: ICD-10-CM

## 2024-11-04 PROCEDURE — 70543 MRI ORBT/FAC/NCK W/O &W/DYE: CPT

## 2024-11-04 PROCEDURE — A9585 GADOBUTROL INJECTION: HCPCS | Performed by: NURSE PRACTITIONER

## 2024-11-04 RX ORDER — GADOBUTROL 604.72 MG/ML
6.4 INJECTION INTRAVENOUS
Status: COMPLETED | OUTPATIENT
Start: 2024-11-04 | End: 2024-11-04

## 2024-11-04 RX ADMIN — GADOBUTROL 6.4 ML: 604.72 INJECTION INTRAVENOUS at 10:07

## 2024-11-11 ENCOUNTER — HOSPITAL ENCOUNTER (OUTPATIENT)
Dept: CT IMAGING | Facility: HOSPITAL | Age: 59
Discharge: HOME | End: 2024-11-11
Payer: COMMERCIAL

## 2024-11-11 ENCOUNTER — HOSPITAL ENCOUNTER (OUTPATIENT)
Dept: RADIOLOGY | Facility: HOSPITAL | Age: 59
Discharge: HOME | End: 2024-11-11
Payer: COMMERCIAL

## 2024-11-11 DIAGNOSIS — R26.81 UNSTEADY GAIT: ICD-10-CM

## 2024-11-11 DIAGNOSIS — R29.6 RECURRENT FALLS WHILE WALKING: ICD-10-CM

## 2024-11-11 PROCEDURE — 70450 CT HEAD/BRAIN W/O DYE: CPT

## 2024-11-11 PROCEDURE — 72110 X-RAY EXAM L-2 SPINE 4/>VWS: CPT

## 2024-11-11 PROCEDURE — 72072 X-RAY EXAM THORAC SPINE 3VWS: CPT

## 2024-11-11 PROCEDURE — 71101 X-RAY EXAM UNILAT RIBS/CHEST: CPT

## 2024-11-12 ENCOUNTER — TELEPHONE (OUTPATIENT)
Age: 59
End: 2024-11-12

## 2024-11-12 ENCOUNTER — TELEPHONE (OUTPATIENT)
Dept: FAMILY MEDICINE CLINIC | Facility: CLINIC | Age: 59
End: 2024-11-12

## 2024-11-12 DIAGNOSIS — R26.81 UNSTEADY GAIT: ICD-10-CM

## 2024-11-12 DIAGNOSIS — M54.14 THORACIC RADICULOPATHY: Primary | ICD-10-CM

## 2024-11-12 NOTE — TELEPHONE ENCOUNTER
Called and spoke with pts reviewed results and gave referral information as well as recommendation for mri

## 2024-11-12 NOTE — TELEPHONE ENCOUNTER
----- Message from Jose Chase MD sent at 11/12/2024 12:08 PM EST -----  MRI and Orthoepdic Consults ASAP  ----- Message -----  From: Interface, Radiology Results In  Sent: 11/12/2024  11:00 AM EST  To: Jose Chase MD

## 2024-11-12 NOTE — TELEPHONE ENCOUNTER
[facility] called, states significant findings for patients IMG62 - XR SPINE THORACIC 3 VW .Please advise [facility] at  [phone] option#3, if any further questions.

## 2024-11-13 ENCOUNTER — RESULTS FOLLOW-UP (OUTPATIENT)
Dept: FAMILY MEDICINE CLINIC | Facility: CLINIC | Age: 59
End: 2024-11-13

## 2024-11-13 NOTE — TELEPHONE ENCOUNTER
----- Message from Jose Chase MD sent at 11/13/2024  7:37 AM EST -----  Pt needs Neurology consult/evaluation ASAP  ----- Message -----  From: Interface, Radiology Results In  Sent: 11/13/2024   7:15 AM EST  To: Jose Chase MD

## 2024-11-14 ENCOUNTER — RESULTS FOLLOW-UP (OUTPATIENT)
Dept: FAMILY MEDICINE CLINIC | Facility: CLINIC | Age: 59
End: 2024-11-14

## 2024-11-14 NOTE — TELEPHONE ENCOUNTER
----- Message from THAO Holden sent at 11/13/2024 12:13 PM EST -----  I dont think we need to see her urgently at this time; there is nothing urgent on ct scan, I last saw her 10/7 and we discussed fall prevention and hydrating well and hopefully she will be able to get a blood sugar machine again soon to monitor blood sugars again. Is there another concern I am missing here?  ----- Message -----  From: Indira Guzman MA  Sent: 11/13/2024  10:39 AM EST  To: THAO Holden

## 2024-11-19 DIAGNOSIS — E11.8 TYPE II DIABETES MELLITUS WITH COMPLICATION (HCC): ICD-10-CM

## 2024-11-19 RX ORDER — DAPAGLIFLOZIN 5 MG/1
5 TABLET, FILM COATED ORAL
Qty: 90 TABLET | Refills: 3 | Status: SHIPPED | OUTPATIENT
Start: 2024-11-19 | End: 2024-11-22 | Stop reason: SDUPTHER

## 2024-11-22 ENCOUNTER — OFFICE VISIT (OUTPATIENT)
Dept: FAMILY MEDICINE CLINIC | Facility: CLINIC | Age: 59
End: 2024-11-22
Payer: COMMERCIAL

## 2024-11-22 VITALS
OXYGEN SATURATION: 99 % | HEART RATE: 76 BPM | SYSTOLIC BLOOD PRESSURE: 126 MMHG | DIASTOLIC BLOOD PRESSURE: 80 MMHG | BODY MASS INDEX: 25.08 KG/M2 | RESPIRATION RATE: 14 BRPM | TEMPERATURE: 98.7 F | HEIGHT: 63 IN

## 2024-11-22 DIAGNOSIS — E11.69 HYPERLIPIDEMIA ASSOCIATED WITH TYPE 2 DIABETES MELLITUS  (HCC): ICD-10-CM

## 2024-11-22 DIAGNOSIS — Z12.11 SCREEN FOR COLON CANCER: ICD-10-CM

## 2024-11-22 DIAGNOSIS — E11.8 TYPE II DIABETES MELLITUS WITH COMPLICATION (HCC): Primary | ICD-10-CM

## 2024-11-22 DIAGNOSIS — Z12.31 SCREENING MAMMOGRAM FOR BREAST CANCER: ICD-10-CM

## 2024-11-22 DIAGNOSIS — E78.5 HYPERLIPIDEMIA ASSOCIATED WITH TYPE 2 DIABETES MELLITUS  (HCC): ICD-10-CM

## 2024-11-22 DIAGNOSIS — M85.80 OSTEOPENIA, UNSPECIFIED LOCATION: ICD-10-CM

## 2024-11-22 DIAGNOSIS — M54.14 THORACIC RADICULOPATHY: ICD-10-CM

## 2024-11-22 DIAGNOSIS — M77.8 LEFT SHOULDER TENDONITIS: ICD-10-CM

## 2024-11-22 DIAGNOSIS — M35.3 PMR (POLYMYALGIA RHEUMATICA) (HCC): ICD-10-CM

## 2024-11-22 DIAGNOSIS — M81.8 IDIOPATHIC OSTEOPOROSIS: ICD-10-CM

## 2024-11-22 DIAGNOSIS — K21.9 GASTROESOPHAGEAL REFLUX DISEASE WITHOUT ESOPHAGITIS: ICD-10-CM

## 2024-11-22 DIAGNOSIS — I10 ESSENTIAL HYPERTENSION: ICD-10-CM

## 2024-11-22 LAB — SL AMB POCT HEMOGLOBIN AIC: 6.6 (ref ?–6.5)

## 2024-11-22 PROCEDURE — 83036 HEMOGLOBIN GLYCOSYLATED A1C: CPT | Performed by: INTERNAL MEDICINE

## 2024-11-22 PROCEDURE — 99214 OFFICE O/P EST MOD 30 MIN: CPT | Performed by: INTERNAL MEDICINE

## 2024-11-22 RX ORDER — ATORVASTATIN CALCIUM 40 MG/1
40 TABLET, FILM COATED ORAL DAILY
Qty: 90 TABLET | Refills: 1 | Status: SHIPPED | OUTPATIENT
Start: 2024-11-22

## 2024-11-22 RX ORDER — FENOFIBRATE 145 MG/1
145 TABLET, COATED ORAL DAILY
Qty: 90 TABLET | Refills: 1 | Status: SHIPPED | OUTPATIENT
Start: 2024-11-22

## 2024-11-22 RX ORDER — DAPAGLIFLOZIN 5 MG/1
5 TABLET, FILM COATED ORAL
Qty: 90 TABLET | Refills: 3 | Status: SHIPPED | OUTPATIENT
Start: 2024-11-22

## 2024-11-22 RX ORDER — CALCIUM CARBONATE/VITAMIN D3 600 MG-10
2 TABLET ORAL DAILY
Qty: 60 TABLET | Refills: 6 | Status: SHIPPED | OUTPATIENT
Start: 2024-11-22

## 2024-11-22 RX ORDER — PREDNISONE 5 MG/1
5 TABLET ORAL DAILY
Qty: 30 TABLET | Refills: 1 | Status: SHIPPED | OUTPATIENT
Start: 2024-11-22

## 2024-11-22 RX ORDER — METOPROLOL TARTRATE 25 MG/1
25 TABLET, FILM COATED ORAL EVERY 12 HOURS SCHEDULED
Qty: 180 TABLET | Refills: 1 | Status: SHIPPED | OUTPATIENT
Start: 2024-11-22

## 2024-11-22 NOTE — ASSESSMENT & PLAN NOTE
Orders:    Lumbar Back Brace    predniSONE 5 mg tablet; Take 1 tablet (5 mg total) by mouth daily

## 2024-11-22 NOTE — ASSESSMENT & PLAN NOTE
Orders:    Calcium Carb-Cholecalciferol (Calcium + Vitamin D3) 600-10 MG-MCG TABS; Take 2 tablets by mouth daily    Lumbar Back Brace    predniSONE 5 mg tablet; Take 1 tablet (5 mg total) by mouth daily

## 2024-11-22 NOTE — PATIENT INSTRUCTIONS

## 2024-11-22 NOTE — ASSESSMENT & PLAN NOTE
Orders:    metoprolol tartrate (LOPRESSOR) 25 mg tablet; Take 1 tablet (25 mg total) by mouth every 12 (twelve) hours    Quantiferon TB Gold Plus Assay; Future

## 2024-11-22 NOTE — ASSESSMENT & PLAN NOTE
Moist Heat  FU w Orthopedic  Orders:    Lumbar Back Brace    predniSONE 5 mg tablet; Take 1 tablet (5 mg total) by mouth daily    Diclofenac Sodium (VOLTAREN) 1 %; Apply 2 g topically 4 (four) times a day

## 2024-11-22 NOTE — ASSESSMENT & PLAN NOTE
Lab Results   Component Value Date    HGBA1C 6.6 (A) 11/22/2024   Improving  Continue same  Life style Mod  RTC in 3 mos w Blood work    Orders:    fenofibrate (TRICOR) 145 mg tablet; Take 1 tablet (145 mg total) by mouth daily    atorvastatin (LIPITOR) 40 mg tablet; Take 1 tablet (40 mg total) by mouth daily

## 2024-11-22 NOTE — PROGRESS NOTES
Name: Hayley Bolaños      : 1965      MRN: [id number]  Encounter Provider: Jose Chase MD  Encounter Date: 2024   Encounter department: [facility]  :  Assessment & Plan  BMI 25.0-25.9,adult    Orders:    POCT hemoglobin A1c    Quantiferon TB Gold Plus Assay; Future    Essential hypertension    Orders:    metoprolol tartrate (LOPRESSOR) 25 mg tablet; Take 1 tablet (25 mg total) by mouth every 12 (twelve) hours    Quantiferon TB Gold Plus Assay; Future    Hyperlipidemia associated with type 2 diabetes mellitus  (HCC)    Lab Results   Component Value Date    HGBA1C 6.6 (A) 2024   Improving  Continue same  Life style Mod  RTC in 3 mos w Blood work    Orders:    fenofibrate (TRICOR) 145 mg tablet; Take 1 tablet (145 mg total) by mouth daily    atorvastatin (LIPITOR) 40 mg tablet; Take 1 tablet (40 mg total) by mouth daily    Type II diabetes mellitus with complication (HCC)    Lab Results   Component Value Date    HGBA1C 6.6 (A) 2024       Orders:    Farxiga 5 MG TABS; Take 1 tablet (5 mg total) by mouth daily with lunch    Thoracic radiculopathy    Orders:    Lumbar Back Brace    predniSONE 5 mg tablet; Take 1 tablet (5 mg total) by mouth daily    Idiopathic osteoporosis    Orders:    Mammo screening bilateral w 3d and cad; Future    DXA bone density spine hip and pelvis; Future    Screening mammogram for breast cancer    Orders:    Mammo screening bilateral w 3d and cad; Future    DXA bone density spine hip and pelvis; Future    Gastroesophageal reflux disease without esophagitis    Orders:    Quantiferon TB Gold Plus Assay; Future    H. pylori antigen, stool; Future    Cologuard    Screen for colon cancer    Orders:    Quantiferon TB Gold Plus Assay; Future    H. pylori antigen, stool; Future    Cologuard    Osteopenia, unspecified location    Orders:    Calcium Carb-Cholecalciferol (Calcium + Vitamin D3) 600-10 MG-MCG TABS; Take 2 tablets by mouth  "daily    Lumbar Back Brace    predniSONE 5 mg tablet; Take 1 tablet (5 mg total) by mouth daily    Left shoulder tendonitis  Moist Heat  FU w Orthopedic  Orders:    Lumbar Back Brace    predniSONE 5 mg tablet; Take 1 tablet (5 mg total) by mouth daily    Diclofenac Sodium (VOLTAREN) 1 %; Apply 2 g topically 4 (four) times a day    PMR (polymyalgia rheumatica) (HCC)  Try;  Orders:    predniSONE 5 mg tablet; Take 1 tablet (5 mg total) by mouth daily    Life style mod  RTC in 3 mos w Blood work         History of Present Illness     59 Y O lady is here for Regular check Up, she has few symptoms, recent blood work and med list reviewed,...      Review of Systems   Constitutional:  Negative for chills, fatigue and fever.   HENT:  Positive for postnasal drip. Negative for congestion, facial swelling, sore throat, trouble swallowing and voice change.    Eyes:  Negative for pain, discharge and visual disturbance.   Respiratory:  Negative for cough, shortness of breath and wheezing.    Cardiovascular:  Negative for chest pain, palpitations and leg swelling.   Gastrointestinal:  Negative for abdominal pain, blood in stool, constipation, diarrhea and nausea.   Endocrine: Negative for polydipsia, polyphagia and polyuria.   Genitourinary:  Negative for difficulty urinating, hematuria and urgency.   Musculoskeletal:  Positive for arthralgias, back pain and myalgias.   Skin:  Negative for rash.   Neurological:  Positive for numbness. Negative for dizziness, tremors, weakness and headaches.   Hematological:  Negative for adenopathy. Does not bruise/bleed easily.   Psychiatric/Behavioral:  Negative for dysphoric mood, sleep disturbance and suicidal ideas.           Objective   /80 (BP Location: Left arm, Patient Position: Sitting, Cuff Size: Standard)   Pulse 76   Temp 98.7 °F (37.1 °C) (Tympanic)   Resp 14   Ht 5' 3\" (1.6 m)   LMP  (LMP Unknown)   SpO2 99%   BMI 25.08 kg/m²      Physical Exam  Vitals and nursing note " reviewed.   Constitutional:       General: She is not in acute distress.     Appearance: She is well-developed. She is not diaphoretic.   HENT:      Head: Normocephalic and atraumatic.      Right Ear: External ear normal.      Left Ear: External ear normal.      Nose: Nose normal.   Eyes:      General:         Right eye: No discharge.         Left eye: No discharge.      Conjunctiva/sclera: Conjunctivae normal.      Pupils: Pupils are equal, round, and reactive to light.   Neck:      Thyroid: No thyromegaly.      Trachea: No tracheal deviation.   Cardiovascular:      Rate and Rhythm: Normal rate and regular rhythm.      Heart sounds: Murmur heard.      No friction rub.   Pulmonary:      Effort: Pulmonary effort is normal. No respiratory distress.      Breath sounds: Normal breath sounds. No stridor. No wheezing or rales.   Abdominal:      General: Bowel sounds are normal. There is no distension.      Palpations: Abdomen is soft.      Tenderness: There is no abdominal tenderness. There is no guarding.   Musculoskeletal:         General: Tenderness present. No swelling or deformity. Normal range of motion.      Cervical back: Normal range of motion and neck supple.   Lymphadenopathy:      Cervical: No cervical adenopathy.   Skin:     General: Skin is warm and dry.      Capillary Refill: Capillary refill takes less than 2 seconds.      Coloration: Skin is not pale.      Findings: No erythema or rash.   Neurological:      Mental Status: She is alert and oriented to person, place, and time.      Cranial Nerves: No cranial nerve deficit.      Sensory: Sensory deficit present.      Coordination: Coordination normal.   Psychiatric:         Mood and Affect: Mood normal.         Behavior: Behavior normal.

## 2024-12-06 ENCOUNTER — HOSPITAL ENCOUNTER (OUTPATIENT)
Facility: MEDICAL CENTER | Age: 59
Discharge: HOME | End: 2024-12-06
Payer: COMMERCIAL

## 2024-12-06 DIAGNOSIS — R26.81 UNSTEADY GAIT: ICD-10-CM

## 2024-12-06 DIAGNOSIS — M54.14 THORACIC RADICULOPATHY: ICD-10-CM

## 2024-12-06 PROCEDURE — 72146 MRI CHEST SPINE W/O DYE: CPT

## 2024-12-11 ENCOUNTER — RESULTS FOLLOW-UP (OUTPATIENT)
Dept: FAMILY MEDICINE CLINIC | Facility: CLINIC | Age: 59
End: 2024-12-11

## 2024-12-11 NOTE — TELEPHONE ENCOUNTER
----- Message from Jose Chase MD sent at 12/9/2024  2:37 PM EST -----  Use back brace on daytime, Off Nighttime  ----- Message -----  From: Interface, Radiology Results In  Sent: 12/9/2024   9:56 AM EST  To: Jose Chase MD   Cyclosporine Counseling:  I discussed with the patient the risks of cyclosporine including but not limited to hypertension, gingival hyperplasia,myelosuppression, immunosuppression, liver damage, kidney damage, neurotoxicity, lymphoma, and serious infections. The patient understands that monitoring is required including baseline blood pressure, CBC, CMP, lipid panel and uric acid, and then 1-2 times monthly CMP and blood pressure.

## 2024-12-11 NOTE — TELEPHONE ENCOUNTER
Spoke with the patient.  Informed her of results.  She has an appointment with Ortho on 12/17/24.      Can you please order the back brace so I can fax the order for her.

## 2024-12-17 ENCOUNTER — OFFICE VISIT (OUTPATIENT)
Age: 59
End: 2024-12-17
Payer: COMMERCIAL

## 2024-12-17 VITALS
BODY MASS INDEX: 24.98 KG/M2 | SYSTOLIC BLOOD PRESSURE: 126 MMHG | WEIGHT: 141 LBS | HEIGHT: 63 IN | DIASTOLIC BLOOD PRESSURE: 80 MMHG

## 2024-12-17 DIAGNOSIS — Z87.81 HISTORY OF COMPRESSION FRACTURE OF SPINE: Primary | ICD-10-CM

## 2024-12-17 DIAGNOSIS — M40.209 ACQUIRED KYPHOSIS: ICD-10-CM

## 2024-12-17 PROCEDURE — 99214 OFFICE O/P EST MOD 30 MIN: CPT | Performed by: ORTHOPAEDIC SURGERY

## 2024-12-17 NOTE — PROGRESS NOTES
"[facility] ORTHOPEDIC SPINE SURGERY  DR. MOLLY POST MD  [phone] [address]  [facility] [address]  [place] [address]      HISTORY OF PRESENT ILLNESS:    Hayley Bolaños is a 59 y.o. female who presents for initial evaluation of thoracic spine. The patient is referred by her primary care physician, Dr. Jose Chase. The patient fell down stairs years ago. The patient had a fall recently. She fell in the bathroom. She also fell outside of Dr. Chase's office. She notes imbalance with ambulation. She notes she is shaky. If she is walking with a shopping cart she is steady. She does drop her phone out of her hand at times. She has a history of left total knee arthroplasty and feels as though this leg gives out at times. This was performed approximately 7 years ago.  A lumbar back brace was ordered by her PCP.        Diabetic: Yes   Lab Results   Component Value Date/Time    HGBA1C 6.6 (A) 11/22/2024 11:57 AM    HGBA1C 9.9 (H) 03/23/2022 09:05 AM     Nicotine use: No  BMI: Estimated body mass index is 24.98 kg/m² as calculated from the following:    Height as of this encounter: 5' 3\" (1.6 m).    Weight as of this encounter: 64 kg (141 lb).  Blood thinners: None    ALLERGIES:   Allergies   Allergen Reactions    No Active Allergies        MEDICATIONS:    Current Outpatient Medications:     Acetaminophen (TYLENOL PO), Take by mouth As needed for pain, Disp: , Rfl:     aspirin (Aspirin Low Dose) 81 mg EC tablet, TAKE 1 TABLET (81 MG TOTAL) BY MOUTH DAILY, Disp: 90 tablet, Rfl: 1    atorvastatin (LIPITOR) 40 mg tablet, Take 1 tablet (40 mg total) by mouth daily, Disp: 90 tablet, Rfl: 1    Banophen 25 MG capsule, TAKE 1 CAPSULE (25 MG TOTAL) BY MOUTH DAILY AT BEDTIME AS NEEDED FOR ITCHING, Disp: 90 capsule, Rfl: 3    calcipotriene (DOVONEX) 0.005 % cream, APPLY TO AFFECTED AREA TWICE A DAY 30 DAYS [facility], Disp: 120 g, Rfl: 5    Calcium Carb-Cholecalciferol (Calcium + " Vitamin D3) 600-10 MG-MCG TABS, Take 2 tablets by mouth daily, Disp: 60 tablet, Rfl: 6    celecoxib (CeleBREX) 200 mg capsule, TAKE 1 CAPSULE (200 MG TOTAL) BY MOUTH DAILY WITH LUNCH/DAILY, Disp: 90 capsule, Rfl: 1    cetirizine (ZyrTEC) 10 mg tablet, TAKE 1 TABLET (10 MG TOTAL) BY MOUTH DAILY IN THE EVENING, Disp: 30 tablet, Rfl: 3    Diclofenac Sodium (VOLTAREN) 1 %, Apply 2 g topically 4 (four) times a day, Disp: 200 g, Rfl: 1    escitalopram (LEXAPRO) 10 mg tablet, TAKE 1 TABLET (10 MG TOTAL) BY MOUTH DAILY WITH DINNER, Disp: 30 tablet, Rfl: 5    Farxiga 5 MG TABS, Take 1 tablet (5 mg total) by mouth daily with lunch, Disp: 90 tablet, Rfl: 3    fenofibrate (TRICOR) 145 mg tablet, Take 1 tablet (145 mg total) by mouth daily, Disp: 90 tablet, Rfl: 1    glipiZIDE (GLUCOTROL) 5 mg tablet, Take 1 tablet (5 mg total) by mouth 2 (two) times a day with meals, Disp: 180 tablet, Rfl: 1    Lantus SoloStar 100 units/mL SOPN, INJECT 1 ML (100 UNITS TOTAL) UNDER THE SKIN IN THE MORNING, Disp: , Rfl:     levocetirizine (XYZAL) 5 MG tablet, TAKE 1 TABLET (5 MG TOTAL) BY MOUTH EVERY EVENING, Disp: 90 tablet, Rfl: 1    lidocaine (Lidoderm) 5 %, Apply 1 patch topically over 12 hours daily Remove & Discard patch within 12 hours or as directed by MD, Disp: 30 patch, Rfl: 2    magnesium Oxide (MAG-OX) 400 mg TABS, Take 1 tablet (400 mg total) by mouth 2 (two) times a day, Disp: 180 tablet, Rfl: 1    Melatonin 10 MG TABS, Take 1 tablet (10 mg total) by mouth daily at bedtime, Disp: 30 tablet, Rfl: 3    metFORMIN (GLUCOPHAGE) 1000 MG tablet, Take 1 tablet (1,000 mg total) by mouth 2 (two) times a day with meals, Disp: 180 tablet, Rfl: 1    metoprolol tartrate (LOPRESSOR) 25 mg tablet, Take 1 tablet (25 mg total) by mouth every 12 (twelve) hours, Disp: 180 tablet, Rfl: 1    montelukast (SINGULAIR) 10 mg tablet, TAKE 1 TABLET (10 MG TOTAL) BY MOUTH EVERY 24 HOURS, Disp: 90 tablet, Rfl: 1    oxybutynin (DITROPAN XL) 15 MG 24 hr tablet,  TAKE 1 TABLET (15 MG TOTAL) BY MOUTH DAILY, Disp: 90 tablet, Rfl: 1    pantoprazole (PROTONIX) 40 mg tablet, Take 1 tablet (40 mg total) by mouth daily, Disp: 90 tablet, Rfl: 3    predniSONE 5 mg tablet, Take 1 tablet (5 mg total) by mouth daily, Disp: 30 tablet, Rfl: 1    vitamin B-12 (VITAMIN B-12) 1,000 mcg tablet, Take 1 tablet (1,000 mcg total) by mouth daily, Disp: 90 tablet, Rfl: 3    albuterol (PROVENTIL HFA,VENTOLIN HFA) 90 mcg/act inhaler, Inhale 2 puffs every 6 (six) hours as needed for wheezing (Patient not taking: Reported on 8/4/2023), Disp: 54 g, Rfl: 3    glucose blood test strip, Use as instructed to test three times daily. (Patient not taking: Reported on 5/24/2023), Disp: 100 each, Rfl: 5     PAST MEDICAL HISTORY:   Past Medical History:   Diagnosis Date    Acute exacerbation of chronic obstructive pulmonary disease (COPD) (HCC) 02/27/2020    Asthma     exercise induced    Diabetes (HCC)     type 2    Diabetes mellitus (HCC)     Hypertension     Morbid obesity (HCC) 04/07/2020    Osteoarthritis 04/07/2020    Shingles 11/14/2023       PAST SURGICAL HISTORY:  Past Surgical History:   Procedure Laterality Date    BREAST BIOPSY Left 2016    benign    CHOLECYSTECTOMY      COLONOSCOPY      FL INJECTION LEFT SHOULDER (NON ARTHROGRAM)  07/28/2023    FL LUMBAR PUNCTURE DIAGNOSTIC  07/19/2023    HERNIA REPAIR      LAPAROSCOPY FOR ECTOPIC PREGNANCY      x2, both tubes removed     MAMMO STEREOTACTIC BREAST BIOPSY LEFT (ALL INC) Left     REPLACEMENT TOTAL KNEE Left     SMALL INTESTINE SURGERY      after hernia surgery        SOCIAL HISTORY:  Social History     Tobacco Use   Smoking Status Former   Smokeless Tobacco Never          PHYSICAL EXAM:  59 y.o. female sitting comfortably on exam chair in no apparent distress.   Patient ambulates with normal gait.   No TTP over thoracic, or lumbar spine.     Sensation intact to light touch C5 through T1 dermatomes left upper extremity.   Sensation intact to light touch  C5 through T1 dermatomes right upper extremity.      Left Motor: 5/5 biceps, 5/5 triceps, 5/5 deltoid, 5/5 wrist extension, 5/5 finger flexion, 5/5 finger abduction, 5/5 shoulder abduction   Right Motor: 5/5 biceps, 5/5 triceps, 5/5 deltoid, 5/5 wrist extension, 5/5 finger flexion, 5/5 finger abduction, 5/5 shoulder abduction      Sensation intact to light touch left L2 through S1 dermatomes.   Sensation intact to light touch right L2 through S1 dermatomes     Left Motor: 5/5 iliopsoas, 5/5 quadriceps, 5/5 tibialis anterior, 5/5 extensor hallucis longus, 5/5 gastrocsoleus.   Right Motor: 5/5 iliopsoas, 5/5 quadriceps, 5/5 tibialis anterior, 5/5 extensor hallucis longus, 5/5 gastrocsoleus.    ROM:  Restricted cervical motion in all planes   Symmetric deep tendon reflexes bilateral upper extremities    3+ brachioradialis and biceps  2+ triceps   Symmetric deep tendon reflexes bilateral lower extremities     Absent knees   2+ ankles  Rapid alternating motions maintained      Castano's sign positive left  Radial release sign positive bilaterally  Clonus negative  Straight leg raise test is negative Bilateral   The patient is well perfused distally.      RADIOGRAPHIC STUDIES:  DXA, spine hip and pelvis, 4/17/2024: Lumbar T-score 1.3, Left total hip T-score -0.5, Left femoral neck T-score -0.6. Impression normal bone density.  CT, abdomen pelvis, 5/18/2023: No evidence of fracture.  XR, lumbar spine, 11/11/2024: Mild to moderate degenerative changes.  Disc height is maintained.  There is no sign instability or deformity.  XR, thoracic spine, 11/11/2024: Severe multilevel thoracic degenerative disc with increased kyphosis.  There is evidence of mid thoracic compression deformity with wedging due to superior endplate fracture.  Fracture appears to be chronic.  MRI, thoracic spine, 12/6/2024: T7 and T11 superior endplate compression deformity.  No evidence of acute fracture.  Multilevel thoracic degenerative disc disease  with increased kyphosis.  No evidence of stenosis, or spinal cord compression.      ASSESSMENT:  1. History of compression fracture of spine  -     Ambulatory Referral to Orthopedic Surgery  -     Ambulatory Referral to Physical Therapy; Future  2. Acquired kyphosis  -     Ambulatory Referral to Physical Therapy; Future      PLAN:  59 y.o. female with history of thoracic compression fracture at T7 and acquired kyphosis due to compression fracture.     The patient's x-rays, MRI, CT and DXA were reviewed today. There is no spinal cord compression of the thoracic spine. There is history of a chronic compression fracture at T7 with acquired kyphosis. The cervical spine is visualized on sagittal images and there is no evidence of spinal compression at these levels.     The natural history of compression fracture and acquired kyphosis was discussed with the patient today. The best plan of care for the patient is to attend physical therapy to strengthen the posterior muscles. A referral was placed for the patient to begin physical therapy.      I will see the patient back as needed.        Scribe Attestation      I,:  Lolis Link am acting as a scribe while in the presence of the attending physician.:       I,:  Oz Johnson MD personally performed the services described in this documentation    as scribed in my presence.:

## 2024-12-18 LAB — COLOGUARD RESULT REPORTABLE: NEGATIVE

## 2025-01-13 DIAGNOSIS — F32.A DEPRESSION, UNSPECIFIED DEPRESSION TYPE: ICD-10-CM

## 2025-01-13 DIAGNOSIS — I10 ESSENTIAL HYPERTENSION: ICD-10-CM

## 2025-01-13 RX ORDER — ESCITALOPRAM OXALATE 10 MG/1
10 TABLET ORAL
Qty: 30 TABLET | Refills: 5 | Status: SHIPPED | OUTPATIENT
Start: 2025-01-13

## 2025-01-13 RX ORDER — ASPIRIN 81 MG/1
81 TABLET, COATED ORAL DAILY
Qty: 90 TABLET | Refills: 1 | Status: SHIPPED | OUTPATIENT
Start: 2025-01-13

## 2025-01-29 LAB
LEFT EYE DIABETIC RETINOPATHY: NORMAL
RIGHT EYE DIABETIC RETINOPATHY: NORMAL

## 2025-02-06 ENCOUNTER — TELEPHONE (OUTPATIENT)
Dept: NEUROLOGY | Facility: CLINIC | Age: 60
End: 2025-02-06

## 2025-02-06 NOTE — TELEPHONE ENCOUNTER
Called patient to confirm upcoming appointment on 2/10/25  with Contreras Freeman in the [place] office.  LMOM

## 2025-02-10 ENCOUNTER — OFFICE VISIT (OUTPATIENT)
Dept: NEUROLOGY | Facility: CLINIC | Age: 60
End: 2025-02-10
Payer: COMMERCIAL

## 2025-02-10 VITALS
WEIGHT: 140.6 LBS | BODY MASS INDEX: 24.91 KG/M2 | HEART RATE: 73 BPM | OXYGEN SATURATION: 97 % | DIASTOLIC BLOOD PRESSURE: 60 MMHG | TEMPERATURE: 97.6 F | HEIGHT: 63 IN | SYSTOLIC BLOOD PRESSURE: 110 MMHG

## 2025-02-10 DIAGNOSIS — D36.10 SCHWANNOMA: ICD-10-CM

## 2025-02-10 DIAGNOSIS — R41.3 MEMORY LOSS: Primary | ICD-10-CM

## 2025-02-10 DIAGNOSIS — I67.2 CEREBRAL ATHEROSCLEROSIS: ICD-10-CM

## 2025-02-10 DIAGNOSIS — R29.6 FALLS: ICD-10-CM

## 2025-02-10 DIAGNOSIS — R42 DIZZINESS AND GIDDINESS: ICD-10-CM

## 2025-02-10 PROCEDURE — 99213 OFFICE O/P EST LOW 20 MIN: CPT | Performed by: NURSE PRACTITIONER

## 2025-02-10 NOTE — PATIENT INSTRUCTIONS
Make sure to complete labs  Suggest taking position changes slowly and discuss with Dr. Chase about possibly lowering blood pressure medications if lower blood pressures in the future  Consider doing the physical therapy that was ordered for you  Let us know if any change to memory  Follow up in 4 months time

## 2025-02-10 NOTE — PROGRESS NOTES
Name: Hayley Bolaños      : 1965      MRN: [id number]  Encounter Provider: THAO Espino  Encounter Date: 2/10/2025   Encounter department: [facility] [place]  :  Assessment & Plan  Memory loss         Schwannoma    Orders:    VAS carotid complete study; Future    Dizziness and giddiness    Orders:    VAS carotid complete study; Future    Falls         Cerebral atherosclerosis    Orders:    VAS carotid complete study; Future      Patient Instructions   Make sure to complete labs  Suggest taking position changes slowly and discuss with Dr. Chase about possibly lowering blood pressure medications if lower blood pressures in the future  Consider doing the physical therapy that was ordered for you  Let us know if any change to memory  Follow up in 4 months time     History of Present Illness   Hayley Bolaños presents for follow up, last office visit 10/7/2024.  She states memory is better (last moca )  She states she gets dizzy at times-described as lightheaded when standing. She has had falls-imaging done since last office visit reviewed without significant finding. She does not want to complete sleep study. She follows up routinely with primary care; she was referred to pt recently but has not started this.    MRI soft tissue neck 2024:  IMPRESSION:  1. 1.1 x 1.6 cm uniformly enhancing right carotid space mass retrospectively unchanged from a prior neck CT from 2018, correlating to findings on the recent prior brain MRI potentially a nerve sheath tumor such as schwannoma. Other benign/nonaggressive   masses are in the differential diagnosis, however the imaging stability over 6 1/2 years suggests a benign etiology. Further clinical assessment and follow-up advised.    Previous History:  MRI brain neuroquant 2024;  IMPRESSION:  Moderate altered signal involving white matter discussed above is nonspecific regarding etiology; findings most often relating to chronic  small vessel white matter ischemic disease.  NeuroQuant analysis was performed: No neuro Quant findings to support mesial temporal neurodegeneration.  Partially included and incompletely evaluated is a well-circumscribed ovoid area of altered signal within the right prevertebral soft tissues to the right of midline and to the left of the right internal carotid artery where included having a short and   long axis diameter of 6.5 and 11.1 mm (series 5 annotated axial image 1). A similar structure with maximal short and long axis diameter of 6.5 and 10.4 mm is noted on series 3 axial images 33 through 36 on the 7/12/2019 neck CT and could relate to a   schwannoma. If further imaging characterization is desired, MRI of the neck soft tissues with and without gadolinium could be obtained.     past medical history of DM, COPD, OA, hyperlipidemia, syphilis (recent testing showing old infection-follows with ID- recent LP does not show neurosyphilis), b 12 deficiency (recent level 282) who presents for new patient evaluation for memory loss, referred by primary care. She states for the past 1 year she has been more forgetful; she states if she relaxes what she forgot will come back to her. She gives an example-she forgot one time the seasonings to use in yellow rice she cooked. She uses post it notes to help her remember. She denies home safety issues. She denies any relevant family history of memory loss. She lives alone (sometimes her grandson will stay with her-she helps get him to school). She does not drive-has not driven in some time; she will walk or use the bus. She manages her own medications-uses a pill box. She states she has trouble sleeping at times, she will lay down at 8 pm and not fall asleep until 3am; she states previously diagnosed with DENYS before moving here. She states occasional frontal headache, resolved with tylenol use.    HPI  Review of Systems   Constitutional:  Positive for fatigue.   HENT:  "Negative.     Eyes: Negative.    Respiratory: Negative.     Cardiovascular: Negative.    Gastrointestinal: Negative.    Endocrine: Negative.    Genitourinary: Negative.    Musculoskeletal: Negative.    Skin: Negative.    Allergic/Immunologic: Negative.    Neurological:  Positive for dizziness and weakness. Negative for headaches.   Hematological: Negative.    Psychiatric/Behavioral: Negative.     Pt states memory is better     I have personally reviewed the MA's review of systems and made changes as necessary.    Current Outpatient Medications on File Prior to Visit   Medication Sig Dispense Refill    Acetaminophen (TYLENOL PO) Take by mouth As needed for pain      Aspirin Low Dose 81 MG EC tablet TAKE 1 TABLET (81 MG TOTAL) BY MOUTH DAILY 90 tablet 1    cetirizine (ZyrTEC) 10 mg tablet TAKE 1 TABLET (10 MG TOTAL) BY MOUTH DAILY IN THE EVENING 30 tablet 3    Lantus SoloStar 100 units/mL SOPN INJECT 1 ML (100 UNITS TOTAL) UNDER THE SKIN IN THE MORNING      lidocaine (Lidoderm) 5 % Apply 1 patch topically over 12 hours daily Remove & Discard patch within 12 hours or as directed by MD 30 patch 2    albuterol (PROVENTIL HFA,VENTOLIN HFA) 90 mcg/act inhaler Inhale 2 puffs every 6 (six) hours as needed for wheezing (Patient not taking: Reported on 8/4/2023) 54 g 3    glucose blood test strip Use as instructed to test three times daily. (Patient not taking: Reported on 5/24/2023) 100 each 5     No current facility-administered medications on file prior to visit.      Social History     Tobacco Use    Smoking status: Former    Smokeless tobacco: Never   Vaping Use    Vaping status: Never Used   Substance and Sexual Activity    Alcohol use: Not Currently     Comment: rare    Drug use: No    Sexual activity: Not Currently     Birth control/protection: Surgical        Objective   /60 (BP Location: Right arm, Patient Position: Sitting, Cuff Size: Standard)   Pulse 73   Temp 97.6 °F (36.4 °C) (Temporal)   Ht 5' 3\" (1.6 " m)   Wt 63.8 kg (140 lb 9.6 oz)   LMP  (LMP Unknown)   SpO2 97%   BMI 24.91 kg/m²   Standing bp 92/58  Physical Exam  Vitals reviewed.   Constitutional:       General: She is not in acute distress.  HENT:      Head: Normocephalic and atraumatic.      Right Ear: External ear normal.      Left Ear: External ear normal.      Nose: Nose normal.      Mouth/Throat:      Mouth: Mucous membranes are moist.      Pharynx: Oropharynx is clear.   Eyes:      General:         Right eye: No discharge.         Left eye: No discharge.      Extraocular Movements: Extraocular movements intact.      Pupils: Pupils are equal, round, and reactive to light.   Cardiovascular:      Rate and Rhythm: Normal rate and regular rhythm.      Pulses: Normal pulses.      Heart sounds: Normal heart sounds.   Pulmonary:      Effort: Pulmonary effort is normal.      Breath sounds: Normal breath sounds.   Abdominal:      General: There is no distension.      Tenderness: There is no abdominal tenderness.   Musculoskeletal:      Cervical back: Normal range of motion.      Right lower leg: No edema.      Left lower leg: No edema.   Skin:     General: Skin is warm and dry.      Capillary Refill: Capillary refill takes less than 2 seconds.      Findings: No rash.   Neurological:      General: No focal deficit present.      Mental Status: She is alert. Mental status is at baseline.      Cranial Nerves: No cranial nerve deficit.      Sensory: No sensory deficit.      Motor: No weakness.      Coordination: Coordination normal.      Gait: Gait normal.      Deep Tendon Reflexes: Reflexes normal.   Psychiatric:         Mood and Affect: Mood normal.         Behavior: Behavior normal.       Neurological Exam  Mental Status  Alert.    Cranial Nerves  CN III, IV, VI: Extraocular movements intact bilaterally. Pupils equal round and reactive to light bilaterally.    Gait   Normal gait.

## 2025-02-13 DIAGNOSIS — G62.9 NEUROPATHY: ICD-10-CM

## 2025-02-13 DIAGNOSIS — B02.9 SHINGLES RASH: ICD-10-CM

## 2025-02-13 DIAGNOSIS — M85.80 OSTEOPENIA, UNSPECIFIED LOCATION: ICD-10-CM

## 2025-02-13 DIAGNOSIS — F32.A DEPRESSION, UNSPECIFIED DEPRESSION TYPE: ICD-10-CM

## 2025-02-13 DIAGNOSIS — E11.69 HYPERLIPIDEMIA ASSOCIATED WITH TYPE 2 DIABETES MELLITUS  (HCC): ICD-10-CM

## 2025-02-13 DIAGNOSIS — E11.8 TYPE II DIABETES MELLITUS WITH COMPLICATION (HCC): ICD-10-CM

## 2025-02-13 DIAGNOSIS — B02.8 HERPES ZOSTER WITH OTHER COMPLICATION: ICD-10-CM

## 2025-02-13 DIAGNOSIS — J45.909 UNCOMPLICATED ASTHMA, UNSPECIFIED ASTHMA SEVERITY, UNSPECIFIED WHETHER PERSISTENT: ICD-10-CM

## 2025-02-13 DIAGNOSIS — M54.14 THORACIC RADICULOPATHY: ICD-10-CM

## 2025-02-13 DIAGNOSIS — R21 RASH: ICD-10-CM

## 2025-02-13 DIAGNOSIS — M77.8 LEFT SHOULDER TENDONITIS: ICD-10-CM

## 2025-02-13 DIAGNOSIS — M35.3 PMR (POLYMYALGIA RHEUMATICA) (HCC): ICD-10-CM

## 2025-02-13 DIAGNOSIS — E78.5 HYPERLIPIDEMIA ASSOCIATED WITH TYPE 2 DIABETES MELLITUS  (HCC): ICD-10-CM

## 2025-02-13 RX ORDER — MONTELUKAST SODIUM 10 MG/1
10 TABLET ORAL EVERY 24 HOURS
Qty: 90 TABLET | Refills: 1 | Status: SHIPPED | OUTPATIENT
Start: 2025-02-13

## 2025-02-13 RX ORDER — CALCIPOTRIENE 50 UG/G
CREAM TOPICAL
Qty: 120 G | Refills: 5 | Status: SHIPPED | OUTPATIENT
Start: 2025-02-13

## 2025-02-13 RX ORDER — PREDNISONE 5 MG/1
5 TABLET ORAL DAILY
Qty: 30 TABLET | Refills: 1 | OUTPATIENT
Start: 2025-02-13

## 2025-02-13 RX ORDER — MAGNESIUM OXIDE TAB 400 MG (241.3 MG ELEMENTAL MG) 400 (241.3 MG) MG
400 TAB ORAL 2 TIMES DAILY
Qty: 180 TABLET | Refills: 1 | Status: SHIPPED | OUTPATIENT
Start: 2025-02-13

## 2025-02-13 RX ORDER — ATORVASTATIN CALCIUM 40 MG/1
TABLET, FILM COATED ORAL
Qty: 90 TABLET | Refills: 1 | Status: SHIPPED | OUTPATIENT
Start: 2025-02-13

## 2025-02-13 RX ORDER — OXYBUTYNIN CHLORIDE 15 MG/1
15 TABLET, EXTENDED RELEASE ORAL DAILY
Qty: 90 TABLET | Refills: 1 | Status: SHIPPED | OUTPATIENT
Start: 2025-02-13

## 2025-02-13 RX ORDER — GLIPIZIDE 5 MG/1
5 TABLET ORAL 2 TIMES DAILY WITH MEALS
Qty: 180 TABLET | Refills: 1 | Status: SHIPPED | OUTPATIENT
Start: 2025-02-13

## 2025-02-13 RX ORDER — FENOFIBRATE 145 MG/1
145 TABLET, COATED ORAL DAILY
Qty: 90 TABLET | Refills: 1 | OUTPATIENT
Start: 2025-02-13

## 2025-02-13 RX ORDER — CELECOXIB 200 MG/1
200 CAPSULE ORAL DAILY
Qty: 90 CAPSULE | Refills: 1 | Status: SHIPPED | OUTPATIENT
Start: 2025-02-13

## 2025-02-27 ENCOUNTER — OFFICE VISIT (OUTPATIENT)
Dept: FAMILY MEDICINE CLINIC | Facility: CLINIC | Age: 60
End: 2025-02-27
Payer: COMMERCIAL

## 2025-02-27 VITALS
OXYGEN SATURATION: 98 % | HEIGHT: 63 IN | HEART RATE: 76 BPM | TEMPERATURE: 98.2 F | SYSTOLIC BLOOD PRESSURE: 124 MMHG | BODY MASS INDEX: 24.27 KG/M2 | RESPIRATION RATE: 16 BRPM | WEIGHT: 137 LBS | DIASTOLIC BLOOD PRESSURE: 80 MMHG

## 2025-02-27 DIAGNOSIS — R41.3 MEMORY LOSS: ICD-10-CM

## 2025-02-27 DIAGNOSIS — J45.909 UNCOMPLICATED ASTHMA, UNSPECIFIED ASTHMA SEVERITY, UNSPECIFIED WHETHER PERSISTENT: ICD-10-CM

## 2025-02-27 DIAGNOSIS — E11.8 TYPE II DIABETES MELLITUS WITH COMPLICATION (HCC): ICD-10-CM

## 2025-02-27 DIAGNOSIS — Z00.01 ENCOUNTER FOR GENERAL ADULT MEDICAL EXAMINATION WITH ABNORMAL FINDINGS: Primary | ICD-10-CM

## 2025-02-27 DIAGNOSIS — R14.0 BLOATING: ICD-10-CM

## 2025-02-27 DIAGNOSIS — Z23 NEED FOR COVID-19 VACCINE: ICD-10-CM

## 2025-02-27 DIAGNOSIS — E11.69 HYPERLIPIDEMIA ASSOCIATED WITH TYPE 2 DIABETES MELLITUS  (HCC): ICD-10-CM

## 2025-02-27 DIAGNOSIS — Z12.31 ENCOUNTER FOR SCREENING MAMMOGRAM FOR BREAST CANCER: ICD-10-CM

## 2025-02-27 DIAGNOSIS — G62.9 NEUROPATHY: ICD-10-CM

## 2025-02-27 DIAGNOSIS — F32.A DEPRESSION, UNSPECIFIED DEPRESSION TYPE: ICD-10-CM

## 2025-02-27 DIAGNOSIS — M85.80 OSTEOPENIA, UNSPECIFIED LOCATION: ICD-10-CM

## 2025-02-27 DIAGNOSIS — E53.8 VITAMIN B12 DEFICIENCY: ICD-10-CM

## 2025-02-27 DIAGNOSIS — E78.5 HYPERLIPIDEMIA ASSOCIATED WITH TYPE 2 DIABETES MELLITUS  (HCC): ICD-10-CM

## 2025-02-27 DIAGNOSIS — I10 ESSENTIAL HYPERTENSION: ICD-10-CM

## 2025-02-27 LAB
CREAT UR-MCNC: 60.1 MG/DL
MICROALBUMIN UR-MCNC: 10.6 MG/L
MICROALBUMIN/CREAT 24H UR: 18 MG/G CREATININE (ref 0–30)
SL AMB POCT HEMOGLOBIN AIC: 5.9 (ref ?–6.5)

## 2025-02-27 PROCEDURE — 99214 OFFICE O/P EST MOD 30 MIN: CPT | Performed by: INTERNAL MEDICINE

## 2025-02-27 PROCEDURE — 83036 HEMOGLOBIN GLYCOSYLATED A1C: CPT | Performed by: INTERNAL MEDICINE

## 2025-02-27 PROCEDURE — 82570 ASSAY OF URINE CREATININE: CPT | Performed by: INTERNAL MEDICINE

## 2025-02-27 PROCEDURE — G0439 PPPS, SUBSEQ VISIT: HCPCS | Performed by: INTERNAL MEDICINE

## 2025-02-27 PROCEDURE — G2211 COMPLEX E/M VISIT ADD ON: HCPCS | Performed by: INTERNAL MEDICINE

## 2025-02-27 PROCEDURE — 82043 UR ALBUMIN QUANTITATIVE: CPT | Performed by: INTERNAL MEDICINE

## 2025-02-27 PROCEDURE — 91320 SARSCV2 VAC 30MCG TRS-SUC IM: CPT

## 2025-02-27 PROCEDURE — 90480 ADMN SARSCOV2 VAC 1/ONLY CMP: CPT

## 2025-02-27 RX ORDER — METOPROLOL TARTRATE 25 MG/1
25 TABLET, FILM COATED ORAL EVERY 12 HOURS SCHEDULED
Qty: 180 TABLET | Refills: 1 | Status: SHIPPED | OUTPATIENT
Start: 2025-02-27

## 2025-02-27 RX ORDER — CALCIUM CARBONATE/VITAMIN D3 600 MG-10
2 TABLET ORAL DAILY
Qty: 60 TABLET | Refills: 6 | Status: SHIPPED | OUTPATIENT
Start: 2025-02-27

## 2025-02-27 RX ORDER — MONTELUKAST SODIUM 10 MG/1
10 TABLET ORAL EVERY 24 HOURS
Qty: 90 TABLET | Refills: 1 | Status: SHIPPED | OUTPATIENT
Start: 2025-02-27

## 2025-02-27 RX ORDER — LANOLIN ALCOHOL/MO/W.PET/CERES
1000 CREAM (GRAM) TOPICAL DAILY
Qty: 90 TABLET | Refills: 3 | Status: SHIPPED | OUTPATIENT
Start: 2025-02-27

## 2025-02-27 RX ORDER — ESCITALOPRAM OXALATE 10 MG/1
10 TABLET ORAL
Qty: 30 TABLET | Refills: 5 | Status: SHIPPED | OUTPATIENT
Start: 2025-02-27

## 2025-02-27 RX ORDER — DAPAGLIFLOZIN 5 MG/1
5 TABLET, FILM COATED ORAL
Qty: 90 TABLET | Refills: 3 | Status: SHIPPED | OUTPATIENT
Start: 2025-02-27

## 2025-02-27 RX ORDER — FENOFIBRATE 145 MG/1
145 TABLET, COATED ORAL DAILY
Qty: 90 TABLET | Refills: 1 | Status: SHIPPED | OUTPATIENT
Start: 2025-02-27

## 2025-02-27 RX ORDER — OXYBUTYNIN CHLORIDE 15 MG/1
15 TABLET, EXTENDED RELEASE ORAL DAILY
Qty: 90 TABLET | Refills: 1 | Status: SHIPPED | OUTPATIENT
Start: 2025-02-27

## 2025-02-27 RX ORDER — ATORVASTATIN CALCIUM 40 MG/1
40 TABLET, FILM COATED ORAL DAILY
Qty: 90 TABLET | Refills: 1 | Status: SHIPPED | OUTPATIENT
Start: 2025-02-27

## 2025-02-27 RX ORDER — LANOLIN ALCOHOL/MO/W.PET/CERES
400 CREAM (GRAM) TOPICAL 2 TIMES DAILY
Qty: 180 TABLET | Refills: 1 | Status: SHIPPED | OUTPATIENT
Start: 2025-02-27

## 2025-02-27 RX ORDER — GLIPIZIDE 5 MG/1
5 TABLET ORAL 2 TIMES DAILY WITH MEALS
Qty: 180 TABLET | Refills: 1 | Status: SHIPPED | OUTPATIENT
Start: 2025-02-27

## 2025-02-27 RX ORDER — PANTOPRAZOLE SODIUM 40 MG/1
40 TABLET, DELAYED RELEASE ORAL DAILY
Qty: 90 TABLET | Refills: 3 | Status: SHIPPED | OUTPATIENT
Start: 2025-02-27

## 2025-02-27 RX ORDER — CELECOXIB 200 MG/1
200 CAPSULE ORAL DAILY
Qty: 90 CAPSULE | Refills: 1 | Status: SHIPPED | OUTPATIENT
Start: 2025-02-27

## 2025-02-27 NOTE — ASSESSMENT & PLAN NOTE
Orders:    Calcium Carb-Cholecalciferol (Calcium + Vitamin D3) 600-10 MG-MCG TABS; Take 2 tablets by mouth daily

## 2025-02-27 NOTE — ASSESSMENT & PLAN NOTE
Lab Results   Component Value Date    HGBA1C 5.9 02/27/2025       Orders:    atorvastatin (LIPITOR) 40 mg tablet; Take 1 tablet (40 mg total) by mouth daily    fenofibrate (TRICOR) 145 mg tablet; Take 1 tablet (145 mg total) by mouth daily

## 2025-02-27 NOTE — PROGRESS NOTES
Name: Hayley Bolaños      : 1965      MRN: [id number]  Encounter Provider: Jose Chase MD  Encounter Date: 2025   Encounter department: [facility]  :  Assessment & Plan  Vitamin B12 deficiency    Orders:    Albumin / creatinine urine ratio; Future    POCT hemoglobin A1c    Encounter for screening mammogram for breast cancer    Orders:    Mammo screening bilateral w 3d and cad; Future    Hyperlipidemia associated with type 2 diabetes mellitus  (HCC)    Lab Results   Component Value Date    [facility]1C 5.9 2025       Orders:    atorvastatin (LIPITOR) 40 mg tablet; Take 1 tablet (40 mg total) by mouth daily    fenofibrate (TRICOR) 145 mg tablet; Take 1 tablet (145 mg total) by mouth daily    Osteopenia, unspecified location    Orders:    Calcium Carb-Cholecalciferol (Calcium + Vitamin D3) 600-10 MG-MCG TABS; Take 2 tablets by mouth daily    Neuropathy    Orders:    celecoxib (CeleBREX) 200 mg capsule; Take 1 capsule (200 mg total) by mouth daily With Lunch/daily    Depression, unspecified depression type      Orders:    escitalopram (LEXAPRO) 10 mg tablet; Take 1 tablet (10 mg total) by mouth daily with dinner    oxybutynin (DITROPAN XL) 15 MG 24 hr tablet; Take 1 tablet (15 mg total) by mouth daily    Type II diabetes mellitus with complication (HCC)    Lab Results   Component Value Date    BA1C 5.9 2025       Orders:    Farxiga 5 MG TABS; Take 1 tablet (5 mg total) by mouth daily with lunch    glipiZIDE (GLUCOTROL) 5 mg tablet; Take 1 tablet (5 mg total) by mouth 2 (two) times a day with meals    BMI 24.0-24.9, adult    Orders:    MAGnesium-Oxide 400 mg TABS; Take 1 tablet (400 mg total) by mouth 2 (two) times a day    metFORMIN (GLUCOPHAGE) 1000 MG tablet; Take 1 tablet (1,000 mg total) by mouth 2 (two) times a day with meals    Essential hypertension    Orders:    metoprolol tartrate (LOPRESSOR) 25 mg tablet; Take 1 tablet (25 mg total) by mouth every 12  (twelve) hours    Uncomplicated asthma, unspecified asthma severity, unspecified whether persistent    Orders:    montelukast (SINGULAIR) 10 mg tablet; Take 1 tablet (10 mg total) by mouth every 24 hours    pantoprazole (PROTONIX) 40 mg tablet; Take 1 tablet (40 mg total) by mouth daily    Depression, unspecified depression type      Orders:    escitalopram (LEXAPRO) 10 mg tablet; Take 1 tablet (10 mg total) by mouth daily with dinner    oxybutynin (DITROPAN XL) 15 MG 24 hr tablet; Take 1 tablet (15 mg total) by mouth daily    Memory loss  Fu w neurology  Orders:    vitamin B-12 (VITAMIN B-12) 1,000 mcg tablet; Take 1 tablet (1,000 mcg total) by mouth daily    Need for COVID-19 vaccine    Orders:    COVID-19 Pfizer mRNA vaccine 12 yr and older (Comirnaty pre-filled syringe)    Encounter for general adult medical examination with abnormal findings  Done in detail  Life style Mod  RTC in 3 mos w Blood work,       Bloating  Life style Mod    Orders:    Ambulatory referral to Gastroenterology; Future           History of Present Illness   59 Y O lady is here for AWV and Regular check Up, she has few symptoms, recent blood work and med list reviewed,...      Review of Systems   Constitutional:  Negative for chills, fatigue and fever.   HENT:  Negative for congestion, facial swelling, sore throat, trouble swallowing and voice change.    Eyes:  Negative for pain, discharge and visual disturbance.   Respiratory:  Negative for cough, shortness of breath and wheezing.    Cardiovascular:  Negative for chest pain, palpitations and leg swelling.   Gastrointestinal:  Positive for abdominal pain and nausea. Negative for blood in stool, constipation and diarrhea.   Endocrine: Negative for polydipsia, polyphagia and polyuria.   Genitourinary:  Negative for difficulty urinating, hematuria and urgency.   Musculoskeletal:  Negative for arthralgias and myalgias.   Skin:  Negative for rash.   Neurological:  Negative for dizziness,  "tremors, weakness and headaches.   Hematological:  Negative for adenopathy. Does not bruise/bleed easily.   Psychiatric/Behavioral:  Positive for sleep disturbance. Negative for dysphoric mood and suicidal ideas. The patient is nervous/anxious.        Objective   /80 (BP Location: Left arm, Patient Position: Sitting, Cuff Size: Standard)   Pulse 76   Temp 98.2 °F (36.8 °C) (Tympanic)   Resp 16   Ht 5' 3\" (1.6 m)   Wt 62.1 kg (137 lb)   LMP  (LMP Unknown)   SpO2 98%   BMI 24.27 kg/m²      Physical Exam  Vitals and nursing note reviewed.   Constitutional:       General: She is not in acute distress.     Appearance: She is well-developed. She is not diaphoretic.   HENT:      Head: Normocephalic and atraumatic.      Right Ear: External ear normal.      Left Ear: External ear normal.      Nose: Nose normal.   Eyes:      General:         Right eye: No discharge.         Left eye: No discharge.      Conjunctiva/sclera: Conjunctivae normal.      Pupils: Pupils are equal, round, and reactive to light.   Neck:      Thyroid: No thyromegaly.      Trachea: No tracheal deviation.   Cardiovascular:      Rate and Rhythm: Normal rate and regular rhythm.      Heart sounds: Murmur heard.      No friction rub.   Pulmonary:      Effort: Pulmonary effort is normal. No respiratory distress.      Breath sounds: Normal breath sounds. No stridor. No wheezing or rales.   Abdominal:      General: Bowel sounds are normal. There is no distension.      Palpations: Abdomen is soft.      Tenderness: There is no abdominal tenderness. There is no guarding.      Comments: bloating   Musculoskeletal:         General: Tenderness present. No swelling or deformity. Normal range of motion.      Cervical back: Normal range of motion and neck supple.   Lymphadenopathy:      Cervical: No cervical adenopathy.   Skin:     General: Skin is warm and dry.      Capillary Refill: Capillary refill takes less than 2 seconds.      Coloration: Skin is not " pale.      Findings: No erythema or rash.   Neurological:      Mental Status: She is alert and oriented to person, place, and time.      Cranial Nerves: No cranial nerve deficit.      Coordination: Coordination normal.   Psychiatric:         Mood and Affect: Mood normal.         Behavior: Behavior normal.

## 2025-02-27 NOTE — PATIENT INSTRUCTIONS
Medicare Preventive Visit Patient Instructions  Thank you for completing your Welcome to Medicare Visit or Medicare Annual Wellness Visit today. Your next wellness visit will be due in one year (2/28/2026).  The screening/preventive services that you may require over the next 5-10 years are detailed below. Some tests may not apply to you based off risk factors and/or age. Screening tests ordered at today's visit but not completed yet may show as past due. Also, please note that scanned in results may not display below.  Preventive Screenings:  Service Recommendations Previous Testing/Comments   Colorectal Cancer Screening  * Colonoscopy    * Fecal Occult Blood Test (FOBT)/Fecal Immunochemical Test (FIT)  * Fecal DNA/Cologuard Test  * Flexible Sigmoidoscopy Age: 45-75 years old   Colonoscopy: every 10 years (may be performed more frequently if at higher risk)  OR  FOBT/FIT: every 1 year  OR  Cologuard: every 3 years  OR  Sigmoidoscopy: every 5 years  Screening may be recommended earlier than age 45 if at higher risk for colorectal cancer. Also, an individualized decision between you and your healthcare provider will decide whether screening between the ages of 76-85 would be appropriate. Colonoscopy: 08/31/2023  FOBT/FIT: Not on file  Cologuard: 12/12/2024  Sigmoidoscopy: Not on file    Screening Current     Breast Cancer Screening Age: 40+ years old  Frequency: every 1-2 years  Not required if history of left and right mastectomy Mammogram: 08/11/2023    Screening Current   Cervical Cancer Screening Between the ages of 21-29, pap smear recommended once every 3 years.   Between the ages of 30-65, can perform pap smear with HPV co-testing every 5 years.   Recommendations may differ for women with a history of total hysterectomy, cervical cancer, or abnormal pap smears in past. Pap Smear: 04/22/2022    Screening Current   Hepatitis C Screening Once for adults born between 1945 and 1965  More frequently in patients at  high risk for Hepatitis C Hep C Antibody: 03/25/2019    Screening Current   Diabetes Screening 1-2 times per year if you're at risk for diabetes or have pre-diabetes Fasting glucose: 134 mg/dL (3/21/2024)  A1C: 5.9 (2/27/2025)  Screening Not Indicated  History Diabetes   Cholesterol Screening Once every 5 years if you don't have a lipid disorder. May order more often based on risk factors. Lipid panel: 07/18/2024    Screening Not Indicated  History Lipid Disorder     Other Preventive Screenings Covered by Medicare:  Abdominal Aortic Aneurysm (AAA) Screening: covered once if your at risk. You're considered to be at risk if you have a family history of AAA.  Lung Cancer Screening: covers low dose CT scan once per year if you meet all of the following conditions: (1) Age 55-77; (2) No signs or symptoms of lung cancer; (3) Current smoker or have quit smoking within the last 15 years; (4) You have a tobacco smoking history of at least 20 pack years (packs per day multiplied by number of years you smoked); (5) You get a written order from a healthcare provider.  Glaucoma Screening: covered annually if you're considered high risk: (1) You have diabetes OR (2) Family history of glaucoma OR (3)  aged 50 and older OR (4)  American aged 65 and older  Osteoporosis Screening: covered every 2 years if you meet one of the following conditions: (1) You're estrogen deficient and at risk for osteoporosis based off medical history and other findings; (2) Have a vertebral abnormality; (3) On glucocorticoid therapy for more than 3 months; (4) Have primary hyperparathyroidism; (5) On osteoporosis medications and need to assess response to drug therapy.   Last bone density test (DXA Scan): 04/17/2019.  HIV Screening: covered annually if you're between the age of 15-65. Also covered annually if you are younger than 15 and older than 65 with risk factors for HIV infection. For pregnant patients, it is covered up to 3  times per pregnancy.    Immunizations:  Immunization Recommendations   Influenza Vaccine Annual influenza vaccination during flu season is recommended for all persons aged >= 6 months who do not have contraindications   Pneumococcal Vaccine   * Pneumococcal conjugate vaccine = PCV13 (Prevnar 13), PCV15 (Vaxneuvance), PCV20 (Prevnar 20)  * Pneumococcal polysaccharide vaccine = PPSV23 (Pneumovax) Adults 19-65 yo with certain risk factors or if 65+ yo  If never received any pneumonia vaccine: recommend Prevnar 20 (PCV20)  Give PCV20 if previously received 1 dose of PCV13 or PPSV23   Hepatitis B Vaccine 3 dose series if at intermediate or high risk (ex: diabetes, end stage renal disease, liver disease)   Respiratory syncytial virus (RSV) Vaccine - COVERED BY MEDICARE PART D  * RSVPreF3 (Arexvy) CDC recommends that adults 60 years of age and older may receive a single dose of RSV vaccine using shared clinical decision-making (SCDM)   Tetanus (Td) Vaccine - COST NOT COVERED BY MEDICARE PART B Following completion of primary series, a booster dose should be given every 10 years to maintain immunity against tetanus. Td may also be given as tetanus wound prophylaxis.   Tdap Vaccine - COST NOT COVERED BY MEDICARE PART B Recommended at least once for all adults. For pregnant patients, recommended with each pregnancy.   Shingles Vaccine (Shingrix) - COST NOT COVERED BY MEDICARE PART B  2 shot series recommended in those 19 years and older who have or will have weakened immune systems or those 50 years and older     Health Maintenance Due:      Topic Date Due   • Breast Cancer Screening: Mammogram  08/11/2024   • Cervical Cancer Screening  04/22/2027   • Colorectal Cancer Screening  08/29/2030   • HIV Screening  Completed   • Hepatitis C Screening  Completed     Immunizations Due:      Topic Date Due   • Pneumococcal Vaccine: Pediatrics (0 to 5 Years) and At-Risk Patients (6 to 64 Years) (3 of 3 - PCV20 or PCV21) 12/19/2023      Advance Directives   What are advance directives?  Advance directives are legal documents that state your wishes and plans for medical care. These plans are made ahead of time in case you lose your ability to make decisions for yourself. Advance directives can apply to any medical decision, such as the treatments you want, and if you want to donate organs.   What are the types of advance directives?  There are many types of advance directives, and each state has rules about how to use them. You may choose a combination of any of the following:  Living will:  This is a written record of the treatment you want. You can also choose which treatments you do not want, which to limit, and which to stop at a certain time. This includes surgery, medicine, IV fluid, and tube feedings.   Durable power of  for healthcare (DPAHC):  This is a written record that states who you want to make healthcare choices for you when you are unable to make them for yourself. This person, called a proxy, is usually a family member or a friend. You may choose more than 1 proxy.  Do not resuscitate (DNR) order:  A DNR order is used in case your heart stops beating or you stop breathing. It is a request not to have certain forms of treatment, such as CPR. A DNR order may be included in other types of advance directives.  Medical directive:  This covers the care that you want if you are in a coma, near death, or unable to make decisions for yourself. You can list the treatments you want for each condition. Treatment may include pain medicine, surgery, blood transfusions, dialysis, IV or tube feedings, and a ventilator (breathing machine).  Values history:  This document has questions about your views, beliefs, and how you feel and think about life. This information can help others choose the care that you would choose.  Why are advance directives important?  An advance directive helps you control your care. Although spoken wishes may  be used, it is better to have your wishes written down. Spoken wishes can be misunderstood, or not followed. Treatments may be given even if you do not want them. An advance directive may make it easier for your family to make difficult choices about your care.       © Copyright [employer] 2018 Information is for End User's use only and may not be sold, redistributed or otherwise used for commercial purposes. All illustrations and images included in CareNotes® are the copyrighted property of A.D.A.M., Inc. or [employer]

## 2025-02-27 NOTE — PROGRESS NOTES
Name: Hayley Bolaños      : 1965      MRN: [id number]  Encounter Provider: Jose Chase MD  Encounter Date: 2025   Encounter department: [place] [facility]    Assessment & Plan       Preventive health issues were discussed with patient, and age appropriate screening tests were ordered as noted in patient's After Visit Summary. Personalized health advice and appropriate referrals for health education or preventive services given if needed, as noted in patient's After Visit Summary.    History of Present Illness     HPI   Patient Care Team:  Jose Chase MD as PCP - General (Internal Medicine)  Jose Chase MD as PCP - PCP-[facility] ([facility])    Review of Systems  Medical History Reviewed by provider this encounter:       Annual Wellness Visit Questionnaire   Hayley is here for her Subsequent Wellness visit. Last Medicare Wellness visit information reviewed, patient interviewed and updates made to the record today.      Health Risk Assessment:   Patient rates overall health as good. Patient feels that their physical health rating is same. Patient is satisfied with their life. Eyesight was rated as same. Hearing was rated as same. Patient feels that their emotional and mental health rating is slightly better. Patients states they are never, rarely angry. Patient states they are sometimes unusually tired/fatigued. Pain experienced in the last 7 days has been none. Patient states that she has experienced no weight loss or gain in last 6 months.     Fall Risk Screening:   In the past year, patient has experienced: no history of falling in past year      Urinary Incontinence Screening:   Patient has not leaked urine accidently in the last six months.     Home Safety:  Patient does not have trouble with stairs inside or outside of their home. Patient has working smoke alarms and has working carbon monoxide detector. Home safety hazards include: none.     Nutrition:   Current  diet is Regular.     Medications:   Patient is currently taking over-the-counter supplements. OTC medications include: see medication list. Patient is able to manage medications.     Activities of Daily Living (ADLs)/Instrumental Activities of Daily Living (IADLs):   Walk and transfer into and out of bed and chair?: Yes  Dress and groom yourself?: Yes    Bathe or shower yourself?: Yes    Feed yourself? Yes  Do your laundry/housekeeping?: Yes  Manage your money, pay your bills and track your expenses?: Yes  Make your own meals?: Yes    Do your own shopping?: Yes    Previous Hospitalizations:   Any hospitalizations or ED visits within the last 12 months?: No      Advance Care Planning:   Living will: No    Durable POA for healthcare: No    Advanced directive: No      PREVENTIVE SCREENINGS      Cardiovascular Screening:    General: Screening Not Indicated, History Lipid Disorder and Risks and Benefits Discussed      Diabetes Screening:     General: Screening Not Indicated, History Diabetes, Risks and Benefits Discussed and Screening Current      Colorectal Cancer Screening:     General: Screening Current and Risks and Benefits Discussed      Breast Cancer Screening:     General: Screening Current and Risks and Benefits Discussed      Cervical Cancer Screening:    General: Screening Current and Risks and Benefits Discussed      Osteoporosis Screening:    General: Screening Not Indicated, History Osteoporosis and Risks and Benefits Discussed      Abdominal Aortic Aneurysm (AAA) Screening:        General: Risks and Benefits Discussed      Lung Cancer Screening:     General: Screening Not Indicated and Risks and Benefits Discussed      Hepatitis C Screening:    General: Screening Current and Risks and Benefits Discussed    Screening, Brief Intervention, and Referral to Treatment (SBIRT)     Screening      Single Item Drug Screening:  How often have you used an illegal drug (including marijuana) or a prescription medication  "for non-medical reasons in the past year? never    Single Item Drug Screen Score: 0  Interpretation: Negative screen for possible drug use disorder    Other Counseling Topics:   Car/seat belt/driving safety, skin self-exam, sunscreen and calcium and vitamin D intake and regular weightbearing exercise.     Social Drivers of Health     Financial Resource Strain: Low Risk  (12/5/2023)    Overall Financial Resource Strain (CARDIA)     Difficulty of Paying Living Expenses: Not very hard   Food Insecurity: No Food Insecurity (4/15/2021)    Hunger Vital Sign     Worried About Running Out of Food in the Last Year: Never true     Ran Out of Food in the Last Year: Never true   Transportation Needs: No Transportation Needs (12/5/2023)    PRAPARE - Transportation     Lack of Transportation (Medical): No     Lack of Transportation (Non-Medical): No     No results found.    Objective   Resp 16   Ht 5' 3\" (1.6 m)   LMP  (LMP Unknown)   BMI 24.91 kg/m²     Physical Exam    "

## 2025-03-05 ENCOUNTER — TELEPHONE (OUTPATIENT)
Age: 60
End: 2025-03-05

## 2025-03-17 ENCOUNTER — APPOINTMENT (EMERGENCY)
Dept: CT IMAGING | Facility: HOSPITAL | Age: 60
DRG: 390 | End: 2025-03-17
Payer: COMMERCIAL

## 2025-03-17 ENCOUNTER — HOSPITAL ENCOUNTER (INPATIENT)
Facility: HOSPITAL | Age: 60
LOS: 7 days | Discharge: HOME | DRG: 390 | End: 2025-03-24
Attending: EMERGENCY MEDICINE | Admitting: STUDENT IN AN ORGANIZED HEALTH CARE EDUCATION/TRAINING PROGRAM
Payer: COMMERCIAL

## 2025-03-17 DIAGNOSIS — I10 ESSENTIAL HYPERTENSION: ICD-10-CM

## 2025-03-17 DIAGNOSIS — R10.9 ABDOMINAL PAIN: Primary | ICD-10-CM

## 2025-03-17 DIAGNOSIS — K56.609 SBO (SMALL BOWEL OBSTRUCTION) (HCC): ICD-10-CM

## 2025-03-17 LAB
ALBUMIN SERPL BCG-MCNC: 4.1 G/DL (ref 3.5–5)
ALP SERPL-CCNC: 73 U/L (ref 34–104)
ALT SERPL W P-5'-P-CCNC: 13 U/L (ref 7–52)
ANION GAP SERPL CALCULATED.3IONS-SCNC: 13 MMOL/L (ref 4–13)
AST SERPL W P-5'-P-CCNC: 14 U/L (ref 13–39)
BASOPHILS # BLD AUTO: 0.06 THOUSANDS/ÂΜL (ref 0–0.1)
BASOPHILS NFR BLD AUTO: 0 % (ref 0–1)
BILIRUB SERPL-MCNC: 0.46 MG/DL (ref 0.2–1)
BUN SERPL-MCNC: 17 MG/DL (ref 5–25)
CALCIUM SERPL-MCNC: 10.8 MG/DL (ref 8.4–10.2)
CHLORIDE SERPL-SCNC: 103 MMOL/L (ref 96–108)
CO2 SERPL-SCNC: 24 MMOL/L (ref 21–32)
CREAT SERPL-MCNC: 0.95 MG/DL (ref 0.6–1.3)
EOSINOPHIL # BLD AUTO: 0.22 THOUSAND/ÂΜL (ref 0–0.61)
EOSINOPHIL NFR BLD AUTO: 1 % (ref 0–6)
ERYTHROCYTE [DISTWIDTH] IN BLOOD BY AUTOMATED COUNT: 14.2 % (ref 11.6–15.1)
GFR SERPL CREATININE-BSD FRML MDRD: 65 ML/MIN/1.73SQ M
GLUCOSE SERPL-MCNC: 121 MG/DL (ref 65–140)
GLUCOSE SERPL-MCNC: 139 MG/DL (ref 65–140)
HCT VFR BLD AUTO: 41.2 % (ref 34.8–46.1)
HGB BLD-MCNC: 13.4 G/DL (ref 11.5–15.4)
IMM GRANULOCYTES # BLD AUTO: 0.04 THOUSAND/UL (ref 0–0.2)
IMM GRANULOCYTES NFR BLD AUTO: 0 % (ref 0–2)
LACTATE SERPL-SCNC: 2 MMOL/L (ref 0.5–2)
LACTATE SERPL-SCNC: 3.8 MMOL/L (ref 0.5–2)
LIPASE SERPL-CCNC: 37 U/L (ref 11–82)
LYMPHOCYTES # BLD AUTO: 3.94 THOUSANDS/ÂΜL (ref 0.6–4.47)
LYMPHOCYTES NFR BLD AUTO: 26 % (ref 14–44)
MCH RBC QN AUTO: 27.2 PG (ref 26.8–34.3)
MCHC RBC AUTO-ENTMCNC: 32.5 G/DL (ref 31.4–37.4)
MCV RBC AUTO: 84 FL (ref 82–98)
MONOCYTES # BLD AUTO: 1.12 THOUSAND/ÂΜL (ref 0.17–1.22)
MONOCYTES NFR BLD AUTO: 7 % (ref 4–12)
NEUTROPHILS # BLD AUTO: 9.9 THOUSANDS/ÂΜL (ref 1.85–7.62)
NEUTS SEG NFR BLD AUTO: 66 % (ref 43–75)
NRBC BLD AUTO-RTO: 0 /100 WBCS
PLATELET # BLD AUTO: 296 THOUSANDS/UL (ref 149–390)
PMV BLD AUTO: 10.1 FL (ref 8.9–12.7)
POTASSIUM SERPL-SCNC: 4.6 MMOL/L (ref 3.5–5.3)
PROCALCITONIN SERPL-MCNC: 0.1 NG/ML
PROT SERPL-MCNC: 6.9 G/DL (ref 6.4–8.4)
RBC # BLD AUTO: 4.92 MILLION/UL (ref 3.81–5.12)
SODIUM SERPL-SCNC: 140 MMOL/L (ref 135–147)
WBC # BLD AUTO: 15.28 THOUSAND/UL (ref 4.31–10.16)

## 2025-03-17 PROCEDURE — 99222 1ST HOSP IP/OBS MODERATE 55: CPT | Performed by: STUDENT IN AN ORGANIZED HEALTH CARE EDUCATION/TRAINING PROGRAM

## 2025-03-17 PROCEDURE — 83690 ASSAY OF LIPASE: CPT | Performed by: EMERGENCY MEDICINE

## 2025-03-17 PROCEDURE — 87040 BLOOD CULTURE FOR BACTERIA: CPT | Performed by: EMERGENCY MEDICINE

## 2025-03-17 PROCEDURE — 96375 TX/PRO/DX INJ NEW DRUG ADDON: CPT

## 2025-03-17 PROCEDURE — 99285 EMERGENCY DEPT VISIT HI MDM: CPT | Performed by: EMERGENCY MEDICINE

## 2025-03-17 PROCEDURE — 96374 THER/PROPH/DIAG INJ IV PUSH: CPT

## 2025-03-17 PROCEDURE — 83605 ASSAY OF LACTIC ACID: CPT | Performed by: EMERGENCY MEDICINE

## 2025-03-17 PROCEDURE — 36415 COLL VENOUS BLD VENIPUNCTURE: CPT | Performed by: EMERGENCY MEDICINE

## 2025-03-17 PROCEDURE — 74177 CT ABD & PELVIS W/CONTRAST: CPT

## 2025-03-17 PROCEDURE — 96361 HYDRATE IV INFUSION ADD-ON: CPT

## 2025-03-17 PROCEDURE — 99285 EMERGENCY DEPT VISIT HI MDM: CPT

## 2025-03-17 PROCEDURE — 82948 REAGENT STRIP/BLOOD GLUCOSE: CPT

## 2025-03-17 PROCEDURE — 84145 PROCALCITONIN (PCT): CPT | Performed by: EMERGENCY MEDICINE

## 2025-03-17 PROCEDURE — 80053 COMPREHEN METABOLIC PANEL: CPT | Performed by: EMERGENCY MEDICINE

## 2025-03-17 PROCEDURE — 85025 COMPLETE CBC W/AUTO DIFF WBC: CPT | Performed by: EMERGENCY MEDICINE

## 2025-03-17 RX ORDER — ONDANSETRON 2 MG/ML
4 INJECTION INTRAMUSCULAR; INTRAVENOUS EVERY 6 HOURS PRN
Status: DISCONTINUED | OUTPATIENT
Start: 2025-03-17 | End: 2025-03-24 | Stop reason: HOSPADM

## 2025-03-17 RX ORDER — SODIUM CHLORIDE, SODIUM LACTATE, POTASSIUM CHLORIDE, CALCIUM CHLORIDE 600; 310; 30; 20 MG/100ML; MG/100ML; MG/100ML; MG/100ML
100 INJECTION, SOLUTION INTRAVENOUS CONTINUOUS
Status: DISCONTINUED | OUTPATIENT
Start: 2025-03-17 | End: 2025-03-18

## 2025-03-17 RX ORDER — INSULIN LISPRO 100 [IU]/ML
1-5 INJECTION, SOLUTION INTRAVENOUS; SUBCUTANEOUS EVERY 6 HOURS SCHEDULED
Status: DISCONTINUED | OUTPATIENT
Start: 2025-03-18 | End: 2025-03-21

## 2025-03-17 RX ORDER — PANTOPRAZOLE SODIUM 40 MG/10ML
40 INJECTION, POWDER, LYOPHILIZED, FOR SOLUTION INTRAVENOUS ONCE
Status: COMPLETED | OUTPATIENT
Start: 2025-03-17 | End: 2025-03-17

## 2025-03-17 RX ORDER — INSULIN LISPRO 100 [IU]/ML
1-5 INJECTION, SOLUTION INTRAVENOUS; SUBCUTANEOUS
Status: DISCONTINUED | OUTPATIENT
Start: 2025-03-17 | End: 2025-03-21

## 2025-03-17 RX ORDER — ONDANSETRON 2 MG/ML
1 INJECTION INTRAMUSCULAR; INTRAVENOUS ONCE
Status: COMPLETED | OUTPATIENT
Start: 2025-03-17 | End: 2025-03-17

## 2025-03-17 RX ADMIN — PANTOPRAZOLE SODIUM 40 MG: 40 INJECTION, POWDER, FOR SOLUTION INTRAVENOUS at 18:49

## 2025-03-17 RX ADMIN — SODIUM CHLORIDE 1000 ML: 0.9 INJECTION, SOLUTION INTRAVENOUS at 18:54

## 2025-03-17 RX ADMIN — IOHEXOL 100 ML: 350 INJECTION, SOLUTION INTRAVENOUS at 19:30

## 2025-03-17 RX ADMIN — SODIUM CHLORIDE 1000 ML: 0.9 INJECTION, SOLUTION INTRAVENOUS at 19:57

## 2025-03-17 RX ADMIN — MORPHINE SULFATE 2 MG: 2 INJECTION, SOLUTION INTRAMUSCULAR; INTRAVENOUS at 18:52

## 2025-03-17 NOTE — ED PROVIDER NOTES
Time reflects when diagnosis was documented in both MDM as applicable and the Disposition within this note       Time User Action Codes Description Comment    3/17/2025  9:24 PM Ramon Boss Add [R10.9] Abdominal pain     3/17/2025 10:08 PM Tiny Bermanalil Add [K56.609] SBO (small bowel obstruction) ([facility])           ED Disposition       ED Disposition   Admit    Condition   Stable    Date/Time   Mon Mar 17, 2025  9:24 PM    Comment   Case was discussed with bill and the patient's admission status was agreed to be Admission Status: inpatient status to the service of Dr. khan .               Assessment & Plan       Medical Decision Making  59-year-old female presents emergency department complaining of abdominal pain as well as nausea vomiting.  Patient notes symptoms started yesterday but she does admit that she has had intermittent abdominal pain similar to this for many years.  Patient has had a significant medical history however with abdominal complaints such as an abdominal hernia with failed mesh repair, as well as a perforation of the intestine requiring surgical repair due to peritonitis.  Patient is diabetic as well denies fever or chills today.  Differential diagnosis based on my evaluation is perforation, colitis/ileitis, diverticulitis or potentially pancreatitis.  Small bowel obstruction is also on the differential.  CT scan was ordered as well as lab test which showed an elevated white count and a lactate of 3.8.  The patient did not meet 2 SIRS criteria at 8 PM with a positive respiratory rate of 21.  CT seems to most resemble a SBO.  Patient will be admitted to the medicine service, n.p.o. with surgical consultation.  The case was discussed with Dr. Montero as well as the hospitalist on [facility] service.    Amount and/or Complexity of Data Reviewed  Labs: ordered. Decision-making details documented in ED Course.  Radiology: ordered. Decision-making details documented in ED Course.    Risk  Prescription  drug management.  Decision regarding hospitalization.        ED Course as of 03/19/25 1123   Mon Mar 17, 2025   1855 WBC(!): 15.28   1913 LACTIC ACID(!): 3.8   2040 CT abdomen pelvis with contrast   2051 CT abdomen pelvis with contrast       Medications   ondansetron (FOR EMS ONLY) (ZOFRAN) 4 mg/2 mL injection 4 mg (0 mg Does not apply Given to EMS 3/17/25 1824)   sodium chloride 0.9 % bolus 1,000 mL (0 mL Intravenous Stopped 3/17/25 1955)   morphine injection 2 mg (2 mg Intravenous Given 3/17/25 1852)   pantoprazole (PROTONIX) injection 40 mg (40 mg Intravenous Given 3/17/25 1849)   iohexol (OMNIPAQUE) 350 MG/ML injection (MULTI-DOSE) 100 mL (100 mL Intravenous Given 3/17/25 1930)   sodium chloride 0.9 % bolus 1,000 mL (0 mL Intravenous Stopped 3/17/25 2058)       ED Risk Strat Scores                            SBIRT 20yo+      Flowsheet Row Most Recent Value   Initial Alcohol Screen: US AUDIT-C     1. How often do you have a drink containing alcohol? 0 Filed at: 03/17/2025 1814   2. How many drinks containing alcohol do you have on a typical day you are drinking?  0 Filed at: 03/17/2025 1814   3a. Male UNDER 65: How often do you have five or more drinks on one occasion? 0 Filed at: 03/17/2025 1814   3b. FEMALE Any Age, or MALE 65+: How often do you have 4 or more drinks on one occassion? 0 Filed at: 03/17/2025 1814   Audit-C Score 0 Filed at: 03/17/2025 1814   ALIE: How many times in the past year have you...    Used an illegal drug or used a prescription medication for non-medical reasons? Never Filed at: 03/17/2025 1814                            History of Present Illness       Chief Complaint   Patient presents with    Abdominal Pain     Upper mid abdominal pain- ongoing intermittently for years but yesterday worsened.  Nausea / vomiting  4mg zofran, 700 CC of NSS  Per ems patient was hypotensive with a systolic in the high 90s       Past Medical History:   Diagnosis Date    Acute exacerbation of chronic  obstructive pulmonary disease (COPD) (HCC) 02/27/2020    Asthma     exercise induced    Diabetes (HCC)     type 2    Diabetes mellitus (HCC)     Hypertension     Morbid obesity (HCC) 04/07/2020    Osteoarthritis 04/07/2020    Shingles 11/14/2023      Past Surgical History:   Procedure Laterality Date    BREAST BIOPSY Left 2016    benign    CHOLECYSTECTOMY      COLONOSCOPY      FL INJECTION LEFT SHOULDER (NON ARTHROGRAM)  07/28/2023    FL LUMBAR PUNCTURE DIAGNOSTIC  07/19/2023    HERNIA REPAIR      LAPAROSCOPY FOR ECTOPIC PREGNANCY      x2, both tubes removed     MAMMO STEREOTACTIC BREAST BIOPSY LEFT (ALL INC) Left     REPLACEMENT TOTAL KNEE Left     SMALL INTESTINE SURGERY      after hernia surgery       Family History   Problem Relation Age of Onset    Breast cancer Mother 40        cause of death    Heart attack Father         MI cause of death    No Known Problems Sister     No Known Problems Daughter     No Known Problems Maternal Grandmother     No Known Problems Maternal Grandfather     No Known Problems Paternal Grandmother     No Known Problems Paternal Grandfather     No Known Problems Sister     No Known Problems Sister     Colon cancer Neg Hx     Ovarian cancer Neg Hx       Social History     Tobacco Use    Smoking status: Former     Types: Cigarettes     Start date: 2017    Smokeless tobacco: Never   Vaping Use    Vaping status: Never Used   Substance Use Topics    Alcohol use: Not Currently     Comment: rare    Drug use: No      E-Cigarette/Vaping    E-Cigarette Use Never User       E-Cigarette/Vaping Substances    Nicotine No     THC No     CBD No     Flavoring No     Other No     Unknown No       I have reviewed and agree with the history as documented.     59-year-old female presents emergency department complaining of abdominal pain.  Patient notes that she has had more mild abdominal pain in the upper and mid abdomen throughout the years but has gotten really severe yesterday into today.  Patient  notes that when she throws up the pain gets somewhat better but she states that she is having waves of very sharp severe abdominal pain that come and go.        Review of Systems   Constitutional:  Positive for activity change and appetite change. Negative for chills and fever.   HENT:  Negative for ear pain and sore throat.    Eyes:  Negative for pain and visual disturbance.   Respiratory:  Negative for cough and shortness of breath.    Cardiovascular:  Negative for chest pain and palpitations.   Gastrointestinal:  Positive for abdominal pain. Negative for vomiting.   Genitourinary:  Negative for dysuria and hematuria.   Musculoskeletal:  Negative for arthralgias and back pain.   Skin:  Negative for color change and rash.   All other systems reviewed and are negative.          Objective       ED Triage Vitals [03/17/25 1813]   Temperature Pulse Blood Pressure Respirations SpO2 Patient Position - Orthostatic VS   98.3 °F (36.8 °C) 70 103/54 18 98 % Sitting      Temp Source Heart Rate Source BP Location FiO2 (%) Pain Score    Oral Monitor Left arm -- 7      Vitals      Date and Time Temp Pulse SpO2 Resp BP Pain Score FACES Pain Rating User   03/19/25 1038 -- -- -- -- -- 8 -- MAB   03/19/25 0923 -- -- -- -- -- 7 -- MAB   03/19/25 0724 97.5 °F (36.4 °C) 93 95 % 18 112/59 -- -- DII   03/18/25 2200 -- -- 95 % -- -- No Pain -- TB   03/18/25 2148 97.8 °F (36.6 °C) -- -- 17 104/57 -- -- DII   03/18/25 1846 -- 121 94 % -- -- -- -- LR   03/18/25 1700 -- 121 92 % -- -- -- -- LR   03/18/25 1550 98.1 °F (36.7 °C) 110 94 % 16 115/65 -- -- DII   03/18/25 1500 -- -- 97 % -- -- -- -- LR   03/18/25 1300 -- -- -- -- -- 4 -- LR   03/18/25 1136 97.9 °F (36.6 °C) 122 96 % -- 103/53 -- -- DII   03/18/25 1100 -- -- 93 % -- -- -- -- LR   03/18/25 0733 100.1 °F (37.8 °C) 139 93 % 17 89/53 -- -- DII   03/18/25 0631 -- -- -- -- -- 6 -- TB   03/18/25 0236 -- -- 93 % -- -- -- -- TB   03/18/25 0201 -- 116 92 % -- -- -- -- DII   03/17/25 2253 --  -- 95 % -- -- -- -- TB   03/17/25 2251 -- -- -- -- -- No Pain -- TB   03/17/25 2203 98.3 °F (36.8 °C) 94 96 % 18 116/79 -- -- DII   03/17/25 2130 -- 88 98 % 15 112/68 -- -- EM   03/17/25 2100 -- 91 94 % 18 106/57 -- -- EM   03/17/25 2030 -- 87 96 % 14 108/54 -- -- EM   03/17/25 2000 -- 86 95 % 21 106/57 1 -- EM   03/17/25 1957 -- 86 95 % 17 106/56 -- -- EM   03/17/25 1945 -- 91 94 % 19 -- -- -- EM   03/17/25 1900 -- 83 92 % 18 111/59 -- -- EM   03/17/25 1852 -- -- -- -- -- 8 -- EM   03/17/25 1830 -- 79 95 % 19 108/64 -- -- EM   03/17/25 1813 98.3 °F (36.8 °C) 70 98 % 18 103/54 7 -- EM            Physical Exam  Vitals and nursing note reviewed.   Constitutional:       General: She is not in acute distress.     Appearance: Normal appearance. She is well-developed.   HENT:      Head: Normocephalic and atraumatic.      Right Ear: External ear normal.      Left Ear: External ear normal.      Nose: Nose normal.      Mouth/Throat:      Mouth: Mucous membranes are moist.   Eyes:      Conjunctiva/sclera: Conjunctivae normal.   Cardiovascular:      Rate and Rhythm: Normal rate and regular rhythm.      Pulses: Normal pulses.      Heart sounds: Normal heart sounds. No murmur heard.  Pulmonary:      Effort: Pulmonary effort is normal. No respiratory distress.      Breath sounds: Normal breath sounds.   Abdominal:      General: Bowel sounds are normal. There is no distension or abdominal bruit.      Palpations: Abdomen is soft.      Tenderness: There is abdominal tenderness in the epigastric area and periumbilical area. There is guarding. There is no rebound. Negative signs include Haider's sign, Rovsing's sign and McBurney's sign.   Musculoskeletal:         General: No swelling or deformity.      Cervical back: Neck supple.   Skin:     General: Skin is warm and dry.      Capillary Refill: Capillary refill takes less than 2 seconds.   Neurological:      General: No focal deficit present.      Mental Status: She is alert and  oriented to person, place, and time. Mental status is at baseline.         Results Reviewed       Procedure Component Value Units Date/Time    Blood culture #1 [[id number]] Collected: 03/17/25 1845    Lab Status: Preliminary result Specimen: Blood from Arm, Right Updated: 03/19/25 0901     Blood Culture No Growth at 24 hrs.    Blood culture #2 [[id number]] Collected: 03/17/25 1845    Lab Status: Preliminary result Specimen: Blood from Arm, Right Updated: 03/19/25 0901     Blood Culture No Growth at 24 hrs.    Lactic acid 2 Hours [[id number]]  (Normal) Collected: 03/17/25 2107    Lab Status: Final result Specimen: Blood from Arm, Left Updated: 03/17/25 2133     LACTIC ACID 2.0 mmol/L     Narrative:      Result may be elevated if tourniquet was used during collection.    Procalcitonin [[id number]]  (Normal) Collected: 03/17/25 1845    Lab Status: Final result Specimen: Blood from Arm, Left Updated: 03/17/25 1952     Procalcitonin 0.10 ng/ml     Comprehensive metabolic panel [[id number]]  (Abnormal) Collected: 03/17/25 1845    Lab Status: Final result Specimen: Blood from Arm, Left Updated: 03/17/25 1915     Sodium 140 mmol/L      Potassium 4.6 mmol/L      Chloride 103 mmol/L      CO2 24 mmol/L      ANION GAP 13 mmol/L      BUN 17 mg/dL      Creatinine 0.95 mg/dL      Glucose 139 mg/dL      Calcium 10.8 mg/dL      AST 14 U/L      ALT 13 U/L      Alkaline Phosphatase 73 U/L      Total Protein 6.9 g/dL      Albumin 4.1 g/dL      Total Bilirubin 0.46 mg/dL      eGFR 65 ml/min/1.73sq m     Narrative:      National Kid[employer] guidelines for Chronic Kidney Disease (CKD):     Stage 1 with normal or high GFR (GFR > 90 mL/min/1.73 square meters)    Stage 2 Mild CKD (GFR = 60-89 mL/min/1.73 square meters)    Stage 3A Moderate CKD (GFR = 45-59 mL/min/1.73 square meters)    Stage 3B Moderate CKD (GFR = 30-44 mL/min/1.73 square meters)    Stage 4 Severe CKD (GFR = 15-29 mL/min/1.73 square meters)    Stage 5 End Stage CKD  (GFR <15 mL/min/1.73 square meters)  Note: GFR calculation is accurate only with a steady state creatinine    Lipase [[id number]]  (Normal) Collected: 03/17/25 1845    Lab Status: Final result Specimen: Blood from Arm, Left Updated: 03/17/25 1915     Lipase 37 u/L     Lactic acid, plasma (w/reflex if result > 2.0) [[id number]]  (Abnormal) Collected: 03/17/25 1845    Lab Status: Final result Specimen: Blood from Arm, Right Updated: 03/17/25 1910     LACTIC ACID 3.8 mmol/L     Narrative:      Result may be elevated if tourniquet was used during collection.    CBC and differential [[id number]]  (Abnormal) Collected: 03/17/25 1845    Lab Status: Final result Specimen: Blood from Arm, Left Updated: 03/17/25 1852     WBC 15.28 Thousand/uL      RBC 4.92 Million/uL      Hemoglobin 13.4 g/dL      Hematocrit 41.2 %      MCV 84 fL      MCH 27.2 pg      MCHC 32.5 g/dL      RDW 14.2 %      MPV 10.1 fL      Platelets 296 Thousands/uL      nRBC 0 /100 WBCs      Segmented % 66 %      Immature Grans % 0 %      Lymphocytes % 26 %      Monocytes % 7 %      Eosinophils Relative 1 %      Basophils Relative 0 %      Absolute Neutrophils 9.90 Thousands/µL      Absolute Immature Grans 0.04 Thousand/uL      Absolute Lymphocytes 3.94 Thousands/µL      Absolute Monocytes 1.12 Thousand/µL      Eosinophils Absolute 0.22 Thousand/µL      Basophils Absolute 0.06 Thousands/µL             CT abdomen pelvis with contrast   Final Interpretation by Steve Lopez MD (03/17 2050)      Interval development of small bowel obstruction with fecalization in distal small bowel loop contents as seen on image 2/114 and transition point in the anterior mid pelvis images 2/121 and 601/64, possibly from an adhesion.      Colonic diverticulosis without diverticulitis.      Stable fatty liver.         Workstation performed: [id number]         XR chest portable    (Results Pending)       ECG 12 Lead Documentation Only    Date/Time: 3/17/2025 7:24 PM    Performed by:  Crispin Cruz Jr., DO  Authorized by: Crispin Cruz Jr., DO    ECG reviewed by me, the ED Provider: yes    Patient location:  ED  Comments:      EKG is a sinus rhythm at 83 beats a minute with a normal axis.  There is no definitive acute ST or T wave changes noted.      ED Medication and Procedure Management   Prior to Admission Medications   Prescriptions Last Dose Informant Patient Reported? Taking?   Acetaminophen (TYLENOL PO) 3/17/2025 Self Yes Yes   Sig: Take by mouth As needed for pain   Aspirin Low Dose 81 MG EC tablet 3/17/2025  No Yes   Sig: TAKE 1 TABLET (81 MG TOTAL) BY MOUTH DAILY   Calcium Carb-Cholecalciferol (Calcium + Vitamin D3) 600-10 MG-MCG TABS 3/17/2025  No Yes   Sig: Take 2 tablets by mouth daily   Farxiga 5 MG TABS 3/16/2025  No Yes   Sig: Take 1 tablet (5 mg total) by mouth daily with lunch   Lantus SoloStar 100 units/mL SOPN Not Taking Self Yes No   Sig: INJECT 1 ML (100 UNITS TOTAL) UNDER THE SKIN IN THE MORNING   Patient not taking: Reported on 3/17/2025   MAGnesium-Oxide 400 mg TABS 3/17/2025  No Yes   Sig: Take 1 tablet (400 mg total) by mouth 2 (two) times a day   albuterol (PROVENTIL HFA,VENTOLIN HFA) 90 mcg/act inhaler More than a month Self No No   Sig: Inhale 2 puffs every 6 (six) hours as needed for wheezing   Patient not taking: Reported on 8/4/2023   atorvastatin (LIPITOR) 40 mg tablet 3/16/2025  No Yes   Sig: Take 1 tablet (40 mg total) by mouth daily   calcipotriene (DOVONEX) 0.005 % cream More than a month  No No   Sig: APPLY TO AFFECTED AREA TWICE A DAY 30 DAYS LARRGER AREA HAND LEGS   celecoxib (CeleBREX) 200 mg capsule   No No   Sig: Take 1 capsule (200 mg total) by mouth daily With Lunch/daily   cetirizine (ZyrTEC) 10 mg tablet Not Taking Self No No   Sig: TAKE 1 TABLET (10 MG TOTAL) BY MOUTH DAILY IN THE EVENING   Patient not taking: Reported on 3/17/2025   escitalopram (LEXAPRO) 10 mg tablet 3/16/2025  No Yes   Sig: Take 1 tablet (10 mg total) by mouth daily  with dinner   fenofibrate (TRICOR) 145 mg tablet 3/17/2025  No Yes   Sig: Take 1 tablet (145 mg total) by mouth daily   glipiZIDE (GLUCOTROL) 5 mg tablet 3/17/2025  No Yes   Sig: Take 1 tablet (5 mg total) by mouth 2 (two) times a day with meals   glucose blood test strip  Self No No   Sig: Use as instructed to test three times daily.   Patient not taking: Reported on 5/24/2023   lidocaine (Lidoderm) 5 % More than a month  No No   Sig: Apply 1 patch topically over 12 hours daily Remove & Discard patch within 12 hours or as directed by MD   metFORMIN (GLUCOPHAGE) 1000 MG tablet 3/17/2025  No Yes   Sig: Take 1 tablet (1,000 mg total) by mouth 2 (two) times a day with meals   metoprolol tartrate (LOPRESSOR) 25 mg tablet Unknown  No No   Sig: Take 1 tablet (25 mg total) by mouth every 12 (twelve) hours   montelukast (SINGULAIR) 10 mg tablet Unknown  No No   Sig: Take 1 tablet (10 mg total) by mouth every 24 hours   oxybutynin (DITROPAN XL) 15 MG 24 hr tablet 3/17/2025  No Yes   Sig: Take 1 tablet (15 mg total) by mouth daily   pantoprazole (PROTONIX) 40 mg tablet 3/17/2025  No Yes   Sig: Take 1 tablet (40 mg total) by mouth daily   vitamin B-12 (VITAMIN B-12) 1,000 mcg tablet 3/17/2025  No Yes   Sig: Take 1 tablet (1,000 mcg total) by mouth daily      Facility-Administered Medications: None     Current Discharge Medication List        CONTINUE these medications which have NOT CHANGED    Details   Acetaminophen (TYLENOL PO) Take by mouth As needed for pain      Aspirin Low Dose 81 MG EC tablet TAKE 1 TABLET (81 MG TOTAL) BY MOUTH DAILY  Qty: 90 tablet, Refills: 1    Associated Diagnoses: Essential hypertension      atorvastatin (LIPITOR) 40 mg tablet Take 1 tablet (40 mg total) by mouth daily  Qty: 90 tablet, Refills: 1    Associated Diagnoses: Hyperlipidemia associated with type 2 diabetes mellitus  (HCC)      Calcium Carb-Cholecalciferol (Calcium + Vitamin D3) 600-10 MG-MCG TABS Take 2 tablets by mouth daily  Qty: 60  tablet, Refills: 6    Associated Diagnoses: Osteopenia, unspecified location      escitalopram (LEXAPRO) 10 mg tablet Take 1 tablet (10 mg total) by mouth daily with dinner  Qty: 30 tablet, Refills: 5    Associated Diagnoses: Depression, unspecified depression type      Farxiga 5 MG TABS Take 1 tablet (5 mg total) by mouth daily with lunch  Qty: 90 tablet, Refills: 3    Associated Diagnoses: Type II diabetes mellitus with complication (HCC)      fenofibrate (TRICOR) 145 mg tablet Take 1 tablet (145 mg total) by mouth daily  Qty: 90 tablet, Refills: 1    Associated Diagnoses: Hyperlipidemia associated with type 2 diabetes mellitus  (HCC)      glipiZIDE (GLUCOTROL) 5 mg tablet Take 1 tablet (5 mg total) by mouth 2 (two) times a day with meals  Qty: 180 tablet, Refills: 1    Associated Diagnoses: Type II diabetes mellitus with complication (HCC)      MAGnesium-Oxide 400 mg TABS Take 1 tablet (400 mg total) by mouth 2 (two) times a day  Qty: 180 tablet, Refills: 1    Associated Diagnoses: BMI 24.0-24.9, adult      metFORMIN (GLUCOPHAGE) 1000 MG tablet Take 1 tablet (1,000 mg total) by mouth 2 (two) times a day with meals  Qty: 180 tablet, Refills: 1    Associated Diagnoses: BMI 24.0-24.9, adult      oxybutynin (DITROPAN XL) 15 MG 24 hr tablet Take 1 tablet (15 mg total) by mouth daily  Qty: 90 tablet, Refills: 1    Associated Diagnoses: Depression, unspecified depression type      pantoprazole (PROTONIX) 40 mg tablet Take 1 tablet (40 mg total) by mouth daily  Qty: 90 tablet, Refills: 3    Associated Diagnoses: Uncomplicated asthma, unspecified asthma severity, unspecified whether persistent      vitamin B-12 (VITAMIN B-12) 1,000 mcg tablet Take 1 tablet (1,000 mcg total) by mouth daily  Qty: 90 tablet, Refills: 3    Associated Diagnoses: Memory loss      albuterol (PROVENTIL HFA,VENTOLIN HFA) 90 mcg/act inhaler Inhale 2 puffs every 6 (six) hours as needed for wheezing  Qty: 54 g, Refills: 3    Comments: Substitution to  a formulary equivalent within the same pharmaceutical class is authorized.  Associated Diagnoses: Uncomplicated asthma, unspecified asthma severity, unspecified whether persistent      calcipotriene (DOVONEX) 0.005 % cream APPLY TO AFFECTED AREA TWICE A DAY 30 DAYS LARRGER AREA HAND LEGS  Qty: 120 g, Refills: 5    Associated Diagnoses: Rash      celecoxib (CeleBREX) 200 mg capsule Take 1 capsule (200 mg total) by mouth daily With Lunch/daily  Qty: 90 capsule, Refills: 1    Associated Diagnoses: Neuropathy      cetirizine (ZyrTEC) 10 mg tablet TAKE 1 TABLET (10 MG TOTAL) BY MOUTH DAILY IN THE EVENING  Qty: 30 tablet, Refills: 3    Associated Diagnoses: Allergic rhinitis, unspecified seasonality, unspecified trigger      glucose blood test strip Use as instructed to test three times daily.  Qty: 100 each, Refills: 5    Associated Diagnoses: Diabetes mellitus without complication (HCC)      Lantus SoloStar 100 units/mL SOPN INJECT 1 ML (100 UNITS TOTAL) UNDER THE SKIN IN THE MORNING      lidocaine (Lidoderm) 5 % Apply 1 patch topically over 12 hours daily Remove & Discard patch within 12 hours or as directed by MD  Qty: 30 patch, Refills: 2    Associated Diagnoses: Post herpetic neuralgia      metoprolol tartrate (LOPRESSOR) 25 mg tablet Take 1 tablet (25 mg total) by mouth every 12 (twelve) hours  Qty: 180 tablet, Refills: 1    Associated Diagnoses: Essential hypertension      montelukast (SINGULAIR) 10 mg tablet Take 1 tablet (10 mg total) by mouth every 24 hours  Qty: 90 tablet, Refills: 1    Associated Diagnoses: Uncomplicated asthma, unspecified asthma severity, unspecified whether persistent           No discharge procedures on file.  ED SEPSIS DOCUMENTATION   Time reflects when diagnosis was documented in both MDM as applicable and the Disposition within this note       Time User Action Codes Description Comment    3/17/2025  9:24 PM Ramon Boss Add [R10.9] Abdominal pain     3/17/2025 10:08 PM Cullen  Kareem Add [K56.609] SBO (small bowel obstruction) ([facility])                  Crispin Cruz Jr., DO  03/19/25 1124

## 2025-03-18 ENCOUNTER — APPOINTMENT (INPATIENT)
Dept: RADIOLOGY | Facility: HOSPITAL | Age: 60
DRG: 390 | End: 2025-03-18
Payer: COMMERCIAL

## 2025-03-18 LAB
ALBUMIN SERPL BCG-MCNC: 3.8 G/DL (ref 3.5–5)
ALP SERPL-CCNC: 68 U/L (ref 34–104)
ALT SERPL W P-5'-P-CCNC: 11 U/L (ref 7–52)
ANION GAP SERPL CALCULATED.3IONS-SCNC: 5 MMOL/L (ref 4–13)
ANION GAP SERPL CALCULATED.3IONS-SCNC: 8 MMOL/L (ref 4–13)
AST SERPL W P-5'-P-CCNC: 10 U/L (ref 13–39)
BASOPHILS # BLD AUTO: 0.06 THOUSANDS/ÂΜL (ref 0–0.1)
BASOPHILS NFR BLD AUTO: 1 % (ref 0–1)
BILIRUB SERPL-MCNC: 0.56 MG/DL (ref 0.2–1)
BUN SERPL-MCNC: 20 MG/DL (ref 5–25)
BUN SERPL-MCNC: 21 MG/DL (ref 5–25)
CALCIUM SERPL-MCNC: 8.6 MG/DL (ref 8.4–10.2)
CALCIUM SERPL-MCNC: 9.9 MG/DL (ref 8.4–10.2)
CHLORIDE SERPL-SCNC: 108 MMOL/L (ref 96–108)
CHLORIDE SERPL-SCNC: 108 MMOL/L (ref 96–108)
CO2 SERPL-SCNC: 26 MMOL/L (ref 21–32)
CO2 SERPL-SCNC: 29 MMOL/L (ref 21–32)
CREAT SERPL-MCNC: 0.7 MG/DL (ref 0.6–1.3)
CREAT SERPL-MCNC: 0.97 MG/DL (ref 0.6–1.3)
EOSINOPHIL # BLD AUTO: 0.23 THOUSAND/ÂΜL (ref 0–0.61)
EOSINOPHIL NFR BLD AUTO: 3 % (ref 0–6)
ERYTHROCYTE [DISTWIDTH] IN BLOOD BY AUTOMATED COUNT: 14.2 % (ref 11.6–15.1)
GFR SERPL CREATININE-BSD FRML MDRD: 63 ML/MIN/1.73SQ M
GFR SERPL CREATININE-BSD FRML MDRD: 94 ML/MIN/1.73SQ M
GLUCOSE SERPL-MCNC: 107 MG/DL (ref 65–140)
GLUCOSE SERPL-MCNC: 142 MG/DL (ref 65–140)
GLUCOSE SERPL-MCNC: 143 MG/DL (ref 65–140)
GLUCOSE SERPL-MCNC: 145 MG/DL (ref 65–140)
GLUCOSE SERPL-MCNC: 44 MG/DL (ref 65–140)
GLUCOSE SERPL-MCNC: 46 MG/DL (ref 65–140)
GLUCOSE SERPL-MCNC: 53 MG/DL (ref 65–140)
GLUCOSE SERPL-MCNC: 75 MG/DL (ref 65–140)
HCT VFR BLD AUTO: 41.5 % (ref 34.8–46.1)
HGB BLD-MCNC: 13.5 G/DL (ref 11.5–15.4)
IMM GRANULOCYTES # BLD AUTO: 0.01 THOUSAND/UL (ref 0–0.2)
IMM GRANULOCYTES NFR BLD AUTO: 0 % (ref 0–2)
INR PPP: 0.91 (ref 0.85–1.19)
LYMPHOCYTES # BLD AUTO: 1.46 THOUSANDS/ÂΜL (ref 0.6–4.47)
LYMPHOCYTES NFR BLD AUTO: 17 % (ref 14–44)
MAGNESIUM SERPL-MCNC: 1.8 MG/DL (ref 1.9–2.7)
MAGNESIUM SERPL-MCNC: 2.1 MG/DL (ref 1.9–2.7)
MCH RBC QN AUTO: 27.6 PG (ref 26.8–34.3)
MCHC RBC AUTO-ENTMCNC: 32.5 G/DL (ref 31.4–37.4)
MCV RBC AUTO: 85 FL (ref 82–98)
MONOCYTES # BLD AUTO: 0.95 THOUSAND/ÂΜL (ref 0.17–1.22)
MONOCYTES NFR BLD AUTO: 11 % (ref 4–12)
NEUTROPHILS # BLD AUTO: 5.67 THOUSANDS/ÂΜL (ref 1.85–7.62)
NEUTS SEG NFR BLD AUTO: 68 % (ref 43–75)
NRBC BLD AUTO-RTO: 0 /100 WBCS
PHOSPHATE SERPL-MCNC: 3.8 MG/DL (ref 2.3–4.1)
PHOSPHATE SERPL-MCNC: 4.3 MG/DL (ref 2.3–4.1)
PLATELET # BLD AUTO: 265 THOUSANDS/UL (ref 149–390)
PMV BLD AUTO: 9.9 FL (ref 8.9–12.7)
POTASSIUM SERPL-SCNC: 4.1 MMOL/L (ref 3.5–5.3)
POTASSIUM SERPL-SCNC: 5.2 MMOL/L (ref 3.5–5.3)
PROT SERPL-MCNC: 6.3 G/DL (ref 6.4–8.4)
PROTHROMBIN TIME: 12.7 SECONDS (ref 12.3–15)
RBC # BLD AUTO: 4.89 MILLION/UL (ref 3.81–5.12)
SODIUM SERPL-SCNC: 142 MMOL/L (ref 135–147)
SODIUM SERPL-SCNC: 142 MMOL/L (ref 135–147)
WBC # BLD AUTO: 8.38 THOUSAND/UL (ref 4.31–10.16)

## 2025-03-18 PROCEDURE — 83735 ASSAY OF MAGNESIUM: CPT | Performed by: STUDENT IN AN ORGANIZED HEALTH CARE EDUCATION/TRAINING PROGRAM

## 2025-03-18 PROCEDURE — 82948 REAGENT STRIP/BLOOD GLUCOSE: CPT

## 2025-03-18 PROCEDURE — 83735 ASSAY OF MAGNESIUM: CPT

## 2025-03-18 PROCEDURE — 85025 COMPLETE CBC W/AUTO DIFF WBC: CPT | Performed by: STUDENT IN AN ORGANIZED HEALTH CARE EDUCATION/TRAINING PROGRAM

## 2025-03-18 PROCEDURE — 71045 X-RAY EXAM CHEST 1 VIEW: CPT

## 2025-03-18 PROCEDURE — NC001 PR NO CHARGE: Performed by: PHYSICIAN ASSISTANT

## 2025-03-18 PROCEDURE — 80048 BASIC METABOLIC PNL TOTAL CA: CPT

## 2025-03-18 PROCEDURE — 99222 1ST HOSP IP/OBS MODERATE 55: CPT | Performed by: SURGERY

## 2025-03-18 PROCEDURE — 84100 ASSAY OF PHOSPHORUS: CPT

## 2025-03-18 PROCEDURE — 99232 SBSQ HOSP IP/OBS MODERATE 35: CPT | Performed by: PHYSICIAN ASSISTANT

## 2025-03-18 PROCEDURE — 80053 COMPREHEN METABOLIC PANEL: CPT | Performed by: STUDENT IN AN ORGANIZED HEALTH CARE EDUCATION/TRAINING PROGRAM

## 2025-03-18 PROCEDURE — 84100 ASSAY OF PHOSPHORUS: CPT | Performed by: STUDENT IN AN ORGANIZED HEALTH CARE EDUCATION/TRAINING PROGRAM

## 2025-03-18 PROCEDURE — 85610 PROTHROMBIN TIME: CPT | Performed by: STUDENT IN AN ORGANIZED HEALTH CARE EDUCATION/TRAINING PROGRAM

## 2025-03-18 RX ORDER — SODIUM CHLORIDE, SODIUM LACTATE, POTASSIUM CHLORIDE, CALCIUM CHLORIDE 600; 310; 30; 20 MG/100ML; MG/100ML; MG/100ML; MG/100ML
100 INJECTION, SOLUTION INTRAVENOUS CONTINUOUS
Status: DISCONTINUED | OUTPATIENT
Start: 2025-03-18 | End: 2025-03-19

## 2025-03-18 RX ORDER — DEXTROSE 25 % IN WATER 25 %
25 SYRINGE (ML) INTRAVENOUS ONCE
Status: DISCONTINUED | OUTPATIENT
Start: 2025-03-18 | End: 2025-03-24 | Stop reason: HOSPADM

## 2025-03-18 RX ORDER — LIDOCAINE HYDROCHLORIDE 20 MG/ML
1 JELLY TOPICAL ONCE
Status: COMPLETED | OUTPATIENT
Start: 2025-03-18 | End: 2025-03-18

## 2025-03-18 RX ORDER — DIPHENHYDRAMINE HYDROCHLORIDE AND LIDOCAINE HYDROCHLORIDE AND ALUMINUM HYDROXIDE AND MAGNESIUM HYDRO
10 KIT ONCE
Status: COMPLETED | OUTPATIENT
Start: 2025-03-18 | End: 2025-03-18

## 2025-03-18 RX ORDER — DEXTROSE MONOHYDRATE 25 G/50ML
INJECTION, SOLUTION INTRAVENOUS
Status: COMPLETED
Start: 2025-03-18 | End: 2025-03-18

## 2025-03-18 RX ORDER — MORPHINE SULFATE 4 MG/ML
4 INJECTION, SOLUTION INTRAMUSCULAR; INTRAVENOUS EVERY 4 HOURS PRN
Status: DISCONTINUED | OUTPATIENT
Start: 2025-03-18 | End: 2025-03-24 | Stop reason: HOSPADM

## 2025-03-18 RX ORDER — LORAZEPAM 2 MG/ML
1 INJECTION INTRAMUSCULAR ONCE
Status: COMPLETED | OUTPATIENT
Start: 2025-03-18 | End: 2025-03-18

## 2025-03-18 RX ORDER — ACETAMINOPHEN 10 MG/ML
1000 INJECTION, SOLUTION INTRAVENOUS EVERY 6 HOURS SCHEDULED
Status: DISCONTINUED | OUTPATIENT
Start: 2025-03-18 | End: 2025-03-23

## 2025-03-18 RX ORDER — MAGNESIUM SULFATE HEPTAHYDRATE 40 MG/ML
2 INJECTION, SOLUTION INTRAVENOUS ONCE
Status: COMPLETED | OUTPATIENT
Start: 2025-03-18 | End: 2025-03-18

## 2025-03-18 RX ADMIN — MAGNESIUM SULFATE HEPTAHYDRATE 2 G: 40 INJECTION, SOLUTION INTRAVENOUS at 10:48

## 2025-03-18 RX ADMIN — SODIUM CHLORIDE 1000 ML: 0.9 INJECTION, SOLUTION INTRAVENOUS at 10:49

## 2025-03-18 RX ADMIN — DEXTROSE MONOHYDRATE 50 ML: 25 INJECTION, SOLUTION INTRAVENOUS at 18:54

## 2025-03-18 RX ADMIN — LORAZEPAM 1 MG: 2 INJECTION INTRAMUSCULAR; INTRAVENOUS at 09:53

## 2025-03-18 RX ADMIN — SODIUM CHLORIDE, SODIUM LACTATE, POTASSIUM CHLORIDE, AND CALCIUM CHLORIDE 100 ML/HR: .6; .31; .03; .02 INJECTION, SOLUTION INTRAVENOUS at 09:41

## 2025-03-18 RX ADMIN — TOPICAL ANESTHETIC 2 SPRAY: 200 SPRAY DENTAL; PERIODONTAL at 13:48

## 2025-03-18 RX ADMIN — LORAZEPAM 1 MG: 2 INJECTION INTRAMUSCULAR; INTRAVENOUS at 10:13

## 2025-03-18 RX ADMIN — LIDOCAINE HYDROCHLORIDE 1 APPLICATION: 20 JELLY TOPICAL at 10:12

## 2025-03-18 RX ADMIN — DIPHENHYDRAMINE HYDROCHLORIDE AND LIDOCAINE HYDROCHLORIDE AND ALUMINUM HYDROXIDE AND MAGNESIUM HYDRO 10 ML: KIT at 13:41

## 2025-03-18 RX ADMIN — SODIUM CHLORIDE, SODIUM LACTATE, POTASSIUM CHLORIDE, AND CALCIUM CHLORIDE 100 ML/HR: .6; .31; .03; .02 INJECTION, SOLUTION INTRAVENOUS at 10:14

## 2025-03-18 RX ADMIN — MORPHINE SULFATE 2 MG: 2 INJECTION, SOLUTION INTRAMUSCULAR; INTRAVENOUS at 06:31

## 2025-03-18 RX ADMIN — SODIUM CHLORIDE, SODIUM LACTATE, POTASSIUM CHLORIDE, AND CALCIUM CHLORIDE 100 ML/HR: .6; .31; .03; .02 INJECTION, SOLUTION INTRAVENOUS at 00:18

## 2025-03-18 RX ADMIN — ACETAMINOPHEN 1000 MG: 10 INJECTION INTRAVENOUS at 13:48

## 2025-03-18 RX ADMIN — SODIUM CHLORIDE 1000 ML: 0.9 INJECTION, SOLUTION INTRAVENOUS at 13:49

## 2025-03-18 RX ADMIN — ONDANSETRON 4 MG: 2 INJECTION INTRAMUSCULAR; INTRAVENOUS at 06:31

## 2025-03-18 RX ADMIN — LIDOCAINE HYDROCHLORIDE 1 APPLICATION: 20 JELLY TOPICAL at 13:48

## 2025-03-18 RX ADMIN — ACETAMINOPHEN 1000 MG: 10 INJECTION INTRAVENOUS at 21:24

## 2025-03-18 RX ADMIN — TOPICAL ANESTHETIC 2 SPRAY: 200 SPRAY DENTAL; PERIODONTAL at 10:47

## 2025-03-18 RX ADMIN — LIDOCAINE HYDROCHLORIDE 1 APPLICATION: 20 JELLY TOPICAL at 10:48

## 2025-03-18 NOTE — H&P
"H&P - Hospitalist   Name: Hayley Bolaños 59 y.o. female I MRN: [id number]  Unit/Bed#: -01 I Date of Admission: 3/17/2025   Date of Service: 3/17/2025 I Hospital Day: 0     Assessment & Plan  SBO (small bowel obstruction) (HCC)  To intra-abdominal adhesions due to multiple laparotomies.  At this time the patient is hemodynamically stable, no active nausea and vomiting.  Mild elevated lactic acid that is corrected by IV fluid.  As per general surgery consult, hold off NG tube given patient has no nausea vomiting, no need for IV antibiotics, IV fluids at this time.  Keep the patient NPO.  General surgery will evaluate the patient in the morning.  Type 2 diabetes mellitus (HCC)  Lab Results   Component Value Date    HGBA1C 5.9 02/27/2025       No results for input(s): \"POCGLU\" in the last 72 hours.    Blood Sugar Average: Last 72 hrs:    The patient is on dapagliflozin, glipizide, insulin glargine and metformin.  Given the patient is n.p.o., and blood glucose is controlled, we will continue with the sliding scale insulin at this time every 6 hours.  Essential hypertension  Blood pressure is controlled.  Hold antihypertensive medications at this time.  Hyperlipidemia  Hold atorvastatin and fenofibrate given the patient is NPO.  Resume once n.p.o. status cleared  Leukocytosis  Chronic leukocytosis, unknown underlying etiology.  At this time we will continue to monitor with CBC with differential daily.      VTE Pharmacologic Prophylaxis:   Low Risk (Score 0-2) - Encourage Ambulation.  Code Status: No Order Full code  Discussion with family:     Anticipated Length of Stay: Patient will be admitted on an inpatient basis with an anticipated length of stay of greater than 2 midnights secondary to SBO, pt needs close monitoring and possible surgery.    History of Present Illness   Chief Complaint: Abdominal pain    Hayley Bolaños is a 59 y.o. female with a PMH of possible laparotomy due to various reasons, chronic abdominal " pain, type 2 diabetes, hypertension who presented with a 2-day history of ongoing progressively worse generalized abdominal pain, the patient is still having bowel movements, her last bowel movement was yesterday, she has some nausea but no vomiting.  She has some chills and rigors as well as generalized sweating yesterday but not today.  She denied any other problem at this time.  In the ED she was found to have evidence of a small bowel obstruction on CT scan abdomen and pelvis, chronic mildly worsening leukocytosis, mild elevated lactic acid but that was corrected after IV fluids.  General surgery consult obtained in the ED, general surgery recommended admission to medicine, n.p.o., IV fluid, and assess in the morning for either spontaneous resolution or consideration for surgical intervention.    Review of Systems    Was reviewed and was found unremarkable unless otherwise mentioned in my note.    Historical Information   Past Medical History:   Diagnosis Date    Acute exacerbation of chronic obstructive pulmonary disease (COPD) (HCC) 02/27/2020    Asthma     exercise induced    Diabetes (HCC)     type 2    Diabetes mellitus (HCC)     Hypertension     Morbid obesity (HCC) 04/07/2020    Osteoarthritis 04/07/2020    Shingles 11/14/2023     Past Surgical History:   Procedure Laterality Date    BREAST BIOPSY Left 2016    benign    CHOLECYSTECTOMY      COLONOSCOPY      FL INJECTION LEFT SHOULDER (NON ARTHROGRAM)  07/28/2023    FL LUMBAR PUNCTURE DIAGNOSTIC  07/19/2023    HERNIA REPAIR      LAPAROSCOPY FOR ECTOPIC PREGNANCY      x2, both tubes removed     MAMMO STEREOTACTIC BREAST BIOPSY LEFT (ALL INC) Left     REPLACEMENT TOTAL KNEE Left     SMALL INTESTINE SURGERY      after hernia surgery      Social History     Tobacco Use    Smoking status: Former    Smokeless tobacco: Never   Vaping Use    Vaping status: Never Used   Substance and Sexual Activity    Alcohol use: Not Currently     Comment: rare    Drug use: No     Sexual activity: Not Currently     Birth control/protection: Surgical     E-Cigarette/Vaping    E-Cigarette Use Never User      E-Cigarette/Vaping Substances    Nicotine No     THC No     CBD No     Flavoring No     Other No     Unknown No        Social History:  Marital Status: Single   Occupation:   Patient Pre-hospital Living Situation:   Patient Pre-hospital Level of Mobility:   Patient Pre-hospital Diet Restrictions:     Meds/Allergies   I have reviewed home medications using recent Epic encounter.  Prior to Admission medications    Medication Sig Start Date End Date Taking? Authorizing Provider   Acetaminophen (TYLENOL PO) Take by mouth As needed for pain    Historical Provider, MD   albuterol (PROVENTIL HFA,VENTOLIN HFA) 90 mcg/act inhaler Inhale 2 puffs every 6 (six) hours as needed for wheezing  Patient not taking: Reported on 8/4/2023 4/5/22   Jose Chase MD   Aspirin Low Dose 81 MG EC tablet TAKE 1 TABLET (81 MG TOTAL) BY MOUTH DAILY 1/13/25   Jose Chase MD   atorvastatin (LIPITOR) 40 mg tablet Take 1 tablet (40 mg total) by mouth daily 2/27/25   Jose Chase MD   calcipotriene (DOVONEX) 0.005 % cream APPLY TO AFFECTED AREA TWICE A DAY 30 DAYS LARRGER AREA HAND LEGS 2/13/25   Jose Chase MD   Calcium Carb-Cholecalciferol (Calcium + Vitamin D3) 600-10 MG-MCG TABS Take 2 tablets by mouth daily 2/27/25   Jose Chase MD   celecoxib (CeleBREX) 200 mg capsule Take 1 capsule (200 mg total) by mouth daily With Lunch/daily 2/27/25   Jose Chase MD   cetirizine (ZyrTEC) 10 mg tablet TAKE 1 TABLET (10 MG TOTAL) BY MOUTH DAILY IN THE EVENING 4/20/23   Jose Chase MD   escitalopram (LEXAPRO) 10 mg tablet Take 1 tablet (10 mg total) by mouth daily with dinner 2/27/25   Jose Chase MD   Farxiga 5 MG TABS Take 1 tablet (5 mg total) by mouth daily with lunch 2/27/25   Jose Chase MD   fenofibrate (TRICOR) 145 mg tablet Take 1 tablet (145 mg total) by mouth daily 2/27/25   Jose Chase MD   glipiZIDE (GLUCOTROL) 5  mg tablet Take 1 tablet (5 mg total) by mouth 2 (two) times a day with meals 2/27/25   Jose Chase MD   glucose blood test strip Use as instructed to test three times daily.  Patient not taking: Reported on 5/24/2023 4/23/20   Jose Chase MD   Lantus SoloStar 100 units/mL SOPN INJECT 1 ML (100 UNITS TOTAL) UNDER THE SKIN IN THE MORNING 11/22/23   Historical Provider, MD   lidocaine (Lidoderm) 5 % Apply 1 patch topically over 12 hours daily Remove & Discard patch within 12 hours or as directed by MD 1/2/24   THAO Holden   MAGnesium-Oxide 400 mg TABS Take 1 tablet (400 mg total) by mouth 2 (two) times a day 2/27/25   Jose Chase MD   metFORMIN (GLUCOPHAGE) 1000 MG tablet Take 1 tablet (1,000 mg total) by mouth 2 (two) times a day with meals 2/27/25   Jose Chase MD   metoprolol tartrate (LOPRESSOR) 25 mg tablet Take 1 tablet (25 mg total) by mouth every 12 (twelve) hours 2/27/25   Jose Chase MD   montelukast (SINGULAIR) 10 mg tablet Take 1 tablet (10 mg total) by mouth every 24 hours 2/27/25   Jose Chase MD   oxybutynin (DITROPAN XL) 15 MG 24 hr tablet Take 1 tablet (15 mg total) by mouth daily 2/27/25   Jose Chase MD   pantoprazole (PROTONIX) 40 mg tablet Take 1 tablet (40 mg total) by mouth daily 2/27/25   Jose Chase MD   vitamin B-12 (VITAMIN B-12) 1,000 mcg tablet Take 1 tablet (1,000 mcg total) by mouth daily 2/27/25   Jose Chase MD     Allergies   Allergen Reactions    No Active Allergies        Objective :  Temp:  [98.3 °F (36.8 °C)] 98.3 °F (36.8 °C)  HR:  [70-94] 94  BP: (103-116)/(54-79) 116/79  Resp:  [14-21] 18  SpO2:  [92 %-98 %] 96 %  O2 Device: None (Room air)    Physical Exam     General: No acute distress.  CVS: S1/S2/RRR.  No murmurs.  Lungs: Bilateral clear air entry  Abdomen: Obese, Distended, soft, moderate tenderness, BS active  Neurological: Awake alert oriented to self, time, place no gross focal weakness.      Lines/Drains:            Lab Results: I have reviewed the  following results:  Results from last 7 days   Lab Units 03/17/25  1845   WBC Thousand/uL 15.28*   HEMOGLOBIN g/dL 13.4   HEMATOCRIT % 41.2   PLATELETS Thousands/uL 296   SEGS PCT % 66   LYMPHO PCT % 26   MONO PCT % 7   EOS PCT % 1     Results from last 7 days   Lab Units 03/17/25  1845   SODIUM mmol/L 140   POTASSIUM mmol/L 4.6   CHLORIDE mmol/L 103   CO2 mmol/L 24   BUN mg/dL 17   CREATININE mg/dL 0.95   ANION GAP mmol/L 13   CALCIUM mg/dL 10.8*   ALBUMIN g/dL 4.1   TOTAL BILIRUBIN mg/dL 0.46   ALK PHOS U/L 73   ALT U/L 13   AST U/L 14   GLUCOSE RANDOM mg/dL 139             Lab Results   Component Value Date    HGBA1C 5.9 02/27/2025    HGBA1C 6.6 (A) 11/22/2024    HGBA1C 5.4 03/27/2024     Results from last 7 days   Lab Units 03/17/25  2107 03/17/25  1845   LACTIC ACID mmol/L 2.0 3.8*   PROCALCITONIN ng/ml  --  0.10       Imaging Results Review: I personally reviewed the following image studies in PACS and associated radiology reports: CT abdomen/pelvis. My interpretation of the radiology images/reports is: SBO.      Administrative Statements       ** Please Note: This note has been constructed using a voice recognition system. **

## 2025-03-18 NOTE — ASSESSMENT & PLAN NOTE
"59 year old female with PMH significant for HTN, T2DM, HLD, COPD, prior SBO, multiple abdominal surgeries presents for the evaluation of a small bowel obstruction   Afebrile, tachycardic, hypotensive this AM  WBC 8.38 (15)  Hgb 13.5  Cr 0.97  Mg 1.8  CTAP: \"Interval development of small bowel obstruction with fecalization in distal small bowel loop contents as seen on image 2/114 and transition point in the anterior mid pelvis images 2/121 and 601/64, possibly from an adhesion\"  Clinically and hemodynamically stable. Abdomen soft, distended, generalized TTP. +nausea/dry heaving    Plan:  No acute surgical intervention indicated at this time. Recommend NGT insertion. Continue conservative management. Criteria for surgical intervention reviewed with patient   NPO with IV fluids. Will order another bolus  Replete Mg  Monitor labs and vitals   Analgesia and antiemetics prn  Rest of medical management per SLIM  Discussed with Dr. Garcia   "

## 2025-03-18 NOTE — PROGRESS NOTES
Progress Note - Hospitalist   Name: Hayley Bolaños 59 y.o. female I MRN: [id number]  Unit/Bed#: -01 I Date of Admission: 3/17/2025   Date of Service: 3/18/2025 I Hospital Day: 1    Assessment & Plan  SBO (small bowel obstruction) ([facility])  Patient with abdominal pain, nausea/vomiting  CT abd/pelvis: Interval development of small bowel obstruction with fecalization in distal small bowel loop contents as seen on image 2/114 and transition point in the anterior mid pelvis images 2/121 and 601/64, possibly from an adhesion.   Patient continued to have nausea/vomiting  Nursing to attempt NG tube placement this morning - she was initially unsure of this  Mild elevated lactic acid - resolved with IV fluid  General surgery consultation appreciated   Continue NPO   No acute surgical intervention indicated at this time  Labs in am  Type 2 diabetes mellitus ([facility])  Lab Results   Component Value Date    HGBA1C 5.9 02/27/2025       Recent Labs     03/17/25  2327 03/18/25  0555   POCGLU 121 142*       Blood Sugar Average: Last 72 hrs:  (P) 131.5  Hold home regimen of dapagliflozin, glipizide, insulin glargine and metformin.  The patient is currently n.p.o.  Continue with the sliding scale insulin with accu checks every 6 hours.  Essential hypertension  Blood pressure low this morning  Patient receiving 1 L NS IV bolus- continue IV  mL/hr after bolus  Hold antihypertensive medications at this time.  Monitor blood pressure trends  Hyperlipidemia  Hold atorvastatin and fenofibrate given the patient is NPO.    Resume once n.p.o. status cleared  Leukocytosis  WBC of 15.28- likely reactive   Now resolved    VTE Pharmacologic Prophylaxis: VTE Score: 2 Low Risk (Score 0-2) - Encourage Ambulation.    Mobility:   Basic Mobility Inpatient Raw Score: 23  JH-HLM Goal: 7: Walk 25 feet or more  JH-HLM Achieved: 6: Walk 10 steps or more  JH-HLM Goal NOT achieved. Continue with multidisciplinary rounding and encourage appropriate mobility  to improve upon [facility] goals.    Patient Centered Rounds: I performed bedside rounds with nursing staff today.   Discussions with Specialists or Other Care Team Provider: nursing, CM    Education and Discussions with Family / Patient: Patient declined call to .     Current Length of Stay: 1 day(s)  Current Patient Status: Inpatient   Certification Statement: The patient will continue to require additional inpatient hospital stay due to continued need for IV fluids, npo status, serial abdominal examines  Discharge Plan: Anticipate discharge in 24-48 hrs to home.    Code Status: Level 1 - Full Code    Subjective   The patient was seen and examined. The patient reports continued nausea/vomiting and abdominal pain.     Objective :  Temp:  [98.3 °F (36.8 °C)-100.1 °F (37.8 °C)] 100.1 °F (37.8 °C)  HR:  [] 139  BP: ()/(53-79) 89/53  Resp:  [14-21] 17  SpO2:  [92 %-98 %] 93 %  O2 Device: None (Room air)    Body mass index is 25.15 kg/m².     Input and Output Summary (last 24 hours):     Intake/Output Summary (Last 24 hours) at 3/18/2025 1028  Last data filed at 3/18/2025 0631  Gross per 24 hour   Intake 1700 ml   Output 100 ml   Net 1600 ml       Physical Exam  Vitals and nursing note reviewed.   Constitutional:       General: She is awake.      Appearance: Normal appearance.   HENT:      Head: Normocephalic and atraumatic.   Cardiovascular:      Rate and Rhythm: Normal rate and regular rhythm.   Pulmonary:      Effort: Pulmonary effort is normal.      Breath sounds: Normal breath sounds.   Abdominal:      Palpations: Abdomen is soft.      Tenderness: There is abdominal tenderness in the epigastric area and left upper quadrant. There is no guarding or rebound.   Skin:     General: Skin is warm and dry.   Neurological:      General: No focal deficit present.      Mental Status: She is alert and oriented to person, place, and time.   Psychiatric:         Attention and Perception: Attention normal.          Mood and Affect: Mood normal.         Speech: Speech normal.         Behavior: Behavior is cooperative.         Cognition and Memory: Cognition and memory normal.           Lines/Drains:              Lab Results: I have reviewed the following results:   Results from last 7 days   Lab Units 03/18/25  0450   WBC Thousand/uL 8.38   HEMOGLOBIN g/dL 13.5   HEMATOCRIT % 41.5   PLATELETS Thousands/uL 265   SEGS PCT % 68   LYMPHO PCT % 17   MONO PCT % 11   EOS PCT % 3     Results from last 7 days   Lab Units 03/18/25  0450   SODIUM mmol/L 142   POTASSIUM mmol/L 5.2   CHLORIDE mmol/L 108   CO2 mmol/L 29   BUN mg/dL 21   CREATININE mg/dL 0.97   ANION GAP mmol/L 5   CALCIUM mg/dL 9.9   ALBUMIN g/dL 3.8   TOTAL BILIRUBIN mg/dL 0.56   ALK PHOS U/L 68   ALT U/L 11   AST U/L 10*   GLUCOSE RANDOM mg/dL 145*     Results from last 7 days   Lab Units 03/18/25  0450   INR  0.91     Results from last 7 days   Lab Units 03/18/25  0555 03/17/25  2327   POC GLUCOSE mg/dl 142* 121         Results from last 7 days   Lab Units 03/17/25  2107 03/17/25  1845   LACTIC ACID mmol/L 2.0 3.8*   PROCALCITONIN ng/ml  --  0.10       Recent Cultures (last 7 days):   Results from last 7 days   Lab Units 03/17/25  1845   BLOOD CULTURE  Received in Microbiology Lab. Culture in Progress.  Received in Microbiology Lab. Culture in Progress.       Imaging Results Review: No pertinent imaging studies reviewed.  Other Study Results Review: No additional pertinent studies reviewed.    Last 24 Hours Medication List:     Current Facility-Administered Medications:     acetaminophen (Ofirmev) injection 1,000 mg, Q6H NEETA    insulin lispro (HumALOG/ADMELOG) 100 units/mL subcutaneous injection 1-5 Units, Q6H NEETA **AND** Fingerstick Glucose (POCT), Q6H    insulin lispro (HumALOG/ADMELOG) 100 units/mL subcutaneous injection 1-5 Units, HS    lactated ringers infusion, Continuous, Last Rate: 100 mL/hr (03/18/25 1014)    magnesium sulfate 2 g/50 mL IVPB (premix) 2 g,  Once    morphine injection 2 mg, Q4H PRN    morphine injection 4 mg, Q4H PRN    ondansetron (ZOFRAN) injection 4 mg, Q6H PRN    sodium chloride 0.9 % bolus 1,000 mL, Once    Administrative Statements   Today, Patient Was Seen By: Juancarlos Bradley PA-C  I have spent a total time of 35 minutes in caring for this patient on the day of the visit/encounter including Documenting in the medical record, Reviewing/placing orders in the medical record (including tests, medications, and/or procedures), and Communicating with other healthcare professionals .    **Please Note: This note may have been constructed using a voice recognition system.**

## 2025-03-18 NOTE — ASSESSMENT & PLAN NOTE
Blood pressure low this morning  Patient receiving 1 L NS IV bolus- continue IV  mL/hr after bolus  Hold antihypertensive medications at this time.  Monitor blood pressure trends

## 2025-03-18 NOTE — PLAN OF CARE
Problem: Potential for Falls  Goal: Patient will remain free of falls  Description: INTERVENTIONS:  - Educate patient/family on patient safety including physical limitations  - Instruct patient to call for assistance with activity   - Consult OT/PT to assist with strengthening/mobility   - Keep Call bell within reach  - Keep bed low and locked with side rails adjusted as appropriate  - Keep care items and personal belongings within reach  - Initiate and maintain comfort rounds  - Make Fall Risk Sign visible to staff  - Offer Toileting every 2 Hours, in advance of need  - Initiate/Maintain bed alarm  - Obtain necessary fall risk management equipment: yellow socks  - Apply yellow socks and bracelet for high fall risk patients  - Consider moving patient to room near nurses station  Outcome: Progressing     Problem: INFECTION - ADULT  Goal: Absence or prevention of progression during hospitalization  Description: INTERVENTIONS:  - Assess and monitor for signs and symptoms of infection  - Monitor lab/diagnostic results  - Monitor all insertion sites, i.e. indwelling lines, tubes, and drains  - Monitor endotracheal if appropriate and nasal secretions for changes in amount and color  - Fellows appropriate cooling/warming therapies per order  - Administer medications as ordered  - Instruct and encourage patient and family to use good hand hygiene technique  - Identify and instruct in appropriate isolation precautions for identified infection/condition  Outcome: Progressing     Problem: SAFETY ADULT  Goal: Patient will remain free of falls  Description: INTERVENTIONS:  - Educate patient/family on patient safety including physical limitations  - Instruct patient to call for assistance with activity   - Consult OT/PT to assist with strengthening/mobility   - Keep Call bell within reach  - Keep bed low and locked with side rails adjusted as appropriate  - Keep care items and personal belongings within reach  - Initiate and  maintain comfort rounds  - Make Fall Risk Sign visible to staff  - Offer Toileting every 2 Hours, in advance of need  - Initiate/Maintain bed alarm  - Obtain necessary fall risk management equipment: yellow socks  - Apply yellow socks and bracelet for high fall risk patients  - Consider moving patient to room near nurses station  Outcome: Progressing     Problem: SAFETY ADULT  Goal: Maintains/Returns to pre admission functional level  Description: INTERVENTIONS:  - Perform AM-PAC 6 Click Basic Mobility/ Daily Activity assessment daily.  - Set and communicate daily mobility goal to care team and patient/family/caregiver.   - Collaborate with rehabilitation services on mobility goals if consulted  - Perform Range of Motion 3 times a day.  - Reposition patient every 2 hours.  - Dangle patient 3 times a day  - Stand patient 3 times a day  - Ambulate patient 3 times a day  - Out of bed to chair 3 times a day   - Out of bed for meals 3 times a day  - Out of bed for toileting  - Record patient progress and toleration of activity level   Outcome: Progressing     Problem: DISCHARGE PLANNING  Goal: Discharge to home or other facility with appropriate resources  Description: INTERVENTIONS:  - Identify barriers to discharge w/patient and caregiver  - Arrange for needed discharge resources and transportation as appropriate  - Identify discharge learning needs (meds, wound care, etc.)  - Arrange for interpretive services to assist at discharge as needed  - Refer to Case Management Department for coordinating discharge planning if the patient needs post-hospital services based on physician/advanced practitioner order or complex needs related to functional status, cognitive ability, or social support system  Outcome: Progressing     Problem: Knowledge Deficit  Goal: Patient/family/caregiver demonstrates understanding of disease process, treatment plan, medications, and discharge instructions  Description: Complete learning  assessment and assess knowledge base.  Interventions:  - Provide teaching at level of understanding  - Provide teaching via preferred learning methods  Outcome: Progressing

## 2025-03-18 NOTE — ASSESSMENT & PLAN NOTE
Patient with abdominal pain, nausea/vomiting  CT abd/pelvis: Interval development of small bowel obstruction with fecalization in distal small bowel loop contents as seen on image 2/114 and transition point in the anterior mid pelvis images 2/121 and 601/64, possibly from an adhesion.   Patient continued to have nausea/vomiting  Nursing to attempt NG tube placement this morning - she was initially unsure of this  Mild elevated lactic acid - resolved with IV fluid  General surgery consultation appreciated   Continue NPO   No acute surgical intervention indicated at this time  Labs in am

## 2025-03-18 NOTE — CASE MANAGEMENT
Case Management Progress Note    Patient name Hayley Bolaños  Location /-01 MRN [id number]  : 1965 Date 3/18/2025       LOS (days): 1  Geometric Mean LOS (GMLOS) (days): 2.3  Days to GMLOS:1.6        OBJECTIVE:        Current admission status: Inpatient  Preferred Pharmacy:   [address] [address]  Phone: [phone] Fax: [phone]    Primary Care Provider: Jose Chase MD    Primary Insurance: [facility] DUAL COMPLETE [facility] REP  Secondary Insurance: [place] [employer] AND [employer] [facility]    PROGRESS NOTE:  Cm met with the pt, she did not feel up to having an assessment completed - permission was given to call her daughter- cm called her daughter at 14:13 pm # [phone]- cm made her aware of the role as a cm- cm will continue to follow and work on a safe d/c plan

## 2025-03-18 NOTE — CONSULTS
"Consultation - Surgery-General   Name: Hayley Bolaños 59 y.o. female I MRN: [id number]  Unit/Bed#: -01 I Date of Admission: 3/17/2025   Date of Service: 3/18/2025 I Hospital Day: 1   Inpatient consult to Acute Care Surgery  Consult performed by: Agatha Dobbins PA-C  Consult ordered by: Kareem Berman MD        Physician Requesting Evaluation: Yanely Lobo DO   Reason for Evaluation / Principal Problem: sbo    Assessment & Plan  SBO (small bowel obstruction) (HCC)  59 year old female with PMH significant for HTN, T2DM, HLD, COPD, prior SBO, multiple abdominal surgeries presents for the evaluation of a small bowel obstruction   Afebrile, tachycardic, hypotensive this AM  WBC 8.38 (15)  Hgb 13.5  Cr 0.97  Mg 1.8  CTAP: \"Interval development of small bowel obstruction with fecalization in distal small bowel loop contents as seen on image 2/114 and transition point in the anterior mid pelvis images 2/121 and 601/64, possibly from an adhesion\"  Clinically and hemodynamically stable. Abdomen soft, distended, generalized TTP. +nausea/dry heaving    Plan:  No acute surgical intervention indicated at this time. Recommend NGT insertion. Continue conservative management. Criteria for surgical intervention reviewed with patient   NPO with IV fluids. Will order another bolus  Replete Mg  Monitor labs and vitals   Analgesia and antiemetics prn  Rest of medical management per SLIM  Discussed with Dr. Garcia   Type 2 diabetes mellitus (HCC)  Lab Results   Component Value Date    HGBA1C 5.9 02/27/2025       Recent Labs     03/17/25  2327 03/18/25  0555   POCGLU 121 142*       Blood Sugar Average: Last 72 hrs:  (P) 131.5  Management per primary  Essential hypertension  BP 89/53 recorded this morning   Will order another bolus   I have discussed the above management plan in detail with the primary service.     History of Present Illness   Hayley Bolaños is a 59 y.o. female who presents with abdominal pain. Patient reports " abdominal pain started Sunday afternoon. She reports associated nausea and vomiting. She reports the pain worsened yesterday, prompting her presentation to the ER. She reports her last BM was yesterday morning. She states she is not passing gas. She states she has had a prior SBO in the past. This was treated with an ex lap, SBR. She has a history of a lap ifeoma, incisional hernia repair with mesh, bilateral salpingectomies, and ex lap/SBR. She states she takes aspirin. She states her most recent surgery was many years ago.     Review of Systems   Constitutional:  Negative for chills and fever.   HENT:  Negative for congestion and sore throat.    Respiratory:  Negative for cough and shortness of breath.    Cardiovascular:  Negative for chest pain and leg swelling.   Gastrointestinal:  Positive for abdominal distention, abdominal pain, nausea and vomiting.   Endocrine: Negative for polydipsia and polyuria.   Genitourinary:  Negative for difficulty urinating, dysuria and urgency.   Musculoskeletal:  Negative for back pain and gait problem.   Skin:  Negative for color change and wound.   Neurological:  Negative for dizziness, weakness and headaches.     Historical Information   Past Medical History:   Diagnosis Date    Acute exacerbation of chronic obstructive pulmonary disease (COPD) (HCC) 02/27/2020    Asthma     exercise induced    Diabetes (HCC)     type 2    Diabetes mellitus (HCC)     Hypertension     Morbid obesity (HCC) 04/07/2020    Osteoarthritis 04/07/2020    Shingles 11/14/2023     Past Surgical History:   Procedure Laterality Date    BREAST BIOPSY Left 2016    benign    CHOLECYSTECTOMY      COLONOSCOPY      FL INJECTION LEFT SHOULDER (NON ARTHROGRAM)  07/28/2023    FL LUMBAR PUNCTURE DIAGNOSTIC  07/19/2023    HERNIA REPAIR      LAPAROSCOPY FOR ECTOPIC PREGNANCY      x2, both tubes removed     MAMMO STEREOTACTIC BREAST BIOPSY LEFT (ALL INC) Left     REPLACEMENT TOTAL KNEE Left     SMALL INTESTINE SURGERY       after hernia surgery      Social History     Tobacco Use    Smoking status: Former     Types: Cigarettes     Start date: 2017    Smokeless tobacco: Never   Vaping Use    Vaping status: Never Used   Substance and Sexual Activity    Alcohol use: Not Currently     Comment: rare    Drug use: No    Sexual activity: Not Currently     Birth control/protection: Surgical     E-Cigarette/Vaping    E-Cigarette Use Never User      E-Cigarette/Vaping Substances    Nicotine No     THC No     CBD No     Flavoring No     Other No     Unknown No      Family History   Problem Relation Age of Onset    Breast cancer Mother 40        cause of death    Heart attack Father         MI cause of death    No Known Problems Sister     No Known Problems Daughter     No Known Problems Maternal Grandmother     No Known Problems Maternal Grandfather     No Known Problems Paternal Grandmother     No Known Problems Paternal Grandfather     No Known Problems Sister     No Known Problems Sister     Colon cancer Neg Hx     Ovarian cancer Neg Hx        Objective :  Temp:  [98.3 °F (36.8 °C)-100.1 °F (37.8 °C)] 100.1 °F (37.8 °C)  HR:  [] 139  BP: ()/(53-79) 89/53  Resp:  [14-21] 17  SpO2:  [92 %-98 %] 93 %  O2 Device: None (Room air)      Physical Exam  Vitals reviewed.   Constitutional:       General: She is not in acute distress.     Appearance: She is obese. She is not ill-appearing.   HENT:      Head: Normocephalic and atraumatic.   Cardiovascular:      Rate and Rhythm: Normal rate.   Pulmonary:      Effort: Pulmonary effort is normal. No respiratory distress.   Abdominal:      General: A surgical scar is present. There is distension.      Palpations: Abdomen is soft.      Tenderness: There is abdominal tenderness. There is no guarding.   Musculoskeletal:      Right lower leg: No edema.      Left lower leg: No edema.   Skin:     General: Skin is warm and dry.   Neurological:      General: No focal deficit present.      Mental Status:  She is alert. Mental status is at baseline.   Psychiatric:         Mood and Affect: Mood normal.         Behavior: Behavior normal.         Lab Results: I have reviewed the following results:  Recent Labs     03/17/25  2107 03/18/25  0450   WBC  --  8.38   HGB  --  13.5   HCT  --  41.5   PLT  --  265   SODIUM  --  142   K  --  5.2   CL  --  108   CO2  --  29   BUN  --  21   CREATININE  --  0.97   GLUC  --  145*   MG  --  1.8*   PHOS  --  3.8   AST  --  10*   ALT  --  11   ALB  --  3.8   TBILI  --  0.56   ALKPHOS  --  68   INR  --  0.91   LACTICACID 2.0  --        Imaging Results Review: I reviewed radiology reports from this admission including: CT abdomen/pelvis.  Other Study Results Review: No additional pertinent studies reviewed.    VTE Pharmacologic Prophylaxis: VTE covered by:    None     VTE Mechanical Prophylaxis: sequential compression device    Administrative Statements   I have spent a total time of 25 minutes in caring for this patient on the day of the visit/encounter including Diagnostic results, Impressions, Counseling / Coordination of care, Documenting in the medical record, Reviewing/placing orders in the medical record (including tests, medications, and/or procedures), Obtaining or reviewing history  , and Communicating with other healthcare professionals .    Agatha Dobbins PA-C

## 2025-03-18 NOTE — UTILIZATION REVIEW
Initial Clinical Review    Admission: Date/Time/Statement:   Admission Orders (From admission, onward)       Ordered        03/17/25 2125  INPATIENT ADMISSION  Once                          Orders Placed This Encounter   Procedures    INPATIENT ADMISSION     Standing Status:   Standing     Number of Occurrences:   1     Level of Care:   Med Surg [16]     Estimated length of stay:   More than 2 Midnights     Certification:   I certify that inpatient services are medically necessary for this patient for a duration of greater than two midnights. See H&P and MD Progress Notes for additional information about the patient's course of treatment.     ED Arrival Information       Expected   -    Arrival   3/17/2025 18:06    Acuity   Urgent              Means of arrival   Ambulance    Escorted by   [facility]    Service   Hospitalist    Admission type   Emergency              Arrival complaint   abdominal pain             Chief Complaint   Patient presents with    Abdominal Pain     Upper mid abdominal pain- ongoing intermittently for years but yesterday worsened.  Nausea / vomiting  4mg zofran, 700 CC of NSS  Per ems patient was hypotensive with a systolic in the high 90s       Initial Presentation: 59 y.o. female to ED by ems on 3/17 with progressive worsening of abd pain for past 2 days. Also reports associate n/v.    PMHx of possible laparotomy d/t various reasons, chronic abdominal pain, T2 DM, HTN. In ED VSS, WBC 15.28. Calcium 10.8. Lactic acid 3.8->2.0. Procal wnl. CT A/P showed SBO.  IV protonix, morphine and 2L NSS given in ED.  Admitted Inpatient to MS unit with SBO -- npo. IVFs. IV abx. Consult general surgery. Accu-cheks w/ ssi. Holding PTA po meds currently.    Anticipated Length of Stay/Certification Statement: Patient will be admitted on an inpatient basis with an anticipated length of stay of greater than 2 midnights secondary to SBO, pt needs close monitoring and possible surgery.     Date:  3/18   Day 2: Pt reports continued nausea/vomiting and abdominal pain today. Continue analgesics, antiemetics. SCDs.      Surgery consult -- Clinically and hemodynamically stable. Abdomen soft, distended, generalized TTP. +nausea/dry heaving. No acute surgical intervention indicated at this time. Recommend NGT insertion. Continue conservative management. NPO with IV fluids. Will order another bolus. Replete Mg. Monitor labs and vitals. Analgesia and antiemetics prn.    Date: 3/20  Day 3: Has surpassed a 2nd midnight with active treatments and services for SBO.  Tachycardia has resolved. NG tube successfully placed yesterday with immediate 2L of output, about 1.4L recorded out overnight. Continue NPO, IVF, NGT. Plan to allow for another 24 hrs of decompression via NG tube and tentatively plan for gastrografin study tomorrow morning. Continue to monitor bowel function. Analgesics, antiemetics prn.         ED Treatment-Medication Administration from 03/17/2025 1806 to 03/17/2025 2158         Date/Time Order Dose Route Action     03/17/2025 1854 sodium chloride 0.9 % bolus 1,000 mL 1,000 mL Intravenous New Bag     03/17/2025 1852 morphine injection 2 mg 2 mg Intravenous Given     03/17/2025 1849 pantoprazole (PROTONIX) injection 40 mg 40 mg Intravenous Given     03/17/2025 1957 sodium chloride 0.9 % bolus 1,000 mL 1,000 mL Intravenous New Bag       Scheduled Medications:  acetaminophen, 1,000 mg, Intravenous, Q6H NEETA  dextrose, 25 mL, Intravenous, Once  insulin lispro, 1-5 Units, Subcutaneous, Q6H NEETA  insulin lispro, 1-5 Units, Subcutaneous, HS    Continuous IV Infusions:  dextrose 5 % and sodium chloride 0.9 %, 125 mL/hr, Intravenous, Continuous    PRN Meds:  morphine injection, 2 mg, Intravenous, Q4H PRN 3/18 x1  morphine injection, 4 mg, Intravenous, Q4H PRN 3/19 x1  ondansetron, 4 mg, Intravenous, Q6H PRN 3/18 x1      ED Triage Vitals [03/17/25 1813]   Temperature Pulse Respirations Blood Pressure SpO2 Pain Score    98.3 °F (36.8 °C) 70 18 103/54 98 % 7     Weight (last 2 days)       Date/Time Weight    03/17/25 2251 64.4 (141.98)    03/17/25 2130 64.4 (141.98)            Vital Signs (last 3 days)       Date/Time Temp Pulse Resp BP MAP (mmHg) SpO2 O2 Device Patient Position - Orthostatic VS Pain    03/19/25 1352 -- -- -- -- -- -- -- -- 8    03/19/25 1038 -- -- -- -- -- -- -- -- 8    03/19/25 0923 -- -- -- -- -- -- -- -- 7    03/19/25 07:24:11 97.5 °F (36.4 °C) 93 18 112/59 77 95 % -- -- --    03/18/25 2200 -- -- -- -- -- 95 % None (Room air) -- No Pain    03/18/25 21:48:12 97.8 °F (36.6 °C) -- 17 104/57 73 -- -- -- --    03/18/25 1846 -- 121 -- -- -- 94 % -- -- --    03/18/25 1700 -- 121 -- -- -- 92 % -- -- --    03/18/25 15:50:24 98.1 °F (36.7 °C) 110 16 115/65 82 94 % -- -- --    03/18/25 1500 -- -- -- -- -- 97 % -- -- --    03/18/25 1300 -- -- -- -- -- -- -- -- 4    03/18/25 11:36:48 97.9 °F (36.6 °C) 122 -- 103/53 70 96 % -- -- --    03/18/25 1100 -- -- -- -- -- 93 % None (Room air) -- --    03/18/25 07:33:25 100.1 °F (37.8 °C) 139 17 89/53 65 93 % -- -- --    03/18/25 0631 -- -- -- -- -- -- -- -- 6    03/18/25 0236 -- -- -- -- -- 93 % -- -- --    03/18/25 02:01:54 -- 116 -- -- -- 92 % -- -- --    03/17/25 2253 -- -- -- -- -- 95 % None (Room air) -- --    03/17/25 2251 -- -- -- -- -- -- -- -- No Pain    03/17/25 22:03:10 98.3 °F (36.8 °C) 94 18 116/79 91 96 % -- -- --    03/17/25 2130 -- 88 15 112/68 86 98 % None (Room air) -- --    03/17/25 2100 -- 91 18 106/57 76 94 % -- -- --    03/17/25 2030 -- 87 14 108/54 77 96 % None (Room air) -- --    03/17/25 2000 -- 86 21 106/57 78 95 % -- -- 1    03/17/25 1957 -- 86 17 106/56 77 95 % -- -- --    03/17/25 1945 -- 91 19 -- -- 94 % -- -- --    03/17/25 1900 -- 83 18 111/59 81 92 % None (Room air) -- --    03/17/25 1852 -- -- -- -- -- -- -- -- 8    03/17/25 1830 -- 79 19 108/64 81 95 % -- -- --    03/17/25 1825 -- -- -- -- -- -- None (Room air) -- --    03/17/25 1813 98.3 °F (36.8  °C) 70 18 103/54 -- 98 % None (Room air) Sitting 7       Pertinent Labs/Diagnostic Test Results:   Radiology:  CT abdomen pelvis with contrast   Final Interpretation by Steve Lopez MD (03/17 2050)      Interval development of small bowel obstruction with fecalization in distal small bowel loop contents as seen on image 2/114 and transition point in the anterior mid pelvis images 2/121 and 601/64, possibly from an adhesion.      Colonic diverticulosis without diverticulitis.      Stable fatty liver.         XR chest portable    (Results Pending)     Results from last 7 days   Lab Units 03/19/25  0522 03/18/25  0450 03/17/25  1845   WBC Thousand/uL 7.27 8.38 15.28*   HEMOGLOBIN g/dL 12.4 13.5 13.4   HEMATOCRIT % 38.8 41.5 41.2   PLATELETS Thousands/uL 235 265 296   TOTAL NEUT ABS Thousands/µL  --  5.67 9.90*     Results from last 7 days   Lab Units 03/19/25  0613 03/18/25  1615 03/18/25  0450 03/17/25  1845   SODIUM mmol/L 142 142 142 140   POTASSIUM mmol/L 4.2 4.1 5.2 4.6   CHLORIDE mmol/L 109* 108 108 103   CO2 mmol/L 27 26 29 24   ANION GAP mmol/L 6 8 5 13   BUN mg/dL 19 20 21 17   CREATININE mg/dL 0.66 0.70 0.97 0.95   EGFR ml/min/1.73sq m 96 94 63 65   CALCIUM mg/dL 9.0 8.6 9.9 10.8*   MAGNESIUM mg/dL 2.1 2.1 1.8*  --    PHOSPHORUS mg/dL  --  4.3* 3.8  --      Results from last 7 days   Lab Units 03/18/25  0450 03/17/25  1845   AST U/L 10* 14   ALT U/L 11 13   ALK PHOS U/L 68 73   TOTAL PROTEIN g/dL 6.3* 6.9   ALBUMIN g/dL 3.8 4.1   TOTAL BILIRUBIN mg/dL 0.56 0.46     Results from last 7 days   Lab Units 03/19/25  1130 03/19/25  0542 03/19/25  0102 03/18/25  2353 03/18/25  1934 03/18/25  1845 03/18/25  1842 03/18/25  1227 03/18/25  0555 03/17/25  2327   POC GLUCOSE mg/dl 107 70 110 53* 143* 44* 46* 107 142* 121     Results from last 7 days   Lab Units 03/19/25  0613 03/18/25  1615 03/18/25  0450 03/17/25  1845   GLUCOSE RANDOM mg/dL 68 75 145* 139     Results from last 7 days   Lab Units 03/18/25  0450    PROTIME seconds 12.7   INR  0.91     Results from last 7 days   Lab Units 03/17/25  1845   PROCALCITONIN ng/ml 0.10     Results from last 7 days   Lab Units 03/17/25  2107 03/17/25  1845   LACTIC ACID mmol/L 2.0 3.8*       Results from last 7 days   Lab Units 03/17/25  1845   LIPASE u/L 37       Results from last 7 days   Lab Units 03/17/25  1845   BLOOD CULTURE  No Growth at 24 hrs.  No Growth at 24 hrs.         Past Medical History:   Diagnosis Date    Acute exacerbation of chronic obstructive pulmonary disease (COPD) ([facility]) 02/27/2020    Asthma     exercise induced    Diabetes (HCC)     type 2    Diabetes mellitus (HCC)     Hypertension     Morbid obesity (HCC) 04/07/2020    Osteoarthritis 04/07/2020    Shingles 11/14/2023     Present on Admission:   SBO (small bowel obstruction) (HCC)   Essential hypertension   Hyperlipidemia   Leukocytosis   Type 2 diabetes mellitus (HCC)      Admitting Diagnosis: Abdominal pain [R10.9]  Age/Sex: 59 y.o. female    Network Utilization Review Department  ATTENTION: Please call with any questions or concerns to [phone] and carefully listen to the prompts so that you are directed to the right person. All voicemails are confidential.   For Discharge needs, contact Care Management DC Support Team at [phone] opt. 2  Send all requests for admission clinical reviews, approved or denied determinations and any other requests to dedicated fax number below belonging to the campus where the patient is receiving treatment. List of dedicated fax numbers for the Facilities:  FACILITY NAME UR FAX NUMBER   ADMISSION DENIALS (Administrative/Medical Necessity) [phone]   DISCHARGE SUPPORT TEAM (NETWORK) [phone]   PARENT CHILD HEALTH (Maternity/NICU/Pediatrics) [phone]   [facility] [phone]   [facility] [phone]   [facility] [phone]   [facility]  [phone]   [facility] [phone]   [facility] [phone]   [facility] [phone]   [facility] [phone]   [facility] [phone]   [facility] [phone]   [facility] [phone]   [facility] [phone]

## 2025-03-18 NOTE — ASSESSMENT & PLAN NOTE
Lab Results   Component Value Date    HGBA1C 5.9 02/27/2025       Recent Labs     03/17/25  2327 03/18/25  0555   POCGLU 121 142*       Blood Sugar Average: Last 72 hrs:  (P) 131.5  Management per primary

## 2025-03-18 NOTE — ASSESSMENT & PLAN NOTE
Chronic leukocytosis, unknown underlying etiology.  At this time we will continue to monitor with CBC with differential daily.

## 2025-03-18 NOTE — PROCEDURES
Procedure Note - General Surgery   Hayley Bolaños 59 y.o. female MRN: [id number]  Unit/Bed#: -01 Encounter: [id number]    Preoperative diagnosis: SBO    Post operative diagnosis: SBO    Procedure:  NG tube placement    Surgeon:  Blair Oliva PA-C    Assistant:  Agatha GAN    Anesthesia:  Lidocaine gel 2%, Benzocaine spray, Magic Mouthwash    Estimated blood loss:  none    Complications:  none    Specimens:  none    Indications for procedure: 58 yo F with SBO and active N/V requiring NG placement, prior attempt x 3 without success.    Operative findings:  Patient cooperative with attempt. An 18 Fr NG tube placed through the right nare and secured at 65 cm. Tolerated well with multimodal topical anesthetic options. Placement confirmed by output of 2L light brown output.     Operative technique: verbal consent obtained for repeat attempt. Magic mouthwash administered by RN for 10 mL swish and swallow. Benzocaine spray applied to uvula. Lidocaine gel 2% applied to length of 18 Fr NG tube. Patient upright in bed for procedure. NG advanced straight back and slowly navigated turn into nasopharynx. Patient confirmed sensation of tube in back of throat and began drinking water through straw. NG was able to be advanced smoothly to 65 cm with minimal gag reflex. Patient able to verbalize her name. NG placed to suction with removal of 2L brown enteric contents. Secured with nasal strip and then safety pin to gown. Tolerated well without complications.    Signature:  Blair Oliva PA-C  Date: 3/18/2025 Time: 2:05 PM

## 2025-03-18 NOTE — ASSESSMENT & PLAN NOTE
"Lab Results   Component Value Date    HGBA1C 5.9 02/27/2025       No results for input(s): \"POCGLU\" in the last 72 hours.    Blood Sugar Average: Last 72 hrs:    The patient is on dapagliflozin, glipizide, insulin glargine and metformin.  Given the patient is n.p.o., and blood glucose is controlled, we will continue with the sliding scale insulin at this time every 6 hours.  "

## 2025-03-18 NOTE — ASSESSMENT & PLAN NOTE
Lab Results   Component Value Date    HGBA1C 5.9 02/27/2025       Recent Labs     03/17/25  2327 03/18/25  0555   POCGLU 121 142*       Blood Sugar Average: Last 72 hrs:  (P) 131.5  Hold home regimen of dapagliflozin, glipizide, insulin glargine and metformin.  The patient is currently n.p.o.  Continue with the sliding scale insulin with accu checks every 6 hours.

## 2025-03-18 NOTE — ASSESSMENT & PLAN NOTE
To intra-abdominal adhesions due to multiple laparotomies.  At this time the patient is hemodynamically stable, no active nausea and vomiting.  Mild elevated lactic acid that is corrected by IV fluid.  As per general surgery consult, hold off NG tube given patient has no nausea vomiting, no need for IV antibiotics, IV fluids at this time.  Keep the patient NPO.  General surgery will evaluate the patient in the morning.

## 2025-03-18 NOTE — PLAN OF CARE

## 2025-03-19 LAB
ANION GAP SERPL CALCULATED.3IONS-SCNC: 6 MMOL/L (ref 4–13)
BUN SERPL-MCNC: 19 MG/DL (ref 5–25)
CALCIUM SERPL-MCNC: 9 MG/DL (ref 8.4–10.2)
CHLORIDE SERPL-SCNC: 109 MMOL/L (ref 96–108)
CO2 SERPL-SCNC: 27 MMOL/L (ref 21–32)
CREAT SERPL-MCNC: 0.66 MG/DL (ref 0.6–1.3)
ERYTHROCYTE [DISTWIDTH] IN BLOOD BY AUTOMATED COUNT: 14.2 % (ref 11.6–15.1)
GFR SERPL CREATININE-BSD FRML MDRD: 96 ML/MIN/1.73SQ M
GLUCOSE SERPL-MCNC: 107 MG/DL (ref 65–140)
GLUCOSE SERPL-MCNC: 110 MG/DL (ref 65–140)
GLUCOSE SERPL-MCNC: 123 MG/DL (ref 65–140)
GLUCOSE SERPL-MCNC: 148 MG/DL (ref 65–140)
GLUCOSE SERPL-MCNC: 68 MG/DL (ref 65–140)
GLUCOSE SERPL-MCNC: 70 MG/DL (ref 65–140)
HCT VFR BLD AUTO: 38.8 % (ref 34.8–46.1)
HGB BLD-MCNC: 12.4 G/DL (ref 11.5–15.4)
MAGNESIUM SERPL-MCNC: 2.1 MG/DL (ref 1.9–2.7)
MCH RBC QN AUTO: 27.1 PG (ref 26.8–34.3)
MCHC RBC AUTO-ENTMCNC: 32 G/DL (ref 31.4–37.4)
MCV RBC AUTO: 85 FL (ref 82–98)
PLATELET # BLD AUTO: 235 THOUSANDS/UL (ref 149–390)
PMV BLD AUTO: 9.7 FL (ref 8.9–12.7)
POTASSIUM SERPL-SCNC: 4.2 MMOL/L (ref 3.5–5.3)
RBC # BLD AUTO: 4.57 MILLION/UL (ref 3.81–5.12)
SODIUM SERPL-SCNC: 142 MMOL/L (ref 135–147)
WBC # BLD AUTO: 7.27 THOUSAND/UL (ref 4.31–10.16)

## 2025-03-19 PROCEDURE — 99232 SBSQ HOSP IP/OBS MODERATE 35: CPT | Performed by: SURGERY

## 2025-03-19 PROCEDURE — 83735 ASSAY OF MAGNESIUM: CPT | Performed by: PHYSICIAN ASSISTANT

## 2025-03-19 PROCEDURE — 99232 SBSQ HOSP IP/OBS MODERATE 35: CPT

## 2025-03-19 PROCEDURE — 85027 COMPLETE CBC AUTOMATED: CPT | Performed by: PHYSICIAN ASSISTANT

## 2025-03-19 PROCEDURE — 82948 REAGENT STRIP/BLOOD GLUCOSE: CPT

## 2025-03-19 PROCEDURE — 80048 BASIC METABOLIC PNL TOTAL CA: CPT | Performed by: PHYSICIAN ASSISTANT

## 2025-03-19 RX ORDER — DEXTROSE MONOHYDRATE AND SODIUM CHLORIDE 5; .9 G/100ML; G/100ML
125 INJECTION, SOLUTION INTRAVENOUS CONTINUOUS
Status: DISCONTINUED | OUTPATIENT
Start: 2025-03-19 | End: 2025-03-22

## 2025-03-19 RX ORDER — DEXTROSE 25 % IN WATER 25 %
25 SYRINGE (ML) INTRAVENOUS ONCE
Status: COMPLETED | OUTPATIENT
Start: 2025-03-19 | End: 2025-03-19

## 2025-03-19 RX ORDER — DEXTROSE MONOHYDRATE 25 G/50ML
INJECTION, SOLUTION INTRAVENOUS
Status: COMPLETED
Start: 2025-03-19 | End: 2025-03-19

## 2025-03-19 RX ORDER — DEXTROSE MONOHYDRATE 25 G/50ML
25 INJECTION, SOLUTION INTRAVENOUS ONCE
Status: CANCELLED | OUTPATIENT
Start: 2025-03-19 | End: 2025-03-19

## 2025-03-19 RX ADMIN — DEXTROSE AND SODIUM CHLORIDE 125 ML/HR: 5; .9 INJECTION, SOLUTION INTRAVENOUS at 06:49

## 2025-03-19 RX ADMIN — ACETAMINOPHEN 1000 MG: 10 INJECTION INTRAVENOUS at 09:24

## 2025-03-19 RX ADMIN — SODIUM CHLORIDE, SODIUM LACTATE, POTASSIUM CHLORIDE, AND CALCIUM CHLORIDE 100 ML/HR: .6; .31; .03; .02 INJECTION, SOLUTION INTRAVENOUS at 02:27

## 2025-03-19 RX ADMIN — TOPICAL ANESTHETIC 2 SPRAY: 200 SPRAY DENTAL; PERIODONTAL at 10:38

## 2025-03-19 RX ADMIN — ACETAMINOPHEN 1000 MG: 10 INJECTION INTRAVENOUS at 14:41

## 2025-03-19 RX ADMIN — DEXTROSE AND SODIUM CHLORIDE 125 ML/HR: 5; .9 INJECTION, SOLUTION INTRAVENOUS at 23:13

## 2025-03-19 RX ADMIN — MORPHINE SULFATE 4 MG: 4 INJECTION, SOLUTION INTRAMUSCULAR; INTRAVENOUS at 10:38

## 2025-03-19 RX ADMIN — MORPHINE SULFATE 2 MG: 2 INJECTION, SOLUTION INTRAMUSCULAR; INTRAVENOUS at 09:23

## 2025-03-19 RX ADMIN — MORPHINE SULFATE 2 MG: 2 INJECTION, SOLUTION INTRAMUSCULAR; INTRAVENOUS at 16:13

## 2025-03-19 RX ADMIN — ACETAMINOPHEN 1000 MG: 10 INJECTION INTRAVENOUS at 02:21

## 2025-03-19 RX ADMIN — DEXTROSE MONOHYDRATE 25 ML: 25 INJECTION, SOLUTION INTRAVENOUS at 00:34

## 2025-03-19 NOTE — PLAN OF CARE

## 2025-03-19 NOTE — PROGRESS NOTES
"Progress Note - Surgery-General   Name: Hayley Bolaños 59 y.o. female I MRN: [id number]  Unit/Bed#: -01 I Date of Admission: 3/17/2025   Date of Service: 3/19/2025 I Hospital Day: 2    Assessment & Plan  SBO (small bowel obstruction) (HCC)  59 year old female with PMH significant for HTN, T2DM, HLD, COPD, prior SBO, multiple abdominal surgeries presents for the evaluation of a small bowel obstruction   Afebrile, tachycardia has resolved  No leukocytosis  Cr 0.66  NG tube successfully placed yesterday with immediate 2L of output, about 1.4L recorded out overnight  CTAP: \"Interval development of small bowel obstruction with fecalization in distal small bowel loop contents as seen on image 2/114 and transition point in the anterior mid pelvis images 2/121 and 601/64, possibly from an adhesion\"  Clinically and hemodynamically stable. Abdomen soft, distended, TTP lower abdomen.     Plan:  No acute surgical intervention indicated at this time. Continue conservative management. Criteria for surgical intervention reviewed with patient   Continue NPO, IVF, NGT  Will allow for another 24 hours of decompression via NG tube and tentatively plan for gastrografin study tomorrow morning  Monitor labs and vitals   Monitor for bowel function  Analgesia and antiemetics prn  Rest of medical management per SLIM  Discussed with Dr. Garcia   Type 2 diabetes mellitus ([facility])  Lab Results   Component Value Date    HGBA1C 5.9 02/27/2025       Recent Labs     03/18/25  1934 03/18/25  2353 03/19/25  0102 03/19/25  0542   POCGLU 143* 53* 110 70       Blood Sugar Average: Last 72 hrs:  (P) 92.8474965654873175  Management per primary        Subjective   Patient feeling better today. Abdominal pain resolved. No nausea since NG tube has been placed. No bowel function.     Objective :  Temp:  [97.5 °F (36.4 °C)-98.1 °F (36.7 °C)] 97.5 °F (36.4 °C)  HR:  [] 93  BP: (103-115)/(53-65) 112/59  Resp:  [16-18] 18  SpO2:  [92 %-97 %] 95 %  O2 " Device: None (Room air)    I/O         03/17 0701  03/18 0700 03/18 0701  03/19 0700 03/19 0701 03/20 0700    I.V. (mL/kg) 700 (10.9) 1435 (22.3)     NG/GT  50     IV Piggyback 1000 2060     Total Intake(mL/kg) 1700 (26.4) 3545 (55)     Urine (mL/kg/hr) 100 1850 (1.2)     Emesis/NG output  3400     Total Output 100 5250     Net +1600 -1705                  Lines/Drains/Airways       Active Status       Name Placement date Placement time Site Days    NG/OG/Enteral Tube Nasogastric 18 Fr Right nare 03/18/25  1410  Right nare  less than 1                  Physical Exam  Constitutional:       Appearance: Normal appearance.   HENT:      Mouth/Throat:      Mouth: Mucous membranes are moist.      Pharynx: Oropharynx is clear.   Cardiovascular:      Rate and Rhythm: Normal rate.   Pulmonary:      Effort: Pulmonary effort is normal.   Abdominal:      General: Abdomen is flat.      Palpations: Abdomen is soft.      Tenderness: There is abdominal tenderness.   Musculoskeletal:         General: Normal range of motion.   Skin:     General: Skin is warm and dry.   Neurological:      General: No focal deficit present.      Mental Status: She is alert.         Lab Results: I have reviewed the following results:  Recent Labs     03/17/25  1845 03/17/25  2107 03/18/25  0450 03/18/25  1615 03/19/25  0522 03/19/25  0613   WBC  --   --  8.38  --  7.27  --    HGB  --   --  13.5  --  12.4  --    HCT  --   --  41.5  --  38.8  --    PLT  --   --  265  --  235  --    SODIUM  --   --  142 142  --  142   K  --   --  5.2 4.1  --  4.2   CL  --   --  108 108  --  109*   CO2  --   --  29 26  --  27   BUN  --   --  21 20  --  19   CREATININE  --   --  0.97 0.70  --  0.66   GLUC  --   --  145* 75  --  68   MG   < >  --  1.8* 2.1  --  2.1   PHOS   < >  --  3.8 4.3*  --   --    AST  --   --  10*  --   --   --    ALT  --   --  11  --   --   --    ALB  --   --  3.8  --   --   --    TBILI  --   --  0.56  --   --   --    ALKPHOS  --   --  68  --   --    --    INR  --   --  0.91  --   --   --    LACTICACID  --  2.0  --   --   --   --     < > = values in this interval not displayed.         VTE Pharmacologic Prophylaxis: Sequential compression device (Venodyne)   VTE Mechanical Prophylaxis: sequential compression device    Mariya Charles PA-C

## 2025-03-19 NOTE — ASSESSMENT & PLAN NOTE
"59 year old female with PMH significant for HTN, T2DM, HLD, COPD, prior SBO, multiple abdominal surgeries presents for the evaluation of a small bowel obstruction   Afebrile, tachycardia has resolved  No leukocytosis  Cr 0.66  NG tube successfully placed yesterday with immediate 2L of output, about 1.4L recorded out overnight  CTAP: \"Interval development of small bowel obstruction with fecalization in distal small bowel loop contents as seen on image 2/114 and transition point in the anterior mid pelvis images 2/121 and 601/64, possibly from an adhesion\"  Clinically and hemodynamically stable. Abdomen soft, distended, TTP lower abdomen.     Plan:  No acute surgical intervention indicated at this time. Continue conservative management. Criteria for surgical intervention reviewed with patient   Continue NPO, IVF, NGT  Will allow for another 24 hours of decompression via NG tube and tentatively plan for gastrografin study tomorrow morning  Monitor labs and vitals   Monitor for bowel function  Analgesia and antiemetics prn  Rest of medical management per SLIM  Discussed with Dr. Garcia   "

## 2025-03-19 NOTE — ASSESSMENT & PLAN NOTE
Lab Results   Component Value Date    HGBA1C 5.9 02/27/2025       Recent Labs     03/18/25  1934 03/18/25  2353 03/19/25  0102 03/19/25  0542   POCGLU 143* 53* 110 70       Blood Sugar Average: Last 72 hrs:  (P) [id number]  Hold home regimen of dapagliflozin, glipizide, insulin glargine and metformin.  Sugars reviewed  Currently n.p.o.  Continue IVF's D5 and a half at 125 an hour  Continue with the sliding scale insulin with accu checks every 6 hours  Hypoglycemia protocol  BMP a.m.

## 2025-03-19 NOTE — ASSESSMENT & PLAN NOTE
Presented with abdominal pain, nausea/vomiting  CT abd/pelvis: Interval development of small bowel obstruction with fecalization in distal small bowel loop contents as seen on image 2/114 and transition point in the anterior mid pelvis images 2/121 and 601/64, possibly from an adhesion.   Mild elevated lactic acid - resolved with IV fluid  General surgery consultation appreciated   Continue NPO   NG to suction-monitor output  No acute surgical intervention indicated at this time  Pain regimen  Monitor and replete electrolytes as indicated  BMP a.m.

## 2025-03-19 NOTE — ASSESSMENT & PLAN NOTE
Blood pressure controlled  S/p 1 L IV fluid bolus  Antihypertensives are currently on hold  Continues on IV fluids  Monitor BP per protocol

## 2025-03-19 NOTE — PLAN OF CARE

## 2025-03-19 NOTE — NURSING NOTE
Awake and alert and oriented x4. R/a status. O2 sat 98% hr 61/min. Asssisted to commode and 900 ml void. Urine clear/yellow. . Pain she states very little to abdomen rt nares ng tube maintained to wall suction for bile return. Call bell near

## 2025-03-19 NOTE — ASSESSMENT & PLAN NOTE
Lab Results   Component Value Date    HGBA1C 5.9 02/27/2025       Recent Labs     03/18/25  1934 03/18/25  2353 03/19/25  0102 03/19/25  0542   POCGLU 143* 53* 110 70       Blood Sugar Average: Last 72 hrs:  (P) 92.9374014326418027  Management per primary

## 2025-03-19 NOTE — PROGRESS NOTES
Progress Note - Hospitalist   Name: Hayley Bolaños 59 y.o. female I MRN: [id number]  Unit/Bed#: -01 I Date of Admission: 3/17/2025   Date of Service: 3/19/2025 I Hospital Day: 2    Assessment & Plan  SBO (small bowel obstruction) (HCC)  Presented with abdominal pain, nausea/vomiting  CT abd/pelvis: Interval development of small bowel obstruction with fecalization in distal small bowel loop contents as seen on image 2/114 and transition point in the anterior mid pelvis images 2/121 and 601/64, possibly from an adhesion.   Mild elevated lactic acid - resolved with IV fluid  General surgery consultation appreciated   Continue NPO   NG to suction-monitor output  No acute surgical intervention indicated at this time  Pain regimen  Monitor and replete electrolytes as indicated  BMP a.m.    Type 2 diabetes mellitus ([facility])  Lab Results   Component Value Date    HGBA1C 5.9 02/27/2025       Recent Labs     03/18/25  1934 03/18/25  2353 03/19/25  0102 03/19/25  0542   POCGLU 143* 53* 110 70       Blood Sugar Average: Last 72 hrs:  (P) 92.5970619963095933  Hold home regimen of dapagliflozin, glipizide, insulin glargine and metformin.  Sugars reviewed  Currently n.p.o.  Continue IVF's D5 and a half at 125 an hour  Continue with the sliding scale insulin with accu checks every 6 hours  Hypoglycemia protocol  BMP a.m.  Essential hypertension  Blood pressure controlled  S/p 1 L IV fluid bolus  Antihypertensives are currently on hold  Continues on IV fluids  Monitor BP per protocol  Hyperlipidemia  Hold atorvastatin and fenofibrate given the patient is NPO  Leukocytosis  POA  WBC of 15.28- likely reactive   Now resolved    VTE Pharmacologic Prophylaxis: VTE Score: 2 Low Risk (Score 0-2) - Encourage Ambulation.    Mobility:   Basic Mobility Inpatient Raw Score: 18  JH-HLM Goal: 6: Walk 10 steps or more  JH-HLM Achieved: 6: Walk 10 steps or more  JH-HLM Goal achieved. Continue to encourage appropriate mobility.    Patient Centered  Rounds: I performed bedside rounds with nursing staff today.   Discussions with Specialists or Other Care Team Provider: General Surgery    Education and Discussions with Family / Patient: Patient declined call to .     Current Length of Stay: 2 day(s)  Current Patient Status: Inpatient   Certification Statement: The patient will continue to require additional inpatient hospital stay due to management of small bowel obstruction surgery following, patient is n.p.o. with NG tube, IV fluids  Discharge Plan:  Is planning to return home when medically stable for discharge.  Remains n.p.o., IV fluids, NG tube suction and general surgery following.  Philip labs in the AM.    Code Status: Level 1 - Full Code    Subjective   Reported some low abdominal pain.  States no BM of yet, not passing flatus    Objective :  Temp:  [97.5 °F (36.4 °C)-98.1 °F (36.7 °C)] 97.5 °F (36.4 °C)  HR:  [] 93  BP: (103-115)/(53-65) 112/59  Resp:  [16-18] 18  SpO2:  [92 %-97 %] 95 %  O2 Device: None (Room air)    Body mass index is 25.15 kg/m².     Input and Output Summary (last 24 hours):     Intake/Output Summary (Last 24 hours) at 3/19/2025 1015  Last data filed at 3/19/2025 0648  Gross per 24 hour   Intake 3545 ml   Output 5100 ml   Net -1555 ml       Physical Exam  Vitals reviewed.   Constitutional:       General: She is not in acute distress.     Appearance: She is well-developed.   HENT:      Head: Normocephalic and atraumatic.   Cardiovascular:      Rate and Rhythm: Normal rate and regular rhythm.      Pulses: Normal pulses.      Heart sounds: Normal heart sounds. No murmur heard.  Pulmonary:      Effort: Pulmonary effort is normal. No respiratory distress.      Breath sounds: Normal breath sounds. No wheezing or rales.   Abdominal:      General: There is no distension.      Palpations: Abdomen is soft.      Tenderness: There is no abdominal tenderness. There is no guarding.      Comments: Hypoactive bowel sounds; NG to  suction draining greenish-brown drainage   Musculoskeletal:         General: No swelling. Normal range of motion.   Skin:     General: Skin is warm and dry.      Capillary Refill: Capillary refill takes less than 2 seconds.   Neurological:      General: No focal deficit present.      Mental Status: She is alert and oriented to person, place, and time. Mental status is at baseline.   Psychiatric:         Mood and Affect: Mood normal.         Behavior: Behavior normal.           Lines/Drains:  Lines/Drains/Airways       Active Status       Name Placement date Placement time Site Days    NG/OG/Enteral Tube Nasogastric 18 Fr Right nare 03/18/25  1410  Right nare  less than 1                            Lab Results: I have reviewed the following results:   Results from last 7 days   Lab Units 03/19/25  0522 03/18/25  0450   WBC Thousand/uL 7.27 8.38   HEMOGLOBIN g/dL 12.4 13.5   HEMATOCRIT % 38.8 41.5   PLATELETS Thousands/uL 235 265   SEGS PCT %  --  68   LYMPHO PCT %  --  17   MONO PCT %  --  11   EOS PCT %  --  3     Results from last 7 days   Lab Units 03/19/25  0613 03/18/25  1615 03/18/25  0450   SODIUM mmol/L 142   < > 142   POTASSIUM mmol/L 4.2   < > 5.2   CHLORIDE mmol/L 109*   < > 108   CO2 mmol/L 27   < > 29   BUN mg/dL 19   < > 21   CREATININE mg/dL 0.66   < > 0.97   ANION GAP mmol/L 6   < > 5   CALCIUM mg/dL 9.0   < > 9.9   ALBUMIN g/dL  --   --  3.8   TOTAL BILIRUBIN mg/dL  --   --  0.56   ALK PHOS U/L  --   --  68   ALT U/L  --   --  11   AST U/L  --   --  10*   GLUCOSE RANDOM mg/dL 68   < > 145*    < > = values in this interval not displayed.     Results from last 7 days   Lab Units 03/18/25  0450   INR  0.91     Results from last 7 days   Lab Units 03/19/25  0542 03/19/25  0102 03/18/25  2353 03/18/25  1934 03/18/25  1845 03/18/25  1842 03/18/25  1227 03/18/25  0555 03/17/25  2327   POC GLUCOSE mg/dl 70 110 53* 143* 44* 46* 107 142* 121         Results from last 7 days   Lab Units 03/17/25  5995  03/17/25  1845   LACTIC ACID mmol/L 2.0 3.8*   PROCALCITONIN ng/ml  --  0.10       Recent Cultures (last 7 days):   Results from last 7 days   Lab Units 03/17/25  1845   BLOOD CULTURE  No Growth at 24 hrs.  No Growth at 24 hrs.       Imaging Results Review: I reviewed radiology reports from this admission including: CT abdomen pelvis.  Other Study Results Review: No additional pertinent studies reviewed.    Last 24 Hours Medication List:     Current Facility-Administered Medications:     acetaminophen (Ofirmev) injection 1,000 mg, Q6H NEETA, Last Rate: 1,000 mg (03/19/25 0924)    benzocaine (HURRICAINE) 20 % mucosal spray 2 spray, BID PRN    dextrose 25 % IV solution 25 mL, Once    dextrose 5 % and sodium chloride 0.9 % infusion, Continuous, Last Rate: 125 mL/hr (03/19/25 0649)    insulin lispro (HumALOG/ADMELOG) 100 units/mL subcutaneous injection 1-5 Units, Q6H NEETA **AND** Fingerstick Glucose (POCT), Q6H    insulin lispro (HumALOG/ADMELOG) 100 units/mL subcutaneous injection 1-5 Units, HS    morphine injection 2 mg, Q4H PRN    morphine injection 4 mg, Q4H PRN    ondansetron (ZOFRAN) injection 4 mg, Q6H PRN    Administrative Statements   Today, Patient Was Seen By: THAO Renee  I have spent a total time of 38 minutes in caring for this patient on the day of the visit/encounter including Documenting in the medical record, Reviewing/placing orders in the medical record (including tests, medications, and/or procedures), Obtaining or reviewing history  , and Communicating with other healthcare professionals .    **Please Note: This note may have been constructed using a voice recognition system.**

## 2025-03-20 ENCOUNTER — APPOINTMENT (INPATIENT)
Dept: RADIOLOGY | Facility: HOSPITAL | Age: 60
DRG: 390 | End: 2025-03-20
Payer: COMMERCIAL

## 2025-03-20 LAB
ANION GAP SERPL CALCULATED.3IONS-SCNC: 6 MMOL/L (ref 4–13)
BUN SERPL-MCNC: 13 MG/DL (ref 5–25)
CALCIUM SERPL-MCNC: 8.6 MG/DL (ref 8.4–10.2)
CHLORIDE SERPL-SCNC: 113 MMOL/L (ref 96–108)
CO2 SERPL-SCNC: 25 MMOL/L (ref 21–32)
CREAT SERPL-MCNC: 0.55 MG/DL (ref 0.6–1.3)
ERYTHROCYTE [DISTWIDTH] IN BLOOD BY AUTOMATED COUNT: 14 % (ref 11.6–15.1)
GFR SERPL CREATININE-BSD FRML MDRD: 102 ML/MIN/1.73SQ M
GLUCOSE SERPL-MCNC: 135 MG/DL (ref 65–140)
GLUCOSE SERPL-MCNC: 140 MG/DL (ref 65–140)
GLUCOSE SERPL-MCNC: 141 MG/DL (ref 65–140)
GLUCOSE SERPL-MCNC: 145 MG/DL (ref 65–140)
GLUCOSE SERPL-MCNC: 157 MG/DL (ref 65–140)
GLUCOSE SERPL-MCNC: 171 MG/DL (ref 65–140)
HCT VFR BLD AUTO: 39.4 % (ref 34.8–46.1)
HGB BLD-MCNC: 12.2 G/DL (ref 11.5–15.4)
MCH RBC QN AUTO: 26.4 PG (ref 26.8–34.3)
MCHC RBC AUTO-ENTMCNC: 31 G/DL (ref 31.4–37.4)
MCV RBC AUTO: 85 FL (ref 82–98)
PLATELET # BLD AUTO: 227 THOUSANDS/UL (ref 149–390)
PMV BLD AUTO: 9.9 FL (ref 8.9–12.7)
POTASSIUM SERPL-SCNC: 3.6 MMOL/L (ref 3.5–5.3)
RBC # BLD AUTO: 4.62 MILLION/UL (ref 3.81–5.12)
SODIUM SERPL-SCNC: 144 MMOL/L (ref 135–147)
WBC # BLD AUTO: 5.76 THOUSAND/UL (ref 4.31–10.16)

## 2025-03-20 PROCEDURE — 85027 COMPLETE CBC AUTOMATED: CPT

## 2025-03-20 PROCEDURE — 99232 SBSQ HOSP IP/OBS MODERATE 35: CPT | Performed by: SURGERY

## 2025-03-20 PROCEDURE — 80048 BASIC METABOLIC PNL TOTAL CA: CPT

## 2025-03-20 PROCEDURE — 82948 REAGENT STRIP/BLOOD GLUCOSE: CPT

## 2025-03-20 PROCEDURE — 74018 RADEX ABDOMEN 1 VIEW: CPT

## 2025-03-20 PROCEDURE — 99232 SBSQ HOSP IP/OBS MODERATE 35: CPT

## 2025-03-20 RX ORDER — KETOROLAC TROMETHAMINE 30 MG/ML
15 INJECTION, SOLUTION INTRAMUSCULAR; INTRAVENOUS EVERY 6 HOURS SCHEDULED
Status: COMPLETED | OUTPATIENT
Start: 2025-03-20 | End: 2025-03-23

## 2025-03-20 RX ADMIN — DEXTROSE AND SODIUM CHLORIDE 125 ML/HR: 5; .9 INJECTION, SOLUTION INTRAVENOUS at 06:51

## 2025-03-20 RX ADMIN — INSULIN LISPRO 1 UNITS: 100 INJECTION, SOLUTION INTRAVENOUS; SUBCUTANEOUS at 06:51

## 2025-03-20 RX ADMIN — ACETAMINOPHEN 1000 MG: 10 INJECTION INTRAVENOUS at 19:31

## 2025-03-20 RX ADMIN — KETOROLAC TROMETHAMINE 15 MG: 30 INJECTION, SOLUTION INTRAMUSCULAR at 19:32

## 2025-03-20 RX ADMIN — TOPICAL ANESTHETIC 2 SPRAY: 200 SPRAY DENTAL; PERIODONTAL at 05:34

## 2025-03-20 RX ADMIN — TOPICAL ANESTHETIC 2 SPRAY: 200 SPRAY DENTAL; PERIODONTAL at 19:39

## 2025-03-20 RX ADMIN — ACETAMINOPHEN 1000 MG: 10 INJECTION INTRAVENOUS at 08:51

## 2025-03-20 RX ADMIN — DEXTROSE AND SODIUM CHLORIDE 125 ML/HR: 5; .9 INJECTION, SOLUTION INTRAVENOUS at 15:52

## 2025-03-20 RX ADMIN — ACETAMINOPHEN 1000 MG: 10 INJECTION INTRAVENOUS at 14:03

## 2025-03-20 RX ADMIN — ACETAMINOPHEN 1000 MG: 10 INJECTION INTRAVENOUS at 03:00

## 2025-03-20 RX ADMIN — KETOROLAC TROMETHAMINE 15 MG: 30 INJECTION, SOLUTION INTRAMUSCULAR at 14:20

## 2025-03-20 NOTE — ASSESSMENT & PLAN NOTE
Lab Results   Component Value Date    HGBA1C 5.9 02/27/2025       Recent Labs     03/19/25  2105 03/20/25  0028 03/20/25  0623 03/20/25  1137   POCGLU 123 135 157* 145*       Blood Sugar Average: Last 72 hrs:  (P) 110.7928445239456254  Hold home regimen of dapagliflozin, glipizide, insulin glargine and metformin.  Target blood sugar inpatient 140-180  Blood sugars optimized  Currently n.p.o.  Continue IVF's D5 and a half at 125 an hour  Continue with the sliding scale insulin with accu checks every 6 hours  Hypoglycemia protocol  BMP a.m.

## 2025-03-20 NOTE — NURSING NOTE
Awake and alert/oriented. Ng maintained to rt nares to low int wall ssuction, for drk green return small amts. Abd soft/rounded. Pain is to throat area any iv tylenol/toradol and benzocaine spray to mouth given. Call bell near, and ivf continue

## 2025-03-20 NOTE — CASE MANAGEMENT
Case Management Discharge Planning Note    Patient name Hayley Bolaños  Location /-01 MRN [id number]  : 1965 Date 3/20/2025       Current Admission Date: 3/17/2025  Current Admission Diagnosis:SBO (small bowel obstruction) (HCC)   Patient Active Problem List    Diagnosis Date Noted Date Diagnosed    SBO (small bowel obstruction) (HCC) 2025     Osteopenia, unspecified location 2024     Thoracic radiculopathy 2024     Left shoulder tendonitis 2024     Syphilis 2024     Vaginal atrophy 2022     Leukocytosis 2020     Osteoarthritis 2020     Hyperlipidemia associated with type 2 diabetes mellitus  (HCC) 2020     Pelvic pain 2019     Type 2 diabetes mellitus (HCC) 06/15/2016     Hyperlipidemia 06/15/2016     Essential hypertension 2014       LOS (days): 3  Geometric Mean LOS (GMLOS) (days): 2.3  Days to GMLOS:-0.4     OBJECTIVE:  Risk of Unplanned Readmission Score: 5.94         Current admission status: Inpatient   Preferred Pharmacy:   [facility] [address] [facility] [address]  Phone: [phone] Fax: [phone]    Primary Care Provider: Jose Chase MD    Primary Insurance: [facility] DUAL COMPLETE [facility] REP  Secondary Insurance: [place] [employer] [facility]    DISCHARGE DETAILS:    Discharge planning discussed with:: daughter was called  Freedom of Choice: Yes     CM contacted family/caregiver?: Yes             Contacts  Patient Contacts: Jailene Mancini  Relationship to Patient:: Family  Contact Method: Phone  Phone Number: [phone]  Reason/Outcome: Discharge Planning    Requested Home Health Care         Is the patient interested in HHC at discharge?: No    DME Referral Provided  Referral made for DME?: No    Other Referral/Resources/Interventions Provided:  Referral Comments: pt not staboe for d/c  - SBO,  NG tube, npo  IV d25%  x 1  KUB, IV  d5nss    Would you like to participate in our [facility] service program?  : No - Declined    Treatment Team Recommendation: Home (boom with family & outpt follow up- family)                                         Family notified:: daughter was called

## 2025-03-20 NOTE — PROGRESS NOTES
Progress Note - Hospitalist   Name: Hayley Bolaños 59 y.o. female I MRN: [id number]  Unit/Bed#: -01 I Date of Admission: 3/17/2025   Date of Service: 3/20/2025 I Hospital Day: 3    Assessment & Plan  SBO (small bowel obstruction) (HCC)  Presented with abdominal pain, nausea/vomiting  CT abd/pelvis: Interval development of small bowel obstruction with fecalization in distal small bowel loop contents as seen on image 2/114 and transition point in the anterior mid pelvis images 2/121 and 601/64, possibly from an adhesion.   Mild elevated lactic acid - resolved with IV fluid  General surgery following  Continue NPO   Continue IV fluids  NG to suction-monitor output  No acute surgical intervention indicated at this time  Pain regimen  Monitor and replete electrolytes as indicated  BMP a.m.    Type 2 diabetes mellitus (HCC)  Lab Results   Component Value Date    HGBA1C 5.9 02/27/2025       Recent Labs     03/19/25  2105 03/20/25  0028 03/20/25  0623 03/20/25  1137   POCGLU 123 135 157* 145*       Blood Sugar Average: Last 72 hrs:  (P) [id number]  Hold home regimen of dapagliflozin, glipizide, insulin glargine and metformin.  Target blood sugar inpatient 140-180  Blood sugars optimized  Currently n.p.o.  Continue IVF's D5 and a half at 125 an hour  Continue with the sliding scale insulin with accu checks every 6 hours  Hypoglycemia protocol  BMP a.m.  Essential hypertension  Blood pressure a bit soft, asymptomatic  Currently n.p.o. with IV fluids running  Antihypertensives are currently on hold  Monitor BP per protocol  Hyperlipidemia  Hold atorvastatin and fenofibrate given the patient is NPO  Leukocytosis  POA  WBC of 15.28- likely reactive   Now resolved    VTE Pharmacologic Prophylaxis: VTE Score: 2 Low Risk (Score 0-2) - Encourage Ambulation.    Mobility:   Basic Mobility Inpatient Raw Score: 18  JH-HLM Goal: 6: Walk 10 steps or more  JH-HLM Achieved: 6: Walk 10 steps or more  JH-HLM Goal achieved.  Continue to encourage appropriate mobility.    Patient Centered Rounds: I performed bedside rounds with nursing staff today.   Discussions with Specialists or Other Care Team Provider: General Surgery    Education and Discussions with Family / Patient: Patient declined call to .     Current Length of Stay: 3 day(s)  Current Patient Status: Inpatient   Certification Statement: The patient will continue to require additional inpatient hospital stay due to management of small bowel obstruction surgery following, patient is n.p.o. with NG tube, IV fluids  Discharge Plan:  Is planning to return home when medically stable for discharge.  Remains n.p.o., IV fluids, NG tube suction and general surgery following.  Philip labs in the AM.    Code Status: Level 1 - Full Code    Subjective   Reported some low abdominal pain.  States no BM of yet, not passing flatus    Objective :  Temp:  [97.7 °F (36.5 °C)-98.2 °F (36.8 °C)] 97.7 °F (36.5 °C)  HR:  [82-95] 82  BP: (107-130)/(56-72) 107/56  Resp:  [18] 18  SpO2:  [93 %-98 %] 96 %  O2 Device: None (Room air)    Body mass index is 25.15 kg/m².     Input and Output Summary (last 24 hours):     Intake/Output Summary (Last 24 hours) at 3/20/2025 1309  Last data filed at 3/20/2025 0600  Gross per 24 hour   Intake 2765 ml   Output 3200 ml   Net -435 ml       Physical Exam  Vitals reviewed.   Constitutional:       General: She is not in acute distress.     Appearance: She is well-developed.   HENT:      Head: Normocephalic and atraumatic.   Cardiovascular:      Rate and Rhythm: Normal rate and regular rhythm.      Pulses: Normal pulses.      Heart sounds: Normal heart sounds. No murmur heard.  Pulmonary:      Effort: Pulmonary effort is normal. No respiratory distress.      Breath sounds: Normal breath sounds. No wheezing or rales.   Abdominal:      General: There is no distension.      Palpations: Abdomen is soft.      Tenderness: There is no abdominal tenderness. There is  no guarding.      Comments: Hypoactive bowel sounds; NG to suction draining greenish-brown drainage; reports now having flatus no BM of yet   Musculoskeletal:         General: No swelling. Normal range of motion.   Skin:     General: Skin is warm and dry.      Capillary Refill: Capillary refill takes less than 2 seconds.   Neurological:      General: No focal deficit present.      Mental Status: She is alert and oriented to person, place, and time. Mental status is at baseline.   Psychiatric:         Mood and Affect: Mood normal.         Behavior: Behavior normal.           Lines/Drains:  Lines/Drains/Airways       Active Status       Name Placement date Placement time Site Days    NG/OG/Enteral Tube Nasogastric 18 Fr Right nare 03/18/25  1410  Right nare  1                            Lab Results: I have reviewed the following results:   Results from last 7 days   Lab Units 03/20/25  0525 03/19/25  0522 03/18/25  0450   WBC Thousand/uL 5.76   < > 8.38   HEMOGLOBIN g/dL 12.2   < > 13.5   HEMATOCRIT % 39.4   < > 41.5   PLATELETS Thousands/uL 227   < > 265   SEGS PCT %  --   --  68   LYMPHO PCT %  --   --  17   MONO PCT %  --   --  11   EOS PCT %  --   --  3    < > = values in this interval not displayed.     Results from last 7 days   Lab Units 03/20/25  0525 03/18/25  1615 03/18/25  0450   SODIUM mmol/L 144   < > 142   POTASSIUM mmol/L 3.6   < > 5.2   CHLORIDE mmol/L 113*   < > 108   CO2 mmol/L 25   < > 29   BUN mg/dL 13   < > 21   CREATININE mg/dL 0.55*   < > 0.97   ANION GAP mmol/L 6   < > 5   CALCIUM mg/dL 8.6   < > 9.9   ALBUMIN g/dL  --   --  3.8   TOTAL BILIRUBIN mg/dL  --   --  0.56   ALK PHOS U/L  --   --  68   ALT U/L  --   --  11   AST U/L  --   --  10*   GLUCOSE RANDOM mg/dL 171*   < > 145*    < > = values in this interval not displayed.     Results from last 7 days   Lab Units 03/18/25  0450   INR  0.91     Results from last 7 days   Lab Units 03/20/25  1137 03/20/25  0623 03/20/25  0028 03/19/25  2103  03/19/25  1815 03/19/25  1130 03/19/25  0542 03/19/25  0102 03/18/25  2353 03/18/25  1934 03/18/25  1845 03/18/25  1842   POC GLUCOSE mg/dl 145* 157* 135 123 148* 107 70 110 53* 143* 44* 46*         Results from last 7 days   Lab Units 03/17/25  2107 03/17/25  1845   LACTIC ACID mmol/L 2.0 3.8*   PROCALCITONIN ng/ml  --  0.10       Recent Cultures (last 7 days):   Results from last 7 days   Lab Units 03/17/25  1845   BLOOD CULTURE  No Growth at 48 hrs.  No Growth at 48 hrs.       Imaging Results Review: I reviewed radiology reports from this admission including: CT abdomen pelvis.  Other Study Results Review: No additional pertinent studies reviewed.    Last 24 Hours Medication List:     Current Facility-Administered Medications:     acetaminophen (Ofirmev) injection 1,000 mg, Q6H NEETA, Last Rate: 1,000 mg (03/20/25 0851)    benzocaine (HURRICAINE) 20 % mucosal spray 2 spray, BID PRN    dextrose 25 % IV solution 25 mL, Once    dextrose 5 % and sodium chloride 0.9 % infusion, Continuous, Last Rate: 125 mL/hr (03/20/25 0651)    insulin lispro (HumALOG/ADMELOG) 100 units/mL subcutaneous injection 1-5 Units, Q6H NEETA **AND** Fingerstick Glucose (POCT), Q6H    insulin lispro (HumALOG/ADMELOG) 100 units/mL subcutaneous injection 1-5 Units, HS    morphine injection 2 mg, Q4H PRN    morphine injection 4 mg, Q4H PRN    ondansetron (ZOFRAN) injection 4 mg, Q6H PRN    Administrative Statements   Today, Patient Was Seen By: THAO Renee  I have spent a total time of 36 minutes in caring for this patient on the day of the visit/encounter including Documenting in the medical record, Reviewing/placing orders in the medical record (including tests, medications, and/or procedures), Obtaining or reviewing history  , and Communicating with other healthcare professionals .    **Please Note: This note may have been constructed using a voice recognition system.**

## 2025-03-20 NOTE — ASSESSMENT & PLAN NOTE
"59 year old female with PMH significant for HTN, T2DM, HLD, COPD, prior SBO, multiple abdominal surgeries presents for the evaluation of a small bowel obstruction   Afebrile, tachycardia has resolved  No leukocytosis  Kidney function wnl  NG tube successfully placed 3/18, output overnight recorded at 1.2L bilious  CTAP: \"Interval development of small bowel obstruction with fecalization in distal small bowel loop contents as seen on image 2/114 and transition point in the anterior mid pelvis images 2/121 and 601/64, possibly from an adhesion\"  Clinically and hemodynamically stable. Abdomen soft, distended, nontender.     Plan:  No acute surgical intervention indicated at this time. Continue conservative management. Criteria for surgical intervention reviewed with patient   Continue NPO, IVF, NGT  Will allow for further decompression via NG tube, will plan for gastrografin study once output tapers off further  Monitor labs and vitals   Monitor for bowel function  Analgesia and antiemetics prn  Rest of medical management per SLIM  Discussed with Dr. Garcia   "

## 2025-03-20 NOTE — PROGRESS NOTES
"Progress Note - Surgery-General   Name: Hayley Bolaños 59 y.o. female I MRN: [id number]  Unit/Bed#: -01 I Date of Admission: 3/17/2025   Date of Service: 3/20/2025 I Hospital Day: 3    Assessment & Plan  SBO (small bowel obstruction) (HCC)  59 year old female with PMH significant for HTN, T2DM, HLD, COPD, prior SBO, multiple abdominal surgeries presents for the evaluation of a small bowel obstruction   Afebrile, tachycardia has resolved  No leukocytosis  Kidney function wnl  NG tube successfully placed 3/18, output overnight recorded at 1.2L bilious  CTAP: \"Interval development of small bowel obstruction with fecalization in distal small bowel loop contents as seen on image 2/114 and transition point in the anterior mid pelvis images 2/121 and 601/64, possibly from an adhesion\"  Clinically and hemodynamically stable. Abdomen soft, distended, nontender.     Plan:  No acute surgical intervention indicated at this time. Continue conservative management. Criteria for surgical intervention reviewed with patient   Continue NPO, IVF, NGT  Will allow for further decompression via NG tube, will plan for gastrografin study once output tapers off further  Monitor labs and vitals   Monitor for bowel function  Analgesia and antiemetics prn  Rest of medical management per SLIM  Discussed with Dr. Garcia         Subjective   Patient complaining of throat irritation from the NG tube. Abdominal pain is under control. No nausea. No bowel function.    Objective :  Temp:  [97.7 °F (36.5 °C)-98.2 °F (36.8 °C)] 97.7 °F (36.5 °C)  HR:  [82-95] 82  BP: (107-130)/(56-72) 107/56  Resp:  [18] 18  SpO2:  [93 %-98 %] 96 %  O2 Device: None (Room air)    I/O         03/18 0701  03/19 0700 03/19 0701  03/20 0700 03/20 0701 03/21 0700    P.O.  0     I.V. (mL/kg) 1435 (22.3) 2615 (40.6)     NG/GT 50 100     IV Piggyback 2060 100     Total Intake(mL/kg) 3545 (55) 2815 (43.7)     Urine (mL/kg/hr) 1850 (1.2) 2000 (1.3)     Emesis/NG output 3400 " 1850     Stool  0     Total Output 5250 3850     Net -1705 -1035            Unmeasured Stool Occurrence  0 x           Lines/Drains/Airways       Active Status       Name Placement date Placement time Site Days    NG/OG/Enteral Tube Nasogastric 18 Fr Right nare 03/18/25  1410  Right nare  1                  Physical Exam  Constitutional:       Appearance: Normal appearance.   HENT:      Mouth/Throat:      Mouth: Mucous membranes are moist.      Pharynx: Oropharynx is clear.   Cardiovascular:      Rate and Rhythm: Normal rate.   Pulmonary:      Effort: Pulmonary effort is normal.   Abdominal:      General: There is distension.      Palpations: Abdomen is soft.      Tenderness: There is no abdominal tenderness.   Musculoskeletal:         General: Normal range of motion.   Skin:     General: Skin is warm and dry.   Neurological:      General: No focal deficit present.      Mental Status: She is alert.         Lab Results: I have reviewed the following results:  Recent Labs     03/17/25  1845 03/17/25  2107 03/18/25  0450 03/18/25  1615 03/19/25  0522 03/19/25  0613 03/20/25  0525   WBC  --   --  8.38  --    < >  --  5.76   HGB  --   --  13.5  --    < >  --  12.2   HCT  --   --  41.5  --    < >  --  39.4   PLT  --   --  265  --    < >  --  227   SODIUM  --   --  142 142  --  142 144   K  --   --  5.2 4.1  --  4.2 3.6   CL  --   --  108 108  --  109* 113*   CO2  --   --  29 26  --  27 25   BUN  --   --  21 20  --  19 13   CREATININE  --   --  0.97 0.70  --  0.66 0.55*   GLUC  --   --  145* 75  --  68 171*   MG   < >  --  1.8* 2.1  --  2.1  --    PHOS   < >  --  3.8 4.3*  --   --   --    AST  --   --  10*  --   --   --   --    ALT  --   --  11  --   --   --   --    ALB  --   --  3.8  --   --   --   --    TBILI  --   --  0.56  --   --   --   --    ALKPHOS  --   --  68  --   --   --   --    INR  --   --  0.91  --   --   --   --    LACTICACID  --  2.0  --   --   --   --   --     < > = values in this interval not displayed.          VTE Pharmacologic Prophylaxis: Sequential compression device (Venodyne)   VTE Mechanical Prophylaxis: sequential compression device    Mariya Charles PA-C

## 2025-03-20 NOTE — ASSESSMENT & PLAN NOTE
Presented with abdominal pain, nausea/vomiting  CT abd/pelvis: Interval development of small bowel obstruction with fecalization in distal small bowel loop contents as seen on image 2/114 and transition point in the anterior mid pelvis images 2/121 and 601/64, possibly from an adhesion.   Mild elevated lactic acid - resolved with IV fluid  General surgery following  Continue NPO   Continue IV fluids  NG to suction-monitor output  No acute surgical intervention indicated at this time  Pain regimen  Monitor and replete electrolytes as indicated  BMP a.m.

## 2025-03-20 NOTE — CASE MANAGEMENT
Case Management Assessment & Discharge Planning Note    Patient name Hayley Bolaños  Location /-01 MRN [id number]  : 1965 Date 3/19/2025       Current Admission Date: 3/17/2025  Current Admission Diagnosis:SBO (small bowel obstruction) (HCC)   Patient Active Problem List    Diagnosis Date Noted Date Diagnosed    SBO (small bowel obstruction) (HCC) 2025     Osteopenia, unspecified location 2024     Thoracic radiculopathy 2024     Left shoulder tendonitis 2024     Syphilis 2024     Vaginal atrophy 2022     Leukocytosis 2020     Osteoarthritis 2020     Hyperlipidemia associated with type 2 diabetes mellitus  (HCC) 2020     Pelvic pain 2019     Type 2 diabetes mellitus (HCC) 06/15/2016     Hyperlipidemia 06/15/2016     Essential hypertension 2014       LOS (days): 2  Geometric Mean LOS (GMLOS) (days): 2.3  Days to GMLOS:0.3     OBJECTIVE:    Risk of Unplanned Readmission Score: 6.09         Current admission status: Inpatient  Referral Reason: Other (d/c planning)    [facility] [address] [facility] [address]  Phone: [phone] Fax: [phone]    Primary Care Provider: Jose Chase MD    Primary Insurance: [facility] DUAL COMPLETE  REP  Secondary Insurance: [place] [employer] [facility]    ASSESSMENT:  Active Health Care Proxies       Jailene Mancini [facility] Representative - Daughter   Primary Phone: [phone] (Mobile)  Home Phone: [phone]                 Advance Directives  Does patient have a Health Care POA?: No  Was patient offered paperwork?: Yes (declined paperwork)  Does patient have Advance Directives?: No  Was patient offered paperwork?: Yes (declined paperwork)         Readmission [facility]  30 Day Readmission: No    Patient Information  Admitted from:: Home  Mental Status: Alert  During Assessment patient was  accompanied by: Not accompanied during assessment  Assessment information provided by:: Patient  Primary Caregiver: Self  Support Systems: Family members, Daughter  County of Residence: [place]  What city do you live in?: [place]  Home entry access options. Select all that apply.: Stairs  Number of steps to enter home.: 3  Do the steps have railings?: Yes  Type of Current Residence: 2 story home  Upon entering residence, is there a bedroom on the main floor (no further steps)?: No  A bedroom is located on the following floor levels of residence (select all that apply):: 2nd Floor  Upon entering residence, is there a bathroom on the main floor (no further steps)?: Yes  Number of steps to 2nd floor from main floor: One Flight  Living Arrangements: Lives w/ Extended Family (lives with her niece)  Is patient a ?: No    Activities of Daily Living Prior to Admission  Functional Status: Independent (except driving)  Completes ADLs independently?: Yes  Ambulates independently?: Yes  Does patient use assisted devices?: No  Does patient currently own DME?: No  Does patient have a history of Outpatient Therapy (PT/OT)?: Yes  Does the patient have a history of Short-Term Rehab?: No  Does patient have a history of HHC?: Yes (pt unsure of the agency)  Does patient currently have HHC?: No         Patient Information Continued  Income Source: Pension/long term  Does patient have prescription coverage?: Yes ([facility])  Can the patient afford their medications and any related supplies (such as glucometers or test strips)?: Yes  Does patient receive dialysis treatments?: No  Does patient have a history of substance abuse?: No  Does patient have a history of Mental Health Diagnosis?: No         Means of [facility]          DISCHARGE DETAILS:    Discharge planning discussed with:: patient and daughter was called 14:57 pm  Freedom of Choice: Yes  Comments - Freedom  of Choice: pt denies any d/c needs-  cm will continue to follow  CM contacted family/caregiver?: Yes             Contacts  Patient Contacts: Jailene Mancini  Relationship to Patient:: Family (daughter)  Contact Method: Phone  Phone Number: [phone]  Reason/Outcome: Discharge Planning    Requested Home Health Care         Is the patient interested in HHC at discharge?: No    DME Referral Provided  Referral made for DME?: No    Other Referral/Resources/Interventions Provided:  Referral Comments: pt not stable for d/c - SBO- npo  NG tube IV d5 nss ,surgery    Would you like to participate in our [facility] service program?  : No - Declined    Treatment Team Recommendation: Home (home with family  & outpt follow up- family)

## 2025-03-20 NOTE — ASSESSMENT & PLAN NOTE
Blood pressure a bit soft, asymptomatic  Currently n.p.o. with IV fluids running  Antihypertensives are currently on hold  Monitor BP per protocol

## 2025-03-21 ENCOUNTER — APPOINTMENT (INPATIENT)
Dept: RADIOLOGY | Facility: HOSPITAL | Age: 60
DRG: 390 | End: 2025-03-21
Payer: COMMERCIAL

## 2025-03-21 PROBLEM — E87.6 HYPOKALEMIA: Status: ACTIVE | Noted: 2025-03-21

## 2025-03-21 LAB
ANION GAP SERPL CALCULATED.3IONS-SCNC: 7 MMOL/L (ref 4–13)
BUN SERPL-MCNC: 10 MG/DL (ref 5–25)
CALCIUM SERPL-MCNC: 8.3 MG/DL (ref 8.4–10.2)
CHLORIDE SERPL-SCNC: 118 MMOL/L (ref 96–108)
CO2 SERPL-SCNC: 21 MMOL/L (ref 21–32)
CREAT SERPL-MCNC: 0.58 MG/DL (ref 0.6–1.3)
ERYTHROCYTE [DISTWIDTH] IN BLOOD BY AUTOMATED COUNT: 13.7 % (ref 11.6–15.1)
GFR SERPL CREATININE-BSD FRML MDRD: 100 ML/MIN/1.73SQ M
GLUCOSE SERPL-MCNC: 102 MG/DL (ref 65–140)
GLUCOSE SERPL-MCNC: 118 MG/DL (ref 65–140)
GLUCOSE SERPL-MCNC: 132 MG/DL (ref 65–140)
GLUCOSE SERPL-MCNC: 134 MG/DL (ref 65–140)
GLUCOSE SERPL-MCNC: 138 MG/DL (ref 65–140)
GLUCOSE SERPL-MCNC: 148 MG/DL (ref 65–140)
HCT VFR BLD AUTO: 37.8 % (ref 34.8–46.1)
HGB BLD-MCNC: 11.9 G/DL (ref 11.5–15.4)
MCH RBC QN AUTO: 26.5 PG (ref 26.8–34.3)
MCHC RBC AUTO-ENTMCNC: 31.5 G/DL (ref 31.4–37.4)
MCV RBC AUTO: 84 FL (ref 82–98)
PLATELET # BLD AUTO: 225 THOUSANDS/UL (ref 149–390)
PMV BLD AUTO: 9.5 FL (ref 8.9–12.7)
POTASSIUM SERPL-SCNC: 3.3 MMOL/L (ref 3.5–5.3)
RBC # BLD AUTO: 4.49 MILLION/UL (ref 3.81–5.12)
SODIUM SERPL-SCNC: 146 MMOL/L (ref 135–147)
WBC # BLD AUTO: 6.48 THOUSAND/UL (ref 4.31–10.16)

## 2025-03-21 PROCEDURE — 74018 RADEX ABDOMEN 1 VIEW: CPT

## 2025-03-21 PROCEDURE — 99232 SBSQ HOSP IP/OBS MODERATE 35: CPT | Performed by: SURGERY

## 2025-03-21 PROCEDURE — 85027 COMPLETE CBC AUTOMATED: CPT

## 2025-03-21 PROCEDURE — 99232 SBSQ HOSP IP/OBS MODERATE 35: CPT

## 2025-03-21 PROCEDURE — 82948 REAGENT STRIP/BLOOD GLUCOSE: CPT

## 2025-03-21 PROCEDURE — 80048 BASIC METABOLIC PNL TOTAL CA: CPT

## 2025-03-21 RX ORDER — DIATRIZOATE MEGLUMINE AND DIATRIZOATE SODIUM 660; 100 MG/ML; MG/ML
120 SOLUTION ORAL; RECTAL
Status: DISCONTINUED | OUTPATIENT
Start: 2025-03-21 | End: 2025-03-21

## 2025-03-21 RX ORDER — INSULIN LISPRO 100 [IU]/ML
1-5 INJECTION, SOLUTION INTRAVENOUS; SUBCUTANEOUS
Status: DISCONTINUED | OUTPATIENT
Start: 2025-03-21 | End: 2025-03-24 | Stop reason: HOSPADM

## 2025-03-21 RX ORDER — POTASSIUM CHLORIDE 14.9 MG/ML
20 INJECTION INTRAVENOUS
Status: COMPLETED | OUTPATIENT
Start: 2025-03-21 | End: 2025-03-21

## 2025-03-21 RX ORDER — DIATRIZOATE MEGLUMINE AND DIATRIZOATE SODIUM 660; 100 MG/ML; MG/ML
120 SOLUTION ORAL; RECTAL
Status: COMPLETED | OUTPATIENT
Start: 2025-03-21 | End: 2025-03-21

## 2025-03-21 RX ADMIN — KETOROLAC TROMETHAMINE 15 MG: 30 INJECTION, SOLUTION INTRAMUSCULAR at 23:37

## 2025-03-21 RX ADMIN — KETOROLAC TROMETHAMINE 15 MG: 30 INJECTION, SOLUTION INTRAMUSCULAR at 13:14

## 2025-03-21 RX ADMIN — DEXTROSE AND SODIUM CHLORIDE 125 ML/HR: 5; .9 INJECTION, SOLUTION INTRAVENOUS at 00:49

## 2025-03-21 RX ADMIN — POTASSIUM CHLORIDE 20 MEQ: 14.9 INJECTION, SOLUTION INTRAVENOUS at 10:49

## 2025-03-21 RX ADMIN — KETOROLAC TROMETHAMINE 15 MG: 30 INJECTION, SOLUTION INTRAMUSCULAR at 18:19

## 2025-03-21 RX ADMIN — ACETAMINOPHEN 1000 MG: 10 INJECTION INTRAVENOUS at 10:25

## 2025-03-21 RX ADMIN — DIATRIZOATE MEGLUMINE AND DIATRIZOATE SODIUM 120 ML: 660; 100 LIQUID ORAL; RECTAL at 09:40

## 2025-03-21 RX ADMIN — POTASSIUM CHLORIDE 20 MEQ: 14.9 INJECTION, SOLUTION INTRAVENOUS at 14:20

## 2025-03-21 RX ADMIN — KETOROLAC TROMETHAMINE 15 MG: 30 INJECTION, SOLUTION INTRAMUSCULAR at 00:11

## 2025-03-21 RX ADMIN — Medication 12.5 MG: at 13:14

## 2025-03-21 RX ADMIN — KETOROLAC TROMETHAMINE 15 MG: 30 INJECTION, SOLUTION INTRAMUSCULAR at 05:45

## 2025-03-21 RX ADMIN — ACETAMINOPHEN 1000 MG: 10 INJECTION INTRAVENOUS at 20:02

## 2025-03-21 RX ADMIN — DEXTROSE AND SODIUM CHLORIDE 125 ML/HR: 5; .9 INJECTION, SOLUTION INTRAVENOUS at 21:55

## 2025-03-21 RX ADMIN — Medication 12.5 MG: at 21:53

## 2025-03-21 NOTE — PROGRESS NOTES
Progress Note - Hospitalist   Name: Hayley Bolaños 59 y.o. female I MRN: [id number]  Unit/Bed#: -01 I Date of Admission: 3/17/2025   Date of Service: 3/21/2025 I Hospital Day: 4    Assessment & Plan  SBO (small bowel obstruction) (HCC)  Presented with abdominal pain, nausea/vomiting  CT abd/pelvis: Interval development of small bowel obstruction with fecalization in distal small bowel loop contents as seen on image 2/114 and transition point in the anterior mid pelvis images 2/121 and 601/64, possibly from an adhesion.   Mild elevated lactic acid - resolved with IV fluid  General surgery following  Continue NPO   Continue IV fluids  NG to suction-monitor output  No acute surgical intervention indicated at this time  Patient is having Gastrografin study today  Pain regimen  Monitor and replete electrolytes as indicated  BMP a.m.    Type 2 diabetes mellitus (HCC)  Lab Results   Component Value Date    HGBA1C 5.9 02/27/2025       Recent Labs     03/20/25  1739 03/20/25  2353 03/21/25  0548 03/21/25  0719   POCGLU 140 141* 102 132       Blood Sugar Average: Last 72 hrs:  (P) [id number]  Hold home regimen of dapagliflozin, glipizide, insulin glargine and metformin.  Target blood sugar inpatient 140-180  Blood sugars optimized  Currently n.p.o.  Continue IVF's D5 and a half at 125 an hour  Continue with the sliding scale insulin with accu checks every 6 hours  Hypoglycemia protocol  BMP a.m.  Essential hypertension  Blood pressure was soft, now controlled  Currently n.p.o. with IV fluids running  We will start metoprolol at half of prehospital dosing and monitor  Monitor BP per protocol, adjust medications as indicated  Hyperlipidemia  Hold atorvastatin and fenofibrate given the patient is NPO  Leukocytosis  POA  WBC of 15.28- likely reactive   Now resolved  Hypokalemia  Continue to monitor and replete accordingly  BMP a.m.    VTE Pharmacologic Prophylaxis: VTE Score: 2 Low Risk (Score 0-2) - Encourage  Ambulation.    Mobility:   Basic Mobility Inpatient Raw Score: 18  JH-HLM Goal: 6: Walk 10 steps or more  JH-HLM Achieved: 6: Walk 10 steps or more  JH-HLM Goal achieved. Continue to encourage appropriate mobility.    Patient Centered Rounds: I performed bedside rounds with nursing staff today.   Discussions with Specialists or Other Care Team Provider: General Surgery, nursing, case management    Education and Discussions with Family / Patient: Patient declined call to .     Current Length of Stay: 4 day(s)  Current Patient Status: Inpatient   Certification Statement: The patient will continue to require additional inpatient hospital stay due to management of small bowel obstruction surgery following, patient is n.p.o. with NG tube, IV fluids, Gastrografin study today  Discharge Plan:  Is planning to return home when medically stable for discharge.  Remains n.p.o., IV fluids, NG tube suction and general surgery following.  Patient is having Gastrografin study today.  Repleating electrolytes.  Repeat labs in the a.m.    Code Status: Level 1 - Full Code    Subjective   Patient is pleasant denies abdominal pain today.  States passing flatus now but no BM of yet    Objective :  Temp:  [98.1 °F (36.7 °C)-98.7 °F (37.1 °C)] 98.7 °F (37.1 °C)  HR:  [60-94] 60  BP: (127-137)/(59-71) 127/71  Resp:  [16-18] 17  SpO2:  [96 %-97 %] 97 %  O2 Device: None (Room air)    Body mass index is 25.15 kg/m².     Input and Output Summary (last 24 hours):     Intake/Output Summary (Last 24 hours) at 3/21/2025 1105  Last data filed at 3/21/2025 0719  Gross per 24 hour   Intake 150 ml   Output 1600 ml   Net -1450 ml       Physical Exam  Vitals reviewed.   Constitutional:       General: She is not in acute distress.     Appearance: She is well-developed.   HENT:      Head: Normocephalic and atraumatic.   Cardiovascular:      Rate and Rhythm: Normal rate and regular rhythm.      Pulses: Normal pulses.      Heart sounds: Normal  heart sounds. No murmur heard.  Pulmonary:      Effort: Pulmonary effort is normal. No respiratory distress.      Breath sounds: Normal breath sounds. No wheezing or rales.   Abdominal:      General: There is no distension.      Palpations: Abdomen is soft.      Tenderness: There is no abdominal tenderness. There is no guarding.      Comments: Hypoactive bowel sounds; NG to suction draining greenish-brown drainage; reports now having flatus no BM of yet   Musculoskeletal:         General: No swelling. Normal range of motion.   Skin:     General: Skin is warm and dry.      Capillary Refill: Capillary refill takes less than 2 seconds.   Neurological:      General: No focal deficit present.      Mental Status: She is alert and oriented to person, place, and time. Mental status is at baseline.   Psychiatric:         Mood and Affect: Mood normal.         Behavior: Behavior normal.           Lines/Drains:  Lines/Drains/Airways       Active Status       Name Placement date Placement time Site Days    NG/OG/Enteral Tube Nasogastric 18 Fr Right nare 03/18/25  1410  Right nare  2                            Lab Results: I have reviewed the following results:   Results from last 7 days   Lab Units 03/21/25  0436 03/19/25  0522 03/18/25  0450   WBC Thousand/uL 6.48   < > 8.38   HEMOGLOBIN g/dL 11.9   < > 13.5   HEMATOCRIT % 37.8   < > 41.5   PLATELETS Thousands/uL 225   < > 265   SEGS PCT %  --   --  68   LYMPHO PCT %  --   --  17   MONO PCT %  --   --  11   EOS PCT %  --   --  3    < > = values in this interval not displayed.     Results from last 7 days   Lab Units 03/21/25  0436 03/18/25  1615 03/18/25  0450   SODIUM mmol/L 146   < > 142   POTASSIUM mmol/L 3.3*   < > 5.2   CHLORIDE mmol/L 118*   < > 108   CO2 mmol/L 21   < > 29   BUN mg/dL 10   < > 21   CREATININE mg/dL 0.58*   < > 0.97   ANION GAP mmol/L 7   < > 5   CALCIUM mg/dL 8.3*   < > 9.9   ALBUMIN g/dL  --   --  3.8   TOTAL BILIRUBIN mg/dL  --   --  0.56   ALK PHOS  U/L  --   --  68   ALT U/L  --   --  11   AST U/L  --   --  10*   GLUCOSE RANDOM mg/dL 138   < > 145*    < > = values in this interval not displayed.     Results from last 7 days   Lab Units 03/18/25  0450   INR  0.91     Results from last 7 days   Lab Units 03/21/25  0719 03/21/25  0548 03/20/25  2353 03/20/25  1739 03/20/25  1137 03/20/25  0623 03/20/25  0028 03/19/25  2105 03/19/25  1815 03/19/25  1130 03/19/25  0542 03/19/25  0102   POC GLUCOSE mg/dl 132 102 141* 140 145* 157* 135 123 148* 107 70 110         Results from last 7 days   Lab Units 03/17/25  2107 03/17/25  1845   LACTIC ACID mmol/L 2.0 3.8*   PROCALCITONIN ng/ml  --  0.10       Recent Cultures (last 7 days):   Results from last 7 days   Lab Units 03/17/25  1845   BLOOD CULTURE  No Growth at 72 hrs.  No Growth at 72 hrs.       Imaging Results Review: I reviewed radiology reports from this admission including: CT abdomen pelvis.  Other Study Results Review: No additional pertinent studies reviewed.    Last 24 Hours Medication List:     Current Facility-Administered Medications:     acetaminophen (Ofirmev) injection 1,000 mg, Q6H NEETA, Last Rate: 1,000 mg (03/21/25 1025)    benzocaine (HURRICAINE) 20 % mucosal spray 2 spray, BID PRN    dextrose 25 % IV solution 25 mL, Once    dextrose 5 % and sodium chloride 0.9 % infusion, Continuous, Last Rate: 125 mL/hr (03/21/25 0049)    insulin lispro (HumALOG/ADMELOG) 100 units/mL subcutaneous injection 1-5 Units, Q6H NEETA **AND** Fingerstick Glucose (POCT), Q6H    insulin lispro (HumALOG/ADMELOG) 100 units/mL subcutaneous injection 1-5 Units, HS    ketorolac (TORADOL) injection 15 mg, Q6H NEETA    metoprolol tartrate (LOPRESSOR) partial tablet 12.5 mg, Q12H NEETA    morphine injection 2 mg, Q4H PRN    morphine injection 4 mg, Q4H PRN    ondansetron (ZOFRAN) injection 4 mg, Q6H PRN    potassium chloride 20 mEq IVPB (premix), Q2H, Last Rate: 20 mEq (03/21/25 5362)    Administrative Statements   Today, Patient Was Seen  By: THAO Renee  I have spent a total time of 37 minutes in caring for this patient on the day of the visit/encounter including Documenting in the medical record, Reviewing/placing orders in the medical record (including tests, medications, and/or procedures), Obtaining or reviewing history  , and Communicating with other healthcare professionals .    **Please Note: This note may have been constructed using a voice recognition system.**

## 2025-03-21 NOTE — ASSESSMENT & PLAN NOTE
"59 year old female with PMH significant for HTN, T2DM, HLD, COPD, prior SBO, multiple abdominal surgeries presents for the evaluation of a small bowel obstruction   Afebrile, tachycardia has resolved  No leukocytosis  K 3.3  Kidney function wnl  NG tube successfully placed 3/18, output overnight recorded at 1.2L bilious  CTAP: \"Interval development of small bowel obstruction with fecalization in distal small bowel loop contents as seen on image 2/114 and transition point in the anterior mid pelvis images 2/121 and 601/64, possibly from an adhesion\"  Clinically and hemodynamically stable. Abdomen soft, distended, nontender.     Plan:  Gastrografin study ordered this AM, will follow up results  NPO, IVF, NG tube  Monitor labs and vitals   Monitor for bowel function  Analgesia and antiemetics prn  Rest of medical management per SLIM  Discussed with Dr. Garcia   "

## 2025-03-21 NOTE — PROGRESS NOTES
"Progress Note - Surgery-General   Name: Hayley Bolaños 59 y.o. female I MRN: [id number]  Unit/Bed#: -01 I Date of Admission: 3/17/2025   Date of Service: 3/21/2025 I Hospital Day: 4    Assessment & Plan  SBO (small bowel obstruction) (HCC)  59 year old female with PMH significant for HTN, T2DM, HLD, COPD, prior SBO, multiple abdominal surgeries presents for the evaluation of a small bowel obstruction   Afebrile, tachycardia has resolved  No leukocytosis  K 3.3  Kidney function wnl  NG tube successfully placed 3/18, output overnight recorded at 1.2L bilious  CTAP: \"Interval development of small bowel obstruction with fecalization in distal small bowel loop contents as seen on image 2/114 and transition point in the anterior mid pelvis images 2/121 and 601/64, possibly from an adhesion\"  Clinically and hemodynamically stable. Abdomen soft, distended, nontender.     Plan:  Gastrografin study ordered this AM, will follow up results  NPO, IVF, NG tube  Monitor labs and vitals   Monitor for bowel function  Analgesia and antiemetics prn  Rest of medical management per SLIM  Discussed with Dr. Garcia         Subjective   Patient reports passing gas yesterday. No bowel function today. No nausea or abdominal pain.     Objective :  Temp:  [98.1 °F (36.7 °C)-98.7 °F (37.1 °C)] 98.7 °F (37.1 °C)  HR:  [60-94] 60  BP: (127-137)/(59-71) 127/71  Resp:  [16-18] 17  SpO2:  [96 %-97 %] 97 %  O2 Device: None (Room air)    I/O         03/19 0701  03/20 0700 03/20 0701  03/21 0700 03/21 0701  03/22 0700    P.O. 0 0     I.V. (mL/kg) 2615 (40.6)      NG/ 50     IV Piggyback 100 100     Total Intake(mL/kg) 2815 (43.7) 150 (2.3)     Urine (mL/kg/hr) 2000 (1.3) 850 (0.5) 250 (1.2)    Emesis/NG output 1850 500     Stool 0      Total Output 3850 1350 250    Net -1035 -1200 -250           Unmeasured Stool Occurrence 0 x            Lines/Drains/Airways       Active Status       Name Placement date Placement time Site Days    " NG/OG/Enteral Tube Nasogastric 18 Fr Right nare 03/18/25  1410  Right nare  2                  Physical Exam  Constitutional:       Appearance: Normal appearance.   HENT:      Mouth/Throat:      Mouth: Mucous membranes are moist.      Pharynx: Oropharynx is clear.   Cardiovascular:      Rate and Rhythm: Normal rate.   Pulmonary:      Effort: Pulmonary effort is normal.   Abdominal:      General: There is distension.      Palpations: Abdomen is soft.      Tenderness: There is no abdominal tenderness.   Musculoskeletal:         General: Normal range of motion.   Skin:     General: Skin is warm and dry.   Neurological:      General: No focal deficit present.      Mental Status: She is alert.         Lab Results: I have reviewed the following results:  Recent Labs     03/18/25  1615 03/19/25  0522 03/19/25  0613 03/20/25  0525 03/21/25  0436   WBC  --    < >  --    < > 6.48   HGB  --    < >  --    < > 11.9   HCT  --    < >  --    < > 37.8   PLT  --    < >  --    < > 225   SODIUM 142  --  142   < > 146   K 4.1  --  4.2   < > 3.3*     --  109*   < > 118*   CO2 26  --  27   < > 21   BUN 20  --  19   < > 10   CREATININE 0.70  --  0.66   < > 0.58*   GLUC 75  --  68   < > 138   MG 2.1  --  2.1  --   --    PHOS 4.3*  --   --   --   --     < > = values in this interval not displayed.         VTE Pharmacologic Prophylaxis: Sequential compression device (Venodyne)   VTE Mechanical Prophylaxis: sequential compression device    Mariya Charles PA-C

## 2025-03-21 NOTE — ASSESSMENT & PLAN NOTE
Lab Results   Component Value Date    HGBA1C 5.9 02/27/2025       Recent Labs     03/20/25  1739 03/20/25  2353 03/21/25  0548 03/21/25  0719   POCGLU 140 141* 102 132       Blood Sugar Average: Last 72 hrs:  (P) [id number]  Hold home regimen of dapagliflozin, glipizide, insulin glargine and metformin.  Target blood sugar inpatient 140-180  Blood sugars optimized  Currently n.p.o.  Continue IVF's D5 and a half at 125 an hour  Continue with the sliding scale insulin with accu checks every 6 hours  Hypoglycemia protocol  BMP a.m.

## 2025-03-21 NOTE — PLAN OF CARE
Problem: Potential for Falls  Goal: Patient will remain free of falls  Description: INTERVENTIONS:  - Educate patient/family on patient safety including physical limitations  - Instruct patient to call for assistance with activity   - Consult OT/PT to assist with strengthening/mobility   - Keep Call bell within reach  - Keep bed low and locked with side rails adjusted as appropriate  - Keep care items and personal belongings within reach  - Initiate and maintain comfort rounds  - Make Fall Risk Sign visible to staff  - Offer Toileting every 2 Hours, in advance of need  - Initiate/Maintain bed alarm  - Obtain necessary fall risk management equipment: yellow socks  - Apply yellow socks and bracelet for high fall risk patients  - Consider moving patient to room near nurses station  Outcome: Progressing     Problem: INFECTION - ADULT  Goal: Absence or prevention of progression during hospitalization  Description: INTERVENTIONS:  - Assess and monitor for signs and symptoms of infection  - Monitor lab/diagnostic results  - Monitor all insertion sites, i.e. indwelling lines, tubes, and drains  - Monitor endotracheal if appropriate and nasal secretions for changes in amount and color  - Sharon appropriate cooling/warming therapies per order  - Administer medications as ordered  - Instruct and encourage patient and family to use good hand hygiene technique  - Identify and instruct in appropriate isolation precautions for identified infection/condition  Outcome: Progressing     Problem: GASTROINTESTINAL - ADULT  Goal: Minimal or absence of nausea and/or vomiting  Description: INTERVENTIONS:- Administer IV fluids if ordered to ensure adequate hydration- Maintain NPO status until nausea and vomiting are resolved- Nasogastric tube if ordered- Administer ordered antiemetic medications as needed- Provide nonpharmacologic comfort measures as appropriate- Advance diet as tolerated, if ordered- Consider nutrition services referral  to assist patient with adequate nutrition and appropriate food choices  Outcome: Progressing  Goal: Maintains or returns to baseline bowel function  Description: INTERVENTIONS:- Assess bowel function- Encourage oral fluids to ensure adequate hydration- Administer IV fluids if ordered to ensure adequate hydration- Administer ordered medications as needed- Encourage mobilization and activity- Consider nutritional services referral to assist patient with adequate nutrition and appropriate food choices  Outcome: Progressing  Goal: Maintains adequate nutritional intake  Description: INTERVENTIONS:- Monitor percentage of each meal consumed- Identify factors contributing to decreased intake, treat as appropriate- Assist with meals as needed- Monitor I&O, weight, and lab values if indicated- Obtain nutrition services referral as needed  Outcome: Progressing  Goal: Oral mucous membranes remain intact  Description: INTERVENTIONS- Assess oral mucosa and hygiene practices- Implement preventative oral hygiene regimen- Implement oral medicated treatments as ordered- Initiate Nutrition services referral as needed  Outcome: Progressing     Problem: METABOLIC, FLUID AND ELECTROLYTES - ADULT  Goal: Glucose maintained within target range  Description: INTERVENTIONS:- Monitor Blood Glucose as ordered- Assess for signs and symptoms of hyperglycemia and hypoglycemia- Administer ordered medications to maintain glucose within target range- Assess nutritional intake and initiate nutrition service referral as needed  Outcome: Progressing

## 2025-03-21 NOTE — ASSESSMENT & PLAN NOTE
Blood pressure was soft, now controlled  Currently n.p.o. with IV fluids running  We will start metoprolol at half of prehospital dosing and monitor  Monitor BP per protocol, adjust medications as indicated

## 2025-03-22 LAB
ANION GAP SERPL CALCULATED.3IONS-SCNC: 5 MMOL/L (ref 4–13)
BUN SERPL-MCNC: 6 MG/DL (ref 5–25)
CALCIUM SERPL-MCNC: 7.8 MG/DL (ref 8.4–10.2)
CHLORIDE SERPL-SCNC: 117 MMOL/L (ref 96–108)
CO2 SERPL-SCNC: 19 MMOL/L (ref 21–32)
CREAT SERPL-MCNC: 0.48 MG/DL (ref 0.6–1.3)
ERYTHROCYTE [DISTWIDTH] IN BLOOD BY AUTOMATED COUNT: 13.9 % (ref 11.6–15.1)
GFR SERPL CREATININE-BSD FRML MDRD: 106 ML/MIN/1.73SQ M
GLUCOSE SERPL-MCNC: 115 MG/DL (ref 65–140)
GLUCOSE SERPL-MCNC: 123 MG/DL (ref 65–140)
GLUCOSE SERPL-MCNC: 125 MG/DL (ref 65–140)
GLUCOSE SERPL-MCNC: 131 MG/DL (ref 65–140)
GLUCOSE SERPL-MCNC: 144 MG/DL (ref 65–140)
HCT VFR BLD AUTO: 35.4 % (ref 34.8–46.1)
HGB BLD-MCNC: 11.3 G/DL (ref 11.5–15.4)
MCH RBC QN AUTO: 27.2 PG (ref 26.8–34.3)
MCHC RBC AUTO-ENTMCNC: 31.9 G/DL (ref 31.4–37.4)
MCV RBC AUTO: 85 FL (ref 82–98)
PLATELET # BLD AUTO: 207 THOUSANDS/UL (ref 149–390)
PMV BLD AUTO: 9.7 FL (ref 8.9–12.7)
POTASSIUM SERPL-SCNC: 3.4 MMOL/L (ref 3.5–5.3)
RBC # BLD AUTO: 4.15 MILLION/UL (ref 3.81–5.12)
SODIUM SERPL-SCNC: 141 MMOL/L (ref 135–147)
WBC # BLD AUTO: 7.54 THOUSAND/UL (ref 4.31–10.16)

## 2025-03-22 PROCEDURE — 99232 SBSQ HOSP IP/OBS MODERATE 35: CPT

## 2025-03-22 PROCEDURE — 82948 REAGENT STRIP/BLOOD GLUCOSE: CPT

## 2025-03-22 PROCEDURE — 80048 BASIC METABOLIC PNL TOTAL CA: CPT

## 2025-03-22 PROCEDURE — 85027 COMPLETE CBC AUTOMATED: CPT

## 2025-03-22 RX ORDER — ATORVASTATIN CALCIUM 40 MG/1
40 TABLET, FILM COATED ORAL DAILY
Status: DISCONTINUED | OUTPATIENT
Start: 2025-03-22 | End: 2025-03-24 | Stop reason: HOSPADM

## 2025-03-22 RX ORDER — ESCITALOPRAM OXALATE 10 MG/1
10 TABLET ORAL
Status: DISCONTINUED | OUTPATIENT
Start: 2025-03-22 | End: 2025-03-24 | Stop reason: HOSPADM

## 2025-03-22 RX ORDER — OXYBUTYNIN CHLORIDE 5 MG/1
15 TABLET, EXTENDED RELEASE ORAL DAILY
Status: DISCONTINUED | OUTPATIENT
Start: 2025-03-22 | End: 2025-03-24 | Stop reason: HOSPADM

## 2025-03-22 RX ORDER — FENOFIBRATE 145 MG/1
145 TABLET, COATED ORAL DAILY
Status: DISCONTINUED | OUTPATIENT
Start: 2025-03-22 | End: 2025-03-24 | Stop reason: HOSPADM

## 2025-03-22 RX ORDER — MONTELUKAST SODIUM 10 MG/1
10 TABLET ORAL EVERY 24 HOURS
Status: DISCONTINUED | OUTPATIENT
Start: 2025-03-22 | End: 2025-03-24 | Stop reason: HOSPADM

## 2025-03-22 RX ORDER — PANTOPRAZOLE SODIUM 40 MG/1
40 TABLET, DELAYED RELEASE ORAL DAILY
Status: DISCONTINUED | OUTPATIENT
Start: 2025-03-22 | End: 2025-03-24 | Stop reason: HOSPADM

## 2025-03-22 RX ORDER — ASPIRIN 81 MG/1
81 TABLET ORAL DAILY
Status: DISCONTINUED | OUTPATIENT
Start: 2025-03-22 | End: 2025-03-24 | Stop reason: HOSPADM

## 2025-03-22 RX ADMIN — ACETAMINOPHEN 1000 MG: 10 INJECTION INTRAVENOUS at 02:02

## 2025-03-22 RX ADMIN — ASPIRIN 81 MG: 81 TABLET, COATED ORAL at 14:11

## 2025-03-22 RX ADMIN — CYANOCOBALAMIN TAB 500 MCG 1000 MCG: 500 TAB at 14:11

## 2025-03-22 RX ADMIN — MONTELUKAST 10 MG: 10 TABLET, FILM COATED ORAL at 14:12

## 2025-03-22 RX ADMIN — Medication 12.5 MG: at 20:58

## 2025-03-22 RX ADMIN — KETOROLAC TROMETHAMINE 15 MG: 30 INJECTION, SOLUTION INTRAMUSCULAR at 05:38

## 2025-03-22 RX ADMIN — ACETAMINOPHEN 1000 MG: 10 INJECTION INTRAVENOUS at 08:45

## 2025-03-22 RX ADMIN — FENOFIBRATE 145 MG: 145 TABLET, FILM COATED ORAL at 14:11

## 2025-03-22 RX ADMIN — KETOROLAC TROMETHAMINE 15 MG: 30 INJECTION, SOLUTION INTRAMUSCULAR at 23:42

## 2025-03-22 RX ADMIN — ESCITALOPRAM OXALATE 10 MG: 10 TABLET ORAL at 17:14

## 2025-03-22 RX ADMIN — ATORVASTATIN CALCIUM 40 MG: 40 TABLET, FILM COATED ORAL at 14:11

## 2025-03-22 RX ADMIN — Medication 12.5 MG: at 08:45

## 2025-03-22 RX ADMIN — DEXTROSE AND SODIUM CHLORIDE 125 ML/HR: 5; .9 INJECTION, SOLUTION INTRAVENOUS at 05:41

## 2025-03-22 RX ADMIN — PANTOPRAZOLE SODIUM 40 MG: 40 TABLET, DELAYED RELEASE ORAL at 14:11

## 2025-03-22 RX ADMIN — KETOROLAC TROMETHAMINE 15 MG: 30 INJECTION, SOLUTION INTRAMUSCULAR at 17:14

## 2025-03-22 RX ADMIN — OXYBUTYNIN 15 MG: 5 TABLET, FILM COATED, EXTENDED RELEASE ORAL at 14:11

## 2025-03-22 RX ADMIN — KETOROLAC TROMETHAMINE 15 MG: 30 INJECTION, SOLUTION INTRAMUSCULAR at 11:56

## 2025-03-22 RX ADMIN — ACETAMINOPHEN 1000 MG: 10 INJECTION INTRAVENOUS at 19:31

## 2025-03-22 RX ADMIN — ACETAMINOPHEN 1000 MG: 10 INJECTION INTRAVENOUS at 14:11

## 2025-03-22 NOTE — ASSESSMENT & PLAN NOTE
SBO    Symptoms resolved. Moving her bowels frequently since gastrografin study. Tolerating clears since yesterday. Abdomen soft, non-tender. Will advance to diabetic fulls. Dr. Montero to assess later today.

## 2025-03-22 NOTE — PROGRESS NOTES
Progress Note - Hospitalist   Name: Hayley Bolaños 59 y.o. female I MRN: [id number]  Unit/Bed#: -01 I Date of Admission: 3/17/2025   Date of Service: 3/22/2025 I Hospital Day: 5    Assessment & Plan  SBO (small bowel obstruction) (HCC)  Presented with abdominal pain, nausea/vomiting  CT abd/pelvis: Interval development of small bowel obstruction with fecalization in distal small bowel loop contents as seen on image 2/114 and transition point in the anterior mid pelvis images 2/121 and 601/64, possibly from an adhesion.   Mild elevated lactic acid - resolved with IV fluid  General surgery following  No acute surgical intervention   s/p Gastrografin study yesterday  NG tube is now out  Diet increased by general surgery monitoring tolerance to same  Patient reports now having multiple loose bowel movements  Pain regimen effective  Monitor and replete electrolytes as indicated  BMP a.m.    Type 2 diabetes mellitus (HCC)  Lab Results   Component Value Date    HGBA1C 5.9 02/27/2025       Recent Labs     03/21/25  1602 03/21/25  2032 03/22/25  0713 03/22/25  1100   POCGLU 148* 118 123 125       Blood Sugar Average: Last 72 hrs:  (P) 126.7370662842871738  Hold home regimen of dapagliflozin, glipizide, insulin glargine and metformin  Target blood sugar inpatient 140-180  Blood sugars optimized  Patient started on diet which she is tolerating  Sugars are optimized discontinuing IV fluids  Change SSI to ACHS  Hypoglycemia protocol  BMP a.m.  Essential hypertension  Blood pressure a bit soft, asymptomatic  Continue metoprolol at half of prehospital dosing   Monitor BP per protocol, adjust medications as indicated  Hyperlipidemia  Resuming atorvastatin and Tricor as she is no longer n.p.o.  Leukocytosis  POA  WBC of 15.28- likely reactive   Now resolved  Hypokalemia  Continue to monitor and replete accordingly  BMP a.m.    VTE Pharmacologic Prophylaxis: VTE Score: 2 Low Risk (Score 0-2) - Encourage Ambulation.    Mobility:    Basic Mobility Inpatient Raw Score: 19  JH-HLM Goal: 6: Walk 10 steps or more  JH-HLM Achieved: 7: Walk 25 feet or more  JH-HLM Goal achieved. Continue to encourage appropriate mobility.    Patient Centered Rounds: I performed bedside rounds with nursing staff today.   Discussions with Specialists or Other Care Team Provider: General Surgery, nursing, case management    Education and Discussions with Family / Patient: Patient declined call to .     Current Length of Stay: 5 day(s)  Current Patient Status: Inpatient   Certification Statement: The patient will continue to require additional inpatient hospital stay due to management of small bowel obstruction surgery following, diet has been increased monitoring tolerance    Discharge Plan: Tentative discharge home tomorrow.  Monitoring tolerance to increase diet overnight.  Repleted electrolytes today.  Repeat labs in the AM.    Code Status: Level 1 - Full Code    Subjective   Patient is pleasant denies abdominal pain today.  Now moving bowels.  Hoping to go home soon    Objective :  Temp:  [98 °F (36.7 °C)-98.3 °F (36.8 °C)] 98 °F (36.7 °C)  HR:  [59-72] 61  BP: (115-130)/(59-74) 115/61  Resp:  [16-18] 18  SpO2:  [97 %-98 %] 98 %  O2 Device: None (Room air)    Body mass index is 25.15 kg/m².     Input and Output Summary (last 24 hours):     Intake/Output Summary (Last 24 hours) at 3/22/2025 1305  Last data filed at 3/22/2025 1250  Gross per 24 hour   Intake 1560 ml   Output 500 ml   Net 1060 ml       Physical Exam  Vitals reviewed.   Constitutional:       General: She is not in acute distress.     Appearance: She is well-developed.   HENT:      Head: Normocephalic and atraumatic.   Cardiovascular:      Rate and Rhythm: Normal rate and regular rhythm.      Pulses: Normal pulses.      Heart sounds: Normal heart sounds. No murmur heard.  Pulmonary:      Effort: Pulmonary effort is normal. No respiratory distress.      Breath sounds: Normal breath sounds.  No wheezing or rales.   Abdominal:      General: There is no distension.      Palpations: Abdomen is soft.      Tenderness: There is no abdominal tenderness. There is no guarding.   Musculoskeletal:         General: No swelling. Normal range of motion.   Skin:     General: Skin is warm and dry.      Capillary Refill: Capillary refill takes less than 2 seconds.   Neurological:      General: No focal deficit present.      Mental Status: She is alert and oriented to person, place, and time. Mental status is at baseline.   Psychiatric:         Mood and Affect: Mood normal.         Behavior: Behavior normal.         Thought Content: Thought content normal.         Judgment: Judgment normal.           Lines/Drains:                Lab Results: I have reviewed the following results:   Results from last 7 days   Lab Units 03/22/25  0512 03/19/25  0522 03/18/25  0450   WBC Thousand/uL 7.54   < > 8.38   HEMOGLOBIN g/dL 11.3*   < > 13.5   HEMATOCRIT % 35.4   < > 41.5   PLATELETS Thousands/uL 207   < > 265   SEGS PCT %  --   --  68   LYMPHO PCT %  --   --  17   MONO PCT %  --   --  11   EOS PCT %  --   --  3    < > = values in this interval not displayed.     Results from last 7 days   Lab Units 03/22/25  0512 03/18/25  1615 03/18/25  0450   SODIUM mmol/L 141   < > 142   POTASSIUM mmol/L 3.4*   < > 5.2   CHLORIDE mmol/L 117*   < > 108   CO2 mmol/L 19*   < > 29   BUN mg/dL 6   < > 21   CREATININE mg/dL 0.48*   < > 0.97   ANION GAP mmol/L 5   < > 5   CALCIUM mg/dL 7.8*   < > 9.9   ALBUMIN g/dL  --   --  3.8   TOTAL BILIRUBIN mg/dL  --   --  0.56   ALK PHOS U/L  --   --  68   ALT U/L  --   --  11   AST U/L  --   --  10*   GLUCOSE RANDOM mg/dL 131   < > 145*    < > = values in this interval not displayed.     Results from last 7 days   Lab Units 03/18/25  0450   INR  0.91     Results from last 7 days   Lab Units 03/22/25  1100 03/22/25  0713 03/21/25  2032 03/21/25  1602 03/21/25  1117 03/21/25  0719 03/21/25  0548 03/20/25  2355  03/20/25  1739 03/20/25  1137 03/20/25  0623 03/20/25  0028   POC GLUCOSE mg/dl 125 123 118 148* 134 132 102 141* 140 145* 157* 135         Results from last 7 days   Lab Units 03/17/25  2107 03/17/25  1845   LACTIC ACID mmol/L 2.0 3.8*   PROCALCITONIN ng/ml  --  0.10       Recent Cultures (last 7 days):   Results from last 7 days   Lab Units 03/17/25  1845   BLOOD CULTURE  No Growth After 4 Days.  No Growth After 4 Days.       Imaging Results Review: I reviewed radiology reports from this admission including: CT abdomen pelvis, KUB.  Other Study Results Review: No additional pertinent studies reviewed.    Last 24 Hours Medication List:     Current Facility-Administered Medications:     acetaminophen (Ofirmev) injection 1,000 mg, Q6H NEETA, Last Rate: 1,000 mg (03/22/25 0845)    aspirin (ECOTRIN LOW STRENGTH) EC tablet 81 mg, Daily    atorvastatin (LIPITOR) tablet 40 mg, Daily    benzocaine (HURRICAINE) 20 % mucosal spray 2 spray, BID PRN    cyanocobalamin (VITAMIN B-12) tablet 1,000 mcg, Daily    dextrose 25 % IV solution 25 mL, Once    escitalopram (LEXAPRO) tablet 10 mg, Daily With Dinner    fenofibrate (TRICOR) tablet 145 mg, Daily    insulin lispro (HumALOG/ADMELOG) 100 units/mL subcutaneous injection 1-5 Units, TID AC **AND** Fingerstick Glucose (POCT), TID AC    insulin lispro (HumALOG/ADMELOG) 100 units/mL subcutaneous injection 1-5 Units, HS    ketorolac (TORADOL) injection 15 mg, Q6H NEETA    metoprolol tartrate (LOPRESSOR) partial tablet 12.5 mg, Q12H NEETA    montelukast (SINGULAIR) tablet 10 mg, Q24H    morphine injection 2 mg, Q4H PRN    morphine injection 4 mg, Q4H PRN    ondansetron (ZOFRAN) injection 4 mg, Q6H PRN    oxybutynin (DITROPAN-XL) 24 hr tablet 15 mg, Daily    pantoprazole (PROTONIX) EC tablet 40 mg, Daily    Administrative Statements   Today, Patient Was Seen By: THAO Renee  I have spent a total time of 30 minutes in caring for this patient on the day of the visit/encounter  including Documenting in the medical record, Reviewing/placing orders in the medical record (including tests, medications, and/or procedures), Obtaining or reviewing history  , and Communicating with other healthcare professionals .    **Please Note: This note may have been constructed using a voice recognition system.**

## 2025-03-22 NOTE — ASSESSMENT & PLAN NOTE
Presented with abdominal pain, nausea/vomiting  CT abd/pelvis: Interval development of small bowel obstruction with fecalization in distal small bowel loop contents as seen on image 2/114 and transition point in the anterior mid pelvis images 2/121 and 601/64, possibly from an adhesion.   Mild elevated lactic acid - resolved with IV fluid  General surgery following  No acute surgical intervention   s/p Gastrografin study yesterday  NG tube is now out  Diet increased by general surgery monitoring tolerance to same  Patient reports now having multiple loose bowel movements  Pain regimen effective  Monitor and replete electrolytes as indicated  BMP a.m.

## 2025-03-22 NOTE — PLAN OF CARE
Problem: INFECTION - ADULT  Goal: Absence or prevention of progression during hospitalization  Description: INTERVENTIONS:  - Assess and monitor for signs and symptoms of infection  - Monitor lab/diagnostic results  - Monitor all insertion sites, i.e. indwelling lines, tubes, and drains  - Monitor endotracheal if appropriate and nasal secretions for changes in amount and color  - [place] appropriate cooling/warming therapies per order  - Administer medications as ordered  - Instruct and encourage patient and family to use good hand hygiene technique  - Identify and instruct in appropriate isolation precautions for identified infection/condition  Outcome: Progressing  Goal: Absence of fever/infection during neutropenic period  Description: INTERVENTIONS:  - Monitor WBC    Outcome: Progressing

## 2025-03-22 NOTE — PLAN OF CARE
Problem: Potential for Falls  Goal: Patient will remain free of falls  Description: INTERVENTIONS:  - Educate patient/family on patient safety including physical limitations  - Instruct patient to call for assistance with activity   - Consult OT/PT to assist with strengthening/mobility   - Keep Call bell within reach  - Keep bed low and locked with side rails adjusted as appropriate  - Keep care items and personal belongings within reach  - Initiate and maintain comfort rounds  - Make Fall Risk Sign visible to staff  - Offer Toileting every 2 Hours, in advance of need  - Initiate/Maintain bed alarm  - Obtain necessary fall risk management equipment: yellow socks  - Apply yellow socks and bracelet for high fall risk patients  - Consider moving patient to room near nurses station  Outcome: Progressing     Problem: PAIN - ADULT  Goal: Verbalizes/displays adequate comfort level or baseline comfort level  Description: Interventions:  - Encourage patient to monitor pain and request assistance  - Assess pain using appropriate pain scale  - Administer analgesics based on type and severity of pain and evaluate response  - Implement non-pharmacological measures as appropriate and evaluate response  - Consider cultural and social influences on pain and pain management  - Notify physician/advanced practitioner if interventions unsuccessful or patient reports new pain  Outcome: Progressing     Problem: SAFETY ADULT  Goal: Maintain or return to baseline ADL function  Description: INTERVENTIONS:  -  Assess patient's ability to carry out ADLs; assess patient's baseline for ADL function and identify physical deficits which impact ability to perform ADLs (bathing, care of mouth/teeth, toileting, grooming, dressing, etc.)  - Assess/evaluate cause of self-care deficits   - Assess range of motion  - Assess patient's mobility; develop plan if impaired  - Assess patient's need for assistive devices and provide as appropriate  -  Encourage maximum independence but intervene and supervise when necessary  - Involve family in performance of ADLs  - Assess for home care needs following discharge   - Consider OT consult to assist with ADL evaluation and planning for discharge  - Provide patient education as appropriate  Outcome: Progressing     Problem: Knowledge Deficit  Goal: Patient/family/caregiver demonstrates understanding of disease process, treatment plan, medications, and discharge instructions  Description: Complete learning assessment and assess knowledge base.  Interventions:  - Provide teaching at level of understanding  - Provide teaching via preferred learning methods  Outcome: Progressing     Problem: GASTROINTESTINAL - ADULT  Goal: Minimal or absence of nausea and/or vomiting  Description: INTERVENTIONS:- Administer IV fluids if ordered to ensure adequate hydration- Maintain NPO status until nausea and vomiting are resolved- Nasogastric tube if ordered- Administer ordered antiemetic medications as needed- Provide nonpharmacologic comfort measures as appropriate- Advance diet as tolerated, if ordered- Consider nutrition services referral to assist patient with adequate nutrition and appropriate food choices  Outcome: Progressing     Problem: METABOLIC, FLUID AND ELECTROLYTES - ADULT  Goal: Glucose maintained within target range  Description: INTERVENTIONS:- Monitor Blood Glucose as ordered- Assess for signs and symptoms of hyperglycemia and hypoglycemia- Administer ordered medications to maintain glucose within target range- Assess nutritional intake and initiate nutrition service referral as needed  Outcome: Progressing     Problem: Nutrition/Hydration-ADULT  Goal: Nutrient/Hydration intake appropriate for improving, restoring or maintaining nutritional needs  Description: Monitor and assess patient's nutrition/hydration status for malnutrition. Collaborate with interdisciplinary team and initiate plan and interventions as  ordered.  Monitor patient's weight and dietary intake as ordered or per policy. Utilize nutrition screening tool and intervene as necessary. Determine patient's food preferences and provide high-protein, high-caloric foods as appropriate. INTERVENTIONS:- Monitor oral intake, urinary output, labs, and treatment plans- Assess nutrition and hydration status and recommend course of action- Evaluate amount of meals eaten- Assist patient with eating if necessary - Allow adequate time for meals- Recommend/ encourage appropriate diets, oral nutritional supplements, and vitamin/mineral supplements- Order, calculate, and assess calorie counts as needed- Recommend, monitor, and adjust tube feedings and TPN/PPN based on assessed needs- Assess need for intravenous fluids- Provide specific nutrition/hydration education as appropriate- Include patient/family/caregiver in decisions related to nutrition  Outcome: Progressing     Problem: DISCHARGE PLANNING  Goal: Discharge to home or other facility with appropriate resources  Description: INTERVENTIONS:  - Identify barriers to discharge w/patient and caregiver  - Arrange for needed discharge resources and transportation as appropriate  - Identify discharge learning needs (meds, wound care, etc.)  - Arrange for interpretive services to assist at discharge as needed  - Refer to Case Management Department for coordinating discharge planning if the patient needs post-hospital services based on physician/advanced practitioner order or complex needs related to functional status, cognitive ability, or social support system  Outcome: Progressing

## 2025-03-22 NOTE — PROGRESS NOTES
Progress Note - Surgery-General   Name: Hayley Bolaños 59 y.o. female I MRN: [id number]  Unit/Bed#: -[facility] I Date of Admission: 3/17/2025   Date of Service: 3/22/2025 I Hospital Day: 5    Assessment & Plan  SBO (small bowel obstruction) ([facility])  SBO    Symptoms resolved. Moving her bowels frequently since gastrografin study. Tolerating clears since yesterday. Abdomen soft, non-tender. Will advance to diabetic fulls. Dr. Montero to assess later today.      Subjective   Moving her bowels. No N/V. No recurrent pain. Tolerating clears.    Objective :  Temp:  [98 °F (36.7 °C)-98.3 °F (36.8 °C)] 98 °F (36.7 °C)  HR:  [59-72] 61  BP: (115-130)/(59-74) 115/61  Resp:  [16-18] 18  SpO2:  [97 %-98 %] 98 %  O2 Device: None (Room air)    I/O         03/20 0701  03/21 0700 03/21 0701  03/22 0700 03/22 0701  03/23 0700    P.O. 0 720 620    I.V. (mL/kg)       NG/GT 50      IV Piggyback 100      Total Intake(mL/kg) 150 (2.3) 720 (11.2) 620 (9.6)    Urine (mL/kg/hr) 850 (0.5) 750 (0.5)     Emesis/NG output 500      Stool  0     Total Output 1350 750     Net -1200 -30 +620           Unmeasured Stool Occurrence  4 x             Physical Exam  Vitals and nursing note reviewed.   Constitutional:       General: She is not in acute distress.     Appearance: She is well-developed. She is not diaphoretic.   HENT:      Head: Normocephalic and atraumatic.   Eyes:      Conjunctiva/sclera: Conjunctivae normal.      Pupils: Pupils are equal, round, and reactive to light.   Pulmonary:      Effort: No respiratory distress.   Abdominal:      Comments: Rounded and soft, non-tender.   Musculoskeletal:         General: Normal range of motion.      Cervical back: Normal range of motion.   Skin:     General: Skin is warm and dry.      Capillary Refill: Capillary refill takes less than 2 seconds.   Neurological:      Mental Status: She is alert and oriented to person, place, and time.   Psychiatric:         Behavior: Behavior normal.         Lab Results:  I have reviewed the following results:  Recent Labs     03/22/25  0512   WBC 7.54   HGB 11.3*   HCT 35.4      SODIUM 141   K 3.4*   *   CO2 19*   BUN 6   CREATININE 0.48*   GLUC 131       Imaging Results Review: No pertinent imaging studies reviewed.  Other Study Results Review: No additional pertinent studies reviewed.    VTE Pharmacologic Prophylaxis: Reason for no pharmacologic prophylaxis per primary  VTE Mechanical Prophylaxis: sequential compression device

## 2025-03-22 NOTE — ASSESSMENT & PLAN NOTE
Lab Results   Component Value Date    HGBA1C 5.9 02/27/2025       Recent Labs     03/21/25  1602 03/21/25 2032 03/22/25  0713 03/22/25  1100   POCGLU 148* 118 123 125       Blood Sugar Average: Last 72 hrs:  (P) 126.8134635980712028  Hold home regimen of dapagliflozin, glipizide, insulin glargine and metformin  Target blood sugar inpatient 140-180  Blood sugars optimized  Patient started on diet which she is tolerating  Sugars are optimized discontinuing IV fluids  Change SSI to ACHS  Hypoglycemia protocol  BMP a.m.

## 2025-03-22 NOTE — CASE MANAGEMENT
Case Management Discharge Planning Note    Patient name Hayley Bolaños  Location /-01 MRN [id number]  : 1965 Date 3/21/2025       Current Admission Date: 3/17/2025  Current Admission Diagnosis:SBO (small bowel obstruction) (HCC)   Patient Active Problem List    Diagnosis Date Noted Date Diagnosed    Hypokalemia 2025     SBO (small bowel obstruction) (HCC) 2025     Osteopenia, unspecified location 2024     Thoracic radiculopathy 2024     Left shoulder tendonitis 2024     Syphilis 2024     Vaginal atrophy 2022     Leukocytosis 2020     Osteoarthritis 2020     Hyperlipidemia associated with type 2 diabetes mellitus  (HCC) 2020     Pelvic pain 2019     Type 2 diabetes mellitus (HCC) 06/15/2016     Hyperlipidemia 06/15/2016     Essential hypertension 2014       LOS (days): 4  Geometric Mean LOS (GMLOS) (days): 2.3  Days to GMLOS:-1.7     OBJECTIVE:  Risk of Unplanned Readmission Score: 7.63         Current admission status: Inpatient   Preferred Pharmacy:   [facility] [address] [facility] [address]  Phone: [phone] Fax: [phone]    Primary Care Provider: Jose Chase MD    Primary Insurance: [facility] DUAL COMPLETE [employer] REP  Secondary Insurance: [facility] AND [employer] [facility]    DISCHARGE DETAILS:    Discharge planning discussed with:: patient & Jailene(daughter) at 14:36pm  Freedom of Choice: Yes  Comments - Freedom of Choice: pt denies any d/c needs  CM contacted family/caregiver?: Yes             Contacts  Patient Contacts: Jailene Mancini  Relationship to Patient:: Family  Contact Method: Phone  Phone Number: [phone]  Reason/Outcome: Discharge Planning    Requested Home Health Care         Is the patient interested in HHC at discharge?: No    DME Referral Provided  Referral made for DME?: No    Other Referral/Resources/Interventions  Provided:  Referral Comments: NG tube  d/c - pt staretd on clears- IV d25% x1 IV ofirmev, IV d5nss, IV toradol    Would you like to participate in our [facility] service program?  : No - Declined    Treatment Team Recommendation: Home (home with family & ourtpt  follow up- family vs pt may need transport)                                   IMM Given (Date):: 03/21/25  IMM Given to:: Patient  Family notified:: daughter was called

## 2025-03-22 NOTE — ASSESSMENT & PLAN NOTE
Blood pressure a bit soft, asymptomatic  Continue metoprolol at half of prehospital dosing   Monitor BP per protocol, adjust medications as indicated

## 2025-03-23 LAB
ANION GAP SERPL CALCULATED.3IONS-SCNC: 5 MMOL/L (ref 4–13)
BACTERIA BLD CULT: NORMAL
BACTERIA BLD CULT: NORMAL
BUN SERPL-MCNC: 4 MG/DL (ref 5–25)
CALCIUM SERPL-MCNC: 7.6 MG/DL (ref 8.4–10.2)
CHLORIDE SERPL-SCNC: 116 MMOL/L (ref 96–108)
CO2 SERPL-SCNC: 19 MMOL/L (ref 21–32)
CREAT SERPL-MCNC: 0.5 MG/DL (ref 0.6–1.3)
ERYTHROCYTE [DISTWIDTH] IN BLOOD BY AUTOMATED COUNT: 13.7 % (ref 11.6–15.1)
GFR SERPL CREATININE-BSD FRML MDRD: 105 ML/MIN/1.73SQ M
GLUCOSE SERPL-MCNC: 106 MG/DL (ref 65–140)
GLUCOSE SERPL-MCNC: 112 MG/DL (ref 65–140)
GLUCOSE SERPL-MCNC: 140 MG/DL (ref 65–140)
GLUCOSE SERPL-MCNC: 150 MG/DL (ref 65–140)
GLUCOSE SERPL-MCNC: 152 MG/DL (ref 65–140)
HCT VFR BLD AUTO: 33.4 % (ref 34.8–46.1)
HGB BLD-MCNC: 10.9 G/DL (ref 11.5–15.4)
MCH RBC QN AUTO: 27.2 PG (ref 26.8–34.3)
MCHC RBC AUTO-ENTMCNC: 32.6 G/DL (ref 31.4–37.4)
MCV RBC AUTO: 83 FL (ref 82–98)
PLATELET # BLD AUTO: 212 THOUSANDS/UL (ref 149–390)
PMV BLD AUTO: 9.9 FL (ref 8.9–12.7)
POTASSIUM SERPL-SCNC: 3.3 MMOL/L (ref 3.5–5.3)
RBC # BLD AUTO: 4.01 MILLION/UL (ref 3.81–5.12)
SODIUM SERPL-SCNC: 140 MMOL/L (ref 135–147)
WBC # BLD AUTO: 7.8 THOUSAND/UL (ref 4.31–10.16)

## 2025-03-23 PROCEDURE — 80048 BASIC METABOLIC PNL TOTAL CA: CPT

## 2025-03-23 PROCEDURE — 82948 REAGENT STRIP/BLOOD GLUCOSE: CPT

## 2025-03-23 PROCEDURE — 85027 COMPLETE CBC AUTOMATED: CPT

## 2025-03-23 PROCEDURE — 99232 SBSQ HOSP IP/OBS MODERATE 35: CPT | Performed by: INTERNAL MEDICINE

## 2025-03-23 RX ORDER — ACETAMINOPHEN 325 MG/1
975 TABLET ORAL EVERY 6 HOURS SCHEDULED
Status: DISCONTINUED | OUTPATIENT
Start: 2025-03-23 | End: 2025-03-24 | Stop reason: HOSPADM

## 2025-03-23 RX ADMIN — Medication 12.5 MG: at 21:04

## 2025-03-23 RX ADMIN — OXYBUTYNIN 15 MG: 5 TABLET, FILM COATED, EXTENDED RELEASE ORAL at 08:04

## 2025-03-23 RX ADMIN — CYANOCOBALAMIN TAB 500 MCG 1000 MCG: 500 TAB at 08:05

## 2025-03-23 RX ADMIN — ACETAMINOPHEN 1000 MG: 10 INJECTION INTRAVENOUS at 02:05

## 2025-03-23 RX ADMIN — ESCITALOPRAM OXALATE 10 MG: 10 TABLET ORAL at 16:37

## 2025-03-23 RX ADMIN — KETOROLAC TROMETHAMINE 15 MG: 30 INJECTION, SOLUTION INTRAMUSCULAR at 05:38

## 2025-03-23 RX ADMIN — MONTELUKAST 10 MG: 10 TABLET, FILM COATED ORAL at 12:49

## 2025-03-23 RX ADMIN — INSULIN LISPRO 1 UNITS: 100 INJECTION, SOLUTION INTRAVENOUS; SUBCUTANEOUS at 16:37

## 2025-03-23 RX ADMIN — PANTOPRAZOLE SODIUM 40 MG: 40 TABLET, DELAYED RELEASE ORAL at 08:05

## 2025-03-23 RX ADMIN — ACETAMINOPHEN 975 MG: 325 TABLET ORAL at 14:11

## 2025-03-23 RX ADMIN — INSULIN LISPRO 1 UNITS: 100 INJECTION, SOLUTION INTRAVENOUS; SUBCUTANEOUS at 11:17

## 2025-03-23 RX ADMIN — FENOFIBRATE 145 MG: 145 TABLET, FILM COATED ORAL at 08:05

## 2025-03-23 RX ADMIN — ASPIRIN 81 MG: 81 TABLET, COATED ORAL at 08:05

## 2025-03-23 RX ADMIN — Medication 12.5 MG: at 08:05

## 2025-03-23 RX ADMIN — ATORVASTATIN CALCIUM 40 MG: 40 TABLET, FILM COATED ORAL at 08:05

## 2025-03-23 RX ADMIN — ACETAMINOPHEN 975 MG: 325 TABLET ORAL at 21:04

## 2025-03-23 NOTE — PROGRESS NOTES
Progress Note - Surgery-General   Name: Hayley Bolaños 59 y.o. female I MRN: [id number]  Unit/Bed#: -01 I Date of Admission: 3/17/2025   Date of Service: 3/23/2025 I Hospital Day: 6    Assessment & Plan  SBO (small bowel obstruction) (HCC)  SBO    Doing well on full liquids. Had gas pains this morning that she was able to pass. Small BM overnight. No N/V. On exam her abdomen is soft and non-tender. Can consider diet as tolerated. Surgically stable. Will review with surgeon.      Subjective   Feeling well. Had gas pain this morning, but was able to pass it. Last BM early this morning around 4. No N/V. Tolerating full liquids.    Objective :  Temp:  [97.7 °F (36.5 °C)-98.2 °F (36.8 °C)] 98.2 °F (36.8 °C)  HR:  [62-70] 63  BP: (110-133)/(55-87) 110/55  Resp:  [16-17] 16  SpO2:  [95 %-99 %] 98 %  O2 Device: None (Room air)    I/O         03/21 0701  03/22 0700 03/22 0701  03/23 0700 03/23 0701  03/24 0700    P.O. 720 840 210    NG/GT       IV Piggyback       Total Intake(mL/kg) 720 (11.2) 840 (13) 210 (3.3)    Urine (mL/kg/hr) 750 (0.5)      Emesis/NG output       Stool 0      Total Output 750      Net -30 +840 +210           Unmeasured Stool Occurrence 4 x              Physical Exam  Vitals and nursing note reviewed.   Constitutional:       General: She is not in acute distress.     Appearance: She is well-developed. She is not diaphoretic.   HENT:      Head: Normocephalic and atraumatic.   Eyes:      Conjunctiva/sclera: Conjunctivae normal.      Pupils: Pupils are equal, round, and reactive to light.   Pulmonary:      Effort: No respiratory distress.   Abdominal:      Comments: Soft, compressible, non-tender in all quadrants. No guarding or rigidity. RUQ firmness 2/2 prior hernia repair.   Musculoskeletal:         General: Normal range of motion.      Cervical back: Normal range of motion.   Skin:     General: Skin is warm and dry.      Capillary Refill: Capillary refill takes less than 2 seconds.   Neurological:       Mental Status: She is alert and oriented to person, place, and time.   Psychiatric:         Behavior: Behavior normal.           Lab Results: I have reviewed the following results:  Recent Labs     03/23/25  0437   WBC 7.80   HGB 10.9*   HCT 33.4*      SODIUM 140   K 3.3*   *   CO2 19*   BUN 4*   CREATININE 0.50*   GLUC 106       Imaging Results Review: No pertinent imaging studies reviewed.  Other Study Results Review: No additional pertinent studies reviewed.    VTE Pharmacologic Prophylaxis: Reason for no pharmacologic prophylaxis per primary  VTE Mechanical Prophylaxis: sequential compression device

## 2025-03-23 NOTE — ASSESSMENT & PLAN NOTE
Presented with abdominal pain, nausea/vomiting  CT abd/pelvis (3/17): Interval development of small bowel obstruction with fecalization in distal small bowel loop  Tolerating full liquid diet; will advance to surgical soft today and watch for tolerance  General surgery following

## 2025-03-23 NOTE — ASSESSMENT & PLAN NOTE
Lab Results   Component Value Date    HGBA1C 5.9 02/27/2025       Recent Labs     03/22/25  1630 03/22/25  2046 03/23/25  0714 03/23/25  1114   POCGLU 115 144* 112 152*       Blood Sugar Average: Last 72 hrs:  (P) 132.6825  Hold home dapagliflozin, glipizide, metformin, and insulin  Continue accuchecks, corrective sliding scale insulin, and hypoglycemic protocol  Carb-controlled surgical soft diet

## 2025-03-23 NOTE — PLAN OF CARE
Problem: Potential for Falls  Goal: Patient will remain free of falls  Description: INTERVENTIONS:  - Educate patient/family on patient safety including physical limitations  - Instruct patient to call for assistance with activity   - Consult OT/PT to assist with strengthening/mobility   - Keep Call bell within reach  - Keep bed low and locked with side rails adjusted as appropriate  - Keep care items and personal belongings within reach  - Initiate and maintain comfort rounds  - Make Fall Risk Sign visible to staff  - Offer Toileting every 2 Hours, in advance of need  - Initiate/Maintain bed alarm  - Obtain necessary fall risk management equipment: yellow socks  - Apply yellow socks and bracelet for high fall risk patients  - Consider moving patient to room near nurses station  Outcome: Progressing     Problem: PAIN - ADULT  Goal: Verbalizes/displays adequate comfort level or baseline comfort level  Description: Interventions:  - Encourage patient to monitor pain and request assistance  - Assess pain using appropriate pain scale  - Administer analgesics based on type and severity of pain and evaluate response  - Implement non-pharmacological measures as appropriate and evaluate response  - Consider cultural and social influences on pain and pain management  - Notify physician/advanced practitioner if interventions unsuccessful or patient reports new pain  Outcome: Progressing     Problem: SAFETY ADULT  Goal: Maintain or return to baseline ADL function  Description: INTERVENTIONS:  -  Assess patient's ability to carry out ADLs; assess patient's baseline for ADL function and identify physical deficits which impact ability to perform ADLs (bathing, care of mouth/teeth, toileting, grooming, dressing, etc.)  - Assess/evaluate cause of self-care deficits   - Assess range of motion  - Assess patient's mobility; develop plan if impaired  - Assess patient's need for assistive devices and provide as appropriate  -  Encourage maximum independence but intervene and supervise when necessary  - Involve family in performance of ADLs  - Assess for home care needs following discharge   - Consider OT consult to assist with ADL evaluation and planning for discharge  - Provide patient education as appropriate  Outcome: Progressing     Problem: Knowledge Deficit  Goal: Patient/family/caregiver demonstrates understanding of disease process, treatment plan, medications, and discharge instructions  Description: Complete learning assessment and assess knowledge base.  Interventions:  - Provide teaching at level of understanding  - Provide teaching via preferred learning methods  Outcome: Progressing     Problem: DISCHARGE PLANNING  Goal: Discharge to home or other facility with appropriate resources  Description: INTERVENTIONS:  - Identify barriers to discharge w/patient and caregiver  - Arrange for needed discharge resources and transportation as appropriate  - Identify discharge learning needs (meds, wound care, etc.)  - Arrange for interpretive services to assist at discharge as needed  - Refer to Case Management Department for coordinating discharge planning if the patient needs post-hospital services based on physician/advanced practitioner order or complex needs related to functional status, cognitive ability, or social support system  Outcome: Progressing     Problem: GASTROINTESTINAL - ADULT  Goal: Minimal or absence of nausea and/or vomiting  Description: INTERVENTIONS:- Administer IV fluids if ordered to ensure adequate hydration- Maintain NPO status until nausea and vomiting are resolved- Nasogastric tube if ordered- Administer ordered antiemetic medications as needed- Provide nonpharmacologic comfort measures as appropriate- Advance diet as tolerated, if ordered- Consider nutrition services referral to assist patient with adequate nutrition and appropriate food choices  Outcome: Progressing     Problem: METABOLIC, FLUID AND  ELECTROLYTES - ADULT  Goal: Glucose maintained within target range  Description: INTERVENTIONS:- Monitor Blood Glucose as ordered- Assess for signs and symptoms of hyperglycemia and hypoglycemia- Administer ordered medications to maintain glucose within target range- Assess nutritional intake and initiate nutrition service referral as needed  Outcome: Progressing     Problem: Nutrition/Hydration-ADULT  Goal: Nutrient/Hydration intake appropriate for improving, restoring or maintaining nutritional needs  Description: Monitor and assess patient's nutrition/hydration status for malnutrition. Collaborate with interdisciplinary team and initiate plan and interventions as ordered.  Monitor patient's weight and dietary intake as ordered or per policy. Utilize nutrition screening tool and intervene as necessary. Determine patient's food preferences and provide high-protein, high-caloric foods as appropriate. INTERVENTIONS:- Monitor oral intake, urinary output, labs, and treatment plans- Assess nutrition and hydration status and recommend course of action- Evaluate amount of meals eaten- Assist patient with eating if necessary - Allow adequate time for meals- Recommend/ encourage appropriate diets, oral nutritional supplements, and vitamin/mineral supplements- Order, calculate, and assess calorie counts as needed- Recommend, monitor, and adjust tube feedings and TPN/PPN based on assessed needs- Assess need for intravenous fluids- Provide specific nutrition/hydration education as appropriate- Include patient/family/caregiver in decisions related to nutrition  Outcome: Progressing

## 2025-03-23 NOTE — ASSESSMENT & PLAN NOTE
Blood pressure is adequately controlled at this time  Continue home Lopressor 12.5 Q12H  Monitor BP trends and titrate medications as indicated

## 2025-03-23 NOTE — ASSESSMENT & PLAN NOTE
SBO    Doing well on full liquids. Had gas pains this morning that she was able to pass. Small BM overnight. No N/V. On exam her abdomen is soft and non-tender. Can consider diet as tolerated. Surgically stable. Will review with surgeon.

## 2025-03-23 NOTE — PROGRESS NOTES
Progress Note - Hospitalist   Name: Hayley Bolaños 59 y.o. female I MRN: [id number]  Unit/Bed#: -01 I Date of Admission: 3/17/2025   Date of Service: 3/23/2025 I Hospital Day: 6    Assessment & Plan  SBO (small bowel obstruction) (HCC)  Presented with abdominal pain, nausea/vomiting  CT abd/pelvis (3/17): Interval development of small bowel obstruction with fecalization in distal small bowel loop  Tolerating full liquid diet; will advance to surgical soft today and watch for tolerance  General surgery following  Type 2 diabetes mellitus (HCC)  Lab Results   Component Value Date    HGBA1C 5.9 02/27/2025       Recent Labs     03/22/25  1630 03/22/25  2046 03/23/25  0714 03/23/25  1114   POCGLU 115 144* 112 152*       Blood Sugar Average: Last 72 hrs:  (P) 132.6875  Hold home dapagliflozin, glipizide, metformin, and insulin  Continue accuchecks, corrective sliding scale insulin, and hypoglycemic protocol  Carb-controlled surgical soft diet  Essential hypertension  Blood pressure is adequately controlled at this time  Continue home Lopressor 12.5 Q12H  Monitor BP trends and titrate medications as indicated  Hyperlipidemia  Continue home Lipitor and Tricor    VTE Pharmacologic Prophylaxis: VTE Score: 2 Low Risk (Score 0-2) - Encourage Ambulation.    Mobility:   Basic Mobility Inpatient Raw Score: 21  JH-HLM Goal: 6: Walk 10 steps or more  JH-HLM Achieved: 7: Walk 25 feet or more  JH-HLM Goal achieved. Continue to encourage appropriate mobility.    Patient Centered Rounds: I performed bedside rounds with nursing staff today.   Discussions with Specialists or Other Care Team Provider: General Surgery, Nursing    Education and Discussions with Family / Patient: Patient declined call to .     Current Length of Stay: 6 day(s)  Current Patient Status: Inpatient   Certification Statement: The patient will continue to require additional inpatient hospital stay due to SBO    Discharge Plan: Possible DC home  tomorrow if tolerating diet.    Code Status: Level 1 - Full Code    Subjective   Patient sitting up at the bedside without any acute complaints. She would like to advance her diet today.    Objective :  Temp:  [97.7 °F (36.5 °C)-98.2 °F (36.8 °C)] 98.2 °F (36.8 °C)  HR:  [62-70] 63  BP: (110-133)/(55-87) 110/55  Resp:  [16-17] 16  SpO2:  [95 %-99 %] 98 %  O2 Device: None (Room air)    Body mass index is 25.15 kg/m².     Input and Output Summary (last 24 hours):     Intake/Output Summary (Last 24 hours) at 3/23/2025 1152  Last data filed at 3/23/2025 0801  Gross per 24 hour   Intake 430 ml   Output --   Net 430 ml       Physical Exam  Vitals reviewed.   Constitutional:       General: She is not in acute distress.     Appearance: She is well-developed.   HENT:      Head: Normocephalic and atraumatic.   Cardiovascular:      Rate and Rhythm: Normal rate and regular rhythm.      Pulses: Normal pulses.      Heart sounds: Normal heart sounds. No murmur heard.  Pulmonary:      Effort: Pulmonary effort is normal.      Breath sounds: Normal breath sounds.   Abdominal:      General: Bowel sounds are normal. There is no distension.      Palpations: Abdomen is soft.      Tenderness: There is no abdominal tenderness.   Musculoskeletal:         General: Normal range of motion.   Skin:     General: Skin is warm and dry.      Capillary Refill: Capillary refill takes less than 2 seconds.   Neurological:      General: No focal deficit present.      Mental Status: She is alert and oriented to person, place, and time.           Lines/Drains:                Lab Results: I have reviewed the following results:   Results from last 7 days   Lab Units 03/23/25  0437 03/19/25  0522 03/18/25  0450   WBC Thousand/uL 7.80   < > 8.38   HEMOGLOBIN g/dL 10.9*   < > 13.5   HEMATOCRIT % 33.4*   < > 41.5   PLATELETS Thousands/uL 212   < > 265   SEGS PCT %  --   --  68   LYMPHO PCT %  --   --  17   MONO PCT %  --   --  11   EOS PCT %  --   --  3    < >  = values in this interval not displayed.     Results from last 7 days   Lab Units 03/23/25  0437 03/18/25  1615 03/18/25  0450   SODIUM mmol/L 140   < > 142   POTASSIUM mmol/L 3.3*   < > 5.2   CHLORIDE mmol/L 116*   < > 108   CO2 mmol/L 19*   < > 29   BUN mg/dL 4*   < > 21   CREATININE mg/dL 0.50*   < > 0.97   ANION GAP mmol/L 5   < > 5   CALCIUM mg/dL 7.6*   < > 9.9   ALBUMIN g/dL  --   --  3.8   TOTAL BILIRUBIN mg/dL  --   --  0.56   ALK PHOS U/L  --   --  68   ALT U/L  --   --  11   AST U/L  --   --  10*   GLUCOSE RANDOM mg/dL 106   < > 145*    < > = values in this interval not displayed.     Results from last 7 days   Lab Units 03/18/25  0450   INR  0.91     Results from last 7 days   Lab Units 03/23/25  1114 03/23/25  0714 03/22/25  2046 03/22/25  1630 03/22/25  1100 03/22/25  0713 03/21/25  2032 03/21/25  1602 03/21/25  1117 03/21/25  0719 03/21/25  0548 03/20/25  2353   POC GLUCOSE mg/dl 152* 112 144* 115 125 123 118 148* 134 132 102 141*         Results from last 7 days   Lab Units 03/17/25  2107 03/17/25  1845   LACTIC ACID mmol/L 2.0 3.8*   PROCALCITONIN ng/ml  --  0.10       Recent Cultures (last 7 days):   Results from last 7 days   Lab Units 03/17/25  1845   BLOOD CULTURE  No Growth After 5 Days.  No Growth After 5 Days.       Imaging Results Review: No pertinent imaging studies reviewed.  Other Study Results Review: No additional pertinent studies reviewed.    Last 24 Hours Medication List:     Current Facility-Administered Medications:     acetaminophen (Ofirmev) injection 1,000 mg, Q6H NEETA, Last Rate: 1,000 mg (03/23/25 0205)    aspirin (ECOTRIN LOW STRENGTH) EC tablet 81 mg, Daily    atorvastatin (LIPITOR) tablet 40 mg, Daily    benzocaine (HURRICAINE) 20 % mucosal spray 2 spray, BID PRN    cyanocobalamin (VITAMIN B-12) tablet 1,000 mcg, Daily    dextrose 25 % IV solution 25 mL, Once    escitalopram (LEXAPRO) tablet 10 mg, Daily With Dinner    fenofibrate (TRICOR) tablet 145 mg, Daily    insulin  lispro (HumALOG/ADMELOG) 100 units/mL subcutaneous injection 1-5 Units, TID AC **AND** Fingerstick Glucose (POCT), TID AC    insulin lispro (HumALOG/ADMELOG) 100 units/mL subcutaneous injection 1-5 Units, HS    metoprolol tartrate (LOPRESSOR) partial tablet 12.5 mg, Q12H NEETA    montelukast (SINGULAIR) tablet 10 mg, Q24H    morphine injection 2 mg, Q4H PRN    morphine injection 4 mg, Q4H PRN    ondansetron (ZOFRAN) injection 4 mg, Q6H PRN    oxybutynin (DITROPAN-XL) 24 hr tablet 15 mg, Daily    pantoprazole (PROTONIX) EC tablet 40 mg, Daily    **Please Note: This note may have been constructed using a voice recognition system.**

## 2025-03-24 VITALS
OXYGEN SATURATION: 98 % | SYSTOLIC BLOOD PRESSURE: 121 MMHG | WEIGHT: 141.98 LBS | BODY MASS INDEX: 25.16 KG/M2 | HEART RATE: 58 BPM | RESPIRATION RATE: 19 BRPM | TEMPERATURE: 97.8 F | DIASTOLIC BLOOD PRESSURE: 53 MMHG | HEIGHT: 63 IN

## 2025-03-24 LAB
ANION GAP SERPL CALCULATED.3IONS-SCNC: 4 MMOL/L (ref 4–13)
BUN SERPL-MCNC: 9 MG/DL (ref 5–25)
CALCIUM SERPL-MCNC: 8 MG/DL (ref 8.4–10.2)
CHLORIDE SERPL-SCNC: 115 MMOL/L (ref 96–108)
CO2 SERPL-SCNC: 23 MMOL/L (ref 21–32)
CREAT SERPL-MCNC: 0.63 MG/DL (ref 0.6–1.3)
ERYTHROCYTE [DISTWIDTH] IN BLOOD BY AUTOMATED COUNT: 13.7 % (ref 11.6–15.1)
GFR SERPL CREATININE-BSD FRML MDRD: 97 ML/MIN/1.73SQ M
GLUCOSE SERPL-MCNC: 130 MG/DL (ref 65–140)
GLUCOSE SERPL-MCNC: 137 MG/DL (ref 65–140)
GLUCOSE SERPL-MCNC: 179 MG/DL (ref 65–140)
HCT VFR BLD AUTO: 33.2 % (ref 34.8–46.1)
HGB BLD-MCNC: 10.8 G/DL (ref 11.5–15.4)
MCH RBC QN AUTO: 26.8 PG (ref 26.8–34.3)
MCHC RBC AUTO-ENTMCNC: 32.5 G/DL (ref 31.4–37.4)
MCV RBC AUTO: 82 FL (ref 82–98)
PLATELET # BLD AUTO: 230 THOUSANDS/UL (ref 149–390)
PMV BLD AUTO: 10.2 FL (ref 8.9–12.7)
POTASSIUM SERPL-SCNC: 3.3 MMOL/L (ref 3.5–5.3)
RBC # BLD AUTO: 4.03 MILLION/UL (ref 3.81–5.12)
SODIUM SERPL-SCNC: 142 MMOL/L (ref 135–147)
WBC # BLD AUTO: 6.63 THOUSAND/UL (ref 4.31–10.16)

## 2025-03-24 PROCEDURE — 99232 SBSQ HOSP IP/OBS MODERATE 35: CPT

## 2025-03-24 PROCEDURE — 85027 COMPLETE CBC AUTOMATED: CPT | Performed by: INTERNAL MEDICINE

## 2025-03-24 PROCEDURE — 80048 BASIC METABOLIC PNL TOTAL CA: CPT | Performed by: INTERNAL MEDICINE

## 2025-03-24 PROCEDURE — 99239 HOSP IP/OBS DSCHRG MGMT >30: CPT | Performed by: INTERNAL MEDICINE

## 2025-03-24 PROCEDURE — 82948 REAGENT STRIP/BLOOD GLUCOSE: CPT

## 2025-03-24 RX ORDER — METOPROLOL TARTRATE 25 MG/1
12.5 TABLET, FILM COATED ORAL EVERY 12 HOURS SCHEDULED
Qty: 180 TABLET | Refills: 0 | Status: SHIPPED | OUTPATIENT
Start: 2025-03-24

## 2025-03-24 RX ORDER — POTASSIUM CHLORIDE 1500 MG/1
40 TABLET, EXTENDED RELEASE ORAL
Status: COMPLETED | OUTPATIENT
Start: 2025-03-24 | End: 2025-03-24

## 2025-03-24 RX ADMIN — PANTOPRAZOLE SODIUM 40 MG: 40 TABLET, DELAYED RELEASE ORAL at 08:11

## 2025-03-24 RX ADMIN — OXYBUTYNIN 15 MG: 5 TABLET, FILM COATED, EXTENDED RELEASE ORAL at 08:12

## 2025-03-24 RX ADMIN — MONTELUKAST 10 MG: 10 TABLET, FILM COATED ORAL at 14:03

## 2025-03-24 RX ADMIN — ACETAMINOPHEN 975 MG: 325 TABLET ORAL at 05:11

## 2025-03-24 RX ADMIN — ATORVASTATIN CALCIUM 40 MG: 40 TABLET, FILM COATED ORAL at 08:11

## 2025-03-24 RX ADMIN — Medication 12.5 MG: at 08:11

## 2025-03-24 RX ADMIN — POTASSIUM CHLORIDE 40 MEQ: 1500 TABLET, EXTENDED RELEASE ORAL at 08:11

## 2025-03-24 RX ADMIN — POTASSIUM CHLORIDE 40 MEQ: 1500 TABLET, EXTENDED RELEASE ORAL at 10:37

## 2025-03-24 RX ADMIN — FENOFIBRATE 145 MG: 145 TABLET, FILM COATED ORAL at 08:12

## 2025-03-24 RX ADMIN — CYANOCOBALAMIN TAB 500 MCG 1000 MCG: 500 TAB at 08:12

## 2025-03-24 RX ADMIN — ASPIRIN 81 MG: 81 TABLET, COATED ORAL at 08:11

## 2025-03-24 NOTE — PROGRESS NOTES
Progress Note - Surgery-General   Name: Hayley Bolaños 59 y.o. female I MRN: [id number]  Unit/Bed#: -01 I Date of Admission: 3/17/2025   Date of Service: 3/24/2025 I Hospital Day: 7    Assessment & Plan  SBO (small bowel obstruction) (HCC)  SBO    Doing well on surgical soft diet. Reports having another bowel movement this morning. No N/V. On exam her abdomen is soft and non-tender. Surgically stable for discharge. Will review with Dr. Bliss.    I have discussed the above management plan in detail with the primary service.     Subjective   No abdominal pain. Denies n/v. Last BM this morning. Passing gas. Urinating without difficulty.     Objective :  Temp:  [97.7 °F (36.5 °C)-97.8 °F (36.6 °C)] 97.8 °F (36.6 °C)  HR:  [58-80] 58  BP: (109-133)/(49-61) 121/53  Resp:  [16-19] 19  SpO2:  [97 %] 97 %  O2 Device: None (Room air)    I/O         03/22 0701  03/23 0700 03/23 0701  03/24 0700 03/24 0701  03/25 0700    P.O. 840 210 240    Total Intake(mL/kg) 840 (13) 210 (3.3) 240 (3.7)    Urine (mL/kg/hr)       Stool       Total Output       Net +840 +210 +240                   Physical Exam  Vitals reviewed.   Constitutional:       General: She is not in acute distress.     Appearance: She is obese. She is not ill-appearing.   HENT:      Head: Normocephalic and atraumatic.   Cardiovascular:      Rate and Rhythm: Normal rate.   Pulmonary:      Effort: Pulmonary effort is normal. No respiratory distress.   Abdominal:      General: There is no distension.      Palpations: Abdomen is soft.      Tenderness: There is no abdominal tenderness. There is no guarding.   Musculoskeletal:      Right lower leg: No edema.      Left lower leg: No edema.   Skin:     General: Skin is warm and dry.   Neurological:      General: No focal deficit present.      Mental Status: She is alert. Mental status is at baseline.   Psychiatric:         Mood and Affect: Mood normal.         Behavior: Behavior normal.         Lab Results: I have  reviewed the following results:  Recent Labs     03/24/25  0510   WBC 6.63   HGB 10.8*   HCT 33.2*      SODIUM 142   K 3.3*   *   CO2 23   BUN 9   CREATININE 0.63   GLUC 179*       Imaging Results Review: No pertinent imaging studies reviewed.  Other Study Results Review: No additional pertinent studies reviewed.    VTE Pharmacologic Prophylaxis: VTE covered by:    None     VTE Mechanical Prophylaxis: sequential compression device    Agatha Dobbins PA-C

## 2025-03-24 NOTE — DISCHARGE SUMMARY
Discharge Summary - Hospitalist   Name: Hayley Bolaños 59 y.o. female I MRN: [id number]  Unit/Bed#: -01 I Date of Admission: 3/17/2025   Date of Service: 3/24/2025 I Hospital Day: 7     Assessment & Plan  SBO (small bowel obstruction) (formerly [facility])  Presented with abdominal pain, nausea/vomiting  CT abd/pelvis (3/17): Interval development of small bowel obstruction with fecalization in distal small bowel loop  Tolerating surgical soft diet   General surgery evaluation appreciated  Patient is stable from a surgery perspective   Type 2 diabetes mellitus (HCC)  Lab Results   Component Value Date    HGBA1C 5.9 02/27/2025       Recent Labs     03/23/25  1114 03/23/25  1543 03/23/25 2036 03/24/25  0720   POCGLU 152* 150* 140 137       Blood Sugar Average: Last 72 hrs:  (P) 130.8500355582160973  Resume home dapagliflozin, glipizide, metformin, and insulin per PCP (patient notes she is not taking all of these)  Carb-controlled surgical soft diet  Essential hypertension  Blood pressure is adequately controlled at this time  Continue Lopressor 12.5 Q12H  Hyperlipidemia  Continue home Lipitor and Tricor     Medical Problems       Resolved Problems  Date Reviewed: 3/20/2025   None       Discharging Physician / Practitioner: Yanely Lobo DO  PCP: Jose Chase MD  Admission Date:   Admission Orders (From admission, onward)       Ordered        03/17/25 2125  INPATIENT ADMISSION  Once                          Discharge Date: 03/24/25    Consultations During Hospital Stay:  General Surgery    Procedures Performed:   None    Significant Findings / Test Results:   CT abd/pelvis (3/17): Interval development of small bowel obstruction with fecalization in distal small bowel loop    Incidental Findings:   None     Test Results Pending at Discharge (will require follow up):   None     Outpatient Tests Requested:  To be determined upon outpatient follow-up with PCP    Complications:  None    Reason for Admission: [facility] [place]ospital  "Course:   Hayley Bolaños is a 59 y.o. female patient who originally presented to the hospital on 3/17/2025 due to abdominal pain. Please see H&P as documented by Dr. Berman for complete details regarding history of presenting illness. In brief, the patient has past medical history of chronic abdominal pain, type 2 diabetes, hypertension, and hyperlipidemia who presented with a 2-day history of progressively worsening abdominal pain.  The patient denied any constipation or vomiting but did note significant nausea.  Upon arrival she did undergo CT imaging that demonstrated small bowel obstruction with fecalization in the distal small bowel loops.  She was initiated on NG tube to suction for symptomatic control and was followed by general surgery during her hospitalization.  With improvement in her symptoms, the NG tube was removed and her diet was slowly advanced.  She was ultimately discharged home once tolerating a surgical soft diet.  She was discharged in stable condition and all of her questions were answered prior to departure.  This is a brief discharge summary please see full medical record for more details.    Please see above list of diagnoses and related plan for additional information.     Condition at Discharge: stable    Discharge Day Visit / Exam:   Subjective: Patient is sitting up at the bedside without any acute complaints.  She denies any further nausea or vomiting and is tolerating a surgical soft diet.  No significant overnight events reported by nursing.    Vitals: Blood Pressure: 121/53 (03/24/25 0721)  Pulse: 58 (03/24/25 0721)  Temperature: 97.8 °F (36.6 °C) (03/24/25 0721)  Temp Source: Oral (03/22/25 0713)  Respirations: 19 (03/24/25 0721)  Height: 5' 3\" (160 cm) (03/17/25 2251)  Weight - Scale: 64.4 kg (141 lb 15.6 oz) (03/17/25 2251)  SpO2: 97 % (03/24/25 0721)    Physical Exam  Vitals and nursing note reviewed.   Constitutional:       General: She is not in acute distress.  Cardiovascular: "      Rate and Rhythm: Normal rate and regular rhythm.      Pulses: Normal pulses.      Heart sounds: Normal heart sounds.   Pulmonary:      Effort: Pulmonary effort is normal. No respiratory distress.      Breath sounds: Normal breath sounds.   Abdominal:      General: Bowel sounds are normal. There is no distension.      Palpations: Abdomen is soft.      Tenderness: There is no abdominal tenderness.   Neurological:      General: No focal deficit present.      Mental Status: She is alert and oriented to person, place, and time. Mental status is at baseline.          Discussion with Family:  Patient updated on plan of care.     Discharge instructions/Information to patient and family:   See after visit summary for information provided to patient and family.      Provisions for Follow-Up Care:  See after visit summary for information related to follow-up care and any pertinent home health orders.      Mobility at time of Discharge:   Basic Mobility Inpatient Raw Score: 21  JH-HLM Goal: 6: Walk 10 steps or more  JH-HLM Achieved: 8: Walk 250 feet ot more  HLM Goal achieved. Continue to encourage appropriate mobility.     Disposition:   Home    Planned Readmission: None    Discharge Medications:  See after visit summary for reconciled discharge medications provided to patient and/or family.      Administrative Statements   Discharge Statement:  I have spent a total time of > 35 minutes in caring for this patient on the day of the visit/encounter. .    **Please Note: This note may have been constructed using a voice recognition system**

## 2025-03-24 NOTE — ASSESSMENT & PLAN NOTE
Lab Results   Component Value Date    HGBA1C 5.9 02/27/2025       Recent Labs     03/23/25  1114 03/23/25  1543 03/23/25 2036 03/24/25  0720   POCGLU 152* 150* 140 137       Blood Sugar Average: Last 72 hrs:  (P) 130.1053434902916010  Resume home dapagliflozin, glipizide, metformin, and insulin per PCP (patient notes she is not taking all of these)  Carb-controlled surgical soft diet

## 2025-03-24 NOTE — NURSING NOTE
Patient IV removed with tip in tact. Virtual RN reviewed patient discharge AVS and all questions were answered in a timely manner. Patient brought home by transport.

## 2025-03-24 NOTE — CASE MANAGEMENT
Case Management Discharge Planning Note    Patient name Hayley Bolaños  Location /-01 MRN [id number]  : 1965 Date 3/24/2025       Current Admission Date: 3/17/2025  Current Admission Diagnosis:SBO (small bowel obstruction) (HCC)   Patient Active Problem List    Diagnosis Date Noted Date Diagnosed    Hypokalemia 2025     SBO (small bowel obstruction) (HCC) 2025     Osteopenia, unspecified location 2024     Thoracic radiculopathy 2024     Left shoulder tendonitis 2024     Syphilis 2024     Vaginal atrophy 2022     Leukocytosis 2020     Osteoarthritis 2020     Hyperlipidemia associated with type 2 diabetes mellitus  (HCC) 2020     Pelvic pain 2019     Type 2 diabetes mellitus (HCC) 06/15/2016     Hyperlipidemia 06/15/2016     Essential hypertension 2014       LOS (days): 7  Geometric Mean LOS (GMLOS) (days): 2.3  Days to GMLOS:-4.3     OBJECTIVE:  Risk of Unplanned Readmission Score: 9.29         Current admission status: Inpatient   [facility] [address] [facility] [address]  Phone: [phone] Fax: [phone]    Primary Care Provider: Jose Chase MD    Primary Insurance: [facility] DUAL COMPLETE MC REP  Secondary Insurance: [place] [employer] [facility]    DISCHARGE DETAILS:    Discharge planning discussed with:: blanquita Dent was called at 10:37 am  Freedom of Choice: Yes  Comments - Freedom of Choice: pt needs transport home-  pt denies any additonal d/c needs - family is aware there is a fee for the transport - permission was bertha to set up transport for this pt - pt has a key into her home-  pt & family are in agreement with the d/c & d/c plan  CM contacted family/caregiver?: Yes  Were Treatment Team discharge recommendations reviewed with patient/caregiver?: Yes  Did patient/caregiver verbalize understanding of patient care  needs?: Yes  Were patient/caregiver advised of the risks associated with not following Treatment Team discharge recommendations?: Yes    Contacts  Patient Contacts: Jailene Mancini  Relationship to Patient:: Family (daughter)  Contact Method: Phone  Phone Number: [phone]  Reason/Outcome: Discharge Planning    Requested Home Health Care         Is the patient interested in HHC at discharge?: No    DME Referral Provided  Referral made for DME?: No    Other Referral/Resources/Interventions Provided:  Referral Comments: pt denies any additonal d/c needds - 3pm  medicar set up - pt & rn aware of transport time- pt has a key - her niece is at home sleeping she works nights-  daughter & pt are aware there is a fee - pt's daughter stated to send the bill to her mother and she will pay the bill    Would you like to participate in our [facility] service program?  : No - Declined    Treatment Team Recommendation: Home (home with family & outpt follow up- medi car 3pm [place])  Discharge Destination Plan:: Home (home with family & outpt  follow up-  3pm medicar [place])  Transport at Discharge : Other (Comment) ([facility] [place])     Number/Name of Dispatcher: [phone]  Transported by (Company and Unit #): [employer]  ETA of Transport (Date): 03/24/25  ETA of Transport (Time): 1500  IMM reviewed with patient, patient agrees with discharge determination.     Pt & family are in agreement with the d/c & d/c plan        IMM Given (Date):: 03/24/25  IMM Given to:: Patient  Family notified:: daughter was called

## 2025-03-24 NOTE — ASSESSMENT & PLAN NOTE
SBO    Doing well on surgical soft diet. Reports having another bowel movement this morning. No N/V. On exam her abdomen is soft and non-tender. Surgically stable for discharge. Will review with Dr. Bliss.

## 2025-03-24 NOTE — ASSESSMENT & PLAN NOTE
Presented with abdominal pain, nausea/vomiting  CT abd/pelvis (3/17): Interval development of small bowel obstruction with fecalization in distal small bowel loop  Tolerating surgical soft diet   General surgery evaluation appreciated  Patient is stable from a surgery perspective

## 2025-03-24 NOTE — PLAN OF CARE
Problem: Potential for Falls  Goal: Patient will remain free of falls  Description: INTERVENTIONS:  - Educate patient/family on patient safety including physical limitations  - Instruct patient to call for assistance with activity   - Consult OT/PT to assist with strengthening/mobility   - Keep Call bell within reach  - Keep bed low and locked with side rails adjusted as appropriate  - Keep care items and personal belongings within reach  - Initiate and maintain comfort rounds  - Make Fall Risk Sign visible to staff  - Offer Toileting every 2 Hours, in advance of need  - Initiate/Maintain bed alarm  - Obtain necessary fall risk management equipment: yellow socks  - Apply yellow socks and bracelet for high fall risk patients  - Consider moving patient to room near nurses station  Outcome: Progressing     Problem: INFECTION - ADULT  Goal: Absence or prevention of progression during hospitalization  Description: INTERVENTIONS:  - Assess and monitor for signs and symptoms of infection  - Monitor lab/diagnostic results  - Monitor all insertion sites, i.e. indwelling lines, tubes, and drains  - Monitor endotracheal if appropriate and nasal secretions for changes in amount and color  - [place] appropriate cooling/warming therapies per order  - Administer medications as ordered  - Instruct and encourage patient and family to use good hand hygiene technique  - Identify and instruct in appropriate isolation precautions for identified infection/condition  Outcome: Progressing     Problem: SAFETY ADULT  Goal: Patient will remain free of falls  Description: INTERVENTIONS:  - Educate patient/family on patient safety including physical limitations  - Instruct patient to call for assistance with activity   - Consult OT/PT to assist with strengthening/mobility   - Keep Call bell within reach  - Keep bed low and locked with side rails adjusted as appropriate  - Keep care items and personal belongings within reach  - Initiate and  maintain comfort rounds  - Make Fall Risk Sign visible to staff  - Offer Toileting every 2 Hours, in advance of need  - Initiate/Maintain bed alarm  - Obtain necessary fall risk management equipment: yellow socks  - Apply yellow socks and bracelet for high fall risk patients  - Consider moving patient to room near nurses station  Outcome: Progressing     Problem: SAFETY ADULT  Goal: Maintains/Returns to pre admission functional level  Description: INTERVENTIONS:  - Perform AM-PAC 6 Click Basic Mobility/ Daily Activity assessment daily.  - Set and communicate daily mobility goal to care team and patient/family/caregiver.   - Collaborate with rehabilitation services on mobility goals if consulted  - Perform Range of Motion 3 times a day.  - Reposition patient every 2 hours.  - Dangle patient 3 times a day  - Stand patient 3 times a day  - Ambulate patient 3 times a day  - Out of bed to chair 3 times a day   - Out of bed for meals 3 times a day  - Out of bed for toileting  - Record patient progress and toleration of activity level   Outcome: Progressing     Problem: DISCHARGE PLANNING  Goal: Discharge to home or other facility with appropriate resources  Description: INTERVENTIONS:  - Identify barriers to discharge w/patient and caregiver  - Arrange for needed discharge resources and transportation as appropriate  - Identify discharge learning needs (meds, wound care, etc.)  - Arrange for interpretive services to assist at discharge as needed  - Refer to Case Management Department for coordinating discharge planning if the patient needs post-hospital services based on physician/advanced practitioner order or complex needs related to functional status, cognitive ability, or social support system  Outcome: Progressing     Problem: Knowledge Deficit  Goal: Patient/family/caregiver demonstrates understanding of disease process, treatment plan, medications, and discharge instructions  Description: Complete learning  assessment and assess knowledge base.  Interventions:  - Provide teaching at level of understanding  - Provide teaching via preferred learning methods  Outcome: Progressing     Problem: GASTROINTESTINAL - ADULT  Goal: Minimal or absence of nausea and/or vomiting  Description: INTERVENTIONS:- Administer IV fluids if ordered to ensure adequate hydration- Maintain NPO status until nausea and vomiting are resolved- Nasogastric tube if ordered- Administer ordered antiemetic medications as needed- Provide nonpharmacologic comfort measures as appropriate- Advance diet as tolerated, if ordered- Consider nutrition services referral to assist patient with adequate nutrition and appropriate food choices  Outcome: Progressing     Problem: GASTROINTESTINAL - ADULT  Goal: Maintains adequate nutritional intake  Description: INTERVENTIONS:- Monitor percentage of each meal consumed- Identify factors contributing to decreased intake, treat as appropriate- Assist with meals as needed- Monitor I&O, weight, and lab values if indicated- Obtain nutrition services referral as needed  Outcome: Progressing     Problem: METABOLIC, FLUID AND ELECTROLYTES - ADULT  Goal: Glucose maintained within target range  Description: INTERVENTIONS:- Monitor Blood Glucose as ordered- Assess for signs and symptoms of hyperglycemia and hypoglycemia- Administer ordered medications to maintain glucose within target range- Assess nutritional intake and initiate nutrition service referral as needed  Outcome: Progressing     Problem: Nutrition/Hydration-ADULT  Goal: Nutrient/Hydration intake appropriate for improving, restoring or maintaining nutritional needs  Description: Monitor and assess patient's nutrition/hydration status for malnutrition. Collaborate with interdisciplinary team and initiate plan and interventions as ordered.  Monitor patient's weight and dietary intake as ordered or per policy. Utilize nutrition screening tool and intervene as necessary.  Determine patient's food preferences and provide high-protein, high-caloric foods as appropriate. INTERVENTIONS:- Monitor oral intake, urinary output, labs, and treatment plans- Assess nutrition and hydration status and recommend course of action- Evaluate amount of meals eaten- Assist patient with eating if necessary - Allow adequate time for meals- Recommend/ encourage appropriate diets, oral nutritional supplements, and vitamin/mineral supplements- Order, calculate, and assess calorie counts as needed- Recommend, monitor, and adjust tube feedings and TPN/PPN based on assessed needs- Assess need for intravenous fluids- Provide specific nutrition/hydration education as appropriate- Include patient/family/caregiver in decisions related to nutrition  Outcome: Progressing

## 2025-03-25 ENCOUNTER — TRANSITIONAL CARE MANAGEMENT (OUTPATIENT)
Dept: FAMILY MEDICINE CLINIC | Facility: CLINIC | Age: 60
End: 2025-03-25

## 2025-03-25 NOTE — UTILIZATION REVIEW
NOTIFICATION OF ADMISSION DISCHARGE   This is a Notification of Discharge from [facility]. Please be advised that this patient has been discharge from our facility. Below you will find the admission and discharge date and time including the patient’s disposition.   UTILIZATION REVIEW CONTACT:  Lainey Penn  Utilization   Network Utilization Review Department  Phone: [phone] x carefully listen to the prompts. All voicemails are confidential.  Email: [email or web address]     ADMISSION INFORMATION  PRESENTATION DATE: 3/17/2025  6:06 PM  OBERVATION ADMISSION DATE: N/A  INPATIENT ADMISSION DATE: 3/17/25  9:25 PM   DISCHARGE DATE: 3/24/2025  3:28 PM   DISPOSITION:Home/Self Care    Network Utilization Review Department  ATTENTION: Please call with any questions or concerns to [phone] and carefully listen to the prompts so that you are directed to the right person. All voicemails are confidential.   For Discharge needs, contact Care Management DC Support Team at [phone] opt. 2  Send all requests for admission clinical reviews, approved or denied determinations and any other requests to dedicated fax number below belonging to the campus where the patient is receiving treatment. List of dedicated fax numbers for the Facilities:  FACILITY NAME UR FAX NUMBER   ADMISSION DENIALS (Administrative/Medical Necessity) [phone]   DISCHARGE SUPPORT TEAM ([facility]) [phone]   PARENT CHILD HEALTH (Maternity/NICU/Pediatrics) [phone]   [facility] [phone]   [facility] [phone]   Critical access hospital [phone]   [facility] [phone]   formerly [facility] [phone]   [facility] [phone]   [facility] [phone]   [facility]  [phone]   [facility] [phone]   [facility] [phone]   [facility] [phone]   [facility] [phone]

## 2025-03-31 ENCOUNTER — OFFICE VISIT (OUTPATIENT)
Dept: FAMILY MEDICINE CLINIC | Facility: CLINIC | Age: 60
End: 2025-03-31
Payer: COMMERCIAL

## 2025-03-31 VITALS
DIASTOLIC BLOOD PRESSURE: 70 MMHG | HEART RATE: 60 BPM | TEMPERATURE: 97.8 F | SYSTOLIC BLOOD PRESSURE: 122 MMHG | WEIGHT: 150.2 LBS | BODY MASS INDEX: 26.61 KG/M2 | HEIGHT: 63 IN | OXYGEN SATURATION: 98 %

## 2025-03-31 DIAGNOSIS — R10.84 GENERALIZED ABDOMINAL PAIN: Primary | ICD-10-CM

## 2025-03-31 DIAGNOSIS — M85.80 OSTEOPENIA, UNSPECIFIED LOCATION: ICD-10-CM

## 2025-03-31 DIAGNOSIS — R14.0 BLOATING: ICD-10-CM

## 2025-03-31 DIAGNOSIS — E11.8 TYPE II DIABETES MELLITUS WITH COMPLICATION (HCC): ICD-10-CM

## 2025-03-31 PROCEDURE — 99496 TRANSJ CARE MGMT HIGH F2F 7D: CPT | Performed by: INTERNAL MEDICINE

## 2025-03-31 RX ORDER — CALCIUM CARBONATE/VITAMIN D3 600 MG-10
2 TABLET ORAL DAILY
Qty: 60 TABLET | Refills: 6 | Status: SHIPPED | OUTPATIENT
Start: 2025-03-31

## 2025-03-31 RX ORDER — DAPAGLIFLOZIN 5 MG/1
5 TABLET, FILM COATED ORAL
Qty: 90 TABLET | Refills: 3 | Status: SHIPPED | OUTPATIENT
Start: 2025-03-31

## 2025-03-31 NOTE — PROGRESS NOTES
Transition of Care Visit  Name: Hayley Bolaños      : 1965      MRN: [id number]  Encounter Provider: Jose Chase MD  Encounter Date: 3/31/2025   Encounter department: [place] [facility]    Assessment & Plan  Osteopenia, unspecified location    Orders:    Calcium Carb-Cholecalciferol (Calcium + Vitamin D3) 600-10 MG-MCG TABS; Take 2 tablets by mouth daily    Type II diabetes mellitus with complication (HCC)    Lab Results   Component Value Date    HGBA1C 5.9 2025       Orders:    Farxiga 5 MG TABS; Take 1 tablet (5 mg total) by mouth daily with lunch    UA (URINE) with reflex to Scope; Future    Magnesium; Future    TSH, 3rd generation; Future    T4, free; Future    CBC and differential; Future    Comprehensive metabolic panel; Future    Lipid panel; Future    Bloating    Orders:    Ambulatory referral to Gastroenterology; Future    UA (URINE) with reflex to Scope; Future    Magnesium; Future    TSH, 3rd generation; Future    T4, free; Future    CBC and differential; Future    Comprehensive metabolic panel; Future    Lipid panel; Future    Generalized abdominal pain  Improved Nicely  Continue same  FU w Gi   Life style Mod  RTC in 1-2 mos w Blood work            History of Present Illness     Transitional Care Management Review:   Hayley Bolaños is a 59 y.o. female here for TCM follow up.     During the TCM phone call patient stated:  TCM Call (since 3/17/2025)       Date and time call was made  3/25/2025  8:59 AM    Patient was hospitialized at  [facility]    Date of Admission  25    Date of discharge  25    Disposition  Home          TCM Call (since 3/17/2025)       Scheduled for follow up?  Yes    I have advised the patient to call PCP with any new or worsening symptoms  Elisa Lao, MR          59 Y O Lady is here for Post hospital /TCM visit , she feels a lot better, D/C summary and med list reviewed,...      Review of Systems   Constitutional:   "Positive for fatigue. Negative for chills and fever.   HENT:  Negative for congestion, ear pain, facial swelling, sore throat, trouble swallowing and voice change.    Eyes:  Negative for pain, discharge and visual disturbance.   Respiratory:  Negative for cough, shortness of breath and wheezing.    Cardiovascular:  Negative for chest pain, palpitations and leg swelling.   Gastrointestinal:  Negative for abdominal pain, blood in stool, constipation, diarrhea, nausea and vomiting.   Endocrine: Negative for polydipsia, polyphagia and polyuria.   Genitourinary:  Negative for difficulty urinating, dysuria, hematuria and urgency.   Musculoskeletal:  Negative for arthralgias, back pain and myalgias.   Skin:  Negative for color change and rash.   Neurological:  Negative for dizziness, tremors, seizures, syncope, weakness and headaches.   Hematological:  Negative for adenopathy. Does not bruise/bleed easily.   Psychiatric/Behavioral:  Positive for sleep disturbance. Negative for dysphoric mood and suicidal ideas. The patient is nervous/anxious.    All other systems reviewed and are negative.    Objective   /70 (BP Location: Left arm, Patient Position: Sitting, Cuff Size: Adult)   Pulse 60   Temp 97.8 °F (36.6 °C)   Ht 5' 3\" (1.6 m)   Wt 68.1 kg (150 lb 3.2 oz)   LMP  (LMP Unknown)   SpO2 98%   BMI 26.61 kg/m²     Physical Exam  Vitals and nursing note reviewed.   Constitutional:       General: She is not in acute distress.     Appearance: She is well-developed. She is not diaphoretic.   HENT:      Head: Normocephalic and atraumatic.      Right Ear: External ear normal.      Left Ear: External ear normal.      Nose: Nose normal.   Eyes:      General:         Right eye: No discharge.         Left eye: No discharge.      Conjunctiva/sclera: Conjunctivae normal.      Pupils: Pupils are equal, round, and reactive to light.   Neck:      Thyroid: No thyromegaly.      Trachea: No tracheal deviation.   Cardiovascular:    "   Rate and Rhythm: Normal rate and regular rhythm.      Heart sounds: Murmur heard.      No friction rub.   Pulmonary:      Effort: Pulmonary effort is normal. No respiratory distress.      Breath sounds: Normal breath sounds. No stridor. No wheezing or rales.   Abdominal:      General: Bowel sounds are normal. There is no distension.      Palpations: Abdomen is soft.      Tenderness: There is no abdominal tenderness. There is no guarding.   Musculoskeletal:         General: No swelling, tenderness or deformity. Normal range of motion.      Cervical back: Normal range of motion and neck supple.   Lymphadenopathy:      Cervical: No cervical adenopathy.   Skin:     General: Skin is warm and dry.      Capillary Refill: Capillary refill takes less than 2 seconds.      Coloration: Skin is not pale.      Findings: No erythema or rash.   Neurological:      Mental Status: She is alert and oriented to person, place, and time.      Cranial Nerves: No cranial nerve deficit.      Coordination: Coordination normal.   Psychiatric:         Mood and Affect: Mood normal.         Behavior: Behavior normal.

## 2025-03-31 NOTE — PROGRESS NOTES
TCM Call (since 3/17/2025)       Date and time call was made  3/25/2025  8:59 AM    Patient was hospitialized at  [facility]    Date of Admission  03/17/25    Date of discharge  03/24/25    Disposition  Home          TCM Call (since 3/17/2025)       Scheduled for follow up?  Yes    I have advised the patient to call PCP with any new or worsening symptoms  MR Shania        Done in detail....

## 2025-04-22 ENCOUNTER — OFFICE VISIT (OUTPATIENT)
Dept: GASTROENTEROLOGY | Facility: MEDICAL CENTER | Age: 60
End: 2025-04-22
Payer: COMMERCIAL

## 2025-04-22 VITALS
SYSTOLIC BLOOD PRESSURE: 113 MMHG | HEART RATE: 90 BPM | WEIGHT: 149.4 LBS | BODY MASS INDEX: 26.47 KG/M2 | DIASTOLIC BLOOD PRESSURE: 65 MMHG | TEMPERATURE: 97.8 F

## 2025-04-22 DIAGNOSIS — Z87.19 HISTORY OF SMALL BOWEL OBSTRUCTION: Primary | ICD-10-CM

## 2025-04-22 DIAGNOSIS — R93.5 ABNORMAL CT OF THE ABDOMEN: ICD-10-CM

## 2025-04-22 DIAGNOSIS — E11.69 HYPERLIPIDEMIA ASSOCIATED WITH TYPE 2 DIABETES MELLITUS  (HCC): ICD-10-CM

## 2025-04-22 DIAGNOSIS — E78.5 HYPERLIPIDEMIA ASSOCIATED WITH TYPE 2 DIABETES MELLITUS  (HCC): ICD-10-CM

## 2025-04-22 DIAGNOSIS — M85.80 OSTEOPENIA, UNSPECIFIED LOCATION: Primary | ICD-10-CM

## 2025-04-22 DIAGNOSIS — G62.9 NEUROPATHY: ICD-10-CM

## 2025-04-22 PROCEDURE — 99214 OFFICE O/P EST MOD 30 MIN: CPT | Performed by: INTERNAL MEDICINE

## 2025-04-22 RX ORDER — FENOFIBRATE 145 MG/1
145 TABLET, FILM COATED ORAL DAILY
Qty: 90 TABLET | Refills: 1 | OUTPATIENT
Start: 2025-04-22

## 2025-04-22 RX ORDER — CELECOXIB 200 MG/1
200 CAPSULE ORAL DAILY
Qty: 90 CAPSULE | Refills: 1 | OUTPATIENT
Start: 2025-04-22

## 2025-04-22 RX ORDER — CALCIUM POLYCARBOPHIL 625 MG/1
1 TABLET, FILM COATED ORAL 2 TIMES DAILY
Qty: 180 TABLET | Refills: 6 | Status: SHIPPED | OUTPATIENT
Start: 2025-04-22

## 2025-04-22 RX ORDER — ATORVASTATIN CALCIUM 40 MG/1
TABLET, FILM COATED ORAL
Qty: 90 TABLET | Refills: 1 | OUTPATIENT
Start: 2025-04-22

## 2025-04-22 NOTE — PROGRESS NOTES
Name: Hayley Bolaños      : 1965      MRN: [id number]  Encounter Provider: Neal Young MD  Encounter Date: 2025   Encounter department: [facility] [place]  :  Assessment & Plan  History of small bowel obstruction    Orders:    Calprotectin,Fecal; Future    C-reactive protein; Future    psyllium (METAMUCIL) 58.6 % powder; Take 1 packet by mouth 3 (three) times a day    Abnormal CT of the abdomen       Acute symptoms resolved.  Small bowel obstruction likely secondary to abdominal adhesions.  CT did not reveal any abnormalities in colon.  Her previous colonoscopy results was reviewed.    Colonoscopy was done in , 1 tubular adenoma was removed, recommended to repeat colonoscopy in .  Patient was counseled on importance of increased water and fiber intake.  Start Metamucil supplement.  Labs to rule out Crohn's disease.  Follow-up on as-needed basis.      History of Present Illness   HPI  Hayley Bolaños is a 59 y.o. female who presents for evaluation of recent hospitalization.  Patient had acute onset of nausea vomiting and abdominal pain and went to the hospital in March.  CT showed   Interval development of small bowel obstruction with fecalization in distal small bowel loop contents as seen on image 2/114 and transition point in the anterior mid pelvis images 2/121 and 601/64, possibly from an adhesion.  Patient was treated conservatively and symptoms resolved.  She had a history of multiple abdominal surgeries in the past.  She reported she has symptoms of constipation prior to her hospitalization.  She reported now she has bowel movement daily with sense of incomplete evacuation.    Review of Systems  Past Medical History   Past Medical History:   Diagnosis Date    Acute exacerbation of chronic obstructive pulmonary disease (COPD) (HCC) 2020    Asthma     exercise induced    Diabetes (HCC)     type 2    Diabetes mellitus (HCC)     Hypertension     Morbid obesity  ([facility]) 04/07/2020    Osteoarthritis 04/07/2020    Shingles 11/14/2023     Past Surgical History:   Procedure Laterality Date    BREAST BIOPSY Left 2016    benign    CHOLECYSTECTOMY      COLONOSCOPY      FL INJECTION LEFT SHOULDER (NON ARTHROGRAM)  07/28/2023    FL LUMBAR PUNCTURE DIAGNOSTIC  07/19/2023    HERNIA REPAIR      LAPAROSCOPY FOR ECTOPIC PREGNANCY      x2, both tubes removed     MAMMO STEREOTACTIC BREAST BIOPSY LEFT (ALL INC) Left     REPLACEMENT TOTAL KNEE Left     SMALL INTESTINE SURGERY      after hernia surgery      Family History   Problem Relation Age of Onset    Breast cancer Mother 40        cause of death    Heart attack Father         MI cause of death    No Known Problems Sister     No Known Problems Daughter     No Known Problems Maternal Grandmother     No Known Problems Maternal Grandfather     No Known Problems Paternal Grandmother     No Known Problems Paternal Grandfather     No Known Problems Sister     No Known Problems Sister     Colon cancer Neg Hx     Ovarian cancer Neg Hx       reports that she has quit smoking. Her smoking use included cigarettes. She started smoking about 8 years ago. She has never used smokeless tobacco. She reports that she does not currently use alcohol. She reports that she does not use drugs.  Current Outpatient Medications   Medication Instructions    Acetaminophen (TYLENOL PO) Take by mouth As needed for pain    albuterol (PROVENTIL HFA,VENTOLIN HFA) 90 mcg/act inhaler 2 puffs, Inhalation, Every 6 hours PRN    Aspirin Low Dose 81 mg, Oral, Daily    atorvastatin (LIPITOR) 40 mg, Oral, Daily    calcipotriene (DOVONEX) 0.005 % cream APPLY TO AFFECTED AREA TWICE A DAY 30 DAYS LARRGER AREA HAND LEGS    Calcium Carb-Cholecalciferol (Calcium + Vitamin D3) 600-10 MG-MCG TABS 2 tablets, Oral, Daily    celecoxib (CELEBREX) 200 mg, Oral, Daily, With Lunch/daily    cetirizine (ZYRTEC) 10 mg, Oral, Daily, In the evening    escitalopram (LEXAPRO) 10 mg, Oral, Daily with  dinner    Farxiga 5 mg, Oral, Daily with lunch    fenofibrate (TRICOR) 145 mg, Oral, Daily    glipiZIDE (GLUCOTROL) 5 mg, Oral, 2 times daily with meals    glucose blood test strip Use as instructed to test three times daily.    Lantus SoloStar 100 units/mL SOPN     lidocaine (Lidoderm) 5 % 1 patch, Topical, Daily, Remove & Discard patch within 12 hours or as directed by MD    metFORMIN (GLUCOPHAGE) 1,000 mg, Oral, 2 times daily with meals    metoprolol tartrate (LOPRESSOR) 12.5 mg, Oral, Every 12 hours scheduled    montelukast (SINGULAIR) 10 mg, Oral, Every 24 hours    oxybutynin (DITROPAN XL) 15 mg, Oral, Daily    pantoprazole (PROTONIX) 40 mg, Oral, Daily    psyllium (METAMUCIL) 58.6 % powder 1 packet, Oral, 3 times daily    vitamin B-12 (VITAMIN B-12) 1,000 mcg, Oral, Daily     Allergies   Allergen Reactions    No Active Allergies          Objective   /65   Pulse 90   Temp 97.8 °F (36.6 °C)   Wt 67.8 kg (149 lb 6.4 oz)   LMP  (LMP Unknown)   BMI 26.47 kg/m²      Physical Exam

## 2025-04-25 ENCOUNTER — DOCUMENTATION (OUTPATIENT)
Dept: ADMINISTRATIVE | Facility: OTHER | Age: 60
End: 2025-04-25

## 2025-04-25 NOTE — PROGRESS NOTES
04/25/25 7:31 AM    Annual Wellness Visit outreach is not required, an AWV was completed at the PCP office.    Thank you.  Kelly Mak  [facility]

## 2025-05-12 ENCOUNTER — RA CDI HCC (OUTPATIENT)
Dept: OTHER | Facility: HOSPITAL | Age: 60
End: 2025-05-12

## 2025-05-13 NOTE — PROGRESS NOTES
HCC coding opportunities     E11.36, J44.9     Chart Reviewed number of suggestions sent to Provider: 2     Patients Insurance     Medicare Insurance: [employer] Advantage

## 2025-05-16 ENCOUNTER — OFFICE VISIT (OUTPATIENT)
Dept: FAMILY MEDICINE CLINIC | Facility: CLINIC | Age: 60
End: 2025-05-16
Payer: COMMERCIAL

## 2025-05-16 VITALS
OXYGEN SATURATION: 99 % | BODY MASS INDEX: 26.97 KG/M2 | TEMPERATURE: 97.6 F | HEIGHT: 63 IN | HEART RATE: 59 BPM | SYSTOLIC BLOOD PRESSURE: 118 MMHG | DIASTOLIC BLOOD PRESSURE: 64 MMHG | WEIGHT: 152.2 LBS

## 2025-05-16 DIAGNOSIS — Z12.11 SCREEN FOR COLON CANCER: ICD-10-CM

## 2025-05-16 DIAGNOSIS — I10 ESSENTIAL HYPERTENSION: ICD-10-CM

## 2025-05-16 DIAGNOSIS — M81.8 IDIOPATHIC OSTEOPOROSIS: ICD-10-CM

## 2025-05-16 DIAGNOSIS — F32.A DEPRESSION, UNSPECIFIED DEPRESSION TYPE: ICD-10-CM

## 2025-05-16 DIAGNOSIS — R41.3 MEMORY LOSS: ICD-10-CM

## 2025-05-16 DIAGNOSIS — E11.69 HYPERLIPIDEMIA ASSOCIATED WITH TYPE 2 DIABETES MELLITUS  (HCC): ICD-10-CM

## 2025-05-16 DIAGNOSIS — E11.8 TYPE II DIABETES MELLITUS WITH COMPLICATION (HCC): Primary | ICD-10-CM

## 2025-05-16 DIAGNOSIS — E78.5 HYPERLIPIDEMIA ASSOCIATED WITH TYPE 2 DIABETES MELLITUS  (HCC): ICD-10-CM

## 2025-05-16 DIAGNOSIS — Z12.31 SCREENING MAMMOGRAM FOR BREAST CANCER: ICD-10-CM

## 2025-05-16 DIAGNOSIS — Z01.419 WOMEN'S ANNUAL ROUTINE GYNECOLOGICAL EXAMINATION: ICD-10-CM

## 2025-05-16 DIAGNOSIS — K21.9 GASTROESOPHAGEAL REFLUX DISEASE WITHOUT ESOPHAGITIS: ICD-10-CM

## 2025-05-16 DIAGNOSIS — M85.80 OSTEOPENIA, UNSPECIFIED LOCATION: ICD-10-CM

## 2025-05-16 DIAGNOSIS — M77.8 LEFT SHOULDER TENDONITIS: ICD-10-CM

## 2025-05-16 LAB — SL AMB POCT HEMOGLOBIN AIC: 7.2 (ref ?–6.5)

## 2025-05-16 PROCEDURE — 99214 OFFICE O/P EST MOD 30 MIN: CPT | Performed by: INTERNAL MEDICINE

## 2025-05-16 PROCEDURE — 83036 HEMOGLOBIN GLYCOSYLATED A1C: CPT | Performed by: INTERNAL MEDICINE

## 2025-05-16 RX ORDER — DAPAGLIFLOZIN 5 MG/1
5 TABLET, FILM COATED ORAL
Qty: 90 TABLET | Refills: 3 | Status: SHIPPED | OUTPATIENT
Start: 2025-05-16

## 2025-05-16 RX ORDER — GLIPIZIDE 5 MG/1
5 TABLET ORAL 2 TIMES DAILY WITH MEALS
Qty: 180 TABLET | Refills: 1 | Status: SHIPPED | OUTPATIENT
Start: 2025-05-16

## 2025-05-16 RX ORDER — METOPROLOL TARTRATE 25 MG/1
12.5 TABLET, FILM COATED ORAL EVERY 12 HOURS SCHEDULED
Qty: 180 TABLET | Refills: 0 | Status: SHIPPED | OUTPATIENT
Start: 2025-05-16

## 2025-05-16 RX ORDER — FENOFIBRATE 145 MG/1
145 TABLET, FILM COATED ORAL DAILY
Qty: 90 TABLET | Refills: 1 | Status: SHIPPED | OUTPATIENT
Start: 2025-05-16

## 2025-05-16 RX ORDER — ATORVASTATIN CALCIUM 40 MG/1
40 TABLET, FILM COATED ORAL DAILY
Qty: 90 TABLET | Refills: 1 | Status: SHIPPED | OUTPATIENT
Start: 2025-05-16

## 2025-05-16 RX ORDER — LANOLIN ALCOHOL/MO/W.PET/CERES
1000 CREAM (GRAM) TOPICAL DAILY
Qty: 90 TABLET | Refills: 3 | Status: SHIPPED | OUTPATIENT
Start: 2025-05-16

## 2025-05-16 RX ORDER — ESCITALOPRAM OXALATE 10 MG/1
10 TABLET ORAL
Qty: 30 TABLET | Refills: 5 | Status: SHIPPED | OUTPATIENT
Start: 2025-05-16

## 2025-05-16 RX ORDER — CALCIUM CARBONATE/VITAMIN D3 600 MG-10
2 TABLET ORAL DAILY
Qty: 60 TABLET | Refills: 6 | Status: SHIPPED | OUTPATIENT
Start: 2025-05-16

## 2025-05-16 NOTE — ASSESSMENT & PLAN NOTE
Orders:    Comprehensive metabolic panel; Future    CBC and differential; Future    Lipid Panel with Direct LDL reflex; Future    TSH, 3rd generation with Free T4 reflex; Future    POCT ECG    UA (URINE) with reflex to Scope; Future    Quantiferon TB Gold Plus Assay; Future    H. pylori antigen, stool; Future    Vitamin B12; Future    Vitamin D 25 hydroxy; Future    Magnesium; Future    metoprolol tartrate (LOPRESSOR) 25 mg tablet; Take 0.5 tablets (12.5 mg total) by mouth every 12 (twelve) hours

## 2025-05-16 NOTE — ASSESSMENT & PLAN NOTE
Lab Results   Component Value Date    HGBA1C 7.2 (A) 05/16/2025       Orders:    POCT ECG    UA (URINE) with reflex to Scope; Future    Quantiferon TB Gold Plus Assay; Future    H. pylori antigen, stool; Future    Vitamin B12; Future    Vitamin D 25 hydroxy; Future    Magnesium; Future    atorvastatin (LIPITOR) 40 mg tablet; Take 1 tablet (40 mg total) by mouth daily    fenofibrate (TRICOR) 145 mg tablet; Take 1 tablet (145 mg total) by mouth daily

## 2025-05-16 NOTE — PATIENT INSTRUCTIONS

## 2025-05-16 NOTE — PROGRESS NOTES
Name: Hayley Bolaños      : 1965      MRN: [id number]  Encounter Provider: Jose Chase MD  Encounter Date: 2025   Encounter department: [facility] [place]  :  Assessment & Plan  Gastroesophageal reflux disease without esophagitis    Orders:    Comprehensive metabolic panel; Future    CBC and differential; Future    Lipid Panel with Direct LDL reflex; Future    TSH, 3rd generation with Free T4 reflex; Future    POCT hemoglobin A1c    POCT ECG    UA (URINE) with reflex to Scope; Future    Quantiferon TB Gold Plus Assay; Future    H. pylori antigen, stool; Future    Vitamin B12; Future    Vitamin D 25 hydroxy; Future    Magnesium; Future    Essential hypertension    Orders:    Comprehensive metabolic panel; Future    CBC and differential; Future    Lipid Panel with Direct LDL reflex; Future    TSH, 3rd generation with Free T4 reflex; Future    POCT ECG    UA (URINE) with reflex to Scope; Future    Quantiferon TB Gold Plus Assay; Future    H. pylori antigen, stool; Future    Vitamin B12; Future    Vitamin D 25 hydroxy; Future    Magnesium; Future    metoprolol tartrate (LOPRESSOR) 25 mg tablet; Take 0.5 tablets (12.5 mg total) by mouth every 12 (twelve) hours    Hyperlipidemia associated with type 2 diabetes mellitus  (HCC)    Lab Results   Component Value Date    HGBA1C 7.2 (A) 2025       Orders:    POCT ECG    UA (URINE) with reflex to Scope; Future    Quantiferon TB Gold Plus Assay; Future    H. pylori antigen, stool; Future    Vitamin B12; Future    Vitamin D 25 hydroxy; Future    Magnesium; Future    atorvastatin (LIPITOR) 40 mg tablet; Take 1 tablet (40 mg total) by mouth daily    fenofibrate (TRICOR) 145 mg tablet; Take 1 tablet (145 mg total) by mouth daily    Osteopenia, unspecified location    Orders:    Calcium Carb-Cholecalciferol (Calcium + Vitamin D3) 600-10 MG-MCG TABS; Take 2 tablets by mouth daily    Depression, unspecified depression type      Orders:     escitalopram (LEXAPRO) 10 mg tablet; Take 1 tablet (10 mg total) by mouth daily with dinner    Type II diabetes mellitus with complication (HCC)    Lab Results   Component Value Date    HGBA1C 7.2 (A) 05/16/2025       Orders:    Farxiga 5 MG TABS; Take 1 tablet (5 mg total) by mouth daily with lunch    glipiZIDE (GLUCOTROL) 5 mg tablet; Take 1 tablet (5 mg total) by mouth 2 (two) times a day with meals    BMI 26.0-26.9,adult    Orders:    metFORMIN (GLUCOPHAGE) 1000 MG tablet; Take 1 tablet (1,000 mg total) by mouth 2 (two) times a day with meals    Memory loss  Fu w neurology  RTC in 3mos w;'  Orders:    Vitamin B12; Future    vitamin B-12 (VITAMIN B-12) 1,000 mcg tablet; Take 1 tablet (1,000 mcg total) by mouth daily    Idiopathic osteoporosis  Caltrate plus D daily  RTC in 3 mos w :  Orders:    DXA bone density spine hip and pelvis; Future    Vitamin B12; Future    Vitamin D 25 hydroxy; Future    Screening mammogram for breast cancer    Orders:    Mammo screening bilateral w 3d and cad; Future    Screen for colon cancer    Orders:    Cologuard    H. pylori antigen, stool; Future    Women's annual routine gynecological examination    Orders:    Ambulatory Referral to Obstetrics / Gynecology; Future           History of Present Illness   60 Y O lady is here for Regular check Up, she has few symptoms, recent blood work and med list reviewed,...      Review of Systems   Constitutional:  Positive for fatigue. Negative for chills and fever.   HENT:  Positive for postnasal drip. Negative for congestion, ear pain, facial swelling, sore throat, trouble swallowing and voice change.    Eyes:  Negative for pain, discharge and visual disturbance.   Respiratory:  Negative for cough, shortness of breath and wheezing.    Cardiovascular:  Negative for chest pain, palpitations and leg swelling.   Gastrointestinal:  Negative for abdominal pain, blood in stool, constipation, diarrhea, nausea and vomiting.   Endocrine: Negative for  "polydipsia, polyphagia and polyuria.   Genitourinary:  Negative for difficulty urinating, dysuria, hematuria and urgency.   Musculoskeletal:  Positive for arthralgias. Negative for back pain and myalgias.   Skin:  Negative for color change and rash.   Neurological:  Negative for dizziness, tremors, seizures, syncope, weakness and headaches.   Hematological:  Negative for adenopathy. Does not bruise/bleed easily.   Psychiatric/Behavioral:  Negative for dysphoric mood, sleep disturbance and suicidal ideas.    All other systems reviewed and are negative.      Objective   /64 (BP Location: Left arm, Patient Position: Sitting, Cuff Size: Standard)   Pulse 59   Temp 97.6 °F (36.4 °C) (Temporal)   Ht 5' 3\" (1.6 m)   Wt 69 kg (152 lb 3.2 oz)   LMP  (LMP Unknown)   SpO2 99%   BMI 26.96 kg/m²      Physical Exam  Vitals and nursing note reviewed.   Constitutional:       General: She is not in acute distress.     Appearance: She is well-developed. She is not diaphoretic.   HENT:      Head: Normocephalic and atraumatic.      Right Ear: External ear normal.      Left Ear: External ear normal.      Nose: Nose normal.     Eyes:      General:         Right eye: No discharge.         Left eye: No discharge.      Conjunctiva/sclera: Conjunctivae normal.      Pupils: Pupils are equal, round, and reactive to light.     Neck:      Thyroid: No thyromegaly.      Trachea: No tracheal deviation.     Cardiovascular:      Rate and Rhythm: Normal rate and regular rhythm.      Heart sounds: Murmur heard.      No friction rub.   Pulmonary:      Effort: Pulmonary effort is normal. No respiratory distress.      Breath sounds: Normal breath sounds. No stridor. No wheezing or rales.   Abdominal:      General: Bowel sounds are normal. There is no distension.      Palpations: Abdomen is soft.      Tenderness: There is no abdominal tenderness. There is no guarding.     Musculoskeletal:         General: Tenderness present. No swelling or " deformity. Normal range of motion.      Cervical back: Normal range of motion and neck supple.   Lymphadenopathy:      Cervical: No cervical adenopathy.     Skin:     General: Skin is warm and dry.      Capillary Refill: Capillary refill takes less than 2 seconds.      Coloration: Skin is not pale.      Findings: No erythema or rash.     Neurological:      Mental Status: She is alert and oriented to person, place, and time.      Cranial Nerves: No cranial nerve deficit.      Coordination: Coordination normal.     Psychiatric:         Mood and Affect: Mood normal.         Behavior: Behavior normal.

## 2025-05-19 ENCOUNTER — TELEPHONE (OUTPATIENT)
Age: 60
End: 2025-05-19

## 2025-05-19 NOTE — TELEPHONE ENCOUNTER
Contacted patient for Talk Therapy  wait list to verify needs of services in attempts to update wait list PREFERENCES and offer outside resource guide. Writer verified Full Name, , Callback Number, Address, and Insurance. Writer spoke with patient who confirmed needs of service. Patient provided preferences and declined resources.    Called 1x    PREFERENCES  Location: [facility]  Provider: open to provider  Appt Type: In Person    Additional Resources - Declined

## 2025-06-23 ENCOUNTER — OFFICE VISIT (OUTPATIENT)
Dept: OBGYN CLINIC | Facility: CLINIC | Age: 60
End: 2025-06-23

## 2025-06-23 VITALS
WEIGHT: 157 LBS | BODY MASS INDEX: 27.82 KG/M2 | SYSTOLIC BLOOD PRESSURE: 114 MMHG | DIASTOLIC BLOOD PRESSURE: 60 MMHG | HEIGHT: 63 IN

## 2025-06-23 DIAGNOSIS — B36.9 FUNGAL INFECTION OF SKIN: ICD-10-CM

## 2025-06-23 DIAGNOSIS — F32.A DEPRESSION, UNSPECIFIED DEPRESSION TYPE: ICD-10-CM

## 2025-06-23 DIAGNOSIS — Z01.419 ENCOUNTER FOR ANNUAL ROUTINE GYNECOLOGICAL EXAMINATION: Primary | ICD-10-CM

## 2025-06-23 PROCEDURE — G0101 CA SCREEN;PELVIC/BREAST EXAM: HCPCS | Performed by: NURSE PRACTITIONER

## 2025-06-23 RX ORDER — NYSTATIN 100000 [USP'U]/G
POWDER TOPICAL 2 TIMES DAILY
Qty: 60 G | Refills: 2 | Status: SHIPPED | OUTPATIENT
Start: 2025-06-23

## 2025-06-23 NOTE — PROGRESS NOTES
Assessment/Plan     Diagnoses and all orders for this visit:    Encounter for annual routine gynecological examination    Fungal infection of skin  Comments:  under panis  Orders:  -     nystatin (MYCOSTATIN) powder; Apply topically 2 (two) times a day    Depression, unspecified depression type  -     Ambulatory Referral to Social Work Care Management Program; Future    Other orders  -     Ambulatory Referral to Obstetrics / Gynecology         Plan  Patient Instructions   Schedule mammogram  Use Nystatin powder to abdomen as directed  Call with needs or concerns  Return in 1 year for annual GYN exam  Return in about 1 year (around 2026) for Annual GYN exam.  Pt verbalized understanding of all discussed.      Rob Bolaños is a 60 y.o. female who presents for annual exam. The patient has no complaints today. The patient is not sexually active. GYN screening history: last pap: approximate date 2022 and negative HPV and was normal and last mammogram: approximate date 2023 and was normal. The patient is not taking hormone replacement therapy. Patient denies post-menopausal vaginal bleeding.. The patient participates in regular exercise: no. Discussed the importance of exercise, calcium and vitamin D to decrease the risk od bone Fx after menopause.The patient reports that there is not domestic violence in her life. The patient is not having menopausal symptoms none  Colonoscopy 2023, repeat is recommended in 7 years  C/O recurrent rash under panis    Explained to patient, rash under panis appears like yeast, safe and effective use of nystatin powder was provided  Advised to keep area under panis clean and dry    Pt states she is depressed most days, has a hard time getting out of bed. Depression screen was a 5, denies suicidal thoughts Pt does not see someone for depression. Explained a referral to social work would be provided.     Menstrual History:  OB History          4     "Para   1    Term   1            AB   3    Living   1         SAB        IAB   1    Ectopic   2    Multiple        Live Births   1                Menarche age: 9  No LMP recorded (lmp unknown). Patient is postmenopausal. Late 40's       The following portions of the patient's history were reviewed and updated as appropriate: allergies, current medications, past family history, past medical history, past social history, past surgical history, and problem list.    Review of Systems  Pertinent items are noted in HPI.     Objective      /60 (Patient Position: Sitting, Cuff Size: Large)   Ht 5' 3\" (1.6 m)   Wt 71.2 kg (157 lb)   LMP  (LMP Unknown)   BMI 27.81 kg/m²      General:   alert and oriented, in no acute distress, alert, appears stated age, and cooperative   Heart: NSR   Lungs: clear to auscultation bilaterally, WNL respiratory effort, negative cough or SOB   Thyroid: Negative cough or SOB   Abdomen: soft, non-tender, without masses or organomegaly palpable, yeast appearing infection under panis   Vulva: normal   Vagina: normal mucosa   Cervix: no cervical motion tenderness and no lesions   Uterus: normal size, non-tender, normal shape and consistency   Adnexa: normal adnexa   Breasts: NT, negative masses palpable, negative dischage or dimpling         Lab Review  To schedule mammogram        "

## 2025-06-23 NOTE — PATIENT INSTRUCTIONS
Schedule mammogram  Use Nystatin powder to abdomen as directed  Call with needs or concerns  Return in 1 year for annual GYN exam

## 2025-07-01 ENCOUNTER — PATIENT OUTREACH (OUTPATIENT)
Dept: OBGYN CLINIC | Facility: CLINIC | Age: 60
End: 2025-07-01

## 2025-07-01 NOTE — PROGRESS NOTES
KRISTAL PATEL was referred by THAO Fischer to contact pt for a high PHQ-9 no SI/HI. KRISTAL PATEL did review the pt chart prior to calling. Pt is having difficulty with short term memory loss and follows with neurology. Pt is on the wait list for  Behavioral Health services for talk therapy. KRISTAL PATEL contacted the pt, pt reports that her niece helps her with tasks at home, she is independent with bathing and walking but needs assistance with cooking. Her niece is not a paid care giver. Pt reports to KRISTAL PATEL that she has no medical POA and still makes her own medical decisions. KRISTAL PATEL explained psychiatry vs talk therapy services pt is not interested in psychiatry at this time but would like to remain on the talk therapy wait list. KRISTAL PATEL encouraged the pt to outreach as needed.

## 2025-07-12 DIAGNOSIS — I10 ESSENTIAL HYPERTENSION: ICD-10-CM

## 2025-07-15 RX ORDER — ASPIRIN 81 MG/1
81 TABLET, COATED ORAL DAILY
Qty: 90 TABLET | Refills: 1 | Status: SHIPPED | OUTPATIENT
Start: 2025-07-15